# Patient Record
Sex: FEMALE | Race: WHITE | NOT HISPANIC OR LATINO | Employment: FULL TIME | URBAN - NONMETROPOLITAN AREA
[De-identification: names, ages, dates, MRNs, and addresses within clinical notes are randomized per-mention and may not be internally consistent; named-entity substitution may affect disease eponyms.]

---

## 2017-03-17 ENCOUNTER — OFFICE VISIT (OUTPATIENT)
Dept: CARDIOLOGY | Facility: CLINIC | Age: 57
End: 2017-03-17

## 2017-03-17 VITALS
HEIGHT: 62 IN | BODY MASS INDEX: 29.08 KG/M2 | SYSTOLIC BLOOD PRESSURE: 128 MMHG | WEIGHT: 158 LBS | OXYGEN SATURATION: 97 % | HEART RATE: 72 BPM | DIASTOLIC BLOOD PRESSURE: 79 MMHG

## 2017-03-17 DIAGNOSIS — R07.9 CHEST PAIN, UNSPECIFIED TYPE: Primary | ICD-10-CM

## 2017-03-17 DIAGNOSIS — E78.5 DYSLIPIDEMIA: ICD-10-CM

## 2017-03-17 DIAGNOSIS — R09.89 DIMINISHED PULSES IN LOWER EXTREMITY: ICD-10-CM

## 2017-03-17 DIAGNOSIS — R06.02 SHORTNESS OF BREATH: ICD-10-CM

## 2017-03-17 DIAGNOSIS — R00.2 PALPITATIONS: ICD-10-CM

## 2017-03-17 DIAGNOSIS — R60.9 PERIPHERAL EDEMA: ICD-10-CM

## 2017-03-17 DIAGNOSIS — J06.9 UPPER RESPIRATORY TRACT INFECTION, UNSPECIFIED TYPE: ICD-10-CM

## 2017-03-17 PROCEDURE — 99213 OFFICE O/P EST LOW 20 MIN: CPT | Performed by: NURSE PRACTITIONER

## 2017-03-17 PROCEDURE — 93000 ELECTROCARDIOGRAM COMPLETE: CPT | Performed by: NURSE PRACTITIONER

## 2017-03-17 RX ORDER — AZITHROMYCIN 250 MG/1
TABLET, FILM COATED ORAL
Qty: 6 TABLET | Refills: 0 | Status: SHIPPED | OUTPATIENT
Start: 2017-03-17 | End: 2017-10-09 | Stop reason: ALTCHOICE

## 2017-03-17 RX ORDER — ESTRADIOL 0.05 MG/D
FILM, EXTENDED RELEASE TRANSDERMAL
COMMUNITY
Start: 2015-05-13

## 2017-03-17 RX ORDER — DIMENHYDRINATE 50 MG
TABLET ORAL
COMMUNITY
Start: 2015-09-14

## 2017-03-17 RX ORDER — CALCIUM CARBONATE 500(1250)
TABLET ORAL DAILY
COMMUNITY
Start: 2015-05-13

## 2017-03-17 RX ORDER — MELOXICAM 15 MG/1
15 TABLET ORAL DAILY
COMMUNITY
Start: 2017-01-10 | End: 2021-01-19

## 2017-03-17 RX ORDER — LORATADINE 10 MG/1
TABLET ORAL DAILY
COMMUNITY
Start: 2015-09-14 | End: 2019-06-10 | Stop reason: SDUPTHER

## 2017-03-17 RX ORDER — NARATRIPTAN 2.5 MG/1
2.5 TABLET ORAL EVERY 4 HOURS PRN
COMMUNITY
Start: 2015-05-13

## 2017-03-17 RX ORDER — NICOTINE POLACRILEX 4 MG/1
GUM, CHEWING ORAL DAILY
COMMUNITY
Start: 2015-05-13 | End: 2018-07-09 | Stop reason: SDUPTHER

## 2017-03-17 RX ORDER — NAPROXEN 500 MG/1
500 TABLET ORAL DAILY PRN
COMMUNITY
Start: 2015-09-14

## 2017-03-17 NOTE — PROGRESS NOTES
Subjective   Linda Schafer is a 56 y.o. female     Chief Complaint   Patient presents with   • Follow-up     presents as a follow up       HPI    Problem list    1. History of heart murmur  1.1 Echo 10/27/15 - EF 60-65%; mild MR and TR; PA 25-30  2. Dyslipidemia  3. Palpitations      4. BLE Arterial Ultrasound 9/14/15 - no obstructive disease on either side; mildly abnormal doppler profile left distally.          5. Chest Pain         5.1 Stress Test 10/27/15 - low risk study    Patient is a 56-year-old female who presents today for a follow-up.  She says she has been having sharp pain under her left breast.  She has had 3 in 2 weeks.  They come and go really quickly.  She will have palpitations, but they are random.  They occur when she is anxious, but without anxiety as well.  She had dizziness when she had the flu in Jan, but otherwise no dizziness, presyncope or syncope.  She denies any orthopnea or PND.  She will get edema if she misses her water pill.  She says since she had the flu she has had shortness of breath with her normal activity.  PCP monitors her cholesterol.     Current Outpatient Prescriptions   Medication Sig Dispense Refill   • aspirin 81 MG tablet Take  by mouth Daily.     • atorvastatin (LIPITOR) 20 MG tablet TAKE ONE TABLET BY MOUTH EVERY NIGHT AT BEDTIME 30 tablet 2   • Calcium 500 MG tablet Take  by mouth Daily.     • Coenzyme Q10 (CO Q-10) 100 MG capsule Take  by mouth.     • estradiol (MINIVELLE) 0.05 MG/24HR patch Place  on the skin.     • furosemide (LASIX) 40 MG tablet Take 1 tablet by mouth Daily. 30 tablet 1   • loratadine (ALLERGY RELIEF) 10 MG tablet Take  by mouth Daily.     • MULTIPLE VITAMIN PO Take  by mouth Daily.     • naproxen (NAPROSYN) 500 MG tablet Take 500 mg by mouth Daily As Needed.     • naratriptan (AMERGE) 2.5 MG tablet Take 2.5 mg by mouth Every 4 (Four) Hours As Needed. migraines     • Omeprazole 20 MG tablet delayed-release Take  by mouth Daily.     • potassium  chloride (K-DUR) 10 MEQ CR tablet Take 1 tablet by mouth Daily. 30 tablet 1   • azithromycin (ZITHROMAX) 250 MG tablet Take 2 tablets the first day, then 1 tablet daily for 4 days. 6 tablet 0   • meloxicam (MOBIC) 15 MG tablet Take 15 mg by mouth Daily.       No current facility-administered medications for this visit.        ALLERGIES    Codeine; Penicillins; and Sulfa antibiotics    Past Medical History   Diagnosis Date   • Atypical chest pain      Chest pain.Atypical chest pain   • Chest pain    • Diminished pulses in lower extremity    • Diverticulosis    • Dizziness      Occasional   • Dyslipidemia    • Dyspnea    • Edema    • Fatigue    • GERD (gastroesophageal reflux disease)    • Hiatal hernia    • Migraine headache    • Palpitations    • Personal history of cardiac murmur    • SOB (shortness of breath)        Social History     Social History   • Marital status:      Spouse name: N/A   • Number of children: N/A   • Years of education: N/A     Occupational History   • Not on file.     Social History Main Topics   • Smoking status: Never Smoker   • Smokeless tobacco: Not on file   • Alcohol use No   • Drug use: Not on file   • Sexual activity: Not on file     Other Topics Concern   • Not on file     Social History Narrative       Family History   Problem Relation Age of Onset   • Hyperlipidemia Other      Dyslipidemia   • Stroke Other      CVA   • Coronary artery disease Other    • Diabetes Other    • Hypertension Other    • Sudden death Other      Sudden Cardiac Death (SCD)       Review of Systems   Constitutional: Positive for fatigue (patient recently has had the flu and has not gotten her strength back). Negative for diaphoresis.   HENT: Positive for congestion, postnasal drip, rhinorrhea and sneezing.    Eyes: Positive for visual disturbance (reading glasses ).   Respiratory: Positive for shortness of breath (some shortness of breath with daily activity s/p flu). Negative for chest tightness.   "  Cardiovascular: Positive for chest pain (discomfort under left breast sharp pain have had 3 in 2 weeks; comes and goes quickly   ), palpitations (just random, if get nervous, but not always ) and leg swelling (if misses her water pill ).   Gastrointestinal: Negative for nausea and vomiting.   Endocrine: Negative.    Genitourinary: Negative for difficulty urinating.   Musculoskeletal: Positive for arthralgias and neck pain (neck and shoulders; MRI DDD). Negative for back pain.   Skin: Negative.    Allergic/Immunologic: Positive for environmental allergies.   Neurological: Positive for dizziness (when she had the flu in Jan ) and headaches (h/o of migraines ). Negative for syncope and light-headedness.   Hematological: Bruises/bleeds easily (has blood blisters come up on her hands, mostly around fingertips, after they bust her fingers turn white).   Psychiatric/Behavioral: Negative.        Objective   Visit Vitals   • /79 (BP Location: Left arm, Patient Position: Sitting)   • Pulse 72   • Ht 62\" (157.5 cm)   • Wt 158 lb (71.7 kg)   • SpO2 97%   • BMI 28.9 kg/m2     Lab Results (most recent)     None        Physical Exam   Constitutional: She is oriented to person, place, and time. Vital signs are normal. She appears well-developed and well-nourished. She is active and cooperative.   HENT:   Head: Normocephalic.   Cardiovascular: Normal rate, regular rhythm and normal heart sounds.    Pulses:       Radial pulses are 2+ on the right side, and 2+ on the left side.        Dorsalis pedis pulses are 2+ on the right side, and 2+ on the left side.        Posterior tibial pulses are 2+ on the right side, and 2+ on the left side.   Trace edema BLE.    Pulmonary/Chest: Effort normal and breath sounds normal.   Abdominal: Normal appearance.   Neurological: She is alert and oriented to person, place, and time.   Skin: Skin is warm, dry and intact.   Psychiatric: She has a normal mood and affect. Her speech is normal and " behavior is normal. Judgment and thought content normal. Cognition and memory are normal.       Procedure     ECG 12 Lead  Date/Time: 3/17/2017 8:23 AM  Performed by: JUAN SHABAZZ  Authorized by: JUAN SHABAZZ   Rhythm: sinus rhythm  Rate: normal  BPM: 70  QRS axis: normal  Clinical impression: normal ECG and low voltage  Comments: Chest pain  Palpitations  Shortness of breath                   Assessment/Plan      Diagnosis Plan   1. Chest pain, unspecified type  ECG 12 Lead    Adult Transthoracic Echo Complete    Adult Transthoracic Echo Complete   2. Palpitations  ECG 12 Lead    Adult Transthoracic Echo Complete    Adult Transthoracic Echo Complete   3. Shortness of breath  ECG 12 Lead    Adult Transthoracic Echo Complete    Adult Transthoracic Echo Complete   4. Dyslipidemia  Adult Transthoracic Echo Complete    Adult Transthoracic Echo Complete   5. Diminished pulses in lower extremity  Adult Transthoracic Echo Complete    Adult Transthoracic Echo Complete   6. Peripheral edema  Adult Transthoracic Echo Complete    Adult Transthoracic Echo Complete   7. Upper respiratory tract infection, unspecified type  azithromycin (ZITHROMAX) 250 MG tablet    Adult Transthoracic Echo Complete    Adult Transthoracic Echo Complete       Return for After testing.         Patient does not want to have a stress test for her chest pain, but will have an echo.  She will continue her medication regimen, however, I sent a Zpack for her URI.  She will follow-up after testing or sooner if any changes.

## 2017-03-17 NOTE — PATIENT INSTRUCTIONS
Palpitations  A palpitation is the feeling that your heartbeat is irregular or is faster than normal. It may feel like your heart is fluttering or skipping a beat. Palpitations are usually not a serious problem. However, in some cases, you may need further medical evaluation.  CAUSES   Palpitations can be caused by:  · Smoking.  · Caffeine or other stimulants, such as diet pills or energy drinks.  · Alcohol.  · Stress and anxiety.  · Strenuous physical activity.  · Fatigue.  · Certain medicines.  · Heart disease, especially if you have a history of irregular heart rhythms (arrhythmias), such as atrial fibrillation, atrial flutter, or supraventricular tachycardia.  · An improperly working pacemaker or defibrillator.  DIAGNOSIS   To find the cause of your palpitations, your health care provider will take your medical history and perform a physical exam. Your health care provider may also have you take a test called an ambulatory electrocardiogram (ECG). An ECG records your heartbeat patterns over a 24-hour period. You may also have other tests, such as:  · Transthoracic echocardiogram (TTE). During echocardiography, sound waves are used to evaluate how blood flows through your heart.  · Transesophageal echocardiogram (NAI).  · Cardiac monitoring. This allows your health care provider to monitor your heart rate and rhythm in real time.  · Holter monitor. This is a portable device that records your heartbeat and can help diagnose heart arrhythmias. It allows your health care provider to track your heart activity for several days, if needed.  · Stress tests by exercise or by giving medicine that makes the heart beat faster.  TREATMENT   Treatment of palpitations depends on the cause of your symptoms and can vary greatly. Most cases of palpitations do not require any treatment other than time, relaxation, and monitoring your symptoms. Other causes, such as atrial fibrillation, atrial flutter, or supraventricular  tachycardia, usually require further treatment.  HOME CARE INSTRUCTIONS   · Avoid:    Caffeinated coffee, tea, soft drinks, diet pills, and energy drinks.    Chocolate.    Alcohol.  · Stop smoking if you smoke.  · Reduce your stress and anxiety. Things that can help you relax include:    A method of controlling things in your body, such as your heartbeats, with your mind (biofeedback).    Yoga.    Meditation.    Physical activity such as swimming, jogging, or walking.  · Get plenty of rest and sleep.  SEEK MEDICAL CARE IF:   · You continue to have a fast or irregular heartbeat beyond 24 hours.  · Your palpitations occur more often.  SEEK IMMEDIATE MEDICAL CARE IF:  · You have chest pain or shortness of breath.  · You have a severe headache.  · You feel dizzy or you faint.  MAKE SURE YOU:  · Understand these instructions.  · Will watch your condition.  · Will get help right away if you are not doing well or get worse.     This information is not intended to replace advice given to you by your health care provider. Make sure you discuss any questions you have with your health care provider.     Document Released: 12/15/2001 Document Revised: 12/23/2014 Document Reviewed: 09/01/2016  [employer] Patient Education ©2016 [employer]  Nonspecific Chest Pain   Chest pain can be caused by many different conditions. There is always a chance that your pain could be related to something serious, such as a heart attack or a blood clot in your lungs. Chest pain can also be caused by conditions that are not life-threatening. If you have chest pain, it is very important to follow up with your health care provider.  CAUSES   Chest pain can be caused by:  · Heartburn.  · Pneumonia or bronchitis.  · Anxiety or stress.  · Inflammation around your heart (pericarditis) or lung (pleuritis or pleurisy).  · A blood clot in your lung.  · A collapsed lung (pneumothorax). It can develop suddenly on its own (spontaneous  pneumothorax) or from trauma to the chest.  · Shingles infection (varicella-zoster virus).  · Heart attack.  · Damage to the bones, muscles, and cartilage that make up your chest wall. This can include:    Bruised bones due to injury.    Strained muscles or cartilage due to frequent or repeated coughing or overwork.    Fracture to one or more ribs.    Sore cartilage due to inflammation (costochondritis).  RISK FACTORS   Risk factors for chest pain may include:  · Activities that increase your risk for trauma or injury to your chest.  · Respiratory infections or conditions that cause frequent coughing.  · Medical conditions or overeating that can cause heartburn.  · Heart disease or family history of heart disease.  · Conditions or health behaviors that increase your risk of developing a blood clot.  · Having had chicken pox (varicella zoster).  SIGNS AND SYMPTOMS  Chest pain can feel like:  · Burning or tingling on the surface of your chest or deep in your chest.  · Crushing, pressure, aching, or squeezing pain.  · Dull or sharp pain that is worse when you move, cough, or take a deep breath.  · Pain that is also felt in your back, neck, shoulder, or arm, or pain that spreads to any of these areas.  Your chest pain may come and go, or it may stay constant.  DIAGNOSIS  Lab tests or other studies may be needed to find the cause of your pain. Your health care provider may have you take a test called an ambulatory ECG (electrocardiogram). An ECG records your heartbeat patterns at the time the test is performed. You may also have other tests, such as:  · Transthoracic echocardiogram (TTE). During echocardiography, sound waves are used to create a picture of all of the heart structures and to look at how blood flows through your heart.  · Transesophageal echocardiogram (NAI). This is a more advanced imaging test that obtains images from inside your body. It allows your health care provider to see your heart in finer  detail.  · Cardiac monitoring. This allows your health care provider to monitor your heart rate and rhythm in real time.  · Holter monitor. This is a portable device that records your heartbeat and can help to diagnose abnormal heartbeats. It allows your health care provider to track your heart activity for several days, if needed.  · Stress tests. These can be done through exercise or by taking medicine that makes your heart beat more quickly.  · Blood tests.  · Imaging tests.  TREATMENT   Your treatment depends on what is causing your chest pain. Treatment may include:  · Medicines. These may include:    Acid blockers for heartburn.    Anti-inflammatory medicine.    Pain medicine for inflammatory conditions.    Antibiotic medicine, if an infection is present.    Medicines to dissolve blood clots.    Medicines to treat coronary artery disease.  · Supportive care for conditions that do not require medicines. This may include:    Resting.    Applying heat or cold packs to injured areas.    Limiting activities until pain decreases.  HOME CARE INSTRUCTIONS  · If you were prescribed an antibiotic medicine, finish it all even if you start to feel better.  · Avoid any activities that bring on chest pain.  · Do not use any tobacco products, including cigarettes, chewing tobacco, or electronic cigarettes. If you need help quitting, ask your health care provider.  · Do not drink alcohol.  · Take medicines only as directed by your health care provider.  · Keep all follow-up visits as directed by your health care provider. This is important. This includes any further testing if your chest pain does not go away.  · If heartburn is the cause for your chest pain, you may be told to keep your head raised (elevated) while sleeping. This reduces the chance that acid will go from your stomach into your esophagus.  · Make lifestyle changes as directed by your health care provider. These may include:    Getting regular exercise. Ask  your health care provider to suggest some activities that are safe for you.    Eating a heart-healthy diet. A registered dietitian can help you to learn healthy eating options.    Maintaining a healthy weight.    Managing diabetes, if necessary.    Reducing stress.  SEEK MEDICAL CARE IF:  · Your chest pain does not go away after treatment.  · You have a rash with blisters on your chest.  · You have a fever.  SEEK IMMEDIATE MEDICAL CARE IF:   · Your chest pain is worse.  · You have an increasing cough, or you cough up blood.  · You have severe abdominal pain.  · You have severe weakness.  · You faint.  · You have chills.  · You have sudden, unexplained chest discomfort.  · You have sudden, unexplained discomfort in your arms, back, neck, or jaw.  · You have shortness of breath at any time.  · You suddenly start to sweat, or your skin gets clammy.  · You feel nauseous or you vomit.  · You suddenly feel light-headed or dizzy.  · Your heart begins to beat quickly, or it feels like it is skipping beats.  These symptoms may represent a serious problem that is an emergency. Do not wait to see if the symptoms will go away. Get medical help right away. Call your local emergency services (911 in the [place] Do not drive yourself to the hospital.     This information is not intended to replace advice given to you by your health care provider. Make sure you discuss any questions you have with your health care provider.     Document Released: 09/27/2006 Document Revised: 01/08/2016 Document Reviewed: 07/24/2015  [employer] Patient Education ©2016 [employer]

## 2017-03-28 ENCOUNTER — APPOINTMENT (OUTPATIENT)
Dept: CARDIOLOGY | Facility: HOSPITAL | Age: 57
End: 2017-03-28

## 2017-04-06 DIAGNOSIS — R60.9 EDEMA, UNSPECIFIED TYPE: ICD-10-CM

## 2017-04-06 RX ORDER — POTASSIUM CHLORIDE 750 MG/1
TABLET, FILM COATED, EXTENDED RELEASE ORAL
Qty: 30 TABLET | Refills: 5 | Status: SHIPPED | OUTPATIENT
Start: 2017-04-06 | End: 2017-09-29 | Stop reason: SDUPTHER

## 2017-04-06 RX ORDER — FUROSEMIDE 40 MG/1
TABLET ORAL
Qty: 30 TABLET | Refills: 5 | Status: SHIPPED | OUTPATIENT
Start: 2017-04-06 | End: 2017-09-29 | Stop reason: SDUPTHER

## 2017-04-11 ENCOUNTER — HOSPITAL ENCOUNTER (OUTPATIENT)
Dept: CARDIOLOGY | Facility: HOSPITAL | Age: 57
Discharge: HOME | End: 2017-04-11
Admitting: NURSE PRACTITIONER

## 2017-04-11 ENCOUNTER — OUTSIDE FACILITY SERVICE (OUTPATIENT)
Dept: CARDIOLOGY | Facility: CLINIC | Age: 57
End: 2017-04-11

## 2017-04-11 PROCEDURE — 93306 TTE W/DOPPLER COMPLETE: CPT

## 2017-04-11 PROCEDURE — 93306 TTE W/DOPPLER COMPLETE: CPT | Performed by: INTERNAL MEDICINE

## 2017-04-20 ENCOUNTER — DOCUMENTATION (OUTPATIENT)
Dept: CARDIOLOGY | Facility: CLINIC | Age: 57
End: 2017-04-20

## 2017-09-05 RX ORDER — ATORVASTATIN CALCIUM 20 MG/1
TABLET, FILM COATED ORAL
Qty: 30 TABLET | Refills: 1 | Status: SHIPPED | OUTPATIENT
Start: 2017-09-05 | End: 2017-11-19 | Stop reason: SDUPTHER

## 2017-09-29 DIAGNOSIS — R60.9 EDEMA, UNSPECIFIED TYPE: ICD-10-CM

## 2017-09-29 RX ORDER — POTASSIUM CHLORIDE 750 MG/1
TABLET, FILM COATED, EXTENDED RELEASE ORAL
Qty: 30 TABLET | Refills: 4 | Status: SHIPPED | OUTPATIENT
Start: 2017-09-29 | End: 2018-02-22 | Stop reason: SDUPTHER

## 2017-09-29 RX ORDER — FUROSEMIDE 40 MG/1
TABLET ORAL
Qty: 30 TABLET | Refills: 4 | Status: SHIPPED | OUTPATIENT
Start: 2017-09-29 | End: 2018-02-22 | Stop reason: SDUPTHER

## 2017-10-09 ENCOUNTER — OFFICE VISIT (OUTPATIENT)
Dept: CARDIOLOGY | Facility: CLINIC | Age: 57
End: 2017-10-09

## 2017-10-09 VITALS
HEART RATE: 71 BPM | WEIGHT: 159.2 LBS | HEIGHT: 62 IN | SYSTOLIC BLOOD PRESSURE: 117 MMHG | OXYGEN SATURATION: 95 % | DIASTOLIC BLOOD PRESSURE: 73 MMHG | BODY MASS INDEX: 29.3 KG/M2

## 2017-10-09 DIAGNOSIS — E78.5 DYSLIPIDEMIA: Primary | ICD-10-CM

## 2017-10-09 DIAGNOSIS — R60.9 PERIPHERAL EDEMA: ICD-10-CM

## 2017-10-09 DIAGNOSIS — R00.2 PALPITATIONS: ICD-10-CM

## 2017-10-09 PROCEDURE — 99213 OFFICE O/P EST LOW 20 MIN: CPT | Performed by: NURSE PRACTITIONER

## 2017-10-09 RX ORDER — UBIDECARENONE 75 MG
50 CAPSULE ORAL DAILY
COMMUNITY

## 2017-10-09 RX ORDER — PANTOPRAZOLE SODIUM 20 MG/1
20 TABLET, DELAYED RELEASE ORAL DAILY
COMMUNITY
End: 2017-10-09

## 2017-10-09 NOTE — PROGRESS NOTES
Subjective   Linda Schafer is a 56 y.o. female     Chief Complaint   Patient presents with   • Palpitations     presents as a follow up       HPI    Problem list    1. History of heart murmur  1.1 Echo 10/27/15 - EF 60-65%; mild MR and TR; PA 25-30  1.2 Echo 4/11/17-EF greater than 65%, diastolic dysfunction 1, trace MR and trace TR, PA 25-30  2. Dyslipidemia  3. Palpitations      4. BLE Arterial Ultrasound 9/14/15 - no obstructive disease on either side; mildly abnormal doppler profile left distally.          5. Chest Pain         5.1 Stress Test 10/27/15 - low risk study    Patient is a 56-year-old female who presents today for follow-up.  He denies any chest pain, pressure, palpitations, fluttering, dizziness, presyncope, syncope, orthopnea or PND.  She does get bilateral lower extremity edema when her feet hanging down all day.  She says if she takes her Lasix scheduled then she does fine.  I did encourage her on using compression socks and elevating her legs when she is at work.  She denies any shortness of breath, she really overexerts herself.  PCP monitors her cholesterol.    Current Outpatient Prescriptions   Medication Sig Dispense Refill   • aspirin 81 MG tablet Take 81 mg by mouth Daily.     • atorvastatin (LIPITOR) 20 MG tablet TAKE ONE TABLET BY MOUTH EVERY NIGHT AT BEDTIME 30 tablet 1   • Calcium 500 MG tablet Take  by mouth Daily.     • Coenzyme Q10 (CO Q-10) 100 MG capsule Take  by mouth.     • estradiol (MINIVELLE) 0.05 MG/24HR patch Place  on the skin.     • furosemide (LASIX) 40 MG tablet TAKE ONE TABLET BY MOUTH DAILY 30 tablet 4   • loratadine (ALLERGY RELIEF) 10 MG tablet Take  by mouth Daily.     • meloxicam (MOBIC) 15 MG tablet Take 15 mg by mouth Daily.     • MULTIPLE VITAMIN PO Take  by mouth Daily.     • naproxen (NAPROSYN) 500 MG tablet Take 500 mg by mouth Daily As Needed.     • naratriptan (AMERGE) 2.5 MG tablet Take 2.5 mg by mouth Every 4 (Four) Hours As Needed. migraines     •  Omeprazole 20 MG tablet delayed-release Take  by mouth Daily.     • potassium chloride (K-DUR) 10 MEQ CR tablet TAKE ONE TABLET BY MOUTH DAILY 30 tablet 4   • vitamin B-12 (CYANOCOBALAMIN) 100 MCG tablet Take 50 mcg by mouth Daily.       No current facility-administered medications for this visit.        ALLERGIES    Codeine; Penicillins; and Sulfa antibiotics    Past Medical History:   Diagnosis Date   • Atypical chest pain     Chest pain.Atypical chest pain   • Chest pain    • Diminished pulses in lower extremity    • Diverticulosis    • Dizziness     Occasional   • Dyslipidemia    • Dyspnea    • Edema    • Fatigue    • GERD (gastroesophageal reflux disease)    • Hiatal hernia    • Migraine headache    • Palpitations    • Personal history of cardiac murmur    • SOB (shortness of breath)        Social History     Social History   • Marital status:      Spouse name: N/A   • Number of children: N/A   • Years of education: N/A     Occupational History   • Not on file.     Social History Main Topics   • Smoking status: Never Smoker   • Smokeless tobacco: Never Used   • Alcohol use No   • Drug use: Not on file   • Sexual activity: Not on file     Other Topics Concern   • Not on file     Social History Narrative       Family History   Problem Relation Age of Onset   • Hyperlipidemia Other      Dyslipidemia   • Stroke Other      CVA   • Coronary artery disease Other    • Diabetes Other    • Hypertension Other    • Sudden death Other      Sudden Cardiac Death (SCD)       Review of Systems   Constitutional: Negative for diaphoresis and fatigue.   HENT: Positive for rhinorrhea and sneezing.    Eyes: Positive for visual disturbance (reading glasses).        Watery eyes    Respiratory: Positive for shortness of breath (only really over does it ). Negative for chest tightness.    Cardiovascular: Positive for leg swelling (sits with feet hanging all day ). Negative for chest pain and palpitations.   Gastrointestinal:  "Negative for nausea and vomiting.   Endocrine: Negative.    Genitourinary: Negative for difficulty urinating.   Musculoskeletal: Positive for neck pain (had rehab for 3 mos this year DDD). Negative for arthralgias and back pain.   Skin: Negative.    Allergic/Immunologic: Positive for environmental allergies.   Neurological: Positive for headaches (H/O migraines ). Negative for dizziness, syncope and light-headedness.   Hematological: Negative.    Psychiatric/Behavioral: Negative.        Objective   /73 (BP Location: Left arm, Patient Position: Sitting)  Pulse 71  Ht 62\" (157.5 cm)  Wt 159 lb 3.2 oz (72.2 kg)  SpO2 95%  BMI 29.12 kg/m2  Vitals:    10/09/17 0819   BP: 117/73   BP Location: Left arm   Patient Position: Sitting   Pulse: 71   SpO2: 95%   Weight: 159 lb 3.2 oz (72.2 kg)   Height: 62\" (157.5 cm)      Lab Results (most recent)     None        Physical Exam   Constitutional: She is oriented to person, place, and time. Vital signs are normal. She appears well-developed and well-nourished. She is active and cooperative.   HENT:   Head: Normocephalic.   Eyes: Lids are normal.   Neck: Normal carotid pulses, no hepatojugular reflux and no JVD present. Carotid bruit is not present.   Cardiovascular: Normal rate, regular rhythm and normal heart sounds.    Pulses:       Radial pulses are 2+ on the right side, and 2+ on the left side.        Dorsalis pedis pulses are 2+ on the right side, and 2+ on the left side.        Posterior tibial pulses are 2+ on the right side, and 2+ on the left side.   No edema BLE.    Pulmonary/Chest: Effort normal and breath sounds normal.   Abdominal: Normal appearance and bowel sounds are normal.   Musculoskeletal:        Right foot: There is bony tenderness.   Neurological: She is alert and oriented to person, place, and time.   Skin: Skin is warm, dry and intact.   Psychiatric: She has a normal mood and affect. Her speech is normal and behavior is normal. Judgment and " thought content normal. Cognition and memory are normal.       Procedure   Procedures         Assessment/Plan      Diagnosis Plan   1. Dyslipidemia     2. Palpitations     3. Peripheral edema         Return in about 6 months (around 4/9/2018).    Dyslipidemia-patient is on Lipitor monitor by PCP.  Palpitations-patient doing well.  Peripheral edema-patient is on Lasix.  She will continue her medication regimen.  She will use compression socks and elevate her legs.  She will follow-up in 6 months or sooner if any changes.

## 2017-10-09 NOTE — PATIENT INSTRUCTIONS
Edema  Edema is an abnormal buildup of fluids in your body tissues. Edema is somewhat dependent on gravity to pull the fluid to the lowest place in your body. That makes the condition more common in the legs and thighs (lower extremities). Painless swelling of the feet and ankles is common and becomes more likely as you get older. It is also common in looser tissues, like around your eyes.   When the affected area is squeezed, the fluid may move out of that spot and leave a dent for a few moments. This dent is called pitting.   CAUSES   There are many possible causes of edema. Eating too much salt and being on your feet or sitting for a long time can cause edema in your legs and ankles. Hot weather may make edema worse. Common medical causes of edema include:  · Heart failure.  · Liver disease.  · Kidney disease.  · Weak blood vessels in your legs.  · Cancer.  · An injury.  · Pregnancy.  · Some medications.  · Obesity.   SYMPTOMS   Edema is usually painless. Your skin may look swollen or shiny.   DIAGNOSIS   Your health care provider may be able to diagnose edema by asking about your medical history and doing a physical exam. You may need to have tests such as X-rays, an electrocardiogram, or blood tests to check for medical conditions that may cause edema.   TREATMENT   Edema treatment depends on the cause. If you have heart, liver, or kidney disease, you need the treatment appropriate for these conditions. General treatment may include:  · Elevation of the affected body part above the level of your heart.  · Compression of the affected body part. Pressure from elastic bandages or support stockings squeezes the tissues and forces fluid back into the blood vessels. This keeps fluid from entering the tissues.  · Restriction of fluid and salt intake.  · Use of a water pill (diuretic). These medications are appropriate only for some types of edema. They pull fluid out of your body and make you urinate more often. This  gets rid of fluid and reduces swelling, but diuretics can have side effects. Only use diuretics as directed by your health care provider.  HOME CARE INSTRUCTIONS   · Keep the affected body part above the level of your heart when you are lying down.    · Do not sit still or stand for prolonged periods.    · Do not put anything directly under your knees when lying down.  · Do not wear constricting clothing or garters on your upper legs.    · Exercise your legs to work the fluid back into your blood vessels. This may help the swelling go down.    · Wear elastic bandages or support stockings to reduce ankle swelling as directed by your health care provider.    · Eat a low-salt diet to reduce fluid if your health care provider recommends it.    · Only take medicines as directed by your health care provider.   SEEK MEDICAL CARE IF:   · Your edema is not responding to treatment.  · You have heart, liver, or kidney disease and notice symptoms of edema.  · You have edema in your legs that does not improve after elevating them.    · You have sudden and unexplained weight gain.  SEEK IMMEDIATE MEDICAL CARE IF:   · You develop shortness of breath or chest pain.    · You cannot breathe when you lie down.  · You develop pain, redness, or warmth in the swollen areas.    · You have heart, liver, or kidney disease and suddenly get edema.  · You have a fever and your symptoms suddenly get worse.  MAKE SURE YOU:   · Understand these instructions.  · Will watch your condition.  · Will get help right away if you are not doing well or get worse.     This information is not intended to replace advice given to you by your health care provider. Make sure you discuss any questions you have with your health care provider.     Document Released: 12/18/2006 Document Revised: 04/10/2017 Document Reviewed: 10/10/2014  [employer] Interactive Patient Education ©2017 [employer]  Pulmonary Hypertension  Pulmonary hypertension is high blood pressure  within the arteries in your lungs (pulmonary arteries). It is different than having high blood pressure elsewhere in your body, such as blood pressure that is measured with a blood pressure cuff. Pulmonary hypertension makes it harder for blood to flow through the lungs. As a result, the heart must work harder to pump blood through the lungs, and it may be harder for you to breathe. Over time, this can weaken the heart muscle. Pulmonary hypertension is a serious condition and it can be fatal.   CAUSES  Many different medical conditions can cause pulmonary hypertension. Pulmonary hypertension can be categorized by cause into five groups:  Group 1  Pulmonary hypertension that is caused by abnormal growth of small blood vessels in the lungs (pulmonary arterial hypertension). The abnormal blood vessel growth may have no known cause, or it may be:  · Passed along from a parent (hereditary).  · Caused by another disease, such as a connective tissue disease (including lupus or scleroderma) or HIV.  · Caused by certain drugs or toxins.  Group 2  Pulmonary hypertension that is caused by weakness of the main chamber of the heart (left ventricle) or heart valve disease.  Group 3  Pulmonary hypertension that is caused by lung disease or low oxygen levels. Causes in this group include:  · Emphysema or chronic obstructive pulmonary disease (COPD).  · Untreated sleep apnea.  · Pulmonary fibrosis.  Group 4  Pulmonary hypertension that is caused by blood clots in the lungs (pulmonary emboli).   Group 5  Other causes of pulmonary hypertension, such as sickle cell anemia, or a mix of multiple causes.   SYMPTOMS  Symptoms of this condition include:  · Shortness of breath. You may notice shortness of breath with:    Activity, such as walking.    No activity.  · Tiredness and fatigue.  · Dizziness or fainting.  · Rapid heartbeat or feeling your heart flutter or skip a beat (palpitations).  · Neck vein enlargement.  · Bluish color to your  lips and fingertips.  DIAGNOSIS  This condition may be diagnosed by:  · Chest X-ray.  · Arterial blood gases. This test checks the acidity of your blood as well as your blood oxygen and carbon dioxide levels.  · CT scan. This test can provide detailed images of your lungs.  · Pulmonary function test. This test measures how much air your lungs can hold. It also tests how well air moves in and out of your lungs.  · Electrocardiogram (ECG). This test traces the electrical activity of your heart.  · Echocardiogram. This test is used to look at your heart in motion and check how it is functioning.  · Heart catheterization. This test can measure the pressure in your pulmonary artery and the right side of your heart.  · Lung biopsy. This procedure involves checking a sample of lung tissue to find underlying causes.  TREATMENT  There is no cure for pulmonary hypertension, but treatment can help to relieve symptoms and slow the progress of the condition. Treatment can involve:  · Medicines, such as:    Blood pressure medicines.    Medicines to relax (dilate) the pulmonary blood vessels.    Water pills to get rid of extra fluid (diuretic medicines).    Blood-thinning medicines.  · Surgery. For severe pulmonary hypertension that does not respond to medical treatment, heart-lung or lung transplant may be needed.  HOME CARE INSTRUCTIONS  · Take medicines only as directed by your health care provider. These include over-the-counter medicines and prescription medicines. Take all medicines exactly as instructed. Do not change or stop medicines without first checking with your health care provider.  · Do not smoke. If you need help quitting, ask your health care provider.  · Eat a healthy diet.  · Limit your salt (sodium) intake to less than 2,300 mg per day.  · Stay as active as possible. Exercise as directed by your health care provider. Talk with your health care provider about what type of exercise is safe for you.  · Avoid high  altitudes.  · Avoid hot tubs and saunas.  · Avoid becoming pregnant, if this applies. Talk with your health care provider about safe methods of birth control.  · Keep all follow-up visits as directed by your health care provider. This is important.  SEEK IMMEDIATE MEDICAL CARE IF:  · You have severe shortness of breath.  · You develop chest pain or pressure in your chest.  · You cough up blood.  · You develop swelling of your feet or legs.  · You have a significant increase in weight within 1-2 days.     This information is not intended to replace advice given to you by your health care provider. Make sure you discuss any questions you have with your health care provider.     Document Released: 10/14/2008 Document Revised: 04/10/2017 Document Reviewed: 06/09/2014  [employer] Interactive Patient Education ©2017 [employer]

## 2017-10-16 DIAGNOSIS — J30.2 SEASONAL ALLERGIC RHINITIS, UNSPECIFIED CHRONICITY, UNSPECIFIED TRIGGER: Primary | ICD-10-CM

## 2017-10-16 RX ORDER — LEVOCETIRIZINE DIHYDROCHLORIDE 5 MG/1
5 TABLET, FILM COATED ORAL EVERY EVENING
Qty: 30 TABLET | Refills: 4 | Status: SHIPPED | OUTPATIENT
Start: 2017-10-16 | End: 2018-04-05 | Stop reason: SDUPTHER

## 2017-10-16 NOTE — TELEPHONE ENCOUNTER
Per CINDY Murray patient may try Xyzal 5 mg daily in conjunction with her Loratadine or she may try Flonase Nasal Spray. Patient desires Xyzal tabs.

## 2017-10-24 ENCOUNTER — APPOINTMENT (OUTPATIENT)
Dept: WOMENS IMAGING | Facility: HOSPITAL | Age: 57
End: 2017-10-24

## 2017-10-24 PROCEDURE — 77067 SCR MAMMO BI INCL CAD: CPT | Performed by: RADIOLOGY

## 2017-10-24 PROCEDURE — 77063 BREAST TOMOSYNTHESIS BI: CPT | Performed by: RADIOLOGY

## 2017-11-20 RX ORDER — ATORVASTATIN CALCIUM 20 MG/1
TABLET, FILM COATED ORAL
Qty: 30 TABLET | Refills: 0 | Status: SHIPPED | OUTPATIENT
Start: 2017-11-20 | End: 2017-12-16 | Stop reason: SDUPTHER

## 2017-12-18 RX ORDER — ATORVASTATIN CALCIUM 20 MG/1
TABLET, FILM COATED ORAL
Qty: 30 TABLET | Refills: 5 | Status: SHIPPED | OUTPATIENT
Start: 2017-12-18 | End: 2018-04-23 | Stop reason: SDUPTHER

## 2018-02-22 DIAGNOSIS — R60.9 EDEMA, UNSPECIFIED TYPE: ICD-10-CM

## 2018-02-22 RX ORDER — POTASSIUM CHLORIDE 750 MG/1
TABLET, FILM COATED, EXTENDED RELEASE ORAL
Qty: 30 TABLET | Refills: 5 | Status: SHIPPED | OUTPATIENT
Start: 2018-02-22 | End: 2018-08-18 | Stop reason: SDUPTHER

## 2018-02-22 RX ORDER — FUROSEMIDE 40 MG/1
TABLET ORAL
Qty: 30 TABLET | Refills: 5 | Status: SHIPPED | OUTPATIENT
Start: 2018-02-22 | End: 2018-08-18 | Stop reason: SDUPTHER

## 2018-04-05 DIAGNOSIS — J30.2 SEASONAL ALLERGIC RHINITIS, UNSPECIFIED CHRONICITY, UNSPECIFIED TRIGGER: ICD-10-CM

## 2018-04-05 RX ORDER — LEVOCETIRIZINE DIHYDROCHLORIDE 5 MG/1
TABLET, FILM COATED ORAL
Qty: 30 TABLET | Refills: 3 | Status: SHIPPED | OUTPATIENT
Start: 2018-04-05 | End: 2018-08-28 | Stop reason: SDUPTHER

## 2018-04-23 RX ORDER — ATORVASTATIN CALCIUM 20 MG/1
TABLET, FILM COATED ORAL
Qty: 90 TABLET | Refills: 4 | Status: SHIPPED | OUTPATIENT
Start: 2018-04-23 | End: 2019-07-14 | Stop reason: SDUPTHER

## 2018-07-09 ENCOUNTER — TELEPHONE (OUTPATIENT)
Dept: CARDIOLOGY | Facility: CLINIC | Age: 58
End: 2018-07-09

## 2018-07-09 DIAGNOSIS — K21.9 GASTROESOPHAGEAL REFLUX DISEASE WITHOUT ESOPHAGITIS: Primary | ICD-10-CM

## 2018-07-09 RX ORDER — NICOTINE POLACRILEX 4 MG/1
20 GUM, CHEWING ORAL 2 TIMES DAILY
Qty: 60 EACH | Refills: 11 | Status: SHIPPED | OUTPATIENT
Start: 2018-07-09 | End: 2020-09-25 | Stop reason: SDUPTHER

## 2018-07-09 NOTE — TELEPHONE ENCOUNTER
----- Message from CINDY Hardy sent at 7/9/2018  8:46 AM EDT -----  yes  ----- Message -----  From: Judy Lamb MA  Sent: 7/9/2018   8:29 AM  To: CINDY Hardy    Patient called stating she would like to be switched from prevacid to Prilosec 20mg bid. Is this ok to switch her?

## 2018-07-09 NOTE — TELEPHONE ENCOUNTER
I left a message for the patient that we will be switching her medication to prilosec 20mg bid [facility]

## 2018-08-18 DIAGNOSIS — R60.9 EDEMA, UNSPECIFIED TYPE: ICD-10-CM

## 2018-08-20 RX ORDER — POTASSIUM CHLORIDE 750 MG/1
TABLET, FILM COATED, EXTENDED RELEASE ORAL
Qty: 30 TABLET | Refills: 4 | Status: SHIPPED | OUTPATIENT
Start: 2018-08-20 | End: 2019-01-17 | Stop reason: SDUPTHER

## 2018-08-20 RX ORDER — FUROSEMIDE 40 MG/1
TABLET ORAL
Qty: 30 TABLET | Refills: 4 | Status: SHIPPED | OUTPATIENT
Start: 2018-08-20 | End: 2019-01-22 | Stop reason: SDUPTHER

## 2018-08-28 DIAGNOSIS — J30.2 SEASONAL ALLERGIC RHINITIS, UNSPECIFIED CHRONICITY, UNSPECIFIED TRIGGER: ICD-10-CM

## 2018-08-28 RX ORDER — LEVOCETIRIZINE DIHYDROCHLORIDE 5 MG/1
5 TABLET, FILM COATED ORAL EVERY EVENING
Qty: 30 TABLET | Refills: 3 | Status: SHIPPED | OUTPATIENT
Start: 2018-08-28 | End: 2018-12-23 | Stop reason: SDUPTHER

## 2018-11-30 ENCOUNTER — APPOINTMENT (OUTPATIENT)
Dept: WOMENS IMAGING | Facility: HOSPITAL | Age: 58
End: 2018-11-30

## 2018-11-30 PROCEDURE — 77063 BREAST TOMOSYNTHESIS BI: CPT | Performed by: RADIOLOGY

## 2018-11-30 PROCEDURE — 77067 SCR MAMMO BI INCL CAD: CPT | Performed by: RADIOLOGY

## 2018-12-23 DIAGNOSIS — J30.2 SEASONAL ALLERGIC RHINITIS: ICD-10-CM

## 2018-12-26 RX ORDER — LEVOCETIRIZINE DIHYDROCHLORIDE 5 MG/1
TABLET, FILM COATED ORAL
Qty: 30 TABLET | Refills: 2 | Status: SHIPPED | OUTPATIENT
Start: 2018-12-26 | End: 2022-08-31

## 2019-01-17 DIAGNOSIS — R60.9 EDEMA, UNSPECIFIED TYPE: ICD-10-CM

## 2019-01-17 RX ORDER — POTASSIUM CHLORIDE 750 MG/1
TABLET, FILM COATED, EXTENDED RELEASE ORAL
Qty: 30 TABLET | Refills: 3 | Status: SHIPPED | OUTPATIENT
Start: 2019-01-17 | End: 2020-09-25 | Stop reason: SDUPTHER

## 2019-01-22 DIAGNOSIS — R60.9 EDEMA, UNSPECIFIED TYPE: ICD-10-CM

## 2019-01-22 RX ORDER — FUROSEMIDE 40 MG/1
TABLET ORAL
Qty: 30 TABLET | Refills: 3 | Status: SHIPPED | OUTPATIENT
Start: 2019-01-22 | End: 2020-09-25 | Stop reason: SDUPTHER

## 2019-04-18 DIAGNOSIS — J30.2 SEASONAL ALLERGIC RHINITIS: ICD-10-CM

## 2019-04-19 RX ORDER — LEVOCETIRIZINE DIHYDROCHLORIDE 5 MG/1
TABLET, FILM COATED ORAL
Qty: 30 TABLET | Refills: 1 | OUTPATIENT
Start: 2019-04-19

## 2019-04-19 NOTE — TELEPHONE ENCOUNTER
Rec'd a request for a refill on Xyzal. Patient not seen since 2017 and this needs to be filled by PCP anyway so it was sent back denied.  HAILEY HOWARD

## 2019-06-10 ENCOUNTER — OFFICE VISIT (OUTPATIENT)
Dept: CARDIOLOGY | Facility: CLINIC | Age: 59
End: 2019-06-10

## 2019-06-10 ENCOUNTER — TELEPHONE (OUTPATIENT)
Dept: CARDIOLOGY | Facility: CLINIC | Age: 59
End: 2019-06-10

## 2019-06-10 VITALS
OXYGEN SATURATION: 97 % | WEIGHT: 171.4 LBS | HEART RATE: 80 BPM | SYSTOLIC BLOOD PRESSURE: 116 MMHG | DIASTOLIC BLOOD PRESSURE: 74 MMHG | BODY MASS INDEX: 31.54 KG/M2 | HEIGHT: 62 IN

## 2019-06-10 DIAGNOSIS — R00.2 PALPITATIONS: Primary | ICD-10-CM

## 2019-06-10 DIAGNOSIS — E78.5 DYSLIPIDEMIA: ICD-10-CM

## 2019-06-10 DIAGNOSIS — J30.2 SEASONAL ALLERGIC RHINITIS: ICD-10-CM

## 2019-06-10 DIAGNOSIS — R60.9 PERIPHERAL EDEMA: ICD-10-CM

## 2019-06-10 PROBLEM — R60.0 PERIPHERAL EDEMA: Status: ACTIVE | Noted: 2019-06-10

## 2019-06-10 PROCEDURE — 93000 ELECTROCARDIOGRAM COMPLETE: CPT | Performed by: NURSE PRACTITIONER

## 2019-06-10 PROCEDURE — 99213 OFFICE O/P EST LOW 20 MIN: CPT | Performed by: NURSE PRACTITIONER

## 2019-06-10 RX ORDER — DICYCLOMINE HYDROCHLORIDE 10 MG/1
CAPSULE ORAL
COMMUNITY
Start: 2019-06-02

## 2019-06-10 RX ORDER — METRONIDAZOLE 500 MG/1
TABLET ORAL
COMMUNITY
Start: 2019-06-02 | End: 2021-01-19

## 2019-06-10 RX ORDER — LORATADINE 10 MG/1
10 TABLET ORAL DAILY
Qty: 30 TABLET | Refills: 2 | Status: SHIPPED | OUTPATIENT
Start: 2019-06-10 | End: 2020-01-07 | Stop reason: SDUPTHER

## 2019-06-10 RX ORDER — DOXYCYCLINE 100 MG/1
CAPSULE ORAL
COMMUNITY
Start: 2019-06-02 | End: 2021-01-19

## 2019-06-10 NOTE — PATIENT INSTRUCTIONS
"Fat and Cholesterol Restricted Eating Plan  Getting too much fat and cholesterol in your diet may cause health problems. Choosing the right foods helps keep your fat and cholesterol at normal levels. This can keep you from getting certain diseases.  Your doctor may recommend an eating plan that includes:  · Total fat: ______% or less of total calories a day.  · Saturated fat: ______% or less of total calories a day.  · Cholesterol: less than _________mg a day.  · Fiber: ______g a day.    What are tips for following this plan?  General tips  · Work with your doctor to lose weight if you need to.  · Avoid:  ? Foods with added sugar.  ? Fried foods.  ? Foods with partially hydrogenated oils.  · Limit alcohol intake to no more than 1 drink a day for nonpregnant women and 2 drinks a day for men. One drink equals 12 oz of beer, 5 oz of wine, or 1½ oz of hard liquor.  Reading food labels  · Check food labels for:  ? Trans fats.  ? Partially hydrogenated oils.  ? Saturated fat (g) in each serving.  ? Cholesterol (mg) in each serving.  ? Fiber (g) in each serving.  · Choose foods with healthy fats, such as:  ? Monounsaturated fats.  ? Polyunsaturated fats.  ? Omega-3 fats.  · Choose grain products that have whole grains. Look for the word \"whole\" as the first word in the ingredient list.  Cooking  · Cook foods using low-fat methods. These include baking, boiling, grilling, and broiling.  · Eat more home-cooked foods. Eat at restaurants and buffets less often.  · Avoid cooking using saturated fats, such as butter, cream, palm oil, palm kernel oil, and coconut oil.  Meal planning  · At meals, divide your plate into four equal parts:  ? Fill one-half of your plate with vegetables and green salads.  ? Fill one-fourth of your plate with whole grains.  ? Fill one-fourth of your plate with low-fat (lean) protein foods.  · Eat fish that is high in omega-3 fats at least two times a week. This includes mackerel, tuna, sardines, and " salmon.  · Eat foods that are high in fiber, such as whole grains, beans, apples, broccoli, carrots, peas, and barley.  Recommended foods  Grains  · Whole grains, such as whole wheat or whole grain breads, crackers, cereals, and pasta. Unsweetened oatmeal, bulgur, barley, quinoa, or brown rice. Corn or whole wheat flour tortillas.  Vegetables  · Fresh or frozen vegetables (raw, steamed, roasted, or grilled). Green salads.  Fruits  · All fresh, canned (in natural juice), or frozen fruits.  Meats and other protein foods  · Ground beef (85% or leaner), grass-fed beef, or beef trimmed of fat. Skinless chicken or turkey. Ground chicken or turkey. Pork trimmed of fat. All fish and seafood. Egg whites. Dried beans, peas, or lentils. Unsalted nuts or seeds. Unsalted canned beans. Nut butters without added sugar or oil.  Dairy  · Low-fat or nonfat dairy products, such as skim or 1% milk, 2% or reduced-fat cheeses, low-fat and fat-free ricotta or cottage cheese, or plain low-fat and nonfat yogurt.  Fats and oils  · Tub margarine without trans fats. Light or reduced-fat mayonnaise and salad dressings. Avocado. Olive, canola, sesame, or safflower oils.  The items listed above may not be a complete list of recommended foods or beverages. Contact your dietitian for more options.  Foods to avoid  Grains  · White bread. White pasta. White rice. Cornbread. Bagels, pastries, and croissants. Crackers and snack foods that contain trans fat and hydrogenated oils.  Vegetables  · Vegetables cooked in cheese, cream, or butter sauce. Fried vegetables.  Fruits  · Canned fruit in heavy syrup. Fruit in cream or butter sauce. Fried fruit.  Meats and other protein foods  · Fatty cuts of meat. Ribs, chicken wings, ford, sausage, bologna, salami, chitterlings, fatback, hot dogs, bratwurst, and packaged lunch meats. Liver and organ meats. Whole eggs and egg yolks. Chicken and turkey with skin. Fried meat.  Dairy  · Whole or 2% milk, cream,  half-and-half, and cream cheese. Whole milk cheeses. Whole-fat or sweetened yogurt. Full-fat cheeses. Nondairy creamers and whipped toppings. Processed cheese, cheese spreads, and cheese curds.  Beverages  · Alcohol. Sugar-sweetened drinks such as sodas, lemonade, and fruit drinks.  Fats and oils  · Butter, stick margarine, lard, shortening, ghee, or ford fat. Coconut, palm kernel, and palm oils.  Sweets and desserts  · Corn syrup, sugars, honey, and molasses. Candy. Jam and jelly. Syrup. Sweetened cereals. Cookies, pies, cakes, donuts, muffins, and ice cream.  The items listed above may not be a complete list of foods and beverages to avoid. Contact your dietitian for more information.  Summary  · Choosing the right foods helps keep your fat and cholesterol at normal levels. This can keep you from getting certain diseases.  · At meals, fill one-half of your plate with vegetables and green salads.  · Eat high-fiber foods, like whole grains, beans, apples, carrots, peas, and barley.  · Limit added sugar, saturated fats, alcohol, and fried foods.  This information is not intended to replace advice given to you by your health care provider. Make sure you discuss any questions you have with your health care provider.  Document Released: 06/18/2013 Document Revised: 09/04/2018 Document Reviewed: 09/04/2018  [employer] Interactive Patient Education © 2019 [employer]  BMI for Adults  Body mass index (BMI) is a number that is calculated from a person's weight and height. In most adults, the number is used to find how much of an adult's weight is made up of fat. BMI is not as accurate as a direct measure of body fat.  How is BMI calculated?  BMI is calculated by dividing weight in kilograms by height in meters squared. It can also be calculated by dividing weight in pounds by height in inches squared, then multiplying the resulting number by 703. Charts are available to help you find your BMI quickly and easily without  doing this calculation.  How is BMI interpreted?  Health care professionals use BMI charts to identify whether an adult is underweight, at a normal weight, or overweight based on the following guidelines:  · Underweight: BMI less than 18.5.  · Normal weight: BMI between 18.5 and 24.9.  · Overweight: BMI between 25 and 29.9.  · Obese: BMI of 30 and above.    BMI is usually interpreted the same for males and females.  Weight includes both fat and muscle, so someone with a muscular build, such as an athlete, may have a BMI that is higher than 24.9. In cases like these, BMI may not accurately depict body fat. To determine if excess body fat is the cause of a BMI of 25 or higher, further assessments may need to be done by a health care provider.  Why is BMI a useful tool?  BMI is used to identify a possible weight problem that may be related to a medical problem or may increase the risk for medical problems. BMI can also be used to promote changes to reach a healthy weight.  This information is not intended to replace advice given to you by your health care provider. Make sure you discuss any questions you have with your health care provider.  Document Released: 08/29/2005 Document Revised: 04/27/2017 Document Reviewed: 05/15/2015  [employer] Interactive Patient Education © 2018 [employer]    Edema  Edema is an abnormal buildup of fluids in the body tissues and under the skin. Swelling of the legs, feet, and ankles is a common symptom that becomes more likely as you get older. Swelling is also common in looser tissues, like around the eyes. When the affected area is squeezed, the fluid may move out of that spot and leave a dent for a few moments. This dent is called pitting edema.  There are many possible causes of edema. Eating too much salt (sodium) and being on your feet or sitting for a long time can cause edema in your legs, feet, and ankles. Hot weather may make edema worse. Common causes of edema  include:  · Heart failure.  · Liver or kidney disease.  · Weak leg blood vessels.  · Cancer.  · An injury.  · Pregnancy.  · Medicines.  · Being obese.  · Low protein levels in the blood.    Edema is usually painless. Your skin may look swollen or shiny.  Follow these instructions at home:  · Keep the affected body part raised (elevated) above the level of your heart when you are sitting or lying down.  · Do not sit still or stand for long periods of time.  · Do not wear tight clothing. Do not wear garters on your upper legs.  · Exercise your legs to get your circulation going. This helps to move the fluid back into your blood vessels, and it may help the swelling go down.  · Wear elastic bandages or support stockings to reduce swelling as told by your health care provider.  · Eat a low-salt (low-sodium) diet to reduce fluid as told by your health care provider.  · Depending on the cause of your swelling, you may need to limit how much fluid you drink (fluid restriction).  · Take over-the-counter and prescription medicines only as told by your health care provider.  Contact a health care provider if:  · Your edema does not get better with treatment.  · You have heart, liver, or kidney disease and have symptoms of edema.  · You have sudden and unexplained weight gain.  Get help right away if:  · You develop shortness of breath or chest pain.  · You cannot breathe when you lie down.  · You develop pain, redness, or warmth in the swollen areas.  · You have heart, liver, or kidney disease and suddenly get edema.  · You have a fever and your symptoms suddenly get worse.  Summary  · Edema is an abnormal buildup of fluids in the body tissues and under the skin.  · Eating too much salt (sodium) and being on your feet or sitting for a long time can cause edema in your legs, feet, and ankles.  · Keep the affected body part raised (elevated) above the level of your heart when you are sitting or lying down.  This information is  not intended to replace advice given to you by your health care provider. Make sure you discuss any questions you have with your health care provider.  Document Released: 12/18/2006 Document Revised: 01/20/2018 Document Reviewed: 01/20/2018  [employer] Interactive Patient Education © 2019 [employer]

## 2019-06-10 NOTE — PROGRESS NOTES
Subjective   Linda Schafer is a 58 y.o. female     Chief Complaint   Patient presents with   • Follow-up     Pt in office for 1-2yr follow up appt    • Chest Pain       HPI    Problem list    1. History of heart murmur  1.1 Echo 10/27/15 - EF 60-65%; mild MR and TR; PA 25-30  1.2 Echo 4/11/17-EF greater than 65%, diastolic dysfunction 1, trace MR and trace TR, PA 25-30  2. Dyslipidemia  3. Palpitations      4. BLE Arterial Ultrasound 9/14/15 - no obstructive disease on either side; mildly abnormal doppler profile left distally.          5. Chest Pain         5.1 Stress Test 10/27/15 - low risk study    Patient is a 58-year-old female who presents today for a follow-up.  She denies any chest pain, pressure, dizziness, presyncope, syncope, orthopnea or PND.  She says she generally will get some swelling in her ankles and feet and it is worse whenever it is hot or she is on her feet a lot.  She says she has palpitations but they are very random.  She denies any shortness of breath with activity.  She also has cholesterol checked through her health fair at work.    Current Outpatient Medications   Medication Sig Dispense Refill   • aspirin 81 MG tablet Take 81 mg by mouth Daily.     • atorvastatin (LIPITOR) 20 MG tablet TAKE ONE TABLET BY MOUTH EVERY NIGHT AT BEDTIME 90 tablet 4   • Calcium 500 MG tablet Take  by mouth Daily.     • Coenzyme Q10 (CO Q-10) 100 MG capsule Take  by mouth.     • dicyclomine (BENTYL) 10 MG capsule      • doxycycline (MONODOX) 100 MG capsule      • estradiol (MINIVELLE) 0.05 MG/24HR patch Place  on the skin.     • furosemide (LASIX) 40 MG tablet TAKE ONE TABLET BY MOUTH DAILY 30 tablet 3   • levocetirizine (XYZAL) 5 MG tablet TAKE ONE TABLET BY MOUTH EVERY EVENING 30 tablet 2   • loratadine (ALLERGY RELIEF) 10 MG tablet Take  by mouth Daily.     • meloxicam (MOBIC) 15 MG tablet Take 15 mg by mouth Daily.     • metroNIDAZOLE (FLAGYL) 500 MG tablet      • MULTIPLE VITAMIN PO Take  by mouth  Daily.     • naproxen (NAPROSYN) 500 MG tablet Take 500 mg by mouth Daily As Needed.     • naratriptan (AMERGE) 2.5 MG tablet Take 2.5 mg by mouth Every 4 (Four) Hours As Needed. migraines     • Omeprazole 20 MG tablet delayed-release Take 20 mg by mouth 2 (Two) Times a Day. 60 each 11   • potassium chloride (K-DUR) 10 MEQ CR tablet TAKE ONE TABLET BY MOUTH DAILY 30 tablet 3   • vitamin B-12 (CYANOCOBALAMIN) 100 MCG tablet Take 50 mcg by mouth Daily.       No current facility-administered medications for this visit.        ALLERGIES    Codeine; Penicillins; and Sulfa antibiotics    Past Medical History:   Diagnosis Date   • Atypical chest pain     Chest pain.Atypical chest pain   • Chest pain    • Diminished pulses in lower extremity    • Diverticulosis    • Dizziness     Occasional   • Dyslipidemia    • Dyspnea    • Edema    • Fatigue    • GERD (gastroesophageal reflux disease)    • Hiatal hernia    • Migraine headache    • Palpitations    • Personal history of cardiac murmur    • SOB (shortness of breath)        Social History     Socioeconomic History   • Marital status:      Spouse name: Not on file   • Number of children: Not on file   • Years of education: Not on file   • Highest education level: Not on file   Tobacco Use   • Smoking status: Never Smoker   • Smokeless tobacco: Never Used   Substance and Sexual Activity   • Alcohol use: No       Family History   Problem Relation Age of Onset   • Hyperlipidemia Other         Dyslipidemia   • Stroke Other         CVA   • Coronary artery disease Other    • Diabetes Other    • Hypertension Other    • Sudden death Other         Sudden Cardiac Death (SCD)       Review of Systems   Constitutional: Negative for diaphoresis and fatigue.   HENT: Negative for congestion, rhinorrhea and sore throat.    Eyes: Positive for visual disturbance (reading glasses).   Respiratory: Negative for chest tightness and shortness of breath.    Cardiovascular: Positive for  "palpitations (states it happens randomly ) and leg swelling (BLE edema, mainly feet and ankles. Edema goes down overnight. States she tends to swell more when she's hot or on her feet a lot . ). Negative for chest pain (Denies CP ).   Gastrointestinal: Positive for abdominal pain, constipation (back and forth ) and diarrhea (back and forth ). Negative for blood in stool, nausea and vomiting.        States she has a flare up of diverticulitis    Endocrine: Negative for cold intolerance and heat intolerance.   Genitourinary: Negative for difficulty urinating, dysuria, frequency, hematuria and urgency.   Musculoskeletal: Positive for neck pain (occasional nack pain from DDD). Negative for arthralgias and back pain.   Skin: Negative for rash and wound.   Allergic/Immunologic: Positive for environmental allergies (seasonal ). Negative for food allergies.   Neurological: Positive for headaches (recurrent HA, has one currently). Negative for dizziness, syncope, weakness, light-headedness and numbness.   Hematological: Does not bruise/bleed easily.   Psychiatric/Behavioral: Negative for sleep disturbance.       Objective   /74   Pulse 80   Ht 157.5 cm (62\")   Wt 77.7 kg (171 lb 6.4 oz)   SpO2 97%   BMI 31.35 kg/m²   Vitals:    06/10/19 0834   BP: 116/74   Pulse: 80   SpO2: 97%   Weight: 77.7 kg (171 lb 6.4 oz)   Height: 157.5 cm (62\")      Lab Results (most recent)     None        Physical Exam   Constitutional: She is oriented to person, place, and time. Vital signs are normal. She appears well-developed and well-nourished. She is active and cooperative.   HENT:   Head: Normocephalic.   Eyes: Lids are normal.   Neck: Normal carotid pulses, no hepatojugular reflux and no JVD present. Carotid bruit is not present.   Cardiovascular: Normal rate, regular rhythm and normal heart sounds.   Pulses:       Radial pulses are 2+ on the right side, and 2+ on the left side.        Dorsalis pedis pulses are 2+ on the right " side, and 2+ on the left side.        Posterior tibial pulses are 2+ on the right side, and 2+ on the left side.   No edema BLE.   Pulmonary/Chest: Effort normal and breath sounds normal.   Abdominal: Normal appearance and bowel sounds are normal.   Neurological: She is alert and oriented to person, place, and time.   Skin: Skin is warm, dry and intact.   Psychiatric: She has a normal mood and affect. Her speech is normal and behavior is normal. Judgment and thought content normal. Cognition and memory are normal.       Procedure     ECG 12 Lead  Date/Time: 6/10/2019 9:18 AM  Performed by: Natalie Tarango APRN  Authorized by: Natalie Tarango APRN   Comparison: compared with previous ECG from 5/13/2015  Rhythm: sinus rhythm  Rate: normal  BPM: 65  Conduction: non-specific intraventricular conduction delay  QRS axis: normal  Other findings: low voltage    Clinical impression: non-specific ECG                 Assessment/Plan      Diagnosis Plan   1. Palpitations     2. Dyslipidemia     3. Peripheral edema       Palpitations-stable.  Dyslipidemia-patient is on Lipitor.  Peripheral edema-patient uses Lasix.  She will continue her medication regimen.  She will follow-up in 1 year sooner if any changes.    Return in about 1 year (around 6/10/2020).               Patient's Body mass index is 31.35 kg/m². BMI is above normal parameters. Recommendations include: educational material.      Electronically signed by:

## 2019-07-15 RX ORDER — ATORVASTATIN CALCIUM 20 MG/1
TABLET, FILM COATED ORAL
Qty: 90 TABLET | Refills: 3 | Status: SHIPPED | OUTPATIENT
Start: 2019-07-15 | End: 2020-07-07

## 2019-12-13 ENCOUNTER — APPOINTMENT (OUTPATIENT)
Dept: WOMENS IMAGING | Facility: HOSPITAL | Age: 59
End: 2019-12-13

## 2019-12-13 PROCEDURE — 77063 BREAST TOMOSYNTHESIS BI: CPT | Performed by: RADIOLOGY

## 2019-12-13 PROCEDURE — 77067 SCR MAMMO BI INCL CAD: CPT | Performed by: RADIOLOGY

## 2020-01-07 RX ORDER — LORATADINE 10 MG/1
10 TABLET ORAL DAILY
Qty: 30 TABLET | Refills: 2 | Status: SHIPPED | OUTPATIENT
Start: 2020-01-07

## 2020-01-07 NOTE — TELEPHONE ENCOUNTER
Pt LVM requesting Rf of allergy rx that Natalie prescribed, wanted sent to [facility].       Sent RF to [facility].

## 2020-07-07 RX ORDER — ATORVASTATIN CALCIUM 20 MG/1
TABLET, FILM COATED ORAL
Qty: 90 TABLET | Refills: 2 | Status: SHIPPED | OUTPATIENT
Start: 2020-07-07 | End: 2020-09-25 | Stop reason: SDUPTHER

## 2020-09-25 ENCOUNTER — TELEPHONE (OUTPATIENT)
Dept: CARDIOLOGY | Facility: CLINIC | Age: 60
End: 2020-09-25

## 2020-09-25 DIAGNOSIS — R60.9 EDEMA, UNSPECIFIED TYPE: ICD-10-CM

## 2020-09-25 DIAGNOSIS — E78.5 DYSLIPIDEMIA: Primary | ICD-10-CM

## 2020-09-25 DIAGNOSIS — K21.9 GASTROESOPHAGEAL REFLUX DISEASE WITHOUT ESOPHAGITIS: ICD-10-CM

## 2020-09-25 RX ORDER — NICOTINE POLACRILEX 4 MG/1
20 GUM, CHEWING ORAL 2 TIMES DAILY
Qty: 60 EACH | Refills: 3 | Status: SHIPPED | OUTPATIENT
Start: 2020-09-25 | End: 2020-10-16 | Stop reason: CLARIF

## 2020-09-25 RX ORDER — ATORVASTATIN CALCIUM 20 MG/1
20 TABLET, FILM COATED ORAL
Qty: 30 TABLET | Refills: 3 | Status: SHIPPED | OUTPATIENT
Start: 2020-09-25 | End: 2021-01-19 | Stop reason: SDUPTHER

## 2020-09-25 RX ORDER — FUROSEMIDE 40 MG/1
40 TABLET ORAL DAILY
Qty: 30 TABLET | Refills: 3 | Status: SHIPPED | OUTPATIENT
Start: 2020-09-25 | End: 2021-01-19 | Stop reason: SDUPTHER

## 2020-09-25 RX ORDER — POTASSIUM CHLORIDE 750 MG/1
10 TABLET, FILM COATED, EXTENDED RELEASE ORAL DAILY
Qty: 30 TABLET | Refills: 3 | Status: SHIPPED | OUTPATIENT
Start: 2020-09-25 | End: 2021-01-19 | Stop reason: SDUPTHER

## 2020-09-25 NOTE — TELEPHONE ENCOUNTER
Medications refilled. MS,CMA.      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  potassium chloride (K-DUR) 10 MEQ CR tablet    She uses [facility]

## 2020-10-05 ENCOUNTER — TELEPHONE (OUTPATIENT)
Dept: CARDIOLOGY | Facility: CLINIC | Age: 60
End: 2020-10-05

## 2020-10-05 NOTE — TELEPHONE ENCOUNTER
Received PA request for pt's Omeprazole, Initiated PA via [employer].     Dx code: Gastroesophageal reflux disease without esophagitis (K21.9)    Per chart review, no other GERD med shave been tried/failed.     RADHA MENDOZA (Key: [id number])     [facility] is processing your PA request and will respond shortly with next steps. You are currently using the fastest method to process this prior authorization. Please do not fax or call [employer] ([employer]) to resubmit this request. To check for an update later, open this request again from your dashboard.      Awaiting response...

## 2020-10-16 DIAGNOSIS — K21.9 GASTROESOPHAGEAL REFLUX DISEASE WITHOUT ESOPHAGITIS: Primary | ICD-10-CM

## 2020-10-19 RX ORDER — PANTOPRAZOLE SODIUM 40 MG/1
40 TABLET, DELAYED RELEASE ORAL DAILY
Qty: 90 TABLET | Refills: 3 | Status: SHIPPED | OUTPATIENT
Start: 2020-10-19 | End: 2021-01-19 | Stop reason: SDUPTHER

## 2021-01-19 ENCOUNTER — TELEPHONE (OUTPATIENT)
Dept: CARDIOLOGY | Facility: CLINIC | Age: 61
End: 2021-01-19

## 2021-01-19 ENCOUNTER — OFFICE VISIT (OUTPATIENT)
Dept: CARDIOLOGY | Facility: CLINIC | Age: 61
End: 2021-01-19

## 2021-01-19 ENCOUNTER — LAB (OUTPATIENT)
Dept: CARDIOLOGY | Facility: CLINIC | Age: 61
End: 2021-01-19

## 2021-01-19 VITALS
HEIGHT: 62 IN | TEMPERATURE: 97 F | DIASTOLIC BLOOD PRESSURE: 80 MMHG | WEIGHT: 158 LBS | SYSTOLIC BLOOD PRESSURE: 126 MMHG | HEART RATE: 68 BPM | BODY MASS INDEX: 29.08 KG/M2 | OXYGEN SATURATION: 98 %

## 2021-01-19 DIAGNOSIS — E78.5 DYSLIPIDEMIA: Primary | ICD-10-CM

## 2021-01-19 DIAGNOSIS — E78.5 DYSLIPIDEMIA: ICD-10-CM

## 2021-01-19 DIAGNOSIS — I51.89 GRADE I DIASTOLIC DYSFUNCTION: ICD-10-CM

## 2021-01-19 DIAGNOSIS — Z00.00 HEALTHCARE MAINTENANCE: ICD-10-CM

## 2021-01-19 DIAGNOSIS — R60.9 PERIPHERAL EDEMA: ICD-10-CM

## 2021-01-19 DIAGNOSIS — R00.2 PALPITATIONS: ICD-10-CM

## 2021-01-19 DIAGNOSIS — R60.9 EDEMA, UNSPECIFIED TYPE: ICD-10-CM

## 2021-01-19 DIAGNOSIS — M62.838 NECK MUSCLE SPASM: ICD-10-CM

## 2021-01-19 DIAGNOSIS — K21.9 GASTROESOPHAGEAL REFLUX DISEASE WITHOUT ESOPHAGITIS: ICD-10-CM

## 2021-01-19 LAB
ALBUMIN SERPL-MCNC: 4.42 G/DL (ref 3.5–5.2)
ALBUMIN/GLOB SERPL: 1.7 G/DL
ALP SERPL-CCNC: 63 U/L (ref 39–117)
ALT SERPL W P-5'-P-CCNC: 18 U/L (ref 1–33)
ANION GAP SERPL CALCULATED.3IONS-SCNC: 10.6 MMOL/L (ref 5–15)
AST SERPL-CCNC: 22 U/L (ref 1–32)
BASOPHILS # BLD AUTO: 0.1 10*3/MM3 (ref 0–0.2)
BASOPHILS NFR BLD AUTO: 1.5 % (ref 0–1.5)
BILIRUB SERPL-MCNC: 0.5 MG/DL (ref 0–1.2)
BUN SERPL-MCNC: 17 MG/DL (ref 6–20)
BUN/CREAT SERPL: 19.3 (ref 7–25)
CALCIUM SPEC-SCNC: 9.8 MG/DL (ref 8.6–10.5)
CHLORIDE SERPL-SCNC: 99 MMOL/L (ref 98–107)
CHOLEST SERPL-MCNC: 174 MG/DL (ref 0–200)
CO2 SERPL-SCNC: 27.4 MMOL/L (ref 22–29)
CREAT SERPL-MCNC: 0.88 MG/DL (ref 0.57–1)
DEPRECATED RDW RBC AUTO: 41.1 FL (ref 37–54)
EOSINOPHIL # BLD AUTO: 0.25 10*3/MM3 (ref 0–0.4)
EOSINOPHIL NFR BLD AUTO: 3.7 % (ref 0.3–6.2)
ERYTHROCYTE [DISTWIDTH] IN BLOOD BY AUTOMATED COUNT: 12.4 % (ref 12.3–15.4)
GFR SERPL CREATININE-BSD FRML MDRD: 66 ML/MIN/1.73
GLOBULIN UR ELPH-MCNC: 2.6 GM/DL
GLUCOSE SERPL-MCNC: 107 MG/DL (ref 65–99)
HCT VFR BLD AUTO: 43.1 % (ref 34–46.6)
HDLC SERPL-MCNC: 59 MG/DL (ref 40–60)
HGB BLD-MCNC: 13.5 G/DL (ref 12–15.9)
IMM GRANULOCYTES # BLD AUTO: 0.02 10*3/MM3 (ref 0–0.05)
IMM GRANULOCYTES NFR BLD AUTO: 0.3 % (ref 0–0.5)
LDLC SERPL CALC-MCNC: 89 MG/DL (ref 0–100)
LDLC/HDLC SERPL: 1.44 {RATIO}
LYMPHOCYTES # BLD AUTO: 2.08 10*3/MM3 (ref 0.7–3.1)
LYMPHOCYTES NFR BLD AUTO: 30.7 % (ref 19.6–45.3)
MAGNESIUM SERPL-MCNC: 1.9 MG/DL (ref 1.6–2.6)
MCH RBC QN AUTO: 28.1 PG (ref 26.6–33)
MCHC RBC AUTO-ENTMCNC: 31.3 G/DL (ref 31.5–35.7)
MCV RBC AUTO: 89.6 FL (ref 79–97)
MONOCYTES # BLD AUTO: 0.68 10*3/MM3 (ref 0.1–0.9)
MONOCYTES NFR BLD AUTO: 10 % (ref 5–12)
NEUTROPHILS NFR BLD AUTO: 3.65 10*3/MM3 (ref 1.7–7)
NEUTROPHILS NFR BLD AUTO: 53.8 % (ref 42.7–76)
NRBC BLD AUTO-RTO: 0 /100 WBC (ref 0–0.2)
PLATELET # BLD AUTO: 274 10*3/MM3 (ref 140–450)
PMV BLD AUTO: 12.5 FL (ref 6–12)
POTASSIUM SERPL-SCNC: 4.2 MMOL/L (ref 3.5–5.2)
PROT SERPL-MCNC: 7 G/DL (ref 6–8.5)
RBC # BLD AUTO: 4.81 10*6/MM3 (ref 3.77–5.28)
SODIUM SERPL-SCNC: 137 MMOL/L (ref 136–145)
T4 FREE SERPL-MCNC: 1.15 NG/DL (ref 0.93–1.7)
TRIGL SERPL-MCNC: 149 MG/DL (ref 0–150)
TSH SERPL DL<=0.05 MIU/L-ACNC: 4.67 UIU/ML (ref 0.27–4.2)
VLDLC SERPL-MCNC: 26 MG/DL (ref 5–40)
WBC # BLD AUTO: 6.78 10*3/MM3 (ref 3.4–10.8)

## 2021-01-19 PROCEDURE — 80061 LIPID PANEL: CPT | Performed by: NURSE PRACTITIONER

## 2021-01-19 PROCEDURE — 84481 FREE ASSAY (FT-3): CPT | Performed by: NURSE PRACTITIONER

## 2021-01-19 PROCEDURE — 84439 ASSAY OF FREE THYROXINE: CPT | Performed by: NURSE PRACTITIONER

## 2021-01-19 PROCEDURE — 84443 ASSAY THYROID STIM HORMONE: CPT | Performed by: NURSE PRACTITIONER

## 2021-01-19 PROCEDURE — 99213 OFFICE O/P EST LOW 20 MIN: CPT | Performed by: NURSE PRACTITIONER

## 2021-01-19 PROCEDURE — 83735 ASSAY OF MAGNESIUM: CPT | Performed by: NURSE PRACTITIONER

## 2021-01-19 PROCEDURE — 80053 COMPREHEN METABOLIC PANEL: CPT | Performed by: NURSE PRACTITIONER

## 2021-01-19 PROCEDURE — 36415 COLL VENOUS BLD VENIPUNCTURE: CPT

## 2021-01-19 PROCEDURE — 93000 ELECTROCARDIOGRAM COMPLETE: CPT | Performed by: NURSE PRACTITIONER

## 2021-01-19 PROCEDURE — 85025 COMPLETE CBC W/AUTO DIFF WBC: CPT | Performed by: NURSE PRACTITIONER

## 2021-01-19 RX ORDER — POTASSIUM CHLORIDE 750 MG/1
10 TABLET, FILM COATED, EXTENDED RELEASE ORAL DAILY
Qty: 90 TABLET | Refills: 3 | Status: SHIPPED | OUTPATIENT
Start: 2021-01-19 | End: 2022-08-31 | Stop reason: SDUPTHER

## 2021-01-19 RX ORDER — ATORVASTATIN CALCIUM 20 MG/1
20 TABLET, FILM COATED ORAL
Qty: 90 TABLET | Refills: 3 | Status: SHIPPED | OUTPATIENT
Start: 2021-01-19 | End: 2022-12-19 | Stop reason: SDUPTHER

## 2021-01-19 RX ORDER — FUROSEMIDE 40 MG/1
40 TABLET ORAL DAILY
Qty: 90 TABLET | Refills: 3 | Status: SHIPPED | OUTPATIENT
Start: 2021-01-19 | End: 2022-08-31 | Stop reason: SDUPTHER

## 2021-01-19 RX ORDER — TIZANIDINE HYDROCHLORIDE 4 MG/1
4 CAPSULE, GELATIN COATED ORAL 3 TIMES DAILY PRN
Qty: 45 CAPSULE | Refills: 3 | Status: SHIPPED | OUTPATIENT
Start: 2021-01-19

## 2021-01-19 RX ORDER — TIZANIDINE HYDROCHLORIDE 4 MG/1
4 CAPSULE, GELATIN COATED ORAL 3 TIMES DAILY PRN
COMMUNITY
End: 2021-01-19 | Stop reason: SDUPTHER

## 2021-01-19 RX ORDER — MULTIVIT WITH MINERALS/LUTEIN
250 TABLET ORAL DAILY
COMMUNITY

## 2021-01-19 RX ORDER — PANTOPRAZOLE SODIUM 40 MG/1
40 TABLET, DELAYED RELEASE ORAL DAILY
Qty: 90 TABLET | Refills: 3 | Status: SHIPPED | OUTPATIENT
Start: 2021-01-19 | End: 2022-08-31 | Stop reason: SDUPTHER

## 2021-01-19 NOTE — TELEPHONE ENCOUNTER
Pt was advised of lab results and told to f/up with her PCP  Ana Luisa Saab / [place]      ----- Message from CINDY Hardy sent at 1/19/2021  3:47 PM EST -----  Please advise patient.  TSH still a little underactive.  Forwarded to PCP.  Also sugar was slightly elevated any coffee with sugar or any drinks this AM?  Cholesterol was perfect.

## 2021-01-19 NOTE — PROGRESS NOTES
Subjective   Linda Schafer is a 60 y.o. female     Chief Complaint   Patient presents with   • Follow-up   • Palpitations       HPI    Problem list    1. History of heart murmur  1.1 Echo 10/27/15 - EF 60-65%; mild MR and TR; PA 25-30  1.2 Echo 4/11/17-EF greater than 65%, diastolic dysfunction 1, trace MR and trace TR, PA 25-30  2. Dyslipidemia  3. Palpitations      4. BLE Arterial Ultrasound 9/14/15 - no obstructive disease on either side; mildly abnormal doppler profile left distally.          5. Chest Pain         5.1 Stress Test 10/27/15 - low risk study    Patient is a 60-year-old female who presents today for follow-up.  She denies any chest pain or pressure.  She says she does still have palpitations but they are only on very rare occasion.  She says she might have a few and then not have any for a month or 2.  She says there is no rhyme or reason to when they come.  She denies any dizziness, presyncope, syncope, orthopnea, PND or edema.  She denies any shortness of breath with activity.  She has been under a lot of stress as her brother was the one who was in the standoff in [place].  He currently is in half-way.  She says her mom is still alive and she is 92 and she had a.  3 of her 5 kids already and it was just a lot of stress for her mom as well in regards to what happened with her brother.  She has not had any recent blood work.    Current Outpatient Medications on File Prior to Visit   Medication Sig Dispense Refill   • aspirin 81 MG tablet Take 81 mg by mouth Daily.     • Calcium 500 MG tablet Take  by mouth Daily.     • Coenzyme Q10 (CO Q-10) 100 MG capsule Take  by mouth.     • dicyclomine (BENTYL) 10 MG capsule      • estradiol (MINIVELLE) 0.05 MG/24HR patch Place  on the skin.     • levocetirizine (XYZAL) 5 MG tablet TAKE ONE TABLET BY MOUTH EVERY EVENING 30 tablet 2   • loratadine (ALLERGY RELIEF) 10 MG tablet Take 1 tablet by mouth Daily. 30 tablet 2   • MULTIPLE VITAMIN PO Take  by mouth Daily.      • naproxen (NAPROSYN) 500 MG tablet Take 500 mg by mouth Daily As Needed.     • naratriptan (AMERGE) 2.5 MG tablet Take 2.5 mg by mouth Every 4 (Four) Hours As Needed. migraines     • vitamin B-12 (CYANOCOBALAMIN) 100 MCG tablet Take 50 mcg by mouth Daily.     • vitamin C (ASCORBIC ACID) 250 MG tablet Take 250 mg by mouth Daily.     • [DISCONTINUED] atorvastatin (LIPITOR) 20 MG tablet Take 1 tablet by mouth every night at bedtime. 30 tablet 3   • [DISCONTINUED] furosemide (LASIX) 40 MG tablet Take 1 tablet by mouth Daily. 30 tablet 3   • [DISCONTINUED] pantoprazole (Protonix) 40 MG EC tablet Take 1 tablet by mouth Daily. 90 tablet 3   • [DISCONTINUED] potassium chloride 10 MEQ CR tablet Take 1 tablet by mouth Daily. 30 tablet 3   • [DISCONTINUED] doxycycline (MONODOX) 100 MG capsule      • [DISCONTINUED] meloxicam (MOBIC) 15 MG tablet Take 15 mg by mouth Daily.     • [DISCONTINUED] metroNIDAZOLE (FLAGYL) 500 MG tablet        No current facility-administered medications on file prior to visit.        ALLERGIES    Codeine, Penicillins, and Sulfa antibiotics    Past Medical History:   Diagnosis Date   • Atypical chest pain     Chest pain.Atypical chest pain   • Chest pain    • Diminished pulses in lower extremity    • Diverticulosis    • Dizziness     Occasional   • Dyslipidemia    • Dyspnea    • Edema    • Fatigue    • GERD (gastroesophageal reflux disease)    • Hiatal hernia    • Migraine headache    • Palpitations    • Personal history of cardiac murmur    • SOB (shortness of breath)        Social History     Socioeconomic History   • Marital status:      Spouse name: Not on file   • Number of children: Not on file   • Years of education: Not on file   • Highest education level: Not on file   Tobacco Use   • Smoking status: Never Smoker   • Smokeless tobacco: Never Used   Substance and Sexual Activity   • Alcohol use: No       Family History   Problem Relation Age of Onset   • Hyperlipidemia Other          "Dyslipidemia   • Stroke Other         CVA   • Coronary artery disease Other    • Diabetes Other    • Hypertension Other    • Sudden death Other         Sudden Cardiac Death (SCD)       Review of Systems   Constitutional: Negative for appetite change, chills, diaphoresis, fatigue and fever.   HENT: Negative for congestion, rhinorrhea and sore throat.    Eyes: Positive for visual disturbance (reading glasses).   Respiratory: Negative for cough, chest tightness, shortness of breath and wheezing.    Cardiovascular: Positive for palpitations (Flutters; just occasionally, one a few days ago, may not have another for a month ). Negative for chest pain and leg swelling.   Gastrointestinal: Negative for abdominal pain, blood in stool, constipation, diarrhea, nausea and vomiting.   Endocrine: Positive for heat intolerance (Hot flashes ). Negative for cold intolerance.   Genitourinary: Negative for difficulty urinating, frequency, hematuria and urgency.   Musculoskeletal: Positive for neck pain (Pt states she has luis. Disc ). Negative for back pain, joint swelling and neck stiffness.   Skin: Negative for rash and wound.   Allergic/Immunologic: Positive for environmental allergies (seasonal ). Negative for food allergies.   Neurological: Negative for dizziness, weakness, light-headedness, numbness and headaches.   Hematological: Does not bruise/bleed easily.   Psychiatric/Behavioral: Negative for sleep disturbance.       Objective   /80   Pulse 68   Temp 97 °F (36.1 °C)   Ht 157.5 cm (62\")   Wt 71.7 kg (158 lb)   SpO2 98%   BMI 28.90 kg/m²   Vitals:    01/19/21 0759   BP: 126/80   Pulse: 68   Temp: 97 °F (36.1 °C)   SpO2: 98%   Weight: 71.7 kg (158 lb)   Height: 157.5 cm (62\")      Lab Results (most recent)     None        Physical Exam  Vitals signs reviewed.   Constitutional:       General: She is awake.      Appearance: Normal appearance. She is well-developed, well-groomed and overweight.   HENT:      Head: " Normocephalic.   Eyes:      General: Lids are normal.   Neck:      Vascular: No carotid bruit, hepatojugular reflux or JVD.   Cardiovascular:      Rate and Rhythm: Normal rate and regular rhythm.      Pulses:           Radial pulses are 2+ on the right side and 2+ on the left side.        Dorsalis pedis pulses are 2+ on the right side and 2+ on the left side.        Posterior tibial pulses are 2+ on the right side and 2+ on the left side.      Heart sounds: Normal heart sounds.   Pulmonary:      Effort: Pulmonary effort is normal.      Breath sounds: Normal breath sounds and air entry.   Abdominal:      General: Bowel sounds are normal.      Palpations: Abdomen is soft.   Musculoskeletal:      Right lower leg: No edema.      Left lower leg: No edema.   Skin:     General: Skin is warm and dry.   Neurological:      Mental Status: She is alert and oriented to person, place, and time.   Psychiatric:         Attention and Perception: Attention and perception normal.         Mood and Affect: Mood and affect normal.         Speech: Speech normal.         Behavior: Behavior normal. Behavior is cooperative.         Thought Content: Thought content normal.         Cognition and Memory: Cognition and memory normal.         Judgment: Judgment normal.         Procedure     ECG 12 Lead    Date/Time: 1/19/2021 8:06 AM  Performed by: Natalie Tarango APRN  Authorized by: Natalie Tarango APRN   Comparison: compared with previous ECG from 1/10/2019  Similar to previous ECG  Rhythm: sinus rhythm  Rate: normal  BPM: 62  QRS axis: normal  Other findings: low voltage    Clinical impression: non-specific ECG                 Assessment/Plan      Diagnosis Plan   1. Dyslipidemia  atorvastatin (LIPITOR) 20 MG tablet    Lipid Panel    Comprehensive Metabolic Panel   2. Palpitations  ECG 12 Lead    CBC & Differential    TSH    T3, Free    T4, Free    Magnesium   3. Peripheral edema     4. Grade I diastolic dysfunction     5. Edema,  unspecified type  furosemide (LASIX) 40 MG tablet    potassium chloride 10 MEQ CR tablet   6. Gastroesophageal reflux disease without esophagitis  pantoprazole (Protonix) 40 MG EC tablet   7. Healthcare maintenance  Lipid Panel    Comprehensive Metabolic Panel    CBC & Differential    TSH    T3, Free    T4, Free    Magnesium       Return in about 1 year (around 1/19/2022).    Dyslipidemia-patient's on Lipitor.  Palpitations-stable per patient report.  Peripheral edema/diastolic dysfunction 1-patient takes Lasix.  GERD-refilled her Protonix.  Patient is going to get lipid, CBC, CMP, TSH, free T3, free T4 and magnesium today.  She will continue her medication regimen for now.  She will follow-up in a year sooner if any changes.       Linda PETER Schafer  reports that she has never smoked. She has never used smokeless tobacco..            Patient's Body mass index is 28.9 kg/m². BMI is above normal parameters. Recommendations include: educational material.      Electronically signed by:

## 2021-01-19 NOTE — PATIENT INSTRUCTIONS
"BMI for Adults  What is BMI?  Body mass index (BMI) is a number that is calculated from a person's weight and height. BMI can help estimate how much of a person's weight is composed of fat. BMI does not measure body fat directly. Rather, it is an alternative to procedures that directly measure body fat, which can be difficult and expensive.  BMI can help identify people who may be at higher risk for certain medical problems.  What are BMI measurements used for?  BMI is used as a screening tool to identify possible weight problems. It helps determine whether a person is obese, overweight, a healthy weight, or underweight.  BMI is useful for:  · Identifying a weight problem that may be related to a medical condition or may increase the risk for medical problems.  · Promoting changes, such as changes in diet and exercise, to help reach a healthy weight. BMI screening can be repeated to see if these changes are working.  How is BMI calculated?  BMI involves measuring your weight in relation to your height. Both height and weight are measured, and the BMI is calculated from those numbers. This can be done either in English ([place] or metric measurements. Note that charts and online BMI calculators are available to help you find your BMI quickly and easily without having to do these calculations yourself.  To calculate your BMI in English ([place] measurements:    1. Measure your weight in pounds (lb).  2. Multiply the number of pounds by 703.  ? For example, for a person who weighs 180 lb, multiply that number by 703, which equals 126,540.  3. Measure your height in inches. Then multiply that number by itself to get a measurement called \"inches squared.\"  ? For example, for a person who is 70 inches tall, the \"inches squared\" measurement is 70 inches x 70 inches, which equals 4,900 inches squared.  4. Divide the total from step 2 (number of lb x 703) by the total from step 3 (inches squared): 126,540 ÷ 4,900 = 25.8. This is " "your BMI.  To calculate your BMI in metric measurements:  1. Measure your weight in kilograms (kg).  2. Measure your height in meters (m). Then multiply that number by itself to get a measurement called \"meters squared.\"  ? For example, for a person who is 1.75 m tall, the \"meters squared\" measurement is 1.75 m x 1.75 m, which is equal to 3.1 meters squared.  3. Divide the number of kilograms (your weight) by the meters squared number. In this example: 70 ÷ 3.1 = 22.6. This is your BMI.  What do the results mean?  BMI charts are used to identify whether you are underweight, normal weight, overweight, or obese. The following guidelines will be used:  · Underweight: BMI less than 18.5.  · Normal weight: BMI between 18.5 and 24.9.  · Overweight: BMI between 25 and 29.9.  · Obese: BMI of 30 or above.  Keep these notes in mind:  · Weight includes both fat and muscle, so someone with a muscular build, such as an athlete, may have a BMI that is higher than 24.9. In cases like these, BMI is not an accurate measure of body fat.  · To determine if excess body fat is the cause of a BMI of 25 or higher, further assessments may need to be done by a health care provider.  · BMI is usually interpreted in the same way for men and women.  Where to find more information  For more information about BMI, including tools to quickly calculate your BMI, go to these websites:  · Centers for Disease Control and Prevention: www.cdc.gov  · American Heart Association: www.heart.org  · National Heart, Lung, and [employer]: www.nhlbi.nih.gov  Summary  · Body mass index (BMI) is a number that is calculated from a person's weight and height.  · BMI may help estimate how much of a person's weight is composed of fat. BMI can help identify those who may be at higher risk for certain medical problems.  · BMI can be measured using English measurements or metric measurements.  · BMI charts are used to identify whether you are underweight, normal " weight, overweight, or obese.  This information is not intended to replace advice given to you by your health care provider. Make sure you discuss any questions you have with your health care provider.  Document Revised: 09/09/2020 Document Reviewed: 07/17/2020  Elsevier Patient Education © 2020 [employer]    Palpitations  Palpitations are feelings that your heartbeat is irregular or is faster than normal. It may feel like your heart is fluttering or skipping a beat. Palpitations are usually not a serious problem. They may be caused by many things, including smoking, caffeine, alcohol, stress, and certain medicines or drugs. Most causes of palpitations are not serious. However, some palpitations can be a sign of a serious problem. You may need further tests to rule out serious medical problems.  Follow these instructions at home:         Pay attention to any changes in your condition. Take these actions to help manage your symptoms:  Eating and drinking  · Avoid foods and drinks that may cause palpitations. These may include:  ? Caffeinated coffee, tea, soft drinks, diet pills, and energy drinks.  ? Chocolate.  ? Alcohol.  Lifestyle  · Take steps to reduce your stress and anxiety. Things that can help you relax include:  ? Yoga.  ? Mind-body activities, such as deep breathing, meditation, or using words and images to create positive thoughts (guided imagery).  ? Physical activity, such as swimming, jogging, or walking. Tell your health care provider if your palpitations increase with activity. If you have chest pain or shortness of breath with activity, do not continue the activity until you are seen by your health care provider.  ? Biofeedback. This is a method that helps you learn to use your mind to control things in your body, such as your heartbeat.  · Do not use drugs, including cocaine or ecstasy. Do not use marijuana.  · Get plenty of rest and sleep. Keep a regular bed time.  General instructions  · Take  over-the-counter and prescription medicines only as told by your health care provider.  · Do not use any products that contain nicotine or tobacco, such as cigarettes and e-cigarettes. If you need help quitting, ask your health care provider.  · Keep all follow-up visits as told by your health care provider. This is important. These may include visits for further testing if palpitations do not go away or get worse.  Contact a health care provider if you:  · Continue to have a fast or irregular heartbeat after 24 hours.  · Notice that your palpitations occur more often.  Get help right away if you:  · Have chest pain or shortness of breath.  · Have a severe headache.  · Feel dizzy or you faint.  Summary  · Palpitations are feelings that your heartbeat is irregular or is faster than normal. It may feel like your heart is fluttering or skipping a beat.  · Palpitations may be caused by many things, including smoking, caffeine, alcohol, stress, certain medicines, and drugs.  · Although most causes of palpitations are not serious, some causes can be a sign of a serious medical problem.  · Get help right away if you faint or have chest pain, shortness of breath, a severe headache, or dizziness.  This information is not intended to replace advice given to you by your health care provider. Make sure you discuss any questions you have with your health care provider.  Document Revised: 01/30/2019 Document Reviewed: 01/30/2019  Elsevier Patient Education © 2020 [employer]

## 2021-01-19 NOTE — TELEPHONE ENCOUNTER
----- Message from CINDY Hardy sent at 1/19/2021  3:47 PM EST -----  Please advise patient.  TSH still a little underactive.  Forwarded to PCP.  Also sugar was slightly elevated any coffee with sugar or any drinks this AM?  Cholesterol was perfect.

## 2021-01-20 LAB — T3FREE SERPL-MCNC: 3.71 PG/ML (ref 2–4.4)

## 2021-01-26 ENCOUNTER — APPOINTMENT (OUTPATIENT)
Dept: WOMENS IMAGING | Facility: HOSPITAL | Age: 61
End: 2021-01-26

## 2021-01-26 PROCEDURE — 77063 BREAST TOMOSYNTHESIS BI: CPT | Performed by: RADIOLOGY

## 2021-01-26 PROCEDURE — 77067 SCR MAMMO BI INCL CAD: CPT | Performed by: RADIOLOGY

## 2022-01-28 ENCOUNTER — APPOINTMENT (OUTPATIENT)
Dept: WOMENS IMAGING | Facility: HOSPITAL | Age: 62
End: 2022-01-28

## 2022-01-28 PROCEDURE — 77063 BREAST TOMOSYNTHESIS BI: CPT | Performed by: RADIOLOGY

## 2022-01-28 PROCEDURE — 77067 SCR MAMMO BI INCL CAD: CPT | Performed by: RADIOLOGY

## 2022-08-31 ENCOUNTER — OFFICE VISIT (OUTPATIENT)
Dept: CARDIOLOGY | Facility: CLINIC | Age: 62
End: 2022-08-31

## 2022-08-31 VITALS
SYSTOLIC BLOOD PRESSURE: 131 MMHG | DIASTOLIC BLOOD PRESSURE: 72 MMHG | OXYGEN SATURATION: 98 % | TEMPERATURE: 97.2 F | HEIGHT: 62 IN | BODY MASS INDEX: 27.23 KG/M2 | HEART RATE: 62 BPM | WEIGHT: 148 LBS

## 2022-08-31 DIAGNOSIS — I51.89 GRADE I DIASTOLIC DYSFUNCTION: ICD-10-CM

## 2022-08-31 DIAGNOSIS — R00.2 PALPITATIONS: Primary | ICD-10-CM

## 2022-08-31 DIAGNOSIS — K21.9 GASTROESOPHAGEAL REFLUX DISEASE WITHOUT ESOPHAGITIS: ICD-10-CM

## 2022-08-31 DIAGNOSIS — E78.5 DYSLIPIDEMIA: ICD-10-CM

## 2022-08-31 DIAGNOSIS — R60.9 PERIPHERAL EDEMA: ICD-10-CM

## 2022-08-31 DIAGNOSIS — Z86.16 HISTORY OF COVID-19: ICD-10-CM

## 2022-08-31 PROCEDURE — 93000 ELECTROCARDIOGRAM COMPLETE: CPT | Performed by: NURSE PRACTITIONER

## 2022-08-31 PROCEDURE — 99214 OFFICE O/P EST MOD 30 MIN: CPT | Performed by: NURSE PRACTITIONER

## 2022-08-31 RX ORDER — FUROSEMIDE 40 MG/1
40 TABLET ORAL DAILY
Qty: 90 TABLET | Refills: 3 | Status: SHIPPED | OUTPATIENT
Start: 2022-08-31

## 2022-08-31 RX ORDER — POTASSIUM CHLORIDE 750 MG/1
10 TABLET, FILM COATED, EXTENDED RELEASE ORAL DAILY
Qty: 90 TABLET | Refills: 3 | Status: SHIPPED | OUTPATIENT
Start: 2022-08-31

## 2022-08-31 RX ORDER — PANTOPRAZOLE SODIUM 20 MG/1
20 TABLET, DELAYED RELEASE ORAL 2 TIMES DAILY
Qty: 180 TABLET | Refills: 3 | Status: SHIPPED | OUTPATIENT
Start: 2022-08-31

## 2022-08-31 NOTE — PROGRESS NOTES
Subjective   Linda Schafer is a 61 y.o. female     Chief Complaint   Patient presents with   • Follow-up       HPI    Problem list    1. History of heart murmur  1.1 Echo 10/27/15 - EF 60-65%; mild MR and TR; PA 25-30  1.2 Echo 4/11/17-EF greater than 65%, diastolic dysfunction 1, trace MR and trace TR, PA 25-30  2. Dyslipidemia  3. Palpitations      4. BLE Arterial Ultrasound 9/14/15 - no obstructive disease on either side; mildly abnormal doppler profile left distally.        5. Chest Pain       5.1 Stress Test 10/27/15 - low risk study      6. History of COVID-19 6/2021    Patient is a 61-year-old female who presents today for a follow-up.  She denies any chest pain or pressure.  She says she has palpitations typically when she is laying down but they are very random.  She says she does not have them often at all and she has had them for years.  She does have some dizziness but only occurs on occasion.  She denies any presyncope, syncope, orthopnea or PND.  She does get some swelling in her legs and her water pill does help.  She denies any shortness of breath with activity.  Her mother who is 93 had a stroke recently and she is just not sleeping as well as she is trying to help take care of her.      We went over her labs.  Her last LDL was 105, triglycerides 122, creatinine 0.7, K3.7 and TSH was within normal limits.  I discussed things to watch for since she has had COVID.  However at this time she is back to baseline.    Current Outpatient Medications on File Prior to Visit   Medication Sig Dispense Refill   • aspirin 81 MG tablet Take 81 mg by mouth Daily.     • atorvastatin (LIPITOR) 20 MG tablet Take 1 tablet by mouth every night at bedtime. 90 tablet 3   • Calcium 500 MG tablet Take  by mouth Daily.     • Coenzyme Q10 (CO Q-10) 100 MG capsule Take  by mouth.     • dicyclomine (BENTYL) 10 MG capsule      • estradiol (MINIVELLE, VIVELLE-DOT) 0.05 MG/24HR patch Place  on the skin.     • furosemide (LASIX)  40 MG tablet Take 1 tablet by mouth Daily. 90 tablet 3   • levocetirizine (XYZAL) 5 MG tablet TAKE ONE TABLET BY MOUTH EVERY EVENING 30 tablet 2   • loratadine (ALLERGY RELIEF) 10 MG tablet Take 1 tablet by mouth Daily. 30 tablet 2   • MULTIPLE VITAMIN PO Take  by mouth Daily.     • naproxen (NAPROSYN) 500 MG tablet Take 500 mg by mouth Daily As Needed.     • naratriptan (AMERGE) 2.5 MG tablet Take 2.5 mg by mouth Every 4 (Four) Hours As Needed. migraines     • NON FORMULARY Zinc - 1 tablet daily ( pt is unsure of Dose )     • pantoprazole (Protonix) 40 MG EC tablet Take 1 tablet by mouth Daily. (Patient taking differently: Take 20 mg by mouth 2 (Two) Times a Day.) 90 tablet 3   • potassium chloride 10 MEQ CR tablet Take 1 tablet by mouth Daily. 90 tablet 3   • TiZANidine (ZANAFLEX) 4 MG capsule Take 1 capsule by mouth 3 (Three) Times a Day As Needed for Muscle Spasms. 45 capsule 3   • vitamin B-12 (CYANOCOBALAMIN) 100 MCG tablet Take 50 mcg by mouth Daily.     • vitamin C (ASCORBIC ACID) 250 MG tablet Take 250 mg by mouth Daily.       No current facility-administered medications on file prior to visit.           ALLERGIES    Codeine and Penicillins    Past Medical History:   Diagnosis Date   • Atypical chest pain     Chest pain.Atypical chest pain   • Chest pain    • COVID-19     06/30/2022   • COVID-19 vaccine administered     04/02/2021 ,  [facility] ) 10/25/2021 - Moderna   • Diminished pulses in lower extremity    • Diverticulosis    • Dizziness     Occasional   • Dyslipidemia    • Dyspnea    • Edema    • Fatigue    • GERD (gastroesophageal reflux disease)    • Hiatal hernia    • Migraine headache    • Palpitations    • Personal history of cardiac murmur    • SOB (shortness of breath)        Social History     Socioeconomic History   • Marital status:    Tobacco Use   • Smoking status: Never Smoker   • Smokeless tobacco: Never Used   Vaping Use   • Vaping Use: Never used   Substance and Sexual Activity   •  Alcohol use: No   • Drug use: Never   • Sexual activity: Defer       Family History   Problem Relation Age of Onset   • Hyperlipidemia Mother    • Hypertension Mother    • Stroke Mother    • Anxiety disorder Mother    • Stroke Father    • Hypertension Father    • Hyperlipidemia Father    • Heart attack Father    • Hyperlipidemia Other         Dyslipidemia   • Stroke Other         CVA   • Coronary artery disease Other    • Diabetes Other    • Hypertension Other    • Sudden death Other         Sudden Cardiac Death (SCD)       Review of Systems   Constitutional: Positive for fatigue (fatigued ( she states that she is not getting enough rest due to her mother has had a stroke ). Negative for appetite change, chills, diaphoresis and fever.   HENT: Negative for congestion, rhinorrhea and sore throat.    Eyes: Positive for visual disturbance (readin glasses ).   Respiratory: Negative for cough, chest tightness, shortness of breath and wheezing.    Cardiovascular: Positive for palpitations (fluttering , pounds ( it will just happen when she is laying down ); very random, not often, delt with for years ) and leg swelling (legs,feet and ankles swelling will go down at night ( when she had the swelling ); water pill helps ). Negative for chest pain.   Gastrointestinal: Negative for abdominal pain, blood in stool, constipation, diarrhea, nausea and vomiting.   Endocrine: Negative for cold intolerance and heat intolerance.   Genitourinary: Negative for difficulty urinating, dysuria, frequency, hematuria and urgency.   Musculoskeletal: Positive for back pain (lower back and hips ). Negative for arthralgias, joint swelling, neck pain and neck stiffness.   Skin: Negative for color change, pallor, rash and wound.   Allergic/Immunologic: Positive for environmental allergies (seasonal ). Negative for food allergies.   Neurological: Positive for dizziness (pt has Veridgo at times ). Negative for syncope, weakness, light-headedness,  "numbness and headaches.   Hematological: Bruises/bleeds easily (Brusies easy ).   Psychiatric/Behavioral: Positive for sleep disturbance (hard to go and stay asleep at times due to the stress of her mother having a stroke ).       Objective   /72 (BP Location: Left arm, Patient Position: Sitting)   Pulse 62   Temp 97.2 °F (36.2 °C)   Ht 157.5 cm (62\")   Wt 67.1 kg (148 lb)   SpO2 98%   BMI 27.07 kg/m²   Vitals:    08/31/22 1033   BP: 131/72   BP Location: Left arm   Patient Position: Sitting   Pulse: 62   Temp: 97.2 °F (36.2 °C)   SpO2: 98%   Weight: 67.1 kg (148 lb)   Height: 157.5 cm (62\")      Lab Results (most recent)     None        Physical Exam  Vitals reviewed.   Constitutional:       General: She is awake.      Appearance: Normal appearance. She is well-developed, well-groomed and overweight.   HENT:      Head: Normocephalic.   Eyes:      General: Lids are normal.   Neck:      Vascular: No carotid bruit, hepatojugular reflux or JVD.   Cardiovascular:      Rate and Rhythm: Normal rate and regular rhythm.      Pulses:           Radial pulses are 2+ on the right side and 2+ on the left side.        Dorsalis pedis pulses are 2+ on the right side and 2+ on the left side.        Posterior tibial pulses are 2+ on the right side and 2+ on the left side.      Heart sounds: Normal heart sounds.   Pulmonary:      Effort: Pulmonary effort is normal.      Breath sounds: Normal breath sounds and air entry.   Abdominal:      General: Bowel sounds are normal.      Palpations: Abdomen is soft.   Musculoskeletal:      Right lower leg: No edema.      Left lower leg: No edema.   Skin:     General: Skin is warm and dry.   Neurological:      Mental Status: She is alert and oriented to person, place, and time.   Psychiatric:         Attention and Perception: Attention and perception normal.         Mood and Affect: Mood and affect normal.         Speech: Speech normal.         Behavior: Behavior normal. Behavior is " cooperative.         Thought Content: Thought content normal.         Cognition and Memory: Cognition and memory normal.         Judgment: Judgment normal.         Procedure     ECG 12 Lead    Date/Time: 8/31/2022 10:53 AM  Performed by: Natalie Tarango APRN  Authorized by: Natalie Tarango APRN   Comparison: compared with previous ECG from 1/19/2021  Similar to previous ECG  Rhythm: sinus rhythm  Rate: normal  BPM: 63  QRS axis: normal  Other findings: low voltage    Clinical impression: non-specific ECG                 Assessment & Plan      Diagnosis Plan   1. Palpitations  ECG 12 Lead   2. Gastroesophageal reflux disease without esophagitis  pantoprazole (PROTONIX) 20 MG EC tablet   3. History of COVID-19     4. Grade I diastolic dysfunction     5. Dyslipidemia     6. Peripheral edema  furosemide (LASIX) 40 MG tablet    potassium chloride 10 MEQ CR tablet       Return in about 1 year (around 8/31/2023).    Palpitations-stable.  GERD-refill patient's Protonix.  History of COVID-19-patient is back to baseline.  Diastolic dysfunction/peripheral edema-patient's on Lasix.  Dyslipidemia-patient is on Lipitor and her LDL is slightly elevated at 105.  She will continue her medication regimen.  She will follow-up in 1 year or sooner if any changes.       Linda GREEN Gilmar  reports that she has never smoked. She has never used smokeless tobacco.. Patient brought in medicine list to appointment, it's been reviewed with patient and med list was updated in the chart.     Electronically signed by:

## 2022-12-19 DIAGNOSIS — E78.5 DYSLIPIDEMIA: ICD-10-CM

## 2022-12-19 RX ORDER — ATORVASTATIN CALCIUM 20 MG/1
20 TABLET, FILM COATED ORAL
Qty: 90 TABLET | Refills: 3 | Status: SHIPPED | OUTPATIENT
Start: 2022-12-19

## 2023-08-28 DIAGNOSIS — R60.9 PERIPHERAL EDEMA: ICD-10-CM

## 2023-08-28 RX ORDER — FUROSEMIDE 40 MG/1
TABLET ORAL
Qty: 90 TABLET | Refills: 3 | Status: SHIPPED | OUTPATIENT
Start: 2023-08-28 | End: 2023-08-30 | Stop reason: SDUPTHER

## 2023-08-28 RX ORDER — POTASSIUM CHLORIDE 750 MG/1
TABLET, FILM COATED, EXTENDED RELEASE ORAL
Qty: 90 TABLET | Refills: 3 | Status: SHIPPED | OUTPATIENT
Start: 2023-08-28 | End: 2023-08-30 | Stop reason: SDUPTHER

## 2023-08-30 ENCOUNTER — OFFICE VISIT (OUTPATIENT)
Dept: CARDIOLOGY | Facility: CLINIC | Age: 63
End: 2023-08-30
Payer: COMMERCIAL

## 2023-08-30 VITALS
HEART RATE: 75 BPM | DIASTOLIC BLOOD PRESSURE: 75 MMHG | HEIGHT: 62 IN | WEIGHT: 143.4 LBS | SYSTOLIC BLOOD PRESSURE: 140 MMHG | BODY MASS INDEX: 26.39 KG/M2 | OXYGEN SATURATION: 98 %

## 2023-08-30 DIAGNOSIS — R60.9 PERIPHERAL EDEMA: ICD-10-CM

## 2023-08-30 DIAGNOSIS — R00.2 PALPITATIONS: ICD-10-CM

## 2023-08-30 DIAGNOSIS — I51.89 GRADE I DIASTOLIC DYSFUNCTION: ICD-10-CM

## 2023-08-30 DIAGNOSIS — E78.5 DYSLIPIDEMIA: Primary | ICD-10-CM

## 2023-08-30 DIAGNOSIS — Z86.79 PERSONAL HISTORY OF CARDIAC MURMUR: ICD-10-CM

## 2023-08-30 PROCEDURE — 93000 ELECTROCARDIOGRAM COMPLETE: CPT | Performed by: INTERNAL MEDICINE

## 2023-08-30 PROCEDURE — 99214 OFFICE O/P EST MOD 30 MIN: CPT | Performed by: INTERNAL MEDICINE

## 2023-08-30 RX ORDER — ATORVASTATIN CALCIUM 20 MG/1
20 TABLET, FILM COATED ORAL
Qty: 90 TABLET | Refills: 3 | Status: SHIPPED | OUTPATIENT
Start: 2023-08-30

## 2023-08-30 RX ORDER — POTASSIUM CHLORIDE 750 MG/1
10 TABLET, FILM COATED, EXTENDED RELEASE ORAL DAILY
Qty: 90 TABLET | Refills: 3 | Status: SHIPPED | OUTPATIENT
Start: 2023-08-30

## 2023-08-30 RX ORDER — FUROSEMIDE 40 MG/1
40 TABLET ORAL DAILY
Qty: 90 TABLET | Refills: 3 | Status: SHIPPED | OUTPATIENT
Start: 2023-08-30

## 2023-08-30 NOTE — PROGRESS NOTES
Subjective   Linda Schafer is a 62 y.o. female     Chief Complaint   Patient presents with    Follow-up     Here for yearly f/u    Palpitations    Hyperlipidemia       PROBLEM LIST:     1. History of heart murmur  1.1 Echo 10/27/15 - EF 60-65%; mild MR and TR; PA 25-30  1.2 Echo 4/11/17-EF greater than 65%, diastolic dysfunction 1, trace MR and trace TR, PA 25-30  2. Dyslipidemia  3. Palpitations      4. BLE Arterial Ultrasound 9/14/15 - no obstructive disease on either side; mildly abnormal doppler profile left distally.        5. Chest Pain       5.1 Stress Test 10/27/15 - low risk study      6. History of COVID-19 6/2021    Specialty Problems          Cardiology Problems    Diminished pulses in lower extremity        Migraine headache        Palpitations        Grade I diastolic dysfunction             HPI:  Ms. Schafer returns for follow-up on the above.    She has done well over the last year.  She denies any type of chest discomfort.  She has no orthopnea, PND, or lower extremity edema.  She has occasional palpitations which have not changed over period of years.  She relates no dizziness, presyncope, or syncope.  Functional capacity has remained stable.  The patient denies any symptoms of peripheral arterial disease or arterial embolic events to include no symptoms of TIA or stroke.    Blood pressures have been well controlled.  The patient describes systolic blood pressures generally in the 130s or less.  We have no recent fasting lipid profile data.  The patient has had no intercurrent illnesses, injuries, or hospitalizations.                      PRIOR MEDICATIONS    Current Outpatient Medications on File Prior to Visit   Medication Sig Dispense Refill    aspirin 81 MG tablet Take 1 tablet by mouth Daily.      atorvastatin (LIPITOR) 20 MG tablet Take 1 tablet by mouth every night at bedtime. 90 tablet 3    Calcium 500 MG tablet Take  by mouth Daily.      Coenzyme Q10 (CO Q-10) 100 MG capsule Take 100 mg by  mouth Every Night.      dicyclomine (BENTYL) 10 MG capsule prn      estradiol (MINIVELLE, VIVELLE-DOT) 0.05 MG/24HR patch Place 1 patch on the skin as directed by provider 2 (Two) Times a Week.      furosemide (LASIX) 40 MG tablet TAKE ONE TABLET BY MOUTH DAILY 90 tablet 3    loratadine (ALLERGY RELIEF) 10 MG tablet Take 1 tablet by mouth Daily. 30 tablet 2    MULTIPLE VITAMIN PO Take  by mouth Daily.      naproxen (NAPROSYN) 500 MG tablet Take 1 tablet by mouth Daily As Needed.      naratriptan (AMERGE) 2.5 MG tablet Take 1 tablet by mouth Every 4 (Four) Hours As Needed. migraines      pantoprazole (PROTONIX) 20 MG EC tablet Take 1 tablet by mouth 2 (Two) Times a Day. 180 tablet 3    potassium chloride 10 MEQ CR tablet TAKE ONE TABLET BY MOUTH DAILY 90 tablet 3    TiZANidine (ZANAFLEX) 4 MG capsule Take 1 capsule by mouth 3 (Three) Times a Day As Needed for Muscle Spasms. 45 capsule 3    vitamin B-12 (CYANOCOBALAMIN) 100 MCG tablet Take 0.5 tablets by mouth Daily.      vitamin C (ASCORBIC ACID) 250 MG tablet Take 1 tablet by mouth Daily.      NON FORMULARY Zinc - 1 tablet daily ( pt is unsure of Dose )       No current facility-administered medications on file prior to visit.       ALLERGIES:    Codeine and Penicillins    PAST MEDICAL HISTORY:    Past Medical History:   Diagnosis Date    Atypical chest pain     Chest pain.Atypical chest pain    Chest pain     COVID-19     06/30/2022    COVID-19 vaccine administered     04/02/2021 ,  J and J ) 10/25/2021 - Moderna    Diminished pulses in lower extremity     Diverticulosis     Dizziness     Occasional    Dyslipidemia     Dyspnea     Edema     Fatigue     GERD (gastroesophageal reflux disease)     Hiatal hernia     Migraine headache     Palpitations     Personal history of cardiac murmur     SOB (shortness of breath)        SURGICAL HISTORY:    Past Surgical History:   Procedure Laterality Date    CHOLECYSTECTOMY      HYSTERECTOMY      OOPHORECTOMY      TUBAL  "ABDOMINAL LIGATION         SOCIAL HISTORY:    Social History     Socioeconomic History    Marital status:    Tobacco Use    Smoking status: Never    Smokeless tobacco: Never   Vaping Use    Vaping Use: Never used   Substance and Sexual Activity    Alcohol use: No    Drug use: Never    Sexual activity: Defer       FAMILY HISTORY:    Family History   Problem Relation Age of Onset    Hyperlipidemia Mother     Hypertension Mother     Stroke Mother     Anxiety disorder Mother     Stroke Father     Hypertension Father     Hyperlipidemia Father     Heart attack Father     Hyperlipidemia Other         Dyslipidemia    Stroke Other         CVA    Coronary artery disease Other     Diabetes Other     Hypertension Other     Sudden death Other         Sudden Cardiac Death (SCD)       Review of Systems   Constitutional:  Positive for fatigue (r/t job and caring for mother).   HENT: Negative.     Eyes:  Positive for visual disturbance (reading glasses).   Respiratory: Negative.          Denies orthopnea/PND   Cardiovascular:  Positive for palpitations (\"for years\". usual.) and leg swelling (if doesn't take Lasix). Negative for chest pain.   Gastrointestinal: Negative.    Endocrine: Negative.    Genitourinary: Negative.    Musculoskeletal:  Positive for arthralgias and myalgias.   Skin: Negative.    Allergic/Immunologic: Positive for environmental allergies.   Neurological: Negative.         Denies stroke like sx's   Hematological:  Bruises/bleeds easily.   Psychiatric/Behavioral: Negative.       VISIT VITALS:  Vitals:    08/30/23 1003   BP: 140/75   BP Location: Left arm   Patient Position: Sitting   Pulse: 75   SpO2: 98%   Weight: 65 kg (143 lb 6.4 oz)   Height: 157.5 cm (62.01\")      /75 (BP Location: Left arm, Patient Position: Sitting)   Pulse 75   Ht 157.5 cm (62.01\")   Wt 65 kg (143 lb 6.4 oz)   SpO2 98%   BMI 26.22 kg/m²     RECENT LABS:    Objective       Physical Exam  Vitals and nursing note reviewed. "   Constitutional:       General: She is not in acute distress.     Appearance: She is well-developed.   HENT:      Head: Normocephalic and atraumatic.   Eyes:      Conjunctiva/sclera: Conjunctivae normal.      Pupils: Pupils are equal, round, and reactive to light.   Neck:      Vascular: No carotid bruit, hepatojugular reflux or JVD.      Trachea: No tracheal deviation.      Comments: Nl. Carotid upstrokes  Cardiovascular:      Rate and Rhythm: Normal rate and regular rhythm.      Pulses:           Radial pulses are 2+ on the right side and 2+ on the left side.      Heart sounds: S1 normal and S2 normal. Murmur heard.     No friction rub. S4 sounds present.      Comments: Barely auscultable 1/6 TR  No MR  No AI  Pulmonary:      Effort: Pulmonary effort is normal.      Breath sounds: Normal breath sounds. No wheezing, rhonchi or rales.      Comments: Nl. Expir. Phase  Nl. Breath sound intensity    Abdominal:      General: Bowel sounds are normal. There is no distension or abdominal bruit.      Palpations: Abdomen is soft. There is no mass.      Tenderness: There is no abdominal tenderness. There is no guarding or rebound.      Comments: No organomegaly   Musculoskeletal:         General: No tenderness or deformity. Normal range of motion.      Cervical back: Normal range of motion and neck supple.      Right lower leg: No edema.      Left lower leg: No edema.      Comments: LLE, no edema, palpable pedal pulses  RLE, no edema, palpable pedal pulses   Skin:     General: Skin is warm and dry.      Coloration: Skin is not pale.      Findings: No erythema or rash.   Neurological:      Mental Status: She is alert and oriented to person, place, and time.   Psychiatric:         Behavior: Behavior normal.         Thought Content: Thought content normal.         Judgment: Judgment normal.         ECG 12 Lead    Date/Time: 8/30/2023 10:09 AM  Performed by: Joe García MD  Authorized by: Joe García MD    Comparison: compared with previous ECG from 8/31/2022  Comments: Normal sinus rhythm.  Within normal limits.  No change.          Assessment & Plan   #1.  Valvular heart disease.  The patient has a barely is culpable TR murmur today.  I cannot appreciate MR.  Echo findings as above.  We will continue to monitor.    2.  Palpitations.  The patient has had minimal symptoms which have been stable over period of years.  She does not describe symptoms suggestive of paroxysmal atrial fibrillation.  However, given a family history of A-fib not monitoring might be reasonable if this has not been done in the past.  We will discuss further with the patient.    3.  Lower extremity edema.  The patient has no elevation of pulmonary pressures and no symptoms of right heart failure.  She manages edema with very low-dose Lasix.  We will continue as present.    4.  We discussed today symptoms of heart failure, ischemia, malignant dysrhythmia, peripheral ischemia.  The patient understands any symptoms of TIA or stroke should prompt activation of emergency medical services.  She will report other symptoms immediately.  Ms. Schafer will follow with Dr. Blecher as instructed and we will get fasting lipid profile with labs drawn through her office.  We will plan on seeing her in follow-up in 1 year or on a as needed basis.   Diagnosis Plan   1. Dyslipidemia        2. Grade I diastolic dysfunction        3. Palpitations        4. Peripheral edema        5. Personal history of cardiac murmur            No follow-ups on file.         Linda Schafer  reports that she has never smoked. She has never used smokeless tobacco.. I have educated her on the risk of diseases from using tobacco products such as cancer, COPD, and heart disease.               BMI is >= 25 and <30. (Overweight) The following options were offered after discussion;: pcp addressing             Electronically signed by:    Elliottibed for Joe García MD by Valerie Faria  LPN on August 30, 2023  at 10:09 EDT    I, Joe García MD personally performed the services described in this documentation as scribed by the above named individual in my presence, and it is both accurate and complete. August 30, 2023 10:09 EDT      Dictated Utilizing Dragon Dictation: Part of this note may be an electronic transcription/translation of spoken language to printed text using the Dragon Dictation System.

## 2023-08-31 DIAGNOSIS — E78.5 DYSLIPIDEMIA: ICD-10-CM

## 2023-09-08 ENCOUNTER — TELEPHONE (OUTPATIENT)
Dept: CARDIOLOGY | Facility: CLINIC | Age: 63
End: 2023-09-08
Payer: COMMERCIAL

## 2023-09-08 NOTE — TELEPHONE ENCOUNTER
RECENT CHOL. LEVELS DISCUSSED AND AWARE NO CHANGES NEEDED. PH,LPN            ----- Message from Joe García MD sent at 9/7/2023  1:30 PM EDT -----  No changes needed.   ----- Message -----  From: Kisha Hassan RegSched Rep  Sent: 8/31/2023  10:57 AM EDT  To: Joe García MD

## 2023-11-02 ENCOUNTER — TELEPHONE (OUTPATIENT)
Dept: CARDIOLOGY | Facility: CLINIC | Age: 63
End: 2023-11-02
Payer: COMMERCIAL

## 2023-11-02 DIAGNOSIS — R01.1 HEART MURMUR: ICD-10-CM

## 2023-11-02 DIAGNOSIS — I47.19 ATRIAL TACHYCARDIA: ICD-10-CM

## 2023-11-02 DIAGNOSIS — I51.89 GRADE I DIASTOLIC DYSFUNCTION: ICD-10-CM

## 2023-11-02 DIAGNOSIS — R00.2 PALPITATIONS: ICD-10-CM

## 2023-11-02 DIAGNOSIS — I47.29 NSVT (NONSUSTAINED VENTRICULAR TACHYCARDIA): Primary | ICD-10-CM

## 2023-11-02 NOTE — TELEPHONE ENCOUNTER
DISCUSSED MONITOR RESULTS IN DETAIL WITH MRS. MENDOZA AND DR. GARCÍA'S RECOMMENDATIONS TO HAVE A NUCLEAR STRESS TEST DONE AND ECHO DUE TO EPISODES OF NSVT AND ATRIAL RUNS NOTED. SHE DOES NOT THINK SHE CAN WALK THE TREADMILL EFFIECIENTLY SINCE SHE JUST HAD CAOVID. AWARE [facility] SCHEDULING WILL BE CALLING HER TO SCHEDULE. STATES SHE NEEDS THEM DONE BEFORE EBD OF YEAR DUE TO DEDUCTIBLE. SHE WAS INSTRUCTED NATALEE ALL THE OFFICE BACK ONCE HAS TESTING DATE AND NEEDS A 2 MO. F/U AFTER DUE TO EVENT MONITOR. VERBALIZED SHE UNDERSTOOD. JAKY SHANNON          ----- Message from Joe García MD sent at 11/2/2023  9:14 AM EDT -----  The patient had nonsustained ventricular tachycardia as well as atrial runs on event monitoring.  Scheduled for stress and echo for restratification in the setting of nonsustained V. tach.  Follow-up after those studies are completed.  ----- Message -----  From: Joe García MD  Sent: 10/28/2023   2:42 PM EDT  To: Joe García MD       no withdrawal

## 2023-11-03 ENCOUNTER — TELEPHONE (OUTPATIENT)
Dept: CARDIOLOGY | Facility: CLINIC | Age: 63
End: 2023-11-03
Payer: COMMERCIAL

## 2023-11-03 NOTE — TELEPHONE ENCOUNTER
Caller: Linda Schafer    Relationship: Self    Best call back number: [phone]    Caller requesting test results:     What test was performed: HEART MONITOR    When was the test performed: AUG-SEPT    Where was the test performed:     Additional notes: PATIENT DID GET THE RESULTS FROM [facility] BUT WOULD LIKE A COPY FAXED TO HER .[phone]

## 2023-11-03 NOTE — TELEPHONE ENCOUNTER
Notified patient she will have to sign for records in office. She can print from [employer] or I can fax to another doctor office.

## 2023-11-22 ENCOUNTER — HOSPITAL ENCOUNTER (OUTPATIENT)
Dept: CARDIOLOGY | Facility: HOSPITAL | Age: 63
Discharge: HOME | End: 2023-11-22
Payer: COMMERCIAL

## 2023-11-22 DIAGNOSIS — R01.1 HEART MURMUR: ICD-10-CM

## 2023-11-22 DIAGNOSIS — I51.89 GRADE I DIASTOLIC DYSFUNCTION: ICD-10-CM

## 2023-11-22 DIAGNOSIS — I47.19 ATRIAL TACHYCARDIA: ICD-10-CM

## 2023-11-22 DIAGNOSIS — R00.2 PALPITATIONS: ICD-10-CM

## 2023-11-22 DIAGNOSIS — I47.29 NSVT (NONSUSTAINED VENTRICULAR TACHYCARDIA): ICD-10-CM

## 2023-11-22 LAB
AORTIC DIMENSIONLESS INDEX: 0.8 (DI)
BH CV ECHO MEAS - ACS: 1.95 CM
BH CV ECHO MEAS - AO MAX PG: 6.2 MMHG
BH CV ECHO MEAS - AO MEAN PG: 3 MMHG
BH CV ECHO MEAS - AO ROOT DIAM: 2.8 CM
BH CV ECHO MEAS - AO V2 MAX: 124 CM/SEC
BH CV ECHO MEAS - AO V2 VTI: 29.5 CM
BH CV ECHO MEAS - AVA(I,D): 2.8 CM2
BH CV ECHO MEAS - EDV(CUBED): 74.1 ML
BH CV ECHO MEAS - EDV(MOD-SP4): 63.8 ML
BH CV ECHO MEAS - EF(MOD-SP4): 63.5 %
BH CV ECHO MEAS - ESV(CUBED): 15.4 ML
BH CV ECHO MEAS - ESV(MOD-SP4): 23.3 ML
BH CV ECHO MEAS - FS: 40.7 %
BH CV ECHO MEAS - IVS/LVPW: 0.88 CM
BH CV ECHO MEAS - IVSD: 0.94 CM
BH CV ECHO MEAS - LA A2CS (ATRIAL LENGTH): 4.8 CM
BH CV ECHO MEAS - LA A4C LENGTH: 4.3 CM
BH CV ECHO MEAS - LA DIMENSION: 2.7 CM
BH CV ECHO MEAS - LAT PEAK E' VEL: 12.1 CM/SEC
BH CV ECHO MEAS - LV DIASTOLIC VOL/BSA (35-75): 38.5 CM2
BH CV ECHO MEAS - LV MASS(C)D: 138 GRAMS
BH CV ECHO MEAS - LV MAX PG: 4.2 MMHG
BH CV ECHO MEAS - LV MEAN PG: 2 MMHG
BH CV ECHO MEAS - LV SYSTOLIC VOL/BSA (12-30): 14.1 CM2
BH CV ECHO MEAS - LV V1 MAX: 102 CM/SEC
BH CV ECHO MEAS - LV V1 VTI: 23.6 CM
BH CV ECHO MEAS - LVIDD: 4.2 CM
BH CV ECHO MEAS - LVIDS: 2.49 CM
BH CV ECHO MEAS - LVOT AREA: 3.5 CM2
BH CV ECHO MEAS - LVOT DIAM: 2.1 CM
BH CV ECHO MEAS - LVPWD: 1.07 CM
BH CV ECHO MEAS - MED PEAK E' VEL: 11.6 CM/SEC
BH CV ECHO MEAS - MR MAX PG: 118.8 MMHG
BH CV ECHO MEAS - MR MAX VEL: 545 CM/SEC
BH CV ECHO MEAS - MV A MAX VEL: 76.6 CM/SEC
BH CV ECHO MEAS - MV DEC SLOPE: 230 CM/SEC2
BH CV ECHO MEAS - MV DEC TIME: 0.23 SEC
BH CV ECHO MEAS - MV E MAX VEL: 73.8 CM/SEC
BH CV ECHO MEAS - MV E/A: 0.96
BH CV ECHO MEAS - MV MAX PG: 3 MMHG
BH CV ECHO MEAS - MV MEAN PG: 1 MMHG
BH CV ECHO MEAS - MV P1/2T: 109 MSEC
BH CV ECHO MEAS - MV V2 VTI: 33.2 CM
BH CV ECHO MEAS - MVA(P1/2T): 2.02 CM2
BH CV ECHO MEAS - MVA(VTI): 2.46 CM2
BH CV ECHO MEAS - PA V2 MAX: 102 CM/SEC
BH CV ECHO MEAS - RAP SYSTOLE: 10 MMHG
BH CV ECHO MEAS - RV MAX PG: 4.5 MMHG
BH CV ECHO MEAS - RV V1 MAX: 106 CM/SEC
BH CV ECHO MEAS - RV V1 VTI: 27 CM
BH CV ECHO MEAS - RVDD: 2.6 CM
BH CV ECHO MEAS - RVSP: 26.3 MMHG
BH CV ECHO MEAS - SI(MOD-SP4): 24.4 ML/M2
BH CV ECHO MEAS - SV(LVOT): 81.7 ML
BH CV ECHO MEAS - SV(MOD-SP4): 40.5 ML
BH CV ECHO MEAS - TR MAX PG: 16.3 MMHG
BH CV ECHO MEAS - TR MAX VEL: 202 CM/SEC
BH CV ECHO MEASUREMENTS AVERAGE E/E' RATIO: 6.23
LEFT ATRIUM VOLUME INDEX: 14.8 ML/M2
LEFT ATRIUM VOLUME: 25 ML

## 2023-11-22 PROCEDURE — 93017 CV STRESS TEST TRACING ONLY: CPT

## 2023-11-22 PROCEDURE — 0 TECHNETIUM SESTAMIBI: Performed by: INTERNAL MEDICINE

## 2023-11-22 PROCEDURE — A9500 TC99M SESTAMIBI: HCPCS | Performed by: INTERNAL MEDICINE

## 2023-11-22 PROCEDURE — 25010000002 REGADENOSON 0.4 MG/5ML SOLUTION: Performed by: INTERNAL MEDICINE

## 2023-11-22 PROCEDURE — 78452 HT MUSCLE IMAGE SPECT MULT: CPT

## 2023-11-22 PROCEDURE — 93306 TTE W/DOPPLER COMPLETE: CPT

## 2023-11-22 RX ORDER — REGADENOSON 0.08 MG/ML
0.4 INJECTION, SOLUTION INTRAVENOUS
Status: COMPLETED | OUTPATIENT
Start: 2023-11-22 | End: 2023-11-22

## 2023-11-22 RX ADMIN — TECHNETIUM TC 99M SESTAMIBI 1 DOSE: 1 INJECTION INTRAVENOUS at 10:29

## 2023-11-22 RX ADMIN — REGADENOSON 0.4 MG: 0.08 INJECTION, SOLUTION INTRAVENOUS at 10:29

## 2023-11-22 RX ADMIN — TECHNETIUM TC 99M SESTAMIBI 1 DOSE: 1 INJECTION INTRAVENOUS at 08:08

## 2023-11-27 LAB
BH CV REST NUCLEAR ISOTOPE DOSE: 10 MCI
BH CV STRESS COMMENTS STAGE 1: NORMAL
BH CV STRESS DOSE REGADENOSON STAGE 1: 0.4
BH CV STRESS DURATION MIN STAGE 1: 0
BH CV STRESS DURATION SEC STAGE 1: 10
BH CV STRESS NUCLEAR ISOTOPE DOSE: 30 MCI
BH CV STRESS PROTOCOL 1: NORMAL
BH CV STRESS RECOVERY BP: NORMAL MMHG
BH CV STRESS RECOVERY HR: 68 BPM
BH CV STRESS STAGE 1: 1
MAXIMAL PREDICTED HEART RATE: 159 BPM
PERCENT MAX PREDICTED HR: 54.09 %
STRESS BASELINE BP: NORMAL MMHG
STRESS BASELINE HR: 68 BPM
STRESS PERCENT HR: 64 %
STRESS POST PEAK BP: NORMAL MMHG
STRESS POST PEAK HR: 86 BPM
STRESS TARGET HR: 135 BPM

## 2023-11-29 ENCOUNTER — TELEPHONE (OUTPATIENT)
Dept: CARDIOLOGY | Facility: CLINIC | Age: 63
End: 2023-11-29
Payer: COMMERCIAL

## 2023-11-29 NOTE — TELEPHONE ENCOUNTER
PATIENT NOTIFIED OF STRESS TEST RESULTS AND TO KEEP F/U APPT. VERBALIZED OK. JAKY SHANNON          ----- Message from Joe García MD sent at 11/29/2023  9:01 AM EST -----  Keep f/u appt.   ----- Message -----  From: Joe García MD  Sent: 11/27/2023  10:24 PM EST  To: Joe García MD

## 2023-12-12 ENCOUNTER — TELEPHONE (OUTPATIENT)
Dept: CARDIOLOGY | Facility: CLINIC | Age: 63
End: 2023-12-12
Payer: COMMERCIAL

## 2023-12-12 NOTE — TELEPHONE ENCOUNTER
ECHO  Pt notified of no acute findings. Provider will discuss results at f/u. Pt reminded of appt date and time.  ----- Message from Kelsie Brice MA sent at 12/11/2023  9:38 AM EST -----    ----- Message -----  From: Jesus Andersen PA  Sent: 12/8/2023   4:47 PM EST  To: Kelsie Brice MA    Routine follow-up.  ----- Message -----  From: Mayte Morel RegSched Rep  Sent: 12/7/2023   9:28 AM EST  To: EVA Dawson      ----- Message -----  From: Joe García MD  Sent: 12/3/2023   2:59 PM EST  To: Joe García MD

## 2024-08-16 ENCOUNTER — TELEPHONE (OUTPATIENT)
Dept: CARDIOLOGY | Facility: CLINIC | Age: 64
End: 2024-08-16
Payer: COMMERCIAL

## 2024-10-27 DIAGNOSIS — E78.5 DYSLIPIDEMIA: ICD-10-CM

## 2024-10-28 RX ORDER — ATORVASTATIN CALCIUM 20 MG/1
20 TABLET, FILM COATED ORAL
Qty: 90 TABLET | Refills: 3 | Status: SHIPPED | OUTPATIENT
Start: 2024-10-28

## 2025-05-24 ENCOUNTER — APPOINTMENT (OUTPATIENT)
Dept: CT IMAGING | Facility: HOSPITAL | Age: 65
End: 2025-05-24
Payer: COMMERCIAL

## 2025-05-24 ENCOUNTER — HOSPITAL ENCOUNTER (INPATIENT)
Facility: HOSPITAL | Age: 65
LOS: 6 days | Discharge: NURSING, REHAB OR LONG-TERM CARE | End: 2025-05-30
Attending: EMERGENCY MEDICINE | Admitting: INTERNAL MEDICINE
Payer: COMMERCIAL

## 2025-05-24 ENCOUNTER — APPOINTMENT (OUTPATIENT)
Dept: GENERAL RADIOLOGY | Facility: HOSPITAL | Age: 65
End: 2025-05-24
Payer: COMMERCIAL

## 2025-05-24 ENCOUNTER — APPOINTMENT (OUTPATIENT)
Dept: CARDIOLOGY | Facility: HOSPITAL | Age: 65
End: 2025-05-24
Payer: COMMERCIAL

## 2025-05-24 ENCOUNTER — APPOINTMENT (OUTPATIENT)
Dept: MRI IMAGING | Facility: HOSPITAL | Age: 65
End: 2025-05-24
Payer: COMMERCIAL

## 2025-05-24 DIAGNOSIS — Z98.890 STATUS POST CRANIOTOMY: ICD-10-CM

## 2025-05-24 DIAGNOSIS — I63.9 ACUTE CVA (CEREBROVASCULAR ACCIDENT): Primary | ICD-10-CM

## 2025-05-24 DIAGNOSIS — I63.9 ACUTE ISCHEMIC STROKE: ICD-10-CM

## 2025-05-24 DIAGNOSIS — R00.2 PALPITATIONS: ICD-10-CM

## 2025-05-24 DIAGNOSIS — R13.12 OROPHARYNGEAL DYSPHAGIA: ICD-10-CM

## 2025-05-24 DIAGNOSIS — R47.1 DYSARTHRIA: ICD-10-CM

## 2025-05-24 DIAGNOSIS — I63.9 STROKE: ICD-10-CM

## 2025-05-24 LAB
ALT SERPL W P-5'-P-CCNC: 19 U/L (ref 1–33)
AORTIC DIMENSIONLESS INDEX: 0.74 (DI)
APTT PPP: 27.2 SECONDS (ref 22–39)
ASCENDING AORTA: 2.7 CM
AST SERPL-CCNC: 27 U/L (ref 1–32)
AV MEAN PRESS GRAD SYS DOP V1V2: 2.5 MMHG
AV VMAX SYS DOP: 114.5 CM/SEC
BASOPHILS # BLD AUTO: 0.07 10*3/MM3 (ref 0–0.2)
BASOPHILS NFR BLD AUTO: 0.9 % (ref 0–1.5)
BH CV ECHO MEAS - AO MAX PG: 5.3 MMHG
BH CV ECHO MEAS - AO ROOT DIAM: 2.7 CM
BH CV ECHO MEAS - AO V2 VTI: 29.4 CM
BH CV ECHO MEAS - AVA(I,D): 2.1 CM2
BH CV ECHO MEAS - EDV(CUBED): 79.5 ML
BH CV ECHO MEAS - EDV(MOD-SP2): 70.1 ML
BH CV ECHO MEAS - EDV(MOD-SP4): 60.8 ML
BH CV ECHO MEAS - EF(MOD-SP2): 60.3 %
BH CV ECHO MEAS - EF(MOD-SP4): 63.5 %
BH CV ECHO MEAS - ESV(CUBED): 17.6 ML
BH CV ECHO MEAS - ESV(MOD-SP2): 27.8 ML
BH CV ECHO MEAS - ESV(MOD-SP4): 22.2 ML
BH CV ECHO MEAS - FS: 39.5 %
BH CV ECHO MEAS - IVS/LVPW: 1 CM
BH CV ECHO MEAS - IVSD: 0.9 CM
BH CV ECHO MEAS - LA DIMENSION: 2.9 CM
BH CV ECHO MEAS - LAT PEAK E' VEL: 14.1 CM/SEC
BH CV ECHO MEAS - LV DIASTOLIC VOL/BSA (35-75): 34.5 CM2
BH CV ECHO MEAS - LV MASS(C)D: 123.3 GRAMS
BH CV ECHO MEAS - LV MAX PG: 3.1 MMHG
BH CV ECHO MEAS - LV MEAN PG: 1.5 MMHG
BH CV ECHO MEAS - LV SYSTOLIC VOL/BSA (12-30): 12.6 CM2
BH CV ECHO MEAS - LV V1 MAX: 87.5 CM/SEC
BH CV ECHO MEAS - LV V1 VTI: 21.8 CM
BH CV ECHO MEAS - LVIDD: 4.3 CM
BH CV ECHO MEAS - LVIDS: 2.6 CM
BH CV ECHO MEAS - LVOT AREA: 2.8 CM2
BH CV ECHO MEAS - LVOT DIAM: 1.9 CM
BH CV ECHO MEAS - LVPWD: 0.9 CM
BH CV ECHO MEAS - MED PEAK E' VEL: 9.3 CM/SEC
BH CV ECHO MEAS - MV A MAX VEL: 74.9 CM/SEC
BH CV ECHO MEAS - MV DEC SLOPE: 147 CM/SEC2
BH CV ECHO MEAS - MV DEC TIME: 0.28 SEC
BH CV ECHO MEAS - MV E MAX VEL: 71.8 CM/SEC
BH CV ECHO MEAS - MV E/A: 0.96
BH CV ECHO MEAS - MV MAX PG: 3 MMHG
BH CV ECHO MEAS - MV MEAN PG: 1 MMHG
BH CV ECHO MEAS - MV P1/2T: 160.6 MSEC
BH CV ECHO MEAS - MV V2 VTI: 35.5 CM
BH CV ECHO MEAS - MVA(P1/2T): 1.37 CM2
BH CV ECHO MEAS - MVA(VTI): 1.74 CM2
BH CV ECHO MEAS - PA ACC TIME: 0.17 SEC
BH CV ECHO MEAS - PA V2 MAX: 101 CM/SEC
BH CV ECHO MEAS - SV(LVOT): 61.7 ML
BH CV ECHO MEAS - SV(MOD-SP2): 42.3 ML
BH CV ECHO MEAS - SV(MOD-SP4): 38.6 ML
BH CV ECHO MEAS - SVI(LVOT): 35 ML/M2
BH CV ECHO MEAS - SVI(MOD-SP2): 24 ML/M2
BH CV ECHO MEAS - SVI(MOD-SP4): 21.9 ML/M2
BH CV ECHO MEASUREMENTS AVERAGE E/E' RATIO: 6.14
BH CV ECHO SHUNT ASSESSMENT PERFORMED (HIDDEN SCRIPTING): 1
BH CV VAS BP LEFT ARM: NORMAL MMHG
BH CV XLRA - TDI S': 12.9 CM/SEC
BUN BLDA-MCNC: 11 MG/DL (ref 8–26)
CA-I BLDA-SCNC: 1.25 MMOL/L (ref 1.2–1.32)
CHLORIDE BLDA-SCNC: 103 MMOL/L (ref 98–109)
CO2 BLDA-SCNC: 25 MMOL/L (ref 24–29)
CREAT BLDA-MCNC: 0.8 MG/DL (ref 0.6–1.3)
DEPRECATED RDW RBC AUTO: 38.9 FL (ref 37–54)
EGFRCR SERPLBLD CKD-EPI 2021: 83.4 ML/MIN/1.73
EOSINOPHIL # BLD AUTO: 0.02 10*3/MM3 (ref 0–0.4)
EOSINOPHIL NFR BLD AUTO: 0.3 % (ref 0.3–6.2)
ERYTHROCYTE [DISTWIDTH] IN BLOOD BY AUTOMATED COUNT: 12.2 % (ref 12.3–15.4)
GLUCOSE BLDC GLUCOMTR-MCNC: 113 MG/DL (ref 70–130)
GLUCOSE BLDC GLUCOMTR-MCNC: 116 MG/DL (ref 70–130)
GLUCOSE BLDC GLUCOMTR-MCNC: 90 MG/DL (ref 70–130)
GLUCOSE BLDC GLUCOMTR-MCNC: 96 MG/DL (ref 70–130)
HCT VFR BLD AUTO: 42.7 % (ref 34–46.6)
HCT VFR BLDA CALC: 43 % (ref 38–51)
HGB BLD-MCNC: 13.9 G/DL (ref 12–15.9)
HGB BLDA-MCNC: 14.6 G/DL (ref 12–17)
HOLD SPECIMEN: NORMAL
IMM GRANULOCYTES # BLD AUTO: 0.08 10*3/MM3 (ref 0–0.05)
IMM GRANULOCYTES NFR BLD AUTO: 1.1 % (ref 0–0.5)
INR PPP: 0.91 (ref 0.89–1.12)
IVRT: 58 MS
LEFT ATRIUM VOLUME INDEX: 14.3 ML/M2
LV EF BIPLANE MOD: 62 %
LYMPHOCYTES # BLD AUTO: 1.07 10*3/MM3 (ref 0.7–3.1)
LYMPHOCYTES NFR BLD AUTO: 14.5 % (ref 19.6–45.3)
MAGNESIUM SERPL-MCNC: 2.1 MG/DL (ref 1.6–2.4)
MCH RBC QN AUTO: 28.2 PG (ref 26.6–33)
MCHC RBC AUTO-ENTMCNC: 32.6 G/DL (ref 31.5–35.7)
MCV RBC AUTO: 86.6 FL (ref 79–97)
MONOCYTES # BLD AUTO: 0.34 10*3/MM3 (ref 0.1–0.9)
MONOCYTES NFR BLD AUTO: 4.6 % (ref 5–12)
NEUTROPHILS NFR BLD AUTO: 5.8 10*3/MM3 (ref 1.7–7)
NEUTROPHILS NFR BLD AUTO: 78.6 % (ref 42.7–76)
NRBC BLD AUTO-RTO: 0 /100 WBC (ref 0–0.2)
PLATELET # BLD AUTO: 259 10*3/MM3 (ref 140–450)
PMV BLD AUTO: 11.8 FL (ref 6–12)
POTASSIUM BLDA-SCNC: 4 MMOL/L (ref 3.5–4.9)
POTASSIUM SERPL-SCNC: 3.7 MMOL/L (ref 3.5–5.2)
PROTHROMBIN TIME: 12.8 SECONDS (ref 12.2–15.3)
QT INTERVAL: 488 MS
QTC INTERVAL: 488 MS
RBC # BLD AUTO: 4.93 10*6/MM3 (ref 3.77–5.28)
SODIUM BLD-SCNC: 142 MMOL/L (ref 138–146)
SODIUM SERPL-SCNC: 143 MMOL/L (ref 136–145)
WBC NRBC COR # BLD AUTO: 7.38 10*3/MM3 (ref 3.4–10.8)
WHOLE BLOOD HOLD COAG: NORMAL
WHOLE BLOOD HOLD SPECIMEN: NORMAL

## 2025-05-24 PROCEDURE — 99285 EMERGENCY DEPT VISIT HI MDM: CPT

## 2025-05-24 PROCEDURE — 70496 CT ANGIOGRAPHY HEAD: CPT

## 2025-05-24 PROCEDURE — 83735 ASSAY OF MAGNESIUM: CPT

## 2025-05-24 PROCEDURE — 0042T HC CT CEREBRAL PERFUSION W/WO CONTRAST: CPT

## 2025-05-24 PROCEDURE — 84132 ASSAY OF SERUM POTASSIUM: CPT | Performed by: NURSE PRACTITIONER

## 2025-05-24 PROCEDURE — 80047 BASIC METABLC PNL IONIZED CA: CPT | Performed by: EMERGENCY MEDICINE

## 2025-05-24 PROCEDURE — 93306 TTE W/DOPPLER COMPLETE: CPT | Performed by: INTERNAL MEDICINE

## 2025-05-24 PROCEDURE — 25510000001 IOPAMIDOL PER 1 ML: Performed by: EMERGENCY MEDICINE

## 2025-05-24 PROCEDURE — 25810000003 SODIUM CHLORIDE 0.9 % SOLUTION: Performed by: NURSE PRACTITIONER

## 2025-05-24 PROCEDURE — 85610 PROTHROMBIN TIME: CPT | Performed by: EMERGENCY MEDICINE

## 2025-05-24 PROCEDURE — 99291 CRITICAL CARE FIRST HOUR: CPT | Performed by: INTERNAL MEDICINE

## 2025-05-24 PROCEDURE — 84460 ALANINE AMINO (ALT) (SGPT): CPT | Performed by: EMERGENCY MEDICINE

## 2025-05-24 PROCEDURE — 70498 CT ANGIOGRAPHY NECK: CPT

## 2025-05-24 PROCEDURE — 25810000003 SODIUM CHLORIDE 3 % SOLUTION: Performed by: NURSE PRACTITIONER

## 2025-05-24 PROCEDURE — 93010 ELECTROCARDIOGRAM REPORT: CPT | Performed by: INTERNAL MEDICINE

## 2025-05-24 PROCEDURE — 71045 X-RAY EXAM CHEST 1 VIEW: CPT

## 2025-05-24 PROCEDURE — C1894 INTRO/SHEATH, NON-LASER: HCPCS

## 2025-05-24 PROCEDURE — 70450 CT HEAD/BRAIN W/O DYE: CPT

## 2025-05-24 PROCEDURE — C1751 CATH, INF, PER/CENT/MIDLINE: HCPCS

## 2025-05-24 PROCEDURE — 82948 REAGENT STRIP/BLOOD GLUCOSE: CPT

## 2025-05-24 PROCEDURE — 02HV33Z INSERTION OF INFUSION DEVICE INTO SUPERIOR VENA CAVA, PERCUTANEOUS APPROACH: ICD-10-PCS | Performed by: NURSE PRACTITIONER

## 2025-05-24 PROCEDURE — 84295 ASSAY OF SERUM SODIUM: CPT | Performed by: NURSE PRACTITIONER

## 2025-05-24 PROCEDURE — 93005 ELECTROCARDIOGRAM TRACING: CPT

## 2025-05-24 PROCEDURE — 74018 RADEX ABDOMEN 1 VIEW: CPT

## 2025-05-24 PROCEDURE — 99223 1ST HOSP IP/OBS HIGH 75: CPT | Performed by: NURSE PRACTITIONER

## 2025-05-24 PROCEDURE — 85730 THROMBOPLASTIN TIME PARTIAL: CPT | Performed by: EMERGENCY MEDICINE

## 2025-05-24 PROCEDURE — 70551 MRI BRAIN STEM W/O DYE: CPT

## 2025-05-24 PROCEDURE — 4A03X5D MEASUREMENT OF ARTERIAL FLOW, INTRACRANIAL, EXTERNAL APPROACH: ICD-10-PCS | Performed by: RADIOLOGY

## 2025-05-24 PROCEDURE — 93005 ELECTROCARDIOGRAM TRACING: CPT | Performed by: EMERGENCY MEDICINE

## 2025-05-24 PROCEDURE — 36415 COLL VENOUS BLD VENIPUNCTURE: CPT

## 2025-05-24 PROCEDURE — 84450 TRANSFERASE (AST) (SGOT): CPT | Performed by: EMERGENCY MEDICINE

## 2025-05-24 PROCEDURE — 93306 TTE W/DOPPLER COMPLETE: CPT

## 2025-05-24 PROCEDURE — 99222 1ST HOSP IP/OBS MODERATE 55: CPT | Performed by: STUDENT IN AN ORGANIZED HEALTH CARE EDUCATION/TRAINING PROGRAM

## 2025-05-24 PROCEDURE — 85014 HEMATOCRIT: CPT | Performed by: EMERGENCY MEDICINE

## 2025-05-24 PROCEDURE — 85025 COMPLETE CBC W/AUTO DIFF WBC: CPT | Performed by: EMERGENCY MEDICINE

## 2025-05-24 RX ORDER — ACETAMINOPHEN 160 MG/5ML
650 SOLUTION ORAL EVERY 6 HOURS PRN
Status: DISCONTINUED | OUTPATIENT
Start: 2025-05-24 | End: 2025-05-25

## 2025-05-24 RX ORDER — SODIUM CHLORIDE 0.9 % (FLUSH) 0.9 %
10 SYRINGE (ML) INJECTION AS NEEDED
Status: DISCONTINUED | OUTPATIENT
Start: 2025-05-24 | End: 2025-05-26

## 2025-05-24 RX ORDER — SODIUM CHLORIDE 0.9 % (FLUSH) 0.9 %
10 SYRINGE (ML) INJECTION EVERY 12 HOURS SCHEDULED
Status: DISCONTINUED | OUTPATIENT
Start: 2025-05-24 | End: 2025-05-30 | Stop reason: HOSPADM

## 2025-05-24 RX ORDER — ATORVASTATIN CALCIUM 40 MG/1
80 TABLET, FILM COATED ORAL NIGHTLY
Status: DISCONTINUED | OUTPATIENT
Start: 2025-05-24 | End: 2025-05-26

## 2025-05-24 RX ORDER — ASPIRIN 300 MG/1
300 SUPPOSITORY RECTAL DAILY
Status: DISCONTINUED | OUTPATIENT
Start: 2025-05-24 | End: 2025-05-24

## 2025-05-24 RX ORDER — SODIUM CHLORIDE 0.9 % (FLUSH) 0.9 %
10 SYRINGE (ML) INJECTION EVERY 12 HOURS SCHEDULED
Status: DISCONTINUED | OUTPATIENT
Start: 2025-05-24 | End: 2025-05-29

## 2025-05-24 RX ORDER — IOPAMIDOL 755 MG/ML
115 INJECTION, SOLUTION INTRAVASCULAR
Status: COMPLETED | OUTPATIENT
Start: 2025-05-24 | End: 2025-05-24

## 2025-05-24 RX ORDER — ESTRADIOL 0.05 MG/D
1 PATCH, EXTENDED RELEASE TRANSDERMAL 2 TIMES WEEKLY
Status: ON HOLD | COMMUNITY
End: 2025-05-24

## 2025-05-24 RX ORDER — ASPIRIN 325 MG
325 TABLET ORAL DAILY
Status: DISCONTINUED | OUTPATIENT
Start: 2025-05-24 | End: 2025-05-24

## 2025-05-24 RX ORDER — SODIUM CHLORIDE 0.9 % (FLUSH) 0.9 %
20 SYRINGE (ML) INJECTION AS NEEDED
Status: DISCONTINUED | OUTPATIENT
Start: 2025-05-24 | End: 2025-05-30 | Stop reason: HOSPADM

## 2025-05-24 RX ORDER — SODIUM CHLORIDE 9 MG/ML
40 INJECTION, SOLUTION INTRAVENOUS AS NEEDED
Status: DISCONTINUED | OUTPATIENT
Start: 2025-05-24 | End: 2025-05-30 | Stop reason: HOSPADM

## 2025-05-24 RX ORDER — 3% SODIUM CHLORIDE 3 G/100ML
40 INJECTION, SOLUTION INTRAVENOUS CONTINUOUS
Status: DISPENSED | OUTPATIENT
Start: 2025-05-24 | End: 2025-05-25

## 2025-05-24 RX ORDER — SODIUM CHLORIDE 0.9 % (FLUSH) 0.9 %
10 SYRINGE (ML) INJECTION AS NEEDED
Status: DISCONTINUED | OUTPATIENT
Start: 2025-05-24 | End: 2025-05-30 | Stop reason: HOSPADM

## 2025-05-24 RX ORDER — DEXTROSE MONOHYDRATE 50 MG/ML
6 INJECTION, SOLUTION INTRAVENOUS CONTINUOUS PRN
Status: ACTIVE | OUTPATIENT
Start: 2025-05-24 | End: 2025-05-27

## 2025-05-24 RX ADMIN — MUPIROCIN 1 APPLICATION: 20 OINTMENT TOPICAL at 21:07

## 2025-05-24 RX ADMIN — SODIUM CHLORIDE 40 ML/HR: 3 INJECTION, SOLUTION INTRAVENOUS at 16:08

## 2025-05-24 RX ADMIN — SODIUM CHLORIDE 1000 ML: 9 INJECTION, SOLUTION INTRAVENOUS at 11:08

## 2025-05-24 RX ADMIN — IOPAMIDOL 115 ML: 755 INJECTION, SOLUTION INTRAVENOUS at 10:50

## 2025-05-24 RX ADMIN — Medication 10 ML: at 21:08

## 2025-05-24 RX ADMIN — ACETAMINOPHEN 650 MG: 650 SOLUTION ORAL at 21:06

## 2025-05-24 RX ADMIN — Medication 10 ML: at 21:07

## 2025-05-24 RX ADMIN — ATORVASTATIN CALCIUM 80 MG: 40 TABLET, FILM COATED ORAL at 21:06

## 2025-05-24 RX ADMIN — ASPIRIN 300 MG: 300 SUPPOSITORY RECTAL at 11:58

## 2025-05-24 NOTE — SIGNIFICANT NOTE
05/24/25 1509   SLP Deferred Reason   SLP Deferred Reason Patient unavailable for evaluation  (Swallow and Speech Language evaluation order received and appreciated. Pt currently not appropriate as she will be going to surgery per RN. SLP will continue to follow and complete eval pending pt's appropriateness.)

## 2025-05-24 NOTE — CONSULTS
Placed by CHEYENNE Wick triple lumen PICC confirmed with 3cg.  Prior to picc patient had pause on monitor per staff.  During procedure, patient HR dropped while attempting to thread catheter,  became flush, was arousable, and began vomiting.  Staff present and assisted.  Was able to complete procedure and confirm placement in a sterile manner.

## 2025-05-24 NOTE — H&P
Patient Care Team:  Micheline Heath DO as PCP - General (Family Medicine)    Chief complaint:  Stroke    History of present illness:  Subjective     This is a 64-year-old female patient, lifelong non-smoker with no prior history of stroke.  She is right-handed.    She presented to the hospital today for left-sided weakness and slurred speech.  She was last normal at 11 PM when she went to bed with her .  She woke up around 2:30 AM after she fell out of the bed which is not usual for her and her  walked with her to the bathroom and she returned back.  She appeared to be slightly wobbly when she did so but she was able to walk by herself mostly.  She did not have trouble speaking at that time.  She went back to bed and when her  woke up at 7 AM, he found her weak, unable to get out of the bed and had slurred speech..  She arrived to the ED around 10 AM.  NIH was 18..  Vitals are normal in the ED.  CT brain showed complete infarction in the right MCA territory and effacement of the anterior horn of the lateral ventricle..  Perfusion images showed a core of 70 mL.  CTA showed right ICA and right M1 MCA occlusion.  No a tPA candidate due to time.    I was asked to admit to the intensive care unit.    I evaluated the patient in the ICU.  She was following commands but clearly plegic in the left arm.  She had left neglect, right gaze deviation and expressive aphasia.  Had an episode of sinus pause on telemetry strips around 8 seconds.    Patient's  indicated that she snores really loud but no prior history of sleep apnea.      Review of Systems:  Cannot obtain.  Patient is aphasic.    History  Past Medical History:   Diagnosis Date    Atypical chest pain     Chest pain.Atypical chest pain    Chest pain     COVID-19     06/30/2022    COVID-19 vaccine administered     04/02/2021 ,  J and J ) 10/25/2021 - Moderna    Diminished pulses in lower extremity     Diverticulosis      Dizziness     Occasional    Dyslipidemia     Dyspnea     Edema     Fatigue     GERD (gastroesophageal reflux disease)     Hiatal hernia     Migraine headache     Palpitations     Personal history of cardiac murmur     SOB (shortness of breath)      Past Surgical History:   Procedure Laterality Date    CHOLECYSTECTOMY      HYSTERECTOMY      OOPHORECTOMY      TUBAL ABDOMINAL LIGATION       Family History   Problem Relation Age of Onset    Hyperlipidemia Mother     Hypertension Mother     Stroke Mother     Anxiety disorder Mother     Stroke Father     Hypertension Father     Hyperlipidemia Father     Heart attack Father     Hyperlipidemia Other         Dyslipidemia    Stroke Other         CVA    Coronary artery disease Other     Diabetes Other     Hypertension Other     Sudden death Other         Sudden Cardiac Death (SCD)     Social History     Tobacco Use    Smoking status: Never    Smokeless tobacco: Never   Vaping Use    Vaping status: Never Used   Substance Use Topics    Alcohol use: No    Drug use: Never     E-cigarette/Vaping    E-cigarette/Vaping Use Never User      E-cigarette/Vaping Substances     Medications Prior to Admission   Medication Sig Dispense Refill Last Dose/Taking    aspirin 81 MG tablet Take 1 tablet by mouth Daily. OTC   5/23/2025 Morning    atorvastatin (LIPITOR) 20 MG tablet TAKE ONE TABLET BY MOUTH EVERY NIGHT AT BEDTIME 90 tablet 3 5/23/2025 Bedtime    Calcium 500 MG tablet Take 500 mg by mouth Daily. OTC   5/23/2025    Coenzyme Q10 (CO Q-10) 100 MG capsule Take 100 mg by mouth Every Night. OTC   5/23/2025 Bedtime    dicyclomine (BENTYL) 10 MG capsule Take 1 capsule by mouth 2 (Two) Times a Day As Needed for Abdominal Cramping.   Past Month    estradiol (MINIVELLE, VIVELLE-DOT) 0.05 MG/24HR patch Place 1 patch on the skin as directed by provider 2 (Two) Times a Week.   Past Week    linaclotide (LINZESS) 145 MCG capsule capsule Take 1 capsule by mouth Every Morning Before Breakfast.    5/23/2025    multivitamin (THERAGRAN) tablet tablet Take 1 tablet by mouth Daily. OTC   5/23/2025    naratriptan (AMERGE) 2.5 MG tablet Take 1 tablet by mouth Every 4 (Four) Hours As Needed. migraines   Past Month    pantoprazole (PROTONIX) 20 MG EC tablet Take 1 tablet by mouth 2 (Two) Times a Day. (Patient taking differently: Take 2 tablets by mouth 2 (Two) Times a Day.) 180 tablet 3 5/23/2025    TiZANidine (ZANAFLEX) 4 MG capsule Take 1 capsule by mouth 3 (Three) Times a Day As Needed for Muscle Spasms. (Patient taking differently: Take 1 capsule by mouth 2 (Two) Times a Day As Needed for Muscle Spasms.) 45 capsule 3 5/23/2025    vitamin B-12 (CYANOCOBALAMIN) 100 MCG tablet Take 1 tablet by mouth Daily. OTC   5/23/2025    vitamin C (ASCORBIC ACID) 250 MG tablet Take 1 tablet by mouth Daily. OTC   5/23/2025     Allergies:  Codeine and Penicillins    Objective   Vital Signs  Temp:  [96.8 °F (36 °C)] 96.8 °F (36 °C)  Heart Rate:  [58-78] 71  Resp:  [14] 14  BP: (122-133)/(58-72) 123/58    PPE used per hospital policy    Physical Exam:  Constitutional: Not in acute distress.  Eyes: Injected conjunctiva.  Right gaze deviation.  Pupils equal and reactive to light.  ENMT: Rico 4.  Moist tongue.  External ears normal.  Neck: No thyromegaly.  Trachea midline  Heart: RRR, no murmur.  No pitting edema  Lungs: Good and equal air entry bilaterally.  Nonlabored breathing  Abdomen: Soft. No tenderness or dullness.  Positive bowel sounds  Extremities: No cyanosis, clubbing. Warm extremities and well-perfused  Neuro: Conscious but eyes closed.  Moves the right arm with strength 5/5.  Legs with strength 3-4/5.  Left arm 1/5 and withdraw to pain.  Diminished sensation in the left arm.  Psych: Cannot assess.  Integumentary: No rash or ecchymosis  Lymphatic: No palpable cervical or supraclavicular lymph nodes.      Diagnostic imaging:  I personally and independently reviewed the following images:   Brain imaging as  above.      Laboratory workup:  Results from last 7 days   Lab Units 05/24/25  1038   CREATININE mg/dL 0.80         Results from last 7 days   Lab Units 05/24/25  1038 05/24/25  1037   WBC 10*3/mm3  --  7.38   HEMOGLOBIN g/dL  --  13.9   HEMOGLOBIN, POC g/dL 14.6  --    HEMATOCRIT %  --  42.7   HEMATOCRIT POC % 43  --    PLATELETS 10*3/mm3  --  259     Results from last 7 days   Lab Units 05/24/25  1037   INR  0.91   APTT seconds 27.2           Assessment     Acute ischemic R MCA stroke  Localized brain edema, secondary to #1  Left hemiparesis and expressive aphasia, secondary #1  Sinus bradycardia/sinus pause: Secondary to sleep and sleep apnea.  Possible contribution from brain edema.  Lowest HR was noted at 44 but again she had sinus pauses as above.  Loud snoring, probable CATHRYN    GERD  Hiatal hernia          Plan:    Admit to ICU.  Neurocheck per protocol. Allow permissive hypertension  AC with aspirin.  Atorvastatin 80 mg daily  PT OT/speech  Maintenance IV fluid while n.p.o.  NG tube.  Connected to suction.  Patient vomited x 1 in ICU.  Head of bed elevation.  Aspiration precaution  Oxygen by NC  Consult neurology and neurosurgery  Initiate 3% saline to keep sodium 145-155 unless otherwise recommended by neurology  Echocardiogram and MRI brain.  CT brain in a.m.    CODE STATUS: Full code.  Discussed with family.    Va Espinosa MD  05/24/25  13:00 EDT    Time: Critical care 43 min      This note was dictated utilizing [employer] dictation

## 2025-05-24 NOTE — CONSULTS
Patient: Linda Schafer  : 1960    Primary Care Provider: Micheline Heath DO    Requesting Provider: As above      Chief Complaint: Stroke      History of Present Illness: This is a 64 y.o. female who presented this morning with acute onset of left-sided weakness and altered mental status.  Her imaging revealed a completed right hemispheric infarct secondary to an ICA terminus occlusion.  Fortunately, she was not a endovascular candidate due to completion of the stroke.  She has been transferred to the ICU.  While in the ICU, she has remained neurologically stable.  She is awake and following commands on the right side.  She does endorse headache.  She was given 1 rectal dose of aspirin, but otherwise is not on any blood thinners.    PMHX  Allergies:  Allergies   Allergen Reactions    Codeine      Codeine Derivatives    Penicillins      Medications    Current Facility-Administered Medications:     aspirin tablet 325 mg, 325 mg, Oral, Daily **OR** aspirin suppository 300 mg, 300 mg, Rectal, Daily, Izzy Silvestre, APRN, 300 mg at 25 1158    atorvastatin (LIPITOR) tablet 80 mg, 80 mg, Oral, Nightly, Izzy Silvestre, APRN    sodium chloride 0.9 % flush 10 mL, 10 mL, Intravenous, PRN, Man Hannon MD  Past Medical History:  Past Medical History:   Diagnosis Date    Atypical chest pain     Chest pain.Atypical chest pain    Chest pain     COVID-19     2022    COVID-19 vaccine administered     2021 ,  J [employer] J ) 10/25/2021 - Moderna    Diminished pulses in lower extremity     Diverticulosis     Dizziness     Occasional    Dyslipidemia     Dyspnea     Edema     Fatigue     GERD (gastroesophageal reflux disease)     Hiatal hernia     Migraine headache     Palpitations     Personal history of cardiac murmur     SOB (shortness of breath)      Past Surgical History:  Past Surgical History:   Procedure Laterality Date    CHOLECYSTECTOMY      HYSTERECTOMY      OOPHORECTOMY      TUBAL  "ABDOMINAL LIGATION       Social Hx:  Social History     Tobacco Use    Smoking status: Never    Smokeless tobacco: Never   Vaping Use    Vaping status: Never Used   Substance Use Topics    Alcohol use: No    Drug use: Never     Family Hx:  Family History   Problem Relation Age of Onset    Hyperlipidemia Mother     Hypertension Mother     Stroke Mother     Anxiety disorder Mother     Stroke Father     Hypertension Father     Hyperlipidemia Father     Heart attack Father     Hyperlipidemia Other         Dyslipidemia    Stroke Other         CVA    Coronary artery disease Other     Diabetes Other     Hypertension Other     Sudden death Other         Sudden Cardiac Death (SCD)     Review of Systems:        Positive for left-sided weakness and headache.  All other review of systems negative    Physical Exam:   /73 (BP Location: Left arm, Patient Position: Lying)   Pulse 77   Temp 98.4 °F (36.9 °C) (Oral)   Resp 16   Ht 157.5 cm (62\")   Wt 74.8 kg (165 lb)   SpO2 99%   BMI 30.18 kg/m²   Patient appears comfortable, resting  Awake, alert and oriented x 3  Speech fluent/clear  Opens eyes spont  Pupils 3 mm reactive bilaterally  Left facial droop  Neglects the left side  Follows commands briskly in the right upper and right lower extremity.  No movement noted to stimulation in the left upper extremity.  There is some withdrawal in the left lower extremity to stimulation.    Intake/Output: No intake or output data in the 24 hours ending 05/24/25 1418    Current Medications:   Current Facility-Administered Medications:     aspirin tablet 325 mg, 325 mg, Oral, Daily **OR** aspirin suppository 300 mg, 300 mg, Rectal, Daily, Izzy Silvestre APRN, 300 mg at 05/24/25 1158    atorvastatin (LIPITOR) tablet 80 mg, 80 mg, Oral, Nightly, Izzy Silvestre APRN    sodium chloride 0.9 % flush 10 mL, 10 mL, Intravenous, PRN, Man Hannon MD     Laboratory Results:      Lab 05/24/25  1038 05/24/25  1037   WBC  --  7.38 "   HEMOGLOBIN  --  13.9   HEMOGLOBIN, POC 14.6  --    HEMATOCRIT  --  42.7   HEMATOCRIT POC 43  --    PLATELETS  --  259   NEUTROS ABS  --  5.80   IMMATURE GRANS (ABS)  --  0.08*   LYMPHS ABS  --  1.07   MONOS ABS  --  0.34   EOS ABS  --  0.02   MCV  --  86.6   PROTIME  --  12.8   APTT  --  27.2         Lab 05/24/25  1038   CREATININE 0.80   EGFR 83.4         Lab 05/24/25  1037   ALT (SGPT) 19   AST (SGOT) 27         Lab 05/24/25  1037   PROTIME 12.8   INR 0.91                 Brief Urine Lab Results       None          Microbiology Results (last 10 days)       ** No results found for the last 240 hours. **             Diagnostic Imaging: The patient's diagnostic imaging was independently reviewed and interpreted by myself.    Assessment/Plan:    1. Acute CVA (cerebrovascular accident)         This is a 64-year-old female who presented with a completed right MCA infarct secondary to an ICA terminus occlusion this morning.  Clinically, the patient is neurologically stable and awake.  I had a long discussion with the patient's family regarding the concern for malignant cerebral edema.  Given her young age, overall functional status as well as good support system at home, the family would like for us to proceed with a decompressive craniectomy in the event that she is developing worsening edema.  At this point, she is stable and does not require any urgent surgery.  We are going to start her on 3% with a sodium goal of 145-150.  We will plan for a repeat head CT tomorrow morning.  Depending on what this shows and how she is doing, we may discuss timing of surgery if needed.  At this point, please hold all anticoagulation and antiplatelet medications in the event she does need surgery.  Appreciate stroke team and ICU assistance.      Matt Olivarez MD  05/24/25  14:18 EDT

## 2025-05-24 NOTE — CONSULTS
Stroke Consult Note    Patient Name: Linda Schafer   MRN: [id number]  Age: 64 y.o.  Sex: female  : 1960    Primary Care Physician: Micheline Heath DO  Referring Physician: Dr. Man Hannon    TIME STROKE TEAM CALLED: 0955 EST     TIME PATIENT SEEN: 1025 EST on arrival to [facility]    Handedness: Right  Race:     Chief Complaint/Reason for Consultation: Right MCA syndrome    HPI:   Linda Schafer is a 64-year-old  female with a past medical history significant for hyperlipidemia, GERD, migraine headaches, palpitations and arrhythmia.  She is noted to be a lifelong non-smoker.  She presented to [facility] emergency department via air [facility] for stroke workup.  Per flight crew they report that approximately 2:30 AM the patient was up to go to the bathroom and fell.  They report her  helped her get back to bed with minimal assist and when they woke around 8 AM this morning she had dense left-sided hemiparesis and right gaze preference which prompted a call to 911.  On arrival to [facility] code stroke was initiated and she was taken urgently to CT scanner for further workup and evaluation.  CT head was completed showing right MCA territory infarct with hyperdense right MCA.  CTA head and neck shows complete occlusion of the right internal carotid artery through the right M1 segment.  CT perfusion shows area of core infarct with similarly matching penumbra.  NIH on my initial examination 17.  Blood pressure on arrival 128/65.  Secondary to CT head changes as well as extended last known well she was not deemed to be an appropriate IV thrombolytic therapy candidate.  Secondary to lack of ischemic penumbra and advanced CT head changes she was not deemed to be an emergent endovascular therapy candidate. After discussion with Dr. Olivarez as well as Dr. Torres the patient will be taken to the neuro ICU for further workup and evaluation.     Last Known Normal Date/Time: 0230am EST      Review of Systems   Unable to perform ROS: Mental status change   Constitutional:  Positive for fatigue.   HENT:  Positive for trouble swallowing.    Eyes:  Positive for visual disturbance.   Musculoskeletal:  Positive for gait problem.   Neurological:  Positive for facial asymmetry, speech difficulty, weakness, numbness and headaches.      Past Medical History:   Diagnosis Date    Atypical chest pain     Chest pain.Atypical chest pain    Chest pain     COVID-19     06/30/2022    COVID-19 vaccine administered     04/02/2021 ,  J and J ) 10/25/2021 - Moderna    Diminished pulses in lower extremity     Diverticulosis     Dizziness     Occasional    Dyslipidemia     Dyspnea     Edema     Fatigue     GERD (gastroesophageal reflux disease)     Hiatal hernia     Migraine headache     Palpitations     Personal history of cardiac murmur     SOB (shortness of breath)      Past Surgical History:   Procedure Laterality Date    CHOLECYSTECTOMY      HYSTERECTOMY      OOPHORECTOMY      TUBAL ABDOMINAL LIGATION       Family History   Problem Relation Age of Onset    Hyperlipidemia Mother     Hypertension Mother     Stroke Mother     Anxiety disorder Mother     Stroke Father     Hypertension Father     Hyperlipidemia Father     Heart attack Father     Hyperlipidemia Other         Dyslipidemia    Stroke Other         CVA    Coronary artery disease Other     Diabetes Other     Hypertension Other     Sudden death Other         Sudden Cardiac Death (SCD)     Social History     Socioeconomic History    Marital status:    Tobacco Use    Smoking status: Never    Smokeless tobacco: Never   Vaping Use    Vaping status: Never Used   Substance and Sexual Activity    Alcohol use: No    Drug use: Never    Sexual activity: Defer     Allergies   Allergen Reactions    Codeine      Codeine Derivatives    Penicillins      Prior to Admission medications    Medication Sig Start Date End Date Taking? Authorizing Provider   aspirin 81 MG  tablet Take 1 tablet by mouth Daily. 9/14/15   Mary Jose MD   atorvastatin (LIPITOR) 20 MG tablet TAKE ONE TABLET BY MOUTH EVERY NIGHT AT BEDTIME 10/28/24   Neptali King APRN   Calcium 500 MG tablet Take  by mouth Daily. 5/13/15   Mary Jsoe MD   Coenzyme Q10 (CO Q-10) 100 MG capsule Take 100 mg by mouth Every Night. 9/14/15   Mary Jose MD   dicyclomine (BENTYL) 10 MG capsule prn 6/2/19   Mary Jose MD   estradiol (MINIVELLE, VIVELLE-DOT) 0.05 MG/24HR patch Place 1 patch on the skin as directed by provider 2 (Two) Times a Week. 5/13/15   Mary Jose MD   furosemide (LASIX) 40 MG tablet Take 1 tablet by mouth Daily. 8/30/23   Joe García MD   loratadine (ALLERGY RELIEF) 10 MG tablet Take 1 tablet by mouth Daily. 1/7/20   Natalie Barbosa APRN   MULTIPLE VITAMIN PO Take  by mouth Daily. 5/13/15   Mary Jose MD   naproxen (NAPROSYN) 500 MG tablet Take 1 tablet by mouth Daily As Needed. 9/14/15   Mary Jose MD   naratriptan (AMERGE) 2.5 MG tablet Take 1 tablet by mouth Every 4 (Four) Hours As Needed. migraines 5/13/15   Mary Jose MD   pantoprazole (PROTONIX) 20 MG EC tablet Take 1 tablet by mouth 2 (Two) Times a Day. 8/31/22   Natalie Barbosa APRN   potassium chloride 10 MEQ CR tablet Take 1 tablet by mouth Daily. 8/30/23   Joe García MD   TiZANidine (ZANAFLEX) 4 MG capsule Take 1 capsule by mouth 3 (Three) Times a Day As Needed for Muscle Spasms. 1/19/21   Natalie Barbosa APRN   vitamin B-12 (CYANOCOBALAMIN) 100 MCG tablet Take 0.5 tablets by mouth Daily.    Mary Jose MD   vitamin C (ASCORBIC ACID) 250 MG tablet Take 1 tablet by mouth Daily.    Mary Jose MD         Temp:  [96.8 °F (36 °C)] 96.8 °F (36 °C)  Heart Rate:  [58] 58  Resp:  [14] 14  BP: (133)/(72) 133/72  Neurological Exam  Mental Status  Awake, alert and drowsy. Oriented only to person, place and situation. Speech is  normal. Language is fluent with no aphasia.    Cranial Nerves  CN III, IV, VI: Pupils equal round and reactive to light bilaterally. Right gaze preference.  CN V:  Left: Diminished sensation of the entire left side of the face.  CN VII:  Left: There is central facial weakness.  CN VIII: Hearing appears to be intact bilaterally.  CN IX, X:  Right: Palate is normal.  Left: Palate is normal.    Motor  Normal muscle bulk throughout. No fasciculations present. Normal muscle tone. Left hemiparesis.    Sensory  Left hemisensory loss.     Coordination    No overt dysmetria or ataxia out of proportion to weakness.    Gait    Unable to assess secondary to the acuity of the patient's condition.      Physical Exam  Constitutional:       General: She is awake. She is not in acute distress.  HENT:      Head: Normocephalic.      Mouth/Throat:      Pharynx: Oropharynx is clear.   Eyes:      Pupils: Pupils are equal, round, and reactive to light.   Cardiovascular:      Rate and Rhythm: Normal rate and regular rhythm.   Pulmonary:      Effort: No respiratory distress.   Abdominal:      General: Abdomen is flat.   Skin:     General: Skin is warm and dry.   Neurological:      Mental Status: She is alert.      Cranial Nerves: Cranial nerve deficit present.      Sensory: Sensory deficit present.      Motor: Weakness present.   Psychiatric:         Mood and Affect: Mood normal.         Speech: Speech normal.         Behavior: Behavior normal.       Acute Stroke Data    Thrombolytic Inclusion / Exclusion Criteria    Time: 10:37 EDT  Person Administering Scale: CINDY Thomas      YES NO INCLUSION CRITERIA CLASS I   x      Suspected diagnosis of acute ischemic stroke with measureable neurological deficit.  Low NIHSS with disabling stroke symptoms.    x Onset of stroke symptoms < 3 hours before beginning treatment >/ 18 years old  Stroke symptom onset = time patient was last seen well or without symptoms (LKW)    x Onset of symptoms  between 3-4.5 hours: >/= 80 years old (safe Class IIa) with history of   both diabetes and prior CVA  (reasonable Class IIb) AND NIHSS </= 25  *If not eligible for IV Thrombolytic consider neuro intervention for LKW within 24 hours     YES NO EXCLUSION CRITERIA (CONTRAINDICATIONS) CLASS III EVIDENCE HARM     Blood pressure >185/110 medically refractory to IV medications     Active bleeding at a non-compressible site     Active intracranial hemorrhage (ICH)     Symptoms suggestive of subarachnoid hemorrhage (SAH)     GI bleed within 21 days     Ischemic stroke within 3 months     Severe head trauma within 3 months      Intracranial or intraspinal surgery within 3 months   []  Current GI malignancy     Intracranial neoplasm     Infective endocarditis     Aortic arch dissection     Active coagulopathy with  INR >1.7, platelets <100,000, PTT > 40 sec, PT > 15 sec   *For warfarin, administration can begin before blood tests resulted. Discontinue for above values.      Treatment dose* of LMWH (Lovenox) in last 24 hours  *prophylactic dosages are not a contraindication     Concurrent use of antiplatelet agents' glycoprotein inhibitors IIb/IIIa (Integrilin, etc.)     Thrombin or factor Xa inhibitors (Eliquis, Xarelto, Arixtra) taken in last 48 hours     YES NO CLASS II: AIS WITH THE FOLLOWING CONDITIONS -   TREATMENT RISKS SHOULD BE WEIGHED AGAINST POSSIBLE BENEFITS.      Major trauma in last 14 days, recent major surgery in last 14 days, intracranial arterial dissection, giant unruptured and unsecured intracranial aneurysm, pericarditis       The risks and benefits have been discussed with the patient or family related to the administration of IV thrombolytic therapy for stroke symptoms.     I have discussed and reviewed the patient's case and imaging with the attending prior to IV thrombolytic therapy.   TIME NA Time IV thrombolytic administered       Hospital Meds:  Scheduled-    Infusions-     PRNs-   sodium  chloride    Functional Status Prior to Current Stroke/[place] Score: 0    NIH Stroke Scale  Time: 10:37 EDT  Person Administering Scale: CINDY Thomas    1a  Level of consciousness: 0=alert; keenly responsive   1b. LOC questions:  0=Answers both questions correctly   1c. LOC commands: 0=Performs both tasks correctly   2.  Best Gaze: 2=forced deviation, or total gaze paresis not overcome by oculocephalic maneuver   3.  Visual: 2=Complete hemianopia   4. Facial Palsy: 2=Partial paralysis (total or near total paralysis of the lower face)   5a.  Motor left arm: 4=No movement   5b.  Motor right arm: 0=No drift, limb holds 90 (or 45) degrees for full 10 seconds   6a. motor left le=No movement   6b  Motor right le=No drift, limb holds 90 (or 45) degrees for full 10 seconds   7. Limb Ataxia: 0=Absent   8.  Sensory: 1=Mild to moderate sensory loss; patient feels pinprick is less sharp or is dull on the affected side; there is a loss of superficial pain with pinprick but patient is aware She is being touched   9. Best Language:  0=No aphasia, normal   10. Dysarthria: 0=Normal   11. Extinction and Inattention: 2=Profound jorge-inattention or jorge-inattention to more than one modality. Does not recognize own hand or orients only to one side of space    Total:   17     Results Reviewed:  I have personally reviewed current lab, radiology, and data and agree with results.    Results for orders placed during the hospital encounter of 23    Adult Transthoracic Echo Complete W/ Cont if Necessary Per Protocol    Interpretation Summary    Left ventricular ejection fraction appears to be 61 - 65%.    Left ventricular diastolic function is consistent with (grade I) impaired relaxation.    Estimated right ventricular systolic pressure from tricuspid regurgitation is normal (<35 mmHg).    Good technical quality.    1.  LV size, function, wall motion, and wall thickness are normal.  Visually estimated ejection  fraction is 55 to 60%.  Grade 1 diastolic dysfunction.  The atria and right ventricle are normal.  No septal defect or intracavitary mass or thrombus.    2.  Valves are morphologically and physiologically normal with no stenotic lesions, valve associated masses or thrombi, or hemodynamically significant regurgitation.  There is trivial to mild MR and trivial TR.    3.  No pericardial or great vessel pathology.    4.  Pulmonary artery systolic pressures are estimated in the mid 20s.     XR Chest 1 View  Result Date: 5/24/2025  Impression: No acute cardiopulmonary abnormality. Electronically Signed: Jr Ambrosio MD  5/24/2025 11:20 AM EDT  Workstation ID: [id number]    CT Angiogram Head w AI Analysis of LVO  Result Date: 5/24/2025  Impression: 1.There is occlusion of the right internal carotid artery that may be subacute. 2.There is occlusion of the right M1 segment. 3.There are evolving changes of right MCA territory infarct. Electronically Signed: Ed Jiménez MD  5/24/2025 11:04 AM EDT  Workstation ID: [id number]    CT Angiogram Neck  Result Date: 5/24/2025  Impression: 1.There is occlusion of the right internal carotid artery that may be subacute. 2.There is occlusion of the right M1 segment. 3.There are evolving changes of right MCA territory infarct. Electronically Signed: Ed Jiménez MD  5/24/2025 11:04 AM EDT  Workstation ID: [id number]    CT CEREBRAL PERFUSION WITH & WITHOUT CONTRAST  Result Date: 5/24/2025  Impression: 1.There is occlusion of the right internal carotid artery that may be subacute. 2.There is occlusion of the right M1 segment. 3.There are evolving changes of right MCA territory infarct. Electronically Signed: Ed Jiménez MD  5/24/2025 11:04 AM EDT  Workstation ID: [id number]    CT Head Without Contrast Stroke Protocol  Result Date: 5/24/2025  Impression: Hyperdense right MCA sign suggesting thrombus, with hypodensity in the adjacent right frontal and temporal lobes in the right MCA distribution  suggesting acute infarction. Mild mass effect, but no midline shift. Electronically Signed: Luis Li MD  5/24/2025 10:37 AM EDT  Workstation ID: [id number]    ECG 12 Lead Bradycardia   Preliminary Result   Test Reason : Bradycardia   Blood Pressure :   */*   mmHG   Vent. Rate :  60 BPM     Atrial Rate :  60 BPM      P-R Int : 158 ms          QRS Dur :  76 ms       QT Int : 488 ms       P-R-T Axes :  45  28  27 degrees     QTcB Int : 488 ms      Normal sinus rhythm   Low voltage QRS   QTcB >= 480 msec   Abnormal ECG   When compared with ECG of 24-May-2025 10:58, (Unconfirmed)   No significant change was found      Referred By:            Confirmed By:       ECG 12 Lead ED Triage Standing Order; Acute Stroke (Onset <24 hrs)   Preliminary Result   Test Reason : ED Triage Standing Order~   Blood Pressure :   */*   mmHG   Vent. Rate :  60 BPM     Atrial Rate :  60 BPM      P-R Int : 158 ms          QRS Dur :  74 ms       QT Int : 488 ms       P-R-T Axes :  49  34  49 degrees     QTcB Int : 488 ms      Normal sinus rhythm   Normal ECG   No previous ECGs available      Referred By: ed md           Confirmed By:         Lab Results   Component Value Date    GLUCOSE 107 (H) 01/19/2021    BUN 17 01/19/2021    CREATININE 0.80 05/24/2025     05/24/2025    K 3.7 05/24/2025    CL 99 01/19/2021    CALCIUM 9.8 01/19/2021    PROTEINTOT 7.0 01/19/2021    ALBUMIN 4.42 01/19/2021    ALT 19 05/24/2025    AST 27 05/24/2025    ALKPHOS 63 01/19/2021    BILITOT 0.5 01/19/2021    GLOB 2.6 01/19/2021    AGRATIO 1.7 01/19/2021    BCR 19.3 01/19/2021    ANIONGAP 10.6 01/19/2021    EGFR 83.4 05/24/2025     WBC   Date Value Ref Range Status   05/24/2025 7.38 3.40 - 10.80 10*3/mm3 Final     RBC   Date Value Ref Range Status   05/24/2025 4.93 3.77 - 5.28 10*6/mm3 Final     Hemoglobin   Date Value Ref Range Status   05/24/2025 14.6 12.0 - 17.0 g/dL Final     Comment:     Serial Number: [id number]:  [id number]   05/24/2025 13.9 12.0 -  15.9 g/dL Final     Hematocrit   Date Value Ref Range Status   05/24/2025 43 38 - 51 % Final   05/24/2025 42.7 34.0 - 46.6 % Final     MCV   Date Value Ref Range Status   05/24/2025 86.6 79.0 - 97.0 fL Final     MCH   Date Value Ref Range Status   05/24/2025 28.2 26.6 - 33.0 pg Final     MCHC   Date Value Ref Range Status   05/24/2025 32.6 31.5 - 35.7 g/dL Final     RDW   Date Value Ref Range Status   05/24/2025 12.2 (L) 12.3 - 15.4 % Final     RDW-SD   Date Value Ref Range Status   05/24/2025 38.9 37.0 - 54.0 fl Final     MPV   Date Value Ref Range Status   05/24/2025 11.8 6.0 - 12.0 fL Final     Platelets   Date Value Ref Range Status   05/24/2025 259 140 - 450 10*3/mm3 Final     Neutrophil %   Date Value Ref Range Status   05/24/2025 78.6 (H) 42.7 - 76.0 % Final     Lymphocyte %   Date Value Ref Range Status   05/24/2025 14.5 (L) 19.6 - 45.3 % Final     Monocyte %   Date Value Ref Range Status   05/24/2025 4.6 (L) 5.0 - 12.0 % Final     Eosinophil %   Date Value Ref Range Status   05/24/2025 0.3 0.3 - 6.2 % Final     Basophil %   Date Value Ref Range Status   05/24/2025 0.9 0.0 - 1.5 % Final     Immature Grans %   Date Value Ref Range Status   05/24/2025 1.1 (H) 0.0 - 0.5 % Final     Neutrophils, Absolute   Date Value Ref Range Status   05/24/2025 5.80 1.70 - 7.00 10*3/mm3 Final     Lymphocytes, Absolute   Date Value Ref Range Status   05/24/2025 1.07 0.70 - 3.10 10*3/mm3 Final     Monocytes, Absolute   Date Value Ref Range Status   05/24/2025 0.34 0.10 - 0.90 10*3/mm3 Final     Eosinophils, Absolute   Date Value Ref Range Status   05/24/2025 0.02 0.00 - 0.40 10*3/mm3 Final     Basophils, Absolute   Date Value Ref Range Status   05/24/2025 0.07 0.00 - 0.20 10*3/mm3 Final     Immature Grans, Absolute   Date Value Ref Range Status   05/24/2025 0.08 (H) 0.00 - 0.05 10*3/mm3 Final     nRBC   Date Value Ref Range Status   05/24/2025 0.0 0.0 - 0.2 /100 WBC Final       Assessment/Plan:  64-year-old  female with  minimal vascular risk factors who presented [facility] emergency department with right MCA syndrome.  Secondary to CT angiogram as well as extended last known well she was not deemed to be an appropriate IV thrombolytic therapy candidate.  Secondary to completed stroke on CT head without contrast as well as CT perfusion imaging she was not deemed to be an emergent endovascular therapy candidate after discussing with Dr. Olivarez and Dr. Torres.  She is admitted to the neuro ICU for further workup and evaluation.    Antiplatelet PTA: None  Anticoagulant PTA: None      Left hemiparesis  Right gaze preference  Right MCA CVA  - Current etiology is unknown.  - CVA order set  - Bedside dysphagia screening failed  - PICC Line placement today  - Initiate hypertonic saline at 40 mL, goal sodium 145-150  - Stability CT head 1600  - MRI brain without contrast, please complete today  - PT/OT/SLP to see and assess when appropriate  - Dr. Olivarez following, discussed potential need for decompressive craniectomy  - Loaded with 300 mg rectal aspirin in ED, will defer any additional doses until surgery course is planned  - Repeat CT head in a.m.  - Hemoglobin A1c and FLP in a.m.  - Every 6 hours sodium  - Neurochecks and NIHSS per protocol  - Allow for permissive hypertension with goal SBP less than 200, overall management per ICU medicine team  - Stat CT head for any neurologic decline    Stroke neurology will continue to follow.  Plan of care discussed with the patient as well as her family, Dr. Torres, Dr. Olivarez, and Dr. Hannon.  Thanks for the consult the care of this patient.  Please call with any further questions or concerns.    Izzy Silvestre, APRN  May 24, 2025  10:37 EDT

## 2025-05-25 ENCOUNTER — ANESTHESIA (OUTPATIENT)
Dept: PERIOP | Facility: HOSPITAL | Age: 65
End: 2025-05-25
Payer: COMMERCIAL

## 2025-05-25 ENCOUNTER — APPOINTMENT (OUTPATIENT)
Dept: CT IMAGING | Facility: HOSPITAL | Age: 65
End: 2025-05-25
Payer: COMMERCIAL

## 2025-05-25 ENCOUNTER — ANESTHESIA EVENT CONVERTED (OUTPATIENT)
Dept: ANESTHESIOLOGY | Facility: HOSPITAL | Age: 65
End: 2025-05-25
Payer: COMMERCIAL

## 2025-05-25 ENCOUNTER — ANESTHESIA EVENT (OUTPATIENT)
Dept: PERIOP | Facility: HOSPITAL | Age: 65
End: 2025-05-25
Payer: COMMERCIAL

## 2025-05-25 LAB
ANION GAP SERPL CALCULATED.3IONS-SCNC: 10 MMOL/L (ref 5–15)
BUN SERPL-MCNC: 8 MG/DL (ref 8–23)
BUN/CREAT SERPL: 12.3 (ref 7–25)
CALCIUM SPEC-SCNC: 9.1 MG/DL (ref 8.6–10.5)
CHLORIDE SERPL-SCNC: 115 MMOL/L (ref 98–107)
CHOLEST SERPL-MCNC: 150 MG/DL (ref 0–200)
CO2 SERPL-SCNC: 22 MMOL/L (ref 22–29)
CREAT SERPL-MCNC: 0.65 MG/DL (ref 0.57–1)
DEPRECATED RDW RBC AUTO: 41.9 FL (ref 37–54)
EGFRCR SERPLBLD CKD-EPI 2021: 99.7 ML/MIN/1.73
ERYTHROCYTE [DISTWIDTH] IN BLOOD BY AUTOMATED COUNT: 12.7 % (ref 12.3–15.4)
GLUCOSE BLDC GLUCOMTR-MCNC: 114 MG/DL (ref 70–130)
GLUCOSE SERPL-MCNC: 140 MG/DL (ref 65–99)
HBA1C MFR BLD: 5.3 % (ref 4.8–5.6)
HCT VFR BLD AUTO: 38.7 % (ref 34–46.6)
HDLC SERPL-MCNC: 60 MG/DL (ref 40–60)
HGB BLD-MCNC: 12.2 G/DL (ref 12–15.9)
LDLC SERPL CALC-MCNC: 75 MG/DL (ref 0–100)
LDLC/HDLC SERPL: 1.25 {RATIO}
MCH RBC QN AUTO: 28.2 PG (ref 26.6–33)
MCHC RBC AUTO-ENTMCNC: 31.5 G/DL (ref 31.5–35.7)
MCV RBC AUTO: 89.4 FL (ref 79–97)
PLATELET # BLD AUTO: 222 10*3/MM3 (ref 140–450)
PMV BLD AUTO: 12.1 FL (ref 6–12)
POTASSIUM SERPL-SCNC: 3.3 MMOL/L (ref 3.5–5.2)
POTASSIUM SERPL-SCNC: 3.4 MMOL/L (ref 3.5–5.2)
POTASSIUM SERPL-SCNC: 3.5 MMOL/L (ref 3.5–5.2)
POTASSIUM SERPL-SCNC: 3.6 MMOL/L (ref 3.5–5.2)
QT INTERVAL: 488 MS
QTC INTERVAL: 488 MS
RBC # BLD AUTO: 4.33 10*6/MM3 (ref 3.77–5.28)
SODIUM SERPL-SCNC: 146 MMOL/L (ref 136–145)
SODIUM SERPL-SCNC: 147 MMOL/L (ref 136–145)
SODIUM SERPL-SCNC: 150 MMOL/L (ref 136–145)
SODIUM SERPL-SCNC: 152 MMOL/L (ref 136–145)
TRIGL SERPL-MCNC: 75 MG/DL (ref 0–150)
VLDLC SERPL-MCNC: 15 MG/DL (ref 5–40)
WBC NRBC COR # BLD AUTO: 10.75 10*3/MM3 (ref 3.4–10.8)

## 2025-05-25 PROCEDURE — 25010000002 LIDOCAINE PF 1% 1 % SOLUTION: Performed by: NURSE ANESTHETIST, CERTIFIED REGISTERED

## 2025-05-25 PROCEDURE — 00N00ZZ RELEASE BRAIN, OPEN APPROACH: ICD-10-PCS | Performed by: STUDENT IN AN ORGANIZED HEALTH CARE EDUCATION/TRAINING PROGRAM

## 2025-05-25 PROCEDURE — 25810000003 SODIUM CHLORIDE 0.9 % SOLUTION 250 ML FLEX CONT: Performed by: NURSE ANESTHETIST, CERTIFIED REGISTERED

## 2025-05-25 PROCEDURE — 80061 LIPID PANEL: CPT | Performed by: NURSE PRACTITIONER

## 2025-05-25 PROCEDURE — 61322 CRNEC/CRNOT DCMPRV W/O LOBEC: CPT | Performed by: STUDENT IN AN ORGANIZED HEALTH CARE EDUCATION/TRAINING PROGRAM

## 2025-05-25 PROCEDURE — 25010000002 ONDANSETRON PER 1 MG: Performed by: STUDENT IN AN ORGANIZED HEALTH CARE EDUCATION/TRAINING PROGRAM

## 2025-05-25 PROCEDURE — 84132 ASSAY OF SERUM POTASSIUM: CPT | Performed by: NURSE PRACTITIONER

## 2025-05-25 PROCEDURE — 85027 COMPLETE CBC AUTOMATED: CPT | Performed by: NURSE PRACTITIONER

## 2025-05-25 PROCEDURE — 70450 CT HEAD/BRAIN W/O DYE: CPT

## 2025-05-25 PROCEDURE — 25010000002 POTASSIUM CHLORIDE PER 2 MEQ: Performed by: INTERNAL MEDICINE

## 2025-05-25 PROCEDURE — C1889 IMPLANT/INSERT DEVICE, NOC: HCPCS | Performed by: STUDENT IN AN ORGANIZED HEALTH CARE EDUCATION/TRAINING PROGRAM

## 2025-05-25 PROCEDURE — 25010000002 PROPOFOL 10 MG/ML EMULSION: Performed by: NURSE ANESTHETIST, CERTIFIED REGISTERED

## 2025-05-25 PROCEDURE — 84132 ASSAY OF SERUM POTASSIUM: CPT | Performed by: STUDENT IN AN ORGANIZED HEALTH CARE EDUCATION/TRAINING PROGRAM

## 2025-05-25 PROCEDURE — 25010000002 CEFAZOLIN PER 500 MG: Performed by: NURSE ANESTHETIST, CERTIFIED REGISTERED

## 2025-05-25 PROCEDURE — 25010000002 LEVETRIRACETAM PER 10 MG: Performed by: STUDENT IN AN ORGANIZED HEALTH CARE EDUCATION/TRAINING PROGRAM

## 2025-05-25 PROCEDURE — 25810000003 SODIUM CHLORIDE 0.9 % SOLUTION 250 ML FLEX CONT: Performed by: STUDENT IN AN ORGANIZED HEALTH CARE EDUCATION/TRAINING PROGRAM

## 2025-05-25 PROCEDURE — 82948 REAGENT STRIP/BLOOD GLUCOSE: CPT

## 2025-05-25 PROCEDURE — 25010000002 NICARDIPINE 2.5 MG/ML SOLUTION 10 ML VIAL: Performed by: STUDENT IN AN ORGANIZED HEALTH CARE EDUCATION/TRAINING PROGRAM

## 2025-05-25 PROCEDURE — 83036 HEMOGLOBIN GLYCOSYLATED A1C: CPT | Performed by: NURSE PRACTITIONER

## 2025-05-25 PROCEDURE — 84295 ASSAY OF SERUM SODIUM: CPT | Performed by: NURSE PRACTITIONER

## 2025-05-25 PROCEDURE — 87176 TISSUE HOMOGENIZATION CULTR: CPT | Performed by: STUDENT IN AN ORGANIZED HEALTH CARE EDUCATION/TRAINING PROGRAM

## 2025-05-25 PROCEDURE — 87070 CULTURE OTHR SPECIMN AEROBIC: CPT | Performed by: STUDENT IN AN ORGANIZED HEALTH CARE EDUCATION/TRAINING PROGRAM

## 2025-05-25 PROCEDURE — 25810000003 SODIUM CHLORIDE 0.9 % SOLUTION

## 2025-05-25 PROCEDURE — 84295 ASSAY OF SERUM SODIUM: CPT | Performed by: STUDENT IN AN ORGANIZED HEALTH CARE EDUCATION/TRAINING PROGRAM

## 2025-05-25 PROCEDURE — 25010000002 PHENYLEPHRINE 10 MG/ML SOLUTION 1 ML VIAL: Performed by: NURSE ANESTHETIST, CERTIFIED REGISTERED

## 2025-05-25 PROCEDURE — 80048 BASIC METABOLIC PNL TOTAL CA: CPT | Performed by: NURSE PRACTITIONER

## 2025-05-25 PROCEDURE — 25010000002 FENTANYL CITRATE (PF) 100 MCG/2ML SOLUTION: Performed by: NURSE ANESTHETIST, CERTIFIED REGISTERED

## 2025-05-25 PROCEDURE — 25010000002 ONDANSETRON PER 1 MG: Performed by: NURSE ANESTHETIST, CERTIFIED REGISTERED

## 2025-05-25 PROCEDURE — 25010000002 CEFAZOLIN PER 500 MG: Performed by: STUDENT IN AN ORGANIZED HEALTH CARE EDUCATION/TRAINING PROGRAM

## 2025-05-25 PROCEDURE — 87205 SMEAR GRAM STAIN: CPT | Performed by: STUDENT IN AN ORGANIZED HEALTH CARE EDUCATION/TRAINING PROGRAM

## 2025-05-25 PROCEDURE — 61322 CRNEC/CRNOT DCMPRV W/O LOBEC: CPT

## 2025-05-25 PROCEDURE — 25810000003 SODIUM CHLORIDE 0.9 % SOLUTION: Performed by: NURSE ANESTHETIST, CERTIFIED REGISTERED

## 2025-05-25 PROCEDURE — 87075 CULTR BACTERIA EXCEPT BLOOD: CPT | Performed by: STUDENT IN AN ORGANIZED HEALTH CARE EDUCATION/TRAINING PROGRAM

## 2025-05-25 PROCEDURE — 25010000002 SUGAMMADEX 200 MG/2ML SOLUTION: Performed by: NURSE ANESTHETIST, CERTIFIED REGISTERED

## 2025-05-25 PROCEDURE — 25810000003 SODIUM CHLORIDE 3 % SOLUTION: Performed by: NURSE PRACTITIONER

## 2025-05-25 PROCEDURE — 99291 CRITICAL CARE FIRST HOUR: CPT | Performed by: INTERNAL MEDICINE

## 2025-05-25 DEVICE — FLOSEAL HEMOSTATIC MATRIX, 10ML
Type: IMPLANTABLE DEVICE | Site: BRAIN | Status: FUNCTIONAL
Brand: FLOSEAL HEMOSTATIC MATRIX

## 2025-05-25 DEVICE — DURAGEN® PLUS DURAL REGENERATION MATRIX, 3 IN X 3 IN (7.5 CM X 7.5 CM)
Type: IMPLANTABLE DEVICE | Site: BRAIN | Status: FUNCTIONAL
Brand: DURAGEN® PLUS

## 2025-05-25 DEVICE — HEMOST ABS SURGIFOAM SZ100 8X12 10MM: Type: IMPLANTABLE DEVICE | Site: BRAIN | Status: FUNCTIONAL

## 2025-05-25 RX ORDER — PANTOPRAZOLE SODIUM 40 MG/10ML
40 INJECTION, POWDER, LYOPHILIZED, FOR SOLUTION INTRAVENOUS
Status: DISCONTINUED | OUTPATIENT
Start: 2025-05-26 | End: 2025-05-28

## 2025-05-25 RX ORDER — LIDOCAINE HYDROCHLORIDE 10 MG/ML
0.5 INJECTION, SOLUTION EPIDURAL; INFILTRATION; INTRACAUDAL; PERINEURAL ONCE AS NEEDED
Status: DISCONTINUED | OUTPATIENT
Start: 2025-05-25 | End: 2025-05-25 | Stop reason: HOSPADM

## 2025-05-25 RX ORDER — ONDANSETRON 2 MG/ML
INJECTION INTRAMUSCULAR; INTRAVENOUS AS NEEDED
Status: DISCONTINUED | OUTPATIENT
Start: 2025-05-25 | End: 2025-05-25 | Stop reason: SURG

## 2025-05-25 RX ORDER — POTASSIUM CHLORIDE 29.8 MG/ML
20 INJECTION INTRAVENOUS
Status: COMPLETED | OUTPATIENT
Start: 2025-05-25 | End: 2025-05-25

## 2025-05-25 RX ORDER — SUCCINYLCHOLINE/SOD CL,ISO/PF 200MG/10ML
SYRINGE (ML) INTRAVENOUS AS NEEDED
Status: DISCONTINUED | OUTPATIENT
Start: 2025-05-25 | End: 2025-05-25 | Stop reason: SURG

## 2025-05-25 RX ORDER — BUPIVACAINE HCL/0.9 % NACL/PF 0.125 %
PLASTIC BAG, INJECTION (ML) EPIDURAL AS NEEDED
Status: DISCONTINUED | OUTPATIENT
Start: 2025-05-25 | End: 2025-05-25 | Stop reason: SURG

## 2025-05-25 RX ORDER — MIDAZOLAM HYDROCHLORIDE 1 MG/ML
1 INJECTION, SOLUTION INTRAMUSCULAR; INTRAVENOUS
Status: DISCONTINUED | OUTPATIENT
Start: 2025-05-25 | End: 2025-05-25 | Stop reason: HOSPADM

## 2025-05-25 RX ORDER — LEVETIRACETAM 500 MG/5ML
1000 INJECTION, SOLUTION, CONCENTRATE INTRAVENOUS ONCE
Status: COMPLETED | OUTPATIENT
Start: 2025-05-25 | End: 2025-05-25

## 2025-05-25 RX ORDER — ACETAMINOPHEN 325 MG/1
650 TABLET ORAL EVERY 4 HOURS PRN
Status: DISCONTINUED | OUTPATIENT
Start: 2025-05-25 | End: 2025-05-26

## 2025-05-25 RX ORDER — METOPROLOL TARTRATE 1 MG/ML
INJECTION, SOLUTION INTRAVENOUS AS NEEDED
Status: DISCONTINUED | OUTPATIENT
Start: 2025-05-25 | End: 2025-05-25 | Stop reason: SURG

## 2025-05-25 RX ORDER — LIDOCAINE HYDROCHLORIDE 10 MG/ML
INJECTION, SOLUTION EPIDURAL; INFILTRATION; INTRACAUDAL; PERINEURAL AS NEEDED
Status: DISCONTINUED | OUTPATIENT
Start: 2025-05-25 | End: 2025-05-25 | Stop reason: SURG

## 2025-05-25 RX ORDER — FENTANYL CITRATE 50 UG/ML
INJECTION, SOLUTION INTRAMUSCULAR; INTRAVENOUS AS NEEDED
Status: DISCONTINUED | OUTPATIENT
Start: 2025-05-25 | End: 2025-05-25 | Stop reason: SURG

## 2025-05-25 RX ORDER — ACETAMINOPHEN 160 MG/5ML
650 SOLUTION ORAL EVERY 4 HOURS PRN
Status: DISCONTINUED | OUTPATIENT
Start: 2025-05-25 | End: 2025-05-25

## 2025-05-25 RX ORDER — LEVETIRACETAM 500 MG/5ML
500 INJECTION, SOLUTION, CONCENTRATE INTRAVENOUS EVERY 12 HOURS SCHEDULED
Status: DISCONTINUED | OUTPATIENT
Start: 2025-05-25 | End: 2025-05-27

## 2025-05-25 RX ORDER — 3% SODIUM CHLORIDE 3 G/100ML
30 INJECTION, SOLUTION INTRAVENOUS CONTINUOUS
Status: DISCONTINUED | OUTPATIENT
Start: 2025-05-25 | End: 2025-05-25

## 2025-05-25 RX ORDER — POTASSIUM CHLORIDE 1.5 G/1.58G
40 POWDER, FOR SOLUTION ORAL EVERY 4 HOURS
Status: COMPLETED | OUTPATIENT
Start: 2025-05-25 | End: 2025-05-26

## 2025-05-25 RX ORDER — CEFAZOLIN SODIUM 1 G/3ML
INJECTION, POWDER, FOR SOLUTION INTRAMUSCULAR; INTRAVENOUS AS NEEDED
Status: DISCONTINUED | OUTPATIENT
Start: 2025-05-25 | End: 2025-05-25 | Stop reason: SURG

## 2025-05-25 RX ORDER — ACETAMINOPHEN 160 MG/5ML
650 SOLUTION ORAL EVERY 4 HOURS PRN
Status: DISCONTINUED | OUTPATIENT
Start: 2025-05-25 | End: 2025-05-26

## 2025-05-25 RX ORDER — TRAMADOL HYDROCHLORIDE 50 MG/1
50 TABLET ORAL EVERY 4 HOURS PRN
Status: DISCONTINUED | OUTPATIENT
Start: 2025-05-25 | End: 2025-05-26

## 2025-05-25 RX ORDER — FAMOTIDINE 10 MG/ML
20 INJECTION, SOLUTION INTRAVENOUS ONCE
Status: DISCONTINUED | OUTPATIENT
Start: 2025-05-25 | End: 2025-05-25 | Stop reason: HOSPADM

## 2025-05-25 RX ORDER — SODIUM CHLORIDE 9 MG/ML
50 INJECTION, SOLUTION INTRAVENOUS CONTINUOUS
Status: DISCONTINUED | OUTPATIENT
Start: 2025-05-25 | End: 2025-05-25

## 2025-05-25 RX ORDER — SODIUM CHLORIDE 9 MG/ML
INJECTION, SOLUTION INTRAVENOUS CONTINUOUS PRN
Status: DISCONTINUED | OUTPATIENT
Start: 2025-05-25 | End: 2025-05-25 | Stop reason: SURG

## 2025-05-25 RX ORDER — SODIUM CHLORIDE 9 MG/ML
50 INJECTION, SOLUTION INTRAVENOUS CONTINUOUS
Status: DISCONTINUED | OUTPATIENT
Start: 2025-05-25 | End: 2025-05-28

## 2025-05-25 RX ORDER — ONDANSETRON 4 MG/1
4 TABLET, ORALLY DISINTEGRATING ORAL EVERY 6 HOURS PRN
Status: DISCONTINUED | OUTPATIENT
Start: 2025-05-25 | End: 2025-05-26

## 2025-05-25 RX ORDER — SODIUM CHLORIDE 0.9 % (FLUSH) 0.9 %
10 SYRINGE (ML) INJECTION AS NEEDED
Status: DISCONTINUED | OUTPATIENT
Start: 2025-05-25 | End: 2025-05-25 | Stop reason: HOSPADM

## 2025-05-25 RX ORDER — ROCURONIUM BROMIDE 10 MG/ML
INJECTION, SOLUTION INTRAVENOUS AS NEEDED
Status: DISCONTINUED | OUTPATIENT
Start: 2025-05-25 | End: 2025-05-25 | Stop reason: SURG

## 2025-05-25 RX ORDER — PROPOFOL 10 MG/ML
VIAL (ML) INTRAVENOUS AS NEEDED
Status: DISCONTINUED | OUTPATIENT
Start: 2025-05-25 | End: 2025-05-25 | Stop reason: SURG

## 2025-05-25 RX ORDER — SODIUM CHLORIDE 0.9 % (FLUSH) 0.9 %
10 SYRINGE (ML) INJECTION EVERY 12 HOURS SCHEDULED
Status: DISCONTINUED | OUTPATIENT
Start: 2025-05-25 | End: 2025-05-25 | Stop reason: HOSPADM

## 2025-05-25 RX ORDER — SODIUM CHLORIDE, SODIUM LACTATE, POTASSIUM CHLORIDE, CALCIUM CHLORIDE 600; 310; 30; 20 MG/100ML; MG/100ML; MG/100ML; MG/100ML
9 INJECTION, SOLUTION INTRAVENOUS CONTINUOUS
Status: DISCONTINUED | OUTPATIENT
Start: 2025-05-26 | End: 2025-05-26

## 2025-05-25 RX ORDER — FENTANYL CITRATE 50 UG/ML
50 INJECTION, SOLUTION INTRAMUSCULAR; INTRAVENOUS
Status: DISCONTINUED | OUTPATIENT
Start: 2025-05-25 | End: 2025-05-25 | Stop reason: HOSPADM

## 2025-05-25 RX ORDER — ONDANSETRON 2 MG/ML
4 INJECTION INTRAMUSCULAR; INTRAVENOUS EVERY 6 HOURS PRN
Status: DISCONTINUED | OUTPATIENT
Start: 2025-05-25 | End: 2025-05-30 | Stop reason: HOSPADM

## 2025-05-25 RX ORDER — ONDANSETRON 2 MG/ML
4 INJECTION INTRAMUSCULAR; INTRAVENOUS ONCE AS NEEDED
Status: DISCONTINUED | OUTPATIENT
Start: 2025-05-25 | End: 2025-05-25 | Stop reason: HOSPADM

## 2025-05-25 RX ORDER — MAGNESIUM HYDROXIDE 1200 MG/15ML
LIQUID ORAL AS NEEDED
Status: DISCONTINUED | OUTPATIENT
Start: 2025-05-25 | End: 2025-05-25 | Stop reason: HOSPADM

## 2025-05-25 RX ORDER — ACETAMINOPHEN 650 MG/1
650 SUPPOSITORY RECTAL EVERY 4 HOURS PRN
Status: DISCONTINUED | OUTPATIENT
Start: 2025-05-25 | End: 2025-05-26

## 2025-05-25 RX ORDER — FAMOTIDINE 20 MG/1
20 TABLET, FILM COATED ORAL ONCE
Status: DISCONTINUED | OUTPATIENT
Start: 2025-05-25 | End: 2025-05-25 | Stop reason: HOSPADM

## 2025-05-25 RX ORDER — MANNITOL 20 G/100ML
INJECTION, SOLUTION INTRAVENOUS CONTINUOUS PRN
Status: DISCONTINUED | OUTPATIENT
Start: 2025-05-25 | End: 2025-05-25 | Stop reason: SURG

## 2025-05-25 RX ADMIN — Medication 180 MG: at 09:29

## 2025-05-25 RX ADMIN — SODIUM CHLORIDE 40 ML/HR: 3 INJECTION, SOLUTION INTRAVENOUS at 05:21

## 2025-05-25 RX ADMIN — ROCURONIUM BROMIDE 50 MG: 10 INJECTION INTRAVENOUS at 10:00

## 2025-05-25 RX ADMIN — Medication 100 MCG: at 09:32

## 2025-05-25 RX ADMIN — SODIUM CHLORIDE: 9 INJECTION, SOLUTION INTRAVENOUS at 09:21

## 2025-05-25 RX ADMIN — MANNITOL: 20 INJECTION, SOLUTION INTRAVENOUS at 09:31

## 2025-05-25 RX ADMIN — ACETAMINOPHEN 650 MG: 650 SOLUTION ORAL at 00:59

## 2025-05-25 RX ADMIN — ROCURONIUM BROMIDE 5 MG: 10 INJECTION INTRAVENOUS at 09:29

## 2025-05-25 RX ADMIN — Medication 10 ML: at 20:03

## 2025-05-25 RX ADMIN — ACETAMINOPHEN 650 MG: 650 SOLUTION ORAL at 05:21

## 2025-05-25 RX ADMIN — POTASSIUM CHLORIDE 20 MEQ: 400 INJECTION, SOLUTION INTRAVENOUS at 13:37

## 2025-05-25 RX ADMIN — PROPOFOL 200 MG: 10 INJECTION, EMULSION INTRAVENOUS at 09:29

## 2025-05-25 RX ADMIN — ACETAMINOPHEN 650 MG: 650 SOLUTION ORAL at 17:49

## 2025-05-25 RX ADMIN — SUGAMMADEX 200 MG: 100 INJECTION, SOLUTION INTRAVENOUS at 11:08

## 2025-05-25 RX ADMIN — PHENYLEPHRINE HYDROCHLORIDE 1 MCG/KG/MIN: 10 INJECTION INTRAVENOUS at 09:48

## 2025-05-25 RX ADMIN — MUPIROCIN 1 APPLICATION: 20 OINTMENT TOPICAL at 20:03

## 2025-05-25 RX ADMIN — POTASSIUM CHLORIDE 20 MEQ: 400 INJECTION, SOLUTION INTRAVENOUS at 16:45

## 2025-05-25 RX ADMIN — ACETAMINOPHEN 650 MG: 650 SOLUTION ORAL at 21:52

## 2025-05-25 RX ADMIN — ONDANSETRON 4 MG: 2 INJECTION INTRAMUSCULAR; INTRAVENOUS at 17:49

## 2025-05-25 RX ADMIN — Medication 100 MCG: at 09:39

## 2025-05-25 RX ADMIN — POTASSIUM CHLORIDE 40 MEQ: 1.5 FOR SOLUTION ORAL at 22:18

## 2025-05-25 RX ADMIN — ONDANSETRON 4 MG: 2 INJECTION INTRAMUSCULAR; INTRAVENOUS at 11:12

## 2025-05-25 RX ADMIN — ATORVASTATIN CALCIUM 80 MG: 40 TABLET, FILM COATED ORAL at 20:02

## 2025-05-25 RX ADMIN — LEVETIRACETAM 1000 MG: 100 INJECTION, SOLUTION INTRAVENOUS at 10:01

## 2025-05-25 RX ADMIN — Medication 100 MCG: at 09:41

## 2025-05-25 RX ADMIN — SODIUM CHLORIDE 50 ML/HR: 9 INJECTION, SOLUTION INTRAVENOUS at 21:01

## 2025-05-25 RX ADMIN — LEVETIRACETAM 500 MG: 100 INJECTION, SOLUTION INTRAVENOUS at 20:03

## 2025-05-25 RX ADMIN — Medication 100 MCG: at 09:34

## 2025-05-25 RX ADMIN — SODIUM CHLORIDE 5 MG/HR: 900 INJECTION, SOLUTION INTRAVENOUS at 13:32

## 2025-05-25 RX ADMIN — METOPROLOL TARTRATE 1 MG: 5 INJECTION INTRAVENOUS at 11:11

## 2025-05-25 RX ADMIN — ACETAMINOPHEN 650 MG: 650 SOLUTION ORAL at 13:24

## 2025-05-25 RX ADMIN — Medication 100 MCG: at 10:15

## 2025-05-25 RX ADMIN — CEFAZOLIN 2 G: 1 INJECTION, POWDER, FOR SOLUTION INTRAMUSCULAR; INTRAVENOUS at 09:31

## 2025-05-25 RX ADMIN — SODIUM CHLORIDE 2000 MG: 900 INJECTION INTRAVENOUS at 20:02

## 2025-05-25 RX ADMIN — FENTANYL CITRATE 100 MCG: 50 INJECTION, SOLUTION INTRAMUSCULAR; INTRAVENOUS at 09:29

## 2025-05-25 RX ADMIN — MUPIROCIN 1 APPLICATION: 20 OINTMENT TOPICAL at 08:40

## 2025-05-25 RX ADMIN — LIDOCAINE HYDROCHLORIDE 100 MG: 10 INJECTION, SOLUTION EPIDURAL; INFILTRATION; INTRACAUDAL; PERINEURAL at 09:29

## 2025-05-25 NOTE — SIGNIFICANT NOTE
05/25/25 1036   SLP Deferred Reason   SLP Deferred Reason Unable to evaluate, medical status change  (crani this am, will follow up 5/26)

## 2025-05-25 NOTE — ANESTHESIA POSTPROCEDURE EVALUATION
Patient: Linda Schafer    Procedure Summary       Date: 05/25/25 Room / Location: [facility] OR [address] [place] [address] OR; [facility] ANESTHESIA    Anesthesia Start: 0921 Anesthesia Stop:     Procedures:       RIGHT DECOMPRESSIVE CRANIECTOMY (Right: Head)      ANESTHESIA ARTERIAL LINE Diagnosis:     Scheduled Providers: Matt Olivarez MD Provider: Florecita Sr DO    Anesthesia Type: general, Stacy ASA Status: 4 - Emergent            Anesthesia Type: general, [place]    Vitals  No vitals data found for the desired time range.    HR 60  /65  SpO2 97%  Temp 97.3      Post Anesthesia Care and Evaluation    Patient location during evaluation: PACU  Patient participation: waiting for patient participation  Pain management: adequate    Airway patency: patent  Anesthetic complications: No anesthetic complications  PONV Status: none  Cardiovascular status: hemodynamically stable and acceptable  Respiratory status: nonlabored ventilation, acceptable, nasal cannula and oral airway  Hydration status: acceptable

## 2025-05-25 NOTE — ANESTHESIA PREPROCEDURE EVALUATION
Anesthesia Evaluation     Patient summary reviewed and Nursing notes reviewed   no history of anesthetic complications:   NPO Solid Status: Waived due to emergency  NPO Liquid Status: Waived due to emergency           Airway   Mallampati: II  TM distance: >3 FB  Neck ROM: full  No difficulty expected  Dental - normal exam     Pulmonary - normal exam   (+) ,shortness of breath  (-) not a smoker  Cardiovascular - normal exam  Exercise tolerance: good (4-7 METS)    ECG reviewed      ROS comment: 5/24/25-  lvef EF = 62%. Mild mr, trace ai. Negative bubble.   11/23-1.  No scintigraphic evidence of ischemia.  2.  Preserved post stress ejection fraction of 71% with no focal wall motion abnormalities.3.  Transient ischemic dilation ratio of 1.06 and lung heart radiopharmaceutical ratio 0.39 do not support multivessel coronary disease or increased LV filling pressures at the time of this study.      Neuro/Psych  (+) CVA, headaches  GI/Hepatic/Renal/Endo    (+) hiatal hernia    Musculoskeletal     Abdominal    Substance History - negative use     OB/GYN          Other        ROS/Med Hx Other: Hgb 12.2 k 3.3   R hemispheric infarct 2/2 ICA terminus occlusion                  Anesthesia Plan    ASA 4 - emergent     general and [place]   Rapid sequence  (Emergent crani)  intravenous induction     Anesthetic plan, risks, benefits, and alternatives have been provided, discussed and informed consent has been obtained with: patient.    Plan discussed with CRNA.    CODE STATUS:

## 2025-05-25 NOTE — OP NOTE
Date of operation: May 25, 2025    Attending surgeon: Dr. 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  Decompressive craniectomy on the right for malignant cerebral edema from a MCA stroke    Findings: Successful large decompressive craniectomy achieved.  The brain was under moderate pressure when the dura was opened.    Specimens: None    Indications: Malignant cerebral edema from a right MCA stroke    Procedure in detail: The patient was brought back to the operating room and placed under general anesthesia.  She was then positioned supine with her head rotated to the left to expose the right frontal parietal region.  At this time, the hair on the patient's right side of her head was shaved.  We then sushma out a traditional trauma craniotomy question rachael incision which was just off the midline to the right.  It started just behind the hairline and went down to the root of the zygoma.  We then prepped and draped this area in a sterile fashion.  A timeout was performed, antibiotics were given and local anesthetic was injected into the incision marking.  At this time, we incised the incision with a knife.  Using Bovie cautery, we reflected our myocutaneous flaps anteriorly and have placed fishhook retractors.  We carefully exposed the root of the zygoma as well as the keyhole.  At this time, we achieved complete hemostasis.  We then placed multiple bur holes in a circumferential manner around our exposure.  We then used the craniotome to turn a large bone flap in the area that been exposed.  Once the bone flap was off, we achieved complete hemostasis.  We then continued to dissect the dura off of the middle fossa floor and then further removed the bone of the middle fossa with rongeurs until we were flush with the floor.  At this time, we then opened up the dura  in a stellate fashion.  The brain was under moderate pressure when this was performed.  At this point, we had achieved a good decompression.  We then achieved complete hemostasis with bipolar cautery and Gelfoam powder.  At this time, we placed DuraGen on top of our dural opening.  We then tunneled a Hemovac drain posteriorly.  We then copiously irrigated the field and turned our attention to the closure with the temporalis fascia was closed with interrupted 2-0 Vicryl stitches.  The galea was closed with interrupted inverted 2-0 Vicryl stitches and the skin with staples.  The drain was secured with a 2-0 silk stitch.  The bone flap was stored in our freezer using the standard protocol.    Stephanie Javed was present for all portions of the surgery and assisted with patient positioning, opening, retraction, suturing, and closing.  At the end of the case all counts were correct x2 and there were no complications noted.

## 2025-05-25 NOTE — PROGRESS NOTES
"                                              LOS: 1 day   Patient Care Team:  Micheline Heath DO as PCP - General (Family Medicine)    Chief Complaint:  F/up stroke and critical care management.    Subjective   Interval History    Seen postop.  She is sleepy but not much changed on her exam compared to previously.  Still weaker on the left side and had right gaze preference.  On nicardipine drip and hypertonic saline.  No seizure.    REVIEW OF SYSTEMS:   Could not obtain.  Patient is aphasic.    Ventilator/Non-Invasive Ventilation Settings (From admission, onward)      None                  Physical Exam:     Vital Signs  Temp:  [96.8 °F (36 °C)-100.7 °F (38.2 °C)] 97.6 °F (36.4 °C)  Heart Rate:  [58-86] 66  Resp:  [14-18] 18  BP: (113-140)/(56-91) 133/73    Intake/Output Summary (Last 24 hours) at 5/25/2025 0836  Last data filed at 5/25/2025 0648  Gross per 24 hour   Intake 826.67 ml   Output 950 ml   Net -123.33 ml     Flowsheet Rows      Flowsheet Row First Filed Value   Admission Height 157.5 cm (62\") Documented at 05/24/2025 1032   Admission Weight 74.8 kg (165 lb) Documented at 05/24/2025 1032            PPE used per hospital policy    General Appearance:  Somnolent but easily arousable.  Not in distress.   ENMT: Mallampati 3.  Dry tongue.   Eyes: Right gaze preference.  Pupils equal and reactive to light.   Neck:    Trachea midline. No thyromegaly.   Lungs:    Clear to auscultation,respirations regular, even and nonlabored    Heart:   Regular rhythm and normal rate, normal S1 and S2, no         murmur   Skin:   No rash or ecchymosis   Abdomen:    Soft. No tenderness. No HSM.   Neuro/psych: Somnolent but easily arousable.  Moves extremities on the right.  Withdraws to painful stimulus on the left.   Extremities:  No cyanosis, clubbing or edema.  Warm extremities and well-perfused          Results Review:        Results from last 7 days   Lab Units 05/25/25  0607 05/25/25  0039 05/24/25  1726 05/24/25  1038 "   SODIUM mmol/L 147* 146* 143  --    POTASSIUM mmol/L 3.3* 3.5 3.7  --    CHLORIDE mmol/L 115*  --   --   --    CO2 mmol/L 22.0  --   --   --    BUN mg/dL 8  --   --   --    CREATININE mg/dL 0.65  --   --  0.80   GLUCOSE mg/dL 140*  --   --   --    CALCIUM mg/dL 9.1  --   --   --          Results from last 7 days   Lab Units 05/25/25  0607 05/24/25  1038 05/24/25  1037   WBC 10*3/mm3 10.75  --  7.38   HEMOGLOBIN g/dL 12.2  --  13.9   HEMOGLOBIN, POC g/dL  --  14.6  --    HEMATOCRIT % 38.7  --  42.7   HEMATOCRIT POC %  --  43  --    PLATELETS 10*3/mm3 222  --  259     Results from last 7 days   Lab Units 05/24/25  1037   INR  0.91   APTT seconds 27.2                               I reviewed the patient's new clinical results.        Medication Review:   atorvastatin, 80 mg, Oral, Nightly  mupirocin, 1 Application, Each Nare, BID  sodium chloride, 10 mL, Intravenous, Q12H  sodium chloride, 10 mL, Intravenous, Q12H  sodium chloride, 10 mL, Intravenous, Q12H        dextrose, 6 mL/kg  sodium chloride, 40 mL/hr, Last Rate: 40 mL/hr (05/25/25 0521)        Diagnostic imaging:  I personally and independently reviewed the following images:  CT brain 5/25/2025: Worsening localized brain edema with effacement of the anterior horn of the right lateral ventricle but no midline shift    Assessment     Acute ischemic R MCA stroke  Brain edema, secondary to #1, worse, s/p Decompressive craniectomy 5/25  Left hemiparesis and expressive aphasia, secondary #1  Sinus bradycardia/sinus pause: Secondary to sleep and sleep apnea.  Possible contribution from brain edema.  Lowest HR was noted at 44 but again she had sinus pauses as above.  Loud snoring, probable CATHRYN  Electrolyte disturbance: Iatrogenic hypernatremia.  Hypokalemia.     GERD  Hiatal hernia        Plan     3% saline infusion to target sodium level 145-150 per neurosurgery  CT brain monitoring  Neurocheck per protocol  Continue holding anticoagulation.  Resume when okay with  neurosurgery.  NG tube in place.  Speech eval in a.m. and initiate TF.  Nicardipine IV drip  PT OT/speech  Atorvastatin 80 mg daily.  Replace potassium  Initiate PPI prophylaxis  DVT prophylaxis with SCD        Va Espinosa MD  05/25/25  08:36 EDT        Time: Critical care 35 min      This note was dictated utilizing [employer] dictation

## 2025-05-25 NOTE — ANESTHESIA PROCEDURE NOTES
Airway  Reason: elective    Date/Time: 5/25/2025 9:30 AM  Airway not difficult    General Information and Staff    Patient location during procedure: OR  CRNA/CAA: Corinne Garrison CRNA    Indications and Patient Condition  Indications for airway management: airway protection    Preoxygenated: yes  MILS not maintained throughout    Mask difficulty assessment: 0 - not attempted    Final Airway Details    Final airway type: endotracheal airway      Successful airway: ETT  Cuffed: yes   Successful intubation technique: video laryngoscopy and RSI  Adjuncts used in placement: intubating stylet  Endotracheal tube insertion site: oral  Blade: Randhawa  Blade size: 3  ETT size (mm): 7.5  Cormack-Lehane Classification: grade I - full view of glottis  Placement verified by: chest auscultation and capnometry   Measured from: lips  ETT/EBT  to lips (cm): 20  Number of attempts at approach: 1  Assessment: lips, teeth, and gum same as pre-op and atraumatic intubation    Additional Comments  Negative epigastric sounds, Breath sound equal bilaterally with symmetric chest rise and fall

## 2025-05-25 NOTE — ANESTHESIA PROCEDURE NOTES
Arterial Line       Performed By   Anesthesiologist: Florecita Sr DO   Preanesthetic Checklist  Completed: patient identified, IV checked, site marked, risks and benefits discussed, surgical consent, monitors and equipment checked, pre-op evaluation and timeout performed  Arterial Line Prep    Sterile Tech: cap, gloves and sterile barriers  Prep: ChloraPrep  Patient monitoring: EKG, continuous pulse oximetry and blood pressure monitoring  Arterial Line Procedure   Laterality:right  Location:  radial artery  Catheter size: 20 G   Guidance: landmark technique  Number of attempts: 1  Successful placement: yes   Post Assessment   Dressing Type: wrist guard applied, secured with tape, occlusive dressing applied and line sutured.   Complications no  Circ/Move/Sens Assessment: normal and unchanged.   Patient Tolerance: patient tolerated the procedure well with no apparent complications

## 2025-05-25 NOTE — BRIEF OP NOTE
CRANIOTOMY FOR SUBDURAL HEMATOMA  Progress Note    Linda Schafer  5/25/2025    Pre-op Diagnosis:   Right MCA stroke with malignant edema       Post-Op Diagnosis Codes:  Same    Procedure(s):      Procedure(s):  CRANIOTOMY              Surgeon(s):  Matt Olivarez MD    Anesthesia: General    Staff:   * No surgical staff found *       Estimated Blood Loss: 75 ml    Urine Voided: * No values recorded between 5/25/2025  9:20 AM and 5/25/2025 10:46 AM *    Specimens:                None      Drains:   Closed/Suction Drain 1 Lateral;Right Scalp Accordion 15 Fr. (Active)       NG/OG Tube Nasogastric 16 Fr Left nostril (Active)   Placement Verification X-ray 05/25/25 0600   Site Assessment Clean;Dry;Intact 05/25/25 0800   Securement nasal septal tie 05/25/25 0800   Secured at (cm) 57 05/25/25 0800   NG/OG Site Interventions Site assessed 05/25/25 0800   Status Suction-low intermittent 05/25/25 0800   Drainage Appearance Thin;Yellow;Clear 05/24/25 2030   Surrounding Skin Dry;Intact;Non reddened 05/25/25 0800   Flush/ Irrigation Intake (mL) 60 05/25/25 0800   Intake (mL) 35 mL 05/25/25 0600       External Urinary Catheter (Active)   Daily Indications Daily output 05/25/25 0800   Site Assessment Clean;Skin intact 05/25/25 0800   Application/Removal external catheter changed 05/25/25 0015   Collection Container Wall suction 05/25/25 0800   Wall suction (mmHG) 120 mmHG 05/25/25 0600   Securement Method Securing device 05/25/25 0800   Catheter care complete Yes 05/25/25 0015   Output (mL) 350 mL 05/25/25 0015       Findings: Successful decompressive craniectomy.  Brain was under moderate pressure when the dura was opened.      Complications: None apparent          Matt Olivarez MD     Date: 5/25/2025  Time: 11:00 EDT

## 2025-05-25 NOTE — PROGRESS NOTES
"NEUROSURGERY PROGRESS NOTE    Chief Complaint: Right MCA stroke    Subjective: Patient complaining of more headache this morning.  Additionally, she feels more lethargic and having trouble opening her eyes.  Last sodium was 147.    Objective    Vital Signs: Blood pressure 113/69, pulse 52, temperature 98.7 °F (37.1 °C), temperature source Axillary, resp. rate 16, height 157.5 cm (62.01\"), weight 66.1 kg (145 lb 11.6 oz), SpO2 98%.    Physical Exam  Says name  Does not open eyes to voice  Pupils 3 mm reactive bilaterally  Left facial droop  No movement to stimulation of the left upper extremity.  She does withdraw the left lower extremity to stimulation  Follows commands with good strength on the right side    Intake/Output:   Intake/Output Summary (Last 24 hours) at 5/25/2025 0850  Last data filed at 5/25/2025 0648  Gross per 24 hour   Intake 826.67 ml   Output 950 ml   Net -123.33 ml       Current Medications:   Current Facility-Administered Medications:     acetaminophen (TYLENOL) 160 MG/5ML oral solution 650 mg, 650 mg, Oral, Q4H PRN, Franc Joel APRN, 650 mg at 05/25/25 0521    atorvastatin (LIPITOR) tablet 80 mg, 80 mg, Oral, Nightly, Izzy Silvestre APRN, 80 mg at 05/24/25 2106    Calcium Replacement - Follow Nurse / BPA Driven Protocol, , Not Applicable, PRFrancisca MENDOZA Rami, MD    dextrose (D5W) 5 % infusion 448.8 mL, 6 mL/kg, Intravenous, Continuous PRN, Izzy Silvestre APRN    Magnesium Standard Dose Replacement - Follow Nurse / BPA Driven Protocol, , Not Applicable, PRFrancisca MENDOZA Rami, MD    mupirocin (BACTROBAN) 2 % nasal ointment 1 Application, 1 Application, Each Nare, BID, Va Espinosa MD, 1 Application at 05/25/25 0840    Phosphorus Replacement - Follow Nurse / BPA Driven Protocol, , Not Applicable, PRNFrancisca Rami, MD    Potassium Replacement - Follow Nurse / BPA Driven Protocol, , Not Applicable, PRFrancisca MENDOZA Rami, MD    sodium chloride (HYPERTONIC) 3 % infusion, 40 mL/hr, " Intravenous, Continuous, Izzy Silvestre, CINDY, Last Rate: 40 mL/hr at 05/25/25 0521, 40 mL/hr at 05/25/25 0521    sodium chloride 0.9 % flush 10 mL, 10 mL, Intravenous, PRN, Man Hannon MD    sodium chloride 0.9 % flush 10 mL, 10 mL, Intravenous, Q12H, Va Espinosa MD, 10 mL at 05/24/25 2108    sodium chloride 0.9 % flush 10 mL, 10 mL, Intravenous, Q12H, Va Espinosa MD, 10 mL at 05/24/25 2108    sodium chloride 0.9 % flush 10 mL, 10 mL, Intravenous, Q12H, Va Espinosa MD, 10 mL at 05/24/25 2107    sodium chloride 0.9 % flush 10 mL, 10 mL, Intravenous, PRNFrancisca Rami, MD    sodium chloride 0.9 % flush 20 mL, 20 mL, Intravenous, Francisca VOGEL Rami, MD    sodium chloride 0.9 % infusion 40 mL, 40 mL, Intravenous, LELA, Va Espinosa MD     Laboratory Results:       Lab 05/25/25  0607 05/24/25  1038 05/24/25  1037   WBC 10.75  --  7.38   HEMOGLOBIN 12.2  --  13.9   HEMOGLOBIN, POC  --  14.6  --    HEMATOCRIT 38.7  --  42.7   HEMATOCRIT POC  --  43  --    PLATELETS 222  --  259   NEUTROS ABS  --   --  5.80   IMMATURE GRANS (ABS)  --   --  0.08*   LYMPHS ABS  --   --  1.07   MONOS ABS  --   --  0.34   EOS ABS  --   --  0.02   MCV 89.4  --  86.6   PROTIME  --   --  12.8   APTT  --   --  27.2         Lab 05/25/25  0607 05/25/25  0039 05/24/25  1726 05/24/25  1038 05/24/25  1037   SODIUM 147* 146* 143  --   --    POTASSIUM 3.3* 3.5 3.7  --   --    CHLORIDE 115*  --   --   --   --    CO2 22.0  --   --   --   --    ANION GAP 10.0  --   --   --   --    BUN 8  --   --   --   --    CREATININE 0.65  --   --  0.80  --    EGFR 99.7  --   --  83.4  --    GLUCOSE 140*  --   --   --   --    CALCIUM 9.1  --   --   --   --    MAGNESIUM  --   --   --   --  2.1   HEMOGLOBIN A1C 5.30  --   --   --   --          Lab 05/24/25  1037   ALT (SGPT) 19   AST (SGOT) 27         Lab 05/24/25  1037   PROTIME 12.8   INR 0.91         Lab 05/25/25  0607   CHOLESTEROL 150   LDL CHOL 75   HDL CHOL 60   TRIGLYCERIDES 75             Brief  Urine Lab Results       None          Microbiology Results (last 10 days)       ** No results found for the last 240 hours. **             Diagnostic Imaging: I reviewed and independently interpreted the new imaging.     Assessment/Plan:    1. Acute CVA (cerebrovascular accident)         This is a 64-year-old female who presented yesterday with a completed right MCA infarct.  Repeat imaging this morning shows increased edema and mass effect.  Clinically, the patient is having worsening headache and is slightly more lethargic.  Due to both the radiographic and clinical worsening, I think it is appropriate to proceed with a right sided decompressive craniectomy.  I discussed this case at length yesterday with the patient's family and they were in agreement to proceed.  This morning, I reviewed the risks, benefits and alternatives with her  and daughter which include but are not limited to bleeding, infection, CSF leak, seizure, neurological injury, coma and death.  They understood and are in agreement.  We will look to do that this morning.      Any copied data from previous notes included in the (1) History of Present Illness, (2) Physical Examination and (3) Medical Decision Making and/or Assessment and Plan has been reviewed and is accurate as of 05/25/25      Matt Olivarez MD  05/25/25  08:50 EDT

## 2025-05-26 ENCOUNTER — APPOINTMENT (OUTPATIENT)
Dept: CT IMAGING | Facility: HOSPITAL | Age: 65
End: 2025-05-26
Payer: COMMERCIAL

## 2025-05-26 LAB
ANION GAP SERPL CALCULATED.3IONS-SCNC: 9 MMOL/L (ref 5–15)
BASOPHILS # BLD AUTO: 0.05 10*3/MM3 (ref 0–0.2)
BASOPHILS NFR BLD AUTO: 0.3 % (ref 0–1.5)
BUN SERPL-MCNC: 8 MG/DL (ref 8–23)
BUN/CREAT SERPL: 11.8 (ref 7–25)
CALCIUM SPEC-SCNC: 8.9 MG/DL (ref 8.6–10.5)
CHLORIDE SERPL-SCNC: 120 MMOL/L (ref 98–107)
CO2 SERPL-SCNC: 23 MMOL/L (ref 22–29)
CREAT SERPL-MCNC: 0.68 MG/DL (ref 0.57–1)
DEPRECATED RDW RBC AUTO: 43.1 FL (ref 37–54)
EGFRCR SERPLBLD CKD-EPI 2021: 98.6 ML/MIN/1.73
EOSINOPHIL # BLD AUTO: 0 10*3/MM3 (ref 0–0.4)
EOSINOPHIL NFR BLD AUTO: 0 % (ref 0.3–6.2)
ERYTHROCYTE [DISTWIDTH] IN BLOOD BY AUTOMATED COUNT: 13 % (ref 12.3–15.4)
GLUCOSE BLDC GLUCOMTR-MCNC: 102 MG/DL (ref 70–130)
GLUCOSE BLDC GLUCOMTR-MCNC: 106 MG/DL (ref 70–130)
GLUCOSE BLDC GLUCOMTR-MCNC: 129 MG/DL (ref 70–130)
GLUCOSE SERPL-MCNC: 125 MG/DL (ref 65–99)
HCT VFR BLD AUTO: 35.4 % (ref 34–46.6)
HGB BLD-MCNC: 10.9 G/DL (ref 12–15.9)
IMM GRANULOCYTES # BLD AUTO: 0.08 10*3/MM3 (ref 0–0.05)
IMM GRANULOCYTES NFR BLD AUTO: 0.4 % (ref 0–0.5)
LYMPHOCYTES # BLD AUTO: 1.95 10*3/MM3 (ref 0.7–3.1)
LYMPHOCYTES NFR BLD AUTO: 10.2 % (ref 19.6–45.3)
MCH RBC QN AUTO: 28.2 PG (ref 26.6–33)
MCHC RBC AUTO-ENTMCNC: 30.8 G/DL (ref 31.5–35.7)
MCV RBC AUTO: 91.5 FL (ref 79–97)
MONOCYTES # BLD AUTO: 1.59 10*3/MM3 (ref 0.1–0.9)
MONOCYTES NFR BLD AUTO: 8.3 % (ref 5–12)
NEUTROPHILS NFR BLD AUTO: 15.53 10*3/MM3 (ref 1.7–7)
NEUTROPHILS NFR BLD AUTO: 80.8 % (ref 42.7–76)
NRBC BLD AUTO-RTO: 0 /100 WBC (ref 0–0.2)
PLATELET # BLD AUTO: 208 10*3/MM3 (ref 140–450)
PMV BLD AUTO: 12.3 FL (ref 6–12)
POTASSIUM SERPL-SCNC: 3.8 MMOL/L (ref 3.5–5.2)
POTASSIUM SERPL-SCNC: 3.9 MMOL/L (ref 3.5–5.2)
POTASSIUM SERPL-SCNC: 4.1 MMOL/L (ref 3.5–5.2)
POTASSIUM SERPL-SCNC: 4.1 MMOL/L (ref 3.5–5.2)
RBC # BLD AUTO: 3.87 10*6/MM3 (ref 3.77–5.28)
SODIUM SERPL-SCNC: 151 MMOL/L (ref 136–145)
SODIUM SERPL-SCNC: 152 MMOL/L (ref 136–145)
SODIUM SERPL-SCNC: 152 MMOL/L (ref 136–145)
SODIUM SERPL-SCNC: 153 MMOL/L (ref 136–145)
WBC NRBC COR # BLD AUTO: 19.2 10*3/MM3 (ref 3.4–10.8)

## 2025-05-26 PROCEDURE — 84295 ASSAY OF SERUM SODIUM: CPT | Performed by: STUDENT IN AN ORGANIZED HEALTH CARE EDUCATION/TRAINING PROGRAM

## 2025-05-26 PROCEDURE — 70450 CT HEAD/BRAIN W/O DYE: CPT

## 2025-05-26 PROCEDURE — 84132 ASSAY OF SERUM POTASSIUM: CPT | Performed by: STUDENT IN AN ORGANIZED HEALTH CARE EDUCATION/TRAINING PROGRAM

## 2025-05-26 PROCEDURE — 82948 REAGENT STRIP/BLOOD GLUCOSE: CPT

## 2025-05-26 PROCEDURE — 92610 EVALUATE SWALLOWING FUNCTION: CPT

## 2025-05-26 PROCEDURE — 25010000002 CEFAZOLIN PER 500 MG: Performed by: STUDENT IN AN ORGANIZED HEALTH CARE EDUCATION/TRAINING PROGRAM

## 2025-05-26 PROCEDURE — 25010000002 LEVETRIRACETAM PER 10 MG: Performed by: STUDENT IN AN ORGANIZED HEALTH CARE EDUCATION/TRAINING PROGRAM

## 2025-05-26 PROCEDURE — 99291 CRITICAL CARE FIRST HOUR: CPT | Performed by: INTERNAL MEDICINE

## 2025-05-26 PROCEDURE — 25810000003 SODIUM CHLORIDE 0.9 % SOLUTION

## 2025-05-26 PROCEDURE — 85025 COMPLETE CBC W/AUTO DIFF WBC: CPT | Performed by: STUDENT IN AN ORGANIZED HEALTH CARE EDUCATION/TRAINING PROGRAM

## 2025-05-26 PROCEDURE — 80048 BASIC METABOLIC PNL TOTAL CA: CPT | Performed by: STUDENT IN AN ORGANIZED HEALTH CARE EDUCATION/TRAINING PROGRAM

## 2025-05-26 PROCEDURE — 92523 SPEECH SOUND LANG COMPREHEN: CPT

## 2025-05-26 RX ORDER — ACETAMINOPHEN 160 MG/5ML
650 SOLUTION ORAL EVERY 4 HOURS PRN
Status: DISCONTINUED | OUTPATIENT
Start: 2025-05-26 | End: 2025-05-27

## 2025-05-26 RX ORDER — LIDOCAINE 4 G/G
1 PATCH TOPICAL
Status: DISCONTINUED | OUTPATIENT
Start: 2025-05-27 | End: 2025-05-26

## 2025-05-26 RX ORDER — ATORVASTATIN CALCIUM 40 MG/1
80 TABLET, FILM COATED ORAL NIGHTLY
Status: DISCONTINUED | OUTPATIENT
Start: 2025-05-26 | End: 2025-05-27

## 2025-05-26 RX ORDER — TRAMADOL HYDROCHLORIDE 50 MG/1
50 TABLET ORAL EVERY 4 HOURS PRN
Status: DISCONTINUED | OUTPATIENT
Start: 2025-05-26 | End: 2025-05-27

## 2025-05-26 RX ORDER — LIDOCAINE 4 G/G
1 PATCH TOPICAL
Status: DISCONTINUED | OUTPATIENT
Start: 2025-05-26 | End: 2025-05-30 | Stop reason: HOSPADM

## 2025-05-26 RX ADMIN — ACETAMINOPHEN 650 MG: 650 SOLUTION ORAL at 14:47

## 2025-05-26 RX ADMIN — ACETAMINOPHEN 650 MG: 650 SOLUTION ORAL at 10:28

## 2025-05-26 RX ADMIN — Medication 10 ML: at 08:06

## 2025-05-26 RX ADMIN — ATORVASTATIN CALCIUM 80 MG: 40 TABLET, FILM COATED ORAL at 20:09

## 2025-05-26 RX ADMIN — PANTOPRAZOLE SODIUM 40 MG: 40 INJECTION, POWDER, FOR SOLUTION INTRAVENOUS at 05:54

## 2025-05-26 RX ADMIN — ACETAMINOPHEN 650 MG: 650 SOLUTION ORAL at 20:09

## 2025-05-26 RX ADMIN — SODIUM CHLORIDE 50 ML/HR: 900 INJECTION, SOLUTION INTRAVENOUS at 18:00

## 2025-05-26 RX ADMIN — LEVETIRACETAM 500 MG: 100 INJECTION, SOLUTION INTRAVENOUS at 20:09

## 2025-05-26 RX ADMIN — MUPIROCIN 1 APPLICATION: 20 OINTMENT TOPICAL at 20:10

## 2025-05-26 RX ADMIN — SODIUM CHLORIDE 2000 MG: 900 INJECTION INTRAVENOUS at 02:00

## 2025-05-26 RX ADMIN — ACETAMINOPHEN 650 MG: 650 SOLUTION ORAL at 05:54

## 2025-05-26 RX ADMIN — Medication 10 ML: at 20:09

## 2025-05-26 RX ADMIN — LIDOCAINE 1 PATCH: 4 PATCH TOPICAL at 21:16

## 2025-05-26 RX ADMIN — LEVETIRACETAM 500 MG: 100 INJECTION, SOLUTION INTRAVENOUS at 08:06

## 2025-05-26 RX ADMIN — POTASSIUM CHLORIDE 40 MEQ: 1.5 FOR SOLUTION ORAL at 02:00

## 2025-05-26 RX ADMIN — MUPIROCIN 1 APPLICATION: 20 OINTMENT TOPICAL at 08:06

## 2025-05-26 NOTE — THERAPY EVALUATION
Acute Care - Speech Language Pathology   Swallow Initial Evaluation [facility]  Clinical Swallow Evaluation  Cognitive-Communication Evaluation       Patient Name: Linda Schafer  : 1960  MRN: [id number]  Today's Date: 2025               Admit Date: 2025    Visit Dx:     ICD-10-CM ICD-9-CM   1. Acute CVA (cerebrovascular accident)  I63.9 434.91   2. Stroke  I63.9 434.91   3. Dysarthria  R47.1 784.51   4. Dysphagia, unspecified type  R13.10 787.20     Patient Active Problem List   Diagnosis    Palpitations    Personal history of cardiac murmur    Migraine headache    Hiatal hernia    GERD (gastroesophageal reflux disease)    Diverticulosis    Diminished pulses in lower extremity    Dyslipidemia    Peripheral edema    Grade I diastolic dysfunction    Acute ischemic stroke     Past Medical History:   Diagnosis Date    Atypical chest pain     Chest pain.Atypical chest pain    Chest pain     COVID-19     2022    COVID-19 vaccine administered     2021 ,  J and J ) 10/25/2021 - Moderna    Diminished pulses in lower extremity     Diverticulosis     Dizziness     Occasional    Dyslipidemia     Dyspnea     Edema     Fatigue     GERD (gastroesophageal reflux disease)     Hiatal hernia     Migraine headache     Palpitations     Personal history of cardiac murmur     SOB (shortness of breath)      Past Surgical History:   Procedure Laterality Date    CHOLECYSTECTOMY      HYSTERECTOMY      OOPHORECTOMY      TUBAL ABDOMINAL LIGATION         SLP Recommendation and Plan  SLP Swallowing Diagnosis: moderate, oral dysphagia, suspected pharyngeal dysphagia (25)  SLP Diet Recommendation: NPO, other (see comments) (except ice chips with nursing staff, 3-5 per hour after oral care as tolerated) (25)  Recommended Precautions and Strategies: general aspiration precautions (25)  SLP Rec. for Method of Medication Administration: meds via alternate route (25)      Monitor for Signs of Aspiration: yes, notify SLP if any concerns (05/26/25 0852)  Recommended Diagnostics: reassess via clinical swallow evaluation (05/26/25 0852)     Anticipated Discharge Disposition (SLP): inpatient rehabilitation facility (05/26/25 0852)  Rehab Potential/Prognosis, Swallowing: re-evaluate goals as necessary (05/26/25 0852)     Predicted Duration Therapy Intervention (Days): 2 weeks (05/26/25 0852)  Oral Care Recommendations: Oral Care BID/PRN, Suction toothbrush (05/26/25 0852)                                        Progress:  (eval; see note for more information)      SWALLOW EVALUATION (Last 72 Hours)       SLP Adult Swallow Evaluation       Row Name 05/26/25 0852                   Rehab Evaluation    Document Type evaluation  -MM        Subjective Information no complaints  -MM        Patient Observations alert;cooperative  -MM        Patient/Family/Caregiver Comments/Observations Son present  -MM        Patient Effort good  -MM        Symptoms Noted During/After Treatment dizziness  -MM        Oral Care teeth brushed - suction toothbrush  -MM           General Information    Patient Profile Reviewed yes  -MM        Pertinent History Of Current Problem Pt is a 64 year old female who presented to the facility with R MCA stroke, s/p crani.  -MM        Current Method of Nutrition NPO  -MM        Precautions/Limitations, Vision difficult to assess;other (see comments)  pt was unable to open eyes t/o evaluation  -MM        Precautions/Limitations, Hearing WFL;for purposes of eval  -MM        Prior Level of Function-Communication WFL  -MM        Prior Level of Function-Swallowing no diet consistency restrictions;safe, efficient swallowing in all situations  -MM        Plans/Goals Discussed with patient and family;agreed upon  -MM        Barriers to Rehab medically complex  -MM        Patient's Goals for Discharge patient did not state  -MM        Family Goals for Discharge family did not state   -MM           Pain    Pretreatment Pain Rating 0/10 - no pain  -MM        Posttreatment Pain Rating 0/10 - no pain  -MM           Oral Motor Structure and Function    Dentition Assessment natural, present and adequate  -MM        Secretion Management WNL/WFL  -MM        Mucosal Quality dry  -MM        Volitional Swallow unable to elicit  -MM           Oral Musculature and Cranial Nerve Assessment    Oral Motor General Assessment oral labial or buccal impairment  -MM        Oral Labial or Buccal Impairment, Detail, Cranial Nerve VII (Facial): left labial droop  -MM        Lingual Impairment, Detail. Cranial Nerves IX, XII (Glossopharyngeal and Hypoglossal) reduced strength left;reduced lingual ROM  -MM           General Eating/Swallowing Observations    Eating/Swallowing Skills fed by SLP  -MM        Positioning During Eating upright 90 degree;upright in bed  -MM        Utensils Used spoon  -MM        Consistencies Trialed ice chips;thin liquids;nectar/syrup-thick liquids  -MM           Clinical Swallow Eval    Oral Prep Phase impaired  -MM        Oral Transit impaired  -MM        Oral Residue WFL  -MM        Pharyngeal Phase suspected pharyngeal impairment  -MM        Esophageal Phase unremarkable  -MM        Clinical Swallow Evaluation Summary Patient presents with moderate oral dysphagia and suspected pharyngeal dysphagia c/b poor bolus manipulation, ?delayed swallow initiation, and cough following trials of thin via spoon. SLP recs NPO except ice chips and will continue to follow.  -MM           Oral Prep Concerns    Oral Prep Concerns incomplete or weak lip closure around spoon;anterior loss;increased prep time  -MM        Incomplete or Weak Lip Closure Around Spoon thin;nectar  -MM        Anterior Loss thin  -MM        Increased Prep Time thin;nectar  -MM           Oral Transit Concerns    Oral Transit Concerns delayed initiation of bolus transit;increased oral transit time  -MM        Delayed Intiation of  Bolus Transit thin;nectar  -MM        Increased Oral Transit Time thin;nectar  -MM           Pharyngeal Phase Concerns    Pharyngeal Phase Concerns cough  -MM        Cough thin  -MM           SLP Evaluation Clinical Impression    SLP Swallowing Diagnosis moderate;oral dysphagia;suspected pharyngeal dysphagia  -MM        Functional Impact risk of aspiration/pneumonia;risk of malnutrition;risk of dehydration  -MM        Rehab Potential/Prognosis, Swallowing re-evaluate goals as necessary  -MM           Recommendations    Predicted Duration Therapy Intervention (Days) 2 weeks  -MM        SLP Diet Recommendation NPO;other (see comments)  except ice chips with nursing staff, 3-5 per hour after oral care as tolerated  -MM        Recommended Diagnostics reassess via clinical swallow evaluation  -MM        Recommended Precautions and Strategies general aspiration precautions  -MM        Oral Care Recommendations Oral Care BID/PRN;Suction toothbrush  -MM        SLP Rec. for Method of Medication Administration meds via alternate route  -MM        Monitor for Signs of Aspiration yes;notify SLP if any concerns  -MM        Anticipated Discharge Disposition (SLP) inpatient rehabilitation facility  -MM                  User Key  (r) = Recorded By, (t) = Taken By, (c) = Cosigned By      Initials Name Effective Dates    MM Yolanda Strong MS CCC-SLP 08/30/24 -                     EDUCATION  The patient has been educated in the following areas:   Dysphagia (Swallowing Impairment).        SLP GOALS       Row Name 05/26/25 0852             Patient will demonstrate functional speech skills for return to discharge environment    [place] with minimal cues  -MM      Time frame by discharge  -MM      Barriers medically complex  -MM      Progress/Outcomes new goal  -MM         SLP Diagnostic Treatment     Patient will participate in further assessment in the following areas reading comprehension;graphic expression;cognitive-linguistic   -MM      Time Frame (Diagnostic) 1 week  -MM      Barriers (Diagnostic) Medically complex  -MM      Progress/Outcomes (Additional Goal 1, SLP) new goal  -MM         Respiratory Support Goal 1 (SLP)    Improve Respiratory Support Goal 1 (SLP) increasing length of exhalation;sustaining a vowel sound on exhalation;repeating a sentence on exhalation;80%;with minimal cues (75-90%)  -MM      Time Frame (Respiratory Support Goal 1, SLP) 1 week  -MM      Progress/Outcomes (Respiratory Support Goal 1, SLP) new goal  -MM         Articulation Goal 1 (SLP)    Improve Articulation Goal 1 (SLP) by over-articulating at word level;by over-articulating at phrase level;by over-articulating in connected speech;80%;with minimal cues (75-90%)  -MM      Time Frame (Articulation Goal 1, SLP) 1 week  -MM      Progress/Outcomes (Articulation Goal 1, SLP) new goal  -MM                User Key  (r) = Recorded By, (t) = Taken By, (c) = Cosigned By      Initials Name Provider Type    Yolanda Almaraz MS CCC-SLP Speech and Language Pathologist                         Time Calculation:    Time Calculation- SLP       Row Name 05/26/25 1024             Time Calculation- SLP    SLP Start Time 0852  -MM      SLP Received On 05/26/25  -MM         Untimed Charges    23652-EG Eval Speech and Production w/ Language Minutes 25  -MM      55722-PM Eval Oral Pharyng Swallow Minutes 26  -MM         Total Minutes    Untimed Charges Total Minutes 51  -MM       Total Minutes 51  -MM                User Key  (r) = Recorded By, (t) = Taken By, (c) = Cosigned By      Initials Name Provider Type    Yolanda Almaraz MS CCC-SLP Speech and Language Pathologist                    Therapy Charges for Today       Code Description Service Date Service Provider Modifiers Qty    29106874042 HC ST EVAL ORAL PHARYNG SWALLOW 2 5/26/2025 Yolanda Strong MS CCC-SLP GN 1    75658580907 HC ST EVAL SPEECH AND PROD W LANG  2 5/26/2025 Yolanda Strong MS CCC-SLP GN 1                  Yolanda Strong MS CCC-SLP  2025   and Acute Care - Speech Language Pathology Initial Evaluation  [facility]     Patient Name: Linda Schafer  : 1960  MRN: [id number]  Today's Date: 2025               Admit Date: 2025     Visit Dx:    ICD-10-CM ICD-9-CM   1. Acute CVA (cerebrovascular accident)  I63.9 434.91   2. Stroke  I63.9 434.91   3. Dysarthria  R47.1 784.51   4. Dysphagia, unspecified type  R13.10 787.20     Patient Active Problem List   Diagnosis    Palpitations    Personal history of cardiac murmur    Migraine headache    Hiatal hernia    GERD (gastroesophageal reflux disease)    Diverticulosis    Diminished pulses in lower extremity    Dyslipidemia    Peripheral edema    Grade I diastolic dysfunction    Acute ischemic stroke     Past Medical History:   Diagnosis Date    Atypical chest pain     Chest pain.Atypical chest pain    Chest pain     COVID-19     2022    COVID-19 vaccine administered     2021 ,  J and J ) 10/25/2021 - Moderna    Diminished pulses in lower extremity     Diverticulosis     Dizziness     Occasional    Dyslipidemia     Dyspnea     Edema     Fatigue     GERD (gastroesophageal reflux disease)     Hiatal hernia     Migraine headache     Palpitations     Personal history of cardiac murmur     SOB (shortness of breath)      Past Surgical History:   Procedure Laterality Date    CHOLECYSTECTOMY      HYSTERECTOMY      OOPHORECTOMY      TUBAL ABDOMINAL LIGATION         SLP Recommendation and Plan  SLP Diagnosis: moderate, dysarthria (25)  SLP Diagnosis Comments: Pt presents with grossly functional auditory comprehension and verbal expression with moderate dysarthria. Reading comprehension and graphic expression were unable to be evaluated at this time as the pt could not open her eyes despite cues. SLP will continue to follow. (25)     Monitor for Signs of Aspiration: yes, notify SLP if any concerns (25)     SLC Criteria  for Skilled Therapy Interventions Met: yes (05/26/25 0852)  Anticipated Discharge Disposition (SLP): inpatient rehabilitation facility (05/26/25 0852)        Therapy Frequency (SLP SLC): 5 days per week (05/26/25 0852)  Predicted Duration Therapy Intervention (Days): 2 weeks (05/26/25 0852)  Oral Care Recommendations: Oral Care BID/PRN, Suction toothbrush (05/26/25 0852)                          Progress:  (eval; see note for more information) (05/26/25 1023)      SLP EVALUATION (Last 72 Hours)       SLP SLC Evaluation       Row Name 05/26/25 0852                   General Information    Prior Level of Function-Communication WFL  -MM        Patient's Goals for Discharge patient did not state  -MM        Family Goals for Discharge family did not state  -MM           Comprehension Assessment/Intervention    Comprehension Assessment/Intervention Auditory Comprehension  -MM           Auditory Comprehension Assessment/Intervention    Auditory Comprehension (Communication) WFL  -MM        Narrative Discourse WFL  -MM           Expression Assessment/Intervention    Expression Assessment/Intervention verbal expression  -MM           Verbal Expression Assessment/Intervention    Verbal Expression WFL  -MM        Sentence Formulation WFL  -MM        Verbal Expression, Comment Confrontational naming unable to be assessed as the pt could not open her eyes  -MM           Motor Speech Assessment/Intervention    Motor Speech Function moderate impairment  -MM        Characteristics Consistent with Dysarthria slow rate;decreased intensity;decreased articulation;slurred speech;decreased breath support  -MM        Speech intelligibility 70%;in connected speech;with unfamiliar listener  -MM           SLP Evaluation Clinical Impressions    SLP Diagnosis moderate;dysarthria  -MM        SLP Diagnosis Comments Pt presents with grossly functional auditory comprehension and verbal expression with moderate dysarthria. Reading comprehension and  graphic expression were unable to be evaluated at this time as the pt could not open her eyes despite cues. SLP will continue to follow.  -MM        Rehab Potential/Prognosis good  -MM        SLC Criteria for Skilled Therapy Interventions Met yes  -MM        Functional Impact difficulty communicating in an emergency;difficulty in expressing complex messages  -MM           Recommendations    Therapy Frequency (SLP SLC) 5 days per week  -MM                  User Key  (r) = Recorded By, (t) = Taken By, (c) = Cosigned By      Initials Name Effective Dates    MM Yolanda Strong MS CCC-SLP 08/30/24 -                        EDUCATION  The patient has been educated in the following areas:     Communication Impairment.           SLP GOALS       Row Name 05/26/25 0852             Patient will demonstrate functional speech skills for return to discharge environment    [place] with minimal cues  -MM      Time frame by discharge  -MM      Barriers medically complex  -MM      Progress/Outcomes new goal  -MM         SLP Diagnostic Treatment     Patient will participate in further assessment in the following areas reading comprehension;graphic expression;cognitive-linguistic  -MM      Time Frame (Diagnostic) 1 week  -MM      Barriers (Diagnostic) Medically complex  -MM      Progress/Outcomes (Additional Goal 1, SLP) new goal  -MM         Respiratory Support Goal 1 (SLP)    Improve Respiratory Support Goal 1 (SLP) increasing length of exhalation;sustaining a vowel sound on exhalation;repeating a sentence on exhalation;80%;with minimal cues (75-90%)  -MM      Time Frame (Respiratory Support Goal 1, SLP) 1 week  -MM      Progress/Outcomes (Respiratory Support Goal 1, SLP) new goal  -MM         Articulation Goal 1 (SLP)    Improve Articulation Goal 1 (SLP) by over-articulating at word level;by over-articulating at phrase level;by over-articulating in connected speech;80%;with minimal cues (75-90%)  -MM      Time Frame (Articulation  Goal 1, SLP) 1 week  -MM      Progress/Outcomes (Articulation Goal 1, SLP) new goal  -MM                User Key  (r) = Recorded By, (t) = Taken By, (c) = Cosigned By      Initials Name Provider Type    Yolanda Almaraz MS CCC-SLP Speech and Language Pathologist                              Time Calculation:      Time Calculation- SLP       Row Name 05/26/25 1024             Time Calculation- SLP    SLP Start Time 0852  -MM      SLP Received On 05/26/25  -MM         Untimed Charges    54553-JA Eval Speech and Production w/ Language Minutes 25  -MM      70547-BO Eval Oral Pharyng Swallow Minutes 26  -MM         Total Minutes    Untimed Charges Total Minutes 51  -MM       Total Minutes 51  -MM                User Key  (r) = Recorded By, (t) = Taken By, (c) = Cosigned By      Initials Name Provider Type    Yolanda Almaraz MS CCC-SLP Speech and Language Pathologist                    Therapy Charges for Today       Code Description Service Date Service Provider Modifiers Qty    16780283937 HC ST EVAL ORAL PHARYNG SWALLOW 2 5/26/2025 Yolanda Strong MS CCC-SLP GN 1    42266378765 HC ST EVAL SPEECH AND PROD W LANG  2 5/26/2025 Yolanda Strong MS CCC-SLP GN 1                       Yolanda Strong MS CCC-PALOMO  5/26/2025

## 2025-05-26 NOTE — PROGRESS NOTES
"HOD# : 2    No events last night  Patient answering questions appropriately.  Patient with a lot of swelling in her right eye as expected for a craniectomy.    Patient will stick out her tongue and tell me the year place and date.    Patient pretty much flaccid maybe with extensor tone in the left upper extremity but some discoordinated movement of the left lower extremity with 4 out of 5 strength but proximal hip on the left is not antigravity so 3 out of 5.    Right side follows all commands with 5 out of 5 strength.    Patient son had no unanswered questions    Acute ischemic stroke      Temp:  [97.3 °F (36.3 °C)-99.4 °F (37.4 °C)] 98.7 °F (37.1 °C)  Heart Rate:  [48-90] 74  Resp:  [12-18] 16  BP: (104-142)/(55-97) 124/81  Arterial Line BP: ()/(48-83) 89/57  I/O last 3 completed shifts:  In: 2935.2 [I.V.:2485.2; Other:195; NG/GT:105; IV Piggyback:150]  Out: 2805 [Urine:2750; Drains:55]  I/O this shift:  In: 115.1 [I.V.:115.1]  Out: 75 [Urine:75]  Vital signs were reviewed and documented in the chart      EXAM   Body mass index is 26.65 kg/m².    Patient's GCS is 14 and will follow commands on the right side.    Left upper extremity is flaccid perhaps some extensor tone.    Left lower extremity is proximally weak with 3 out of 5 proximal hip flexion but is able to \"kick like a soccer ball\" with 4 out of 5 strength    Dorsiflexion plantarflexion 4 out of 5 on the left foot 5 out of 5 on the right      DIAGNOSIS  Completed right MCA stroke   Hemicraniectomy secondary to brain swelling      PLAN    From a neurosurgical perspective she is looking pretty good.  Answering questions with left upper extremity weakness as described preoperatively.    Patient will continue medical support from here.  Patient's current sodium is 152.  We will keep the NaCl where it is.    Patient will stay on Keppra and needs Cardene to keep systolic blood pressures less than 140              "

## 2025-05-26 NOTE — PROGRESS NOTES
"                                              LOS: 2 days   Patient Care Team:  Micheline Heath DO as PCP - General (Family Medicine)    Chief Complaint:  F/up stroke and critical care management.    Subjective   Interval History    No fever.  WBC is noted to be increased.  Clinically she looks better.  She is able to talk.  She moves her right side and still very weak on the left.  She underwent swallow evaluation    REVIEW OF SYSTEMS:   Could not obtain.  Patient is aphasic.    Ventilator/Non-Invasive Ventilation Settings (From admission, onward)      None                  Physical Exam:     Vital Signs  Temp:  [97.3 °F (36.3 °C)-99.4 °F (37.4 °C)] 98.7 °F (37.1 °C)  Heart Rate:  [48-90] 79  Resp:  [12-18] 16  BP: (104-142)/(55-97) 112/76  Arterial Line BP: ()/(48-83) 87/58    Intake/Output Summary (Last 24 hours) at 5/26/2025 0823  Last data filed at 5/26/2025 0800  Gross per 24 hour   Intake 2163.6 ml   Output 2455 ml   Net -291.4 ml     Flowsheet Rows      Flowsheet Row First Filed Value   Admission Height 157.5 cm (62\") Documented at 05/24/2025 1032   Admission Weight 74.8 kg (165 lb) Documented at 05/24/2025 1032            PPE used per hospital policy    General Appearance:  Conscious.  Alert.  Not in distress.   ENMT: Mallampati 3.  Dry tongue.   Eyes: injected conjunctiva.  Pupils equal and reactive to light.   Neck:    Trachea midline. No thyromegaly.   Lungs:    Clear to auscultation,respirations regular, even and nonlabored    Heart:   Regular rhythm and normal rate, normal S1 and S2, no         murmur   Skin:   No rash or ecchymosis   Abdomen:    Soft. No tenderness. No HSM.   Neuro/psych: Conscious.  Alert..  Moves extremities on the right.  Very minimally the left arm.  Speech is comprehensible slightly slurred   Extremities:  No cyanosis, clubbing or edema.  Warm extremities and well-perfused          Results Review:        Results from last 7 days   Lab Units 05/26/25  0601 05/26/25  0016 " 05/25/25  1927 05/25/25  1350 05/25/25  0607 05/24/25  1726 05/24/25  1038   SODIUM mmol/L 152* 152* 152*   < > 147*   < >  --    POTASSIUM mmol/L 4.1 4.1 3.6   < > 3.3*   < >  --    CHLORIDE mmol/L 120*  --   --   --  115*  --   --    CO2 mmol/L 23.0  --   --   --  22.0  --   --    BUN mg/dL 8  --   --   --  8  --   --    CREATININE mg/dL 0.68  --   --   --  0.65  --  0.80   GLUCOSE mg/dL 125*  --   --   --  140*   < >  --    CALCIUM mg/dL 8.9  --   --   --  9.1  --   --     < > = values in this interval not displayed.         Results from last 7 days   Lab Units 05/26/25  0601 05/25/25  0607 05/24/25  1038 05/24/25  1037   WBC 10*3/mm3 19.20* 10.75  --  7.38   HEMOGLOBIN g/dL 10.9* 12.2  --  13.9   HEMOGLOBIN, POC g/dL  --   --  14.6  --    HEMATOCRIT % 35.4 38.7  --  42.7   HEMATOCRIT POC %  --   --  43  --    PLATELETS 10*3/mm3 208 222  --  259     Results from last 7 days   Lab Units 05/24/25  1037   INR  0.91   APTT seconds 27.2                               I reviewed the patient's new clinical results.        Medication Review:   atorvastatin, 80 mg, Oral, Nightly  levETIRAcetam, 500 mg, Intravenous, Q12H  mupirocin, 1 Application, Each Nare, BID  pantoprazole, 40 mg, Intravenous, Q AM  sodium chloride, 10 mL, Intravenous, Q12H  sodium chloride, 10 mL, Intravenous, Q12H  sodium chloride, 10 mL, Intravenous, Q12H        dextrose, 6 mL/kg  lactated ringers, 9 mL/hr  niCARdipine, 5-15 mg/hr, Last Rate: Stopped (05/25/25 1900)  sodium chloride, 50 mL/hr, Last Rate: 50 mL/hr (05/26/25 0646)        Diagnostic imaging:  I personally and independently reviewed the following images:  CT brain 5/25/2025: Worsening localized brain edema with effacement of the anterior horn of the right lateral ventricle but no midline shift    CT brain 5/26/2025: Motion artifact limiting interpretation.  Evolution of the R MCA ischemic stroke/infarct with edema and 6.7 midline shift (my interpretation)    Assessment     Acute ischemic  R MCA stroke  Brain edema, secondary to #1,  s/p Decompressive craniectomy 5/25  Left hemiparesis and expressive aphasia, secondary #1  Sinus bradycardia/sinus pauses: Secondary to sleep and sleep apnea.  Possible contribution from brain edema.  POA.  This has resolved since then  Loud snoring, probable CATHRYN  Electrolyte disturbance: Iatrogenic hypernatremia.  Hypokalemia, replaced.  Leukocytosis, no fever.  Dysphagia     GERD  Hiatal hernia        Plan     NS 30 ml/h  Start tube feeding.  Continue speech eval  CT brain per neurosurgery  Neurocheck per protocol.  Continue critical care monitoring.  Continue holding anticoagulation.  Resume when okay with neurosurgery.  Nicardipine IV drip, titrate to keep SBP <140  Keppra 500 mg twice daily prophylaxis  F/up WBC. Consider empiric antibiotic if fever.  Atorvastatin 80 mg daily.  PPI prophylaxis  DVT prophylaxis with SCD        Va Espinosa MD  05/26/25  08:23 EDT        Time: Critical care 32 min      This note was dictated utilizing [place] [employer] dictation

## 2025-05-26 NOTE — PLAN OF CARE
Goal Outcome Evaluation:  Plan of Care Reviewed With: patient        Progress:  (eval; see note for more information)       Anticipated Discharge Disposition (SLP): inpatient rehabilitation facility    SLP Diagnosis: moderate, dysarthria (05/26/25 0852)  SLP Diagnosis Comments: Pt presents with grossly functional auditory comprehension and verbal expression with moderate dysarthria. Reading comprehension and graphic expression were unable to be evaluated at this time as the pt could not open her eyes despite cues. SLP will continue to follow. (05/26/25 0852)  SLP Swallowing Diagnosis: moderate, oral dysphagia, suspected pharyngeal dysphagia (05/26/25 0852)

## 2025-05-27 ENCOUNTER — ANCILLARY PROCEDURE (OUTPATIENT)
Dept: SPEECH THERAPY | Facility: HOSPITAL | Age: 65
End: 2025-05-27
Payer: COMMERCIAL

## 2025-05-27 PROBLEM — Z98.890 STATUS POST CRANIOTOMY: Status: ACTIVE | Noted: 2025-05-27

## 2025-05-27 PROBLEM — G47.33 OBSTRUCTIVE SLEEP APNEA: Status: ACTIVE | Noted: 2025-05-27

## 2025-05-27 LAB
GLUCOSE BLDC GLUCOMTR-MCNC: 74 MG/DL (ref 70–130)
GLUCOSE BLDC GLUCOMTR-MCNC: 84 MG/DL (ref 70–130)
POTASSIUM SERPL-SCNC: 3.8 MMOL/L (ref 3.5–5.2)
SODIUM SERPL-SCNC: 147 MMOL/L (ref 136–145)

## 2025-05-27 PROCEDURE — 92612 ENDOSCOPY SWALLOW (FEES) VID: CPT

## 2025-05-27 PROCEDURE — 82948 REAGENT STRIP/BLOOD GLUCOSE: CPT

## 2025-05-27 PROCEDURE — 84295 ASSAY OF SERUM SODIUM: CPT | Performed by: STUDENT IN AN ORGANIZED HEALTH CARE EDUCATION/TRAINING PROGRAM

## 2025-05-27 PROCEDURE — 99233 SBSQ HOSP IP/OBS HIGH 50: CPT | Performed by: STUDENT IN AN ORGANIZED HEALTH CARE EDUCATION/TRAINING PROGRAM

## 2025-05-27 PROCEDURE — 25010000002 LEVETRIRACETAM PER 10 MG: Performed by: STUDENT IN AN ORGANIZED HEALTH CARE EDUCATION/TRAINING PROGRAM

## 2025-05-27 PROCEDURE — 84132 ASSAY OF SERUM POTASSIUM: CPT | Performed by: STUDENT IN AN ORGANIZED HEALTH CARE EDUCATION/TRAINING PROGRAM

## 2025-05-27 PROCEDURE — 99232 SBSQ HOSP IP/OBS MODERATE 35: CPT | Performed by: INTERNAL MEDICINE

## 2025-05-27 PROCEDURE — 92610 EVALUATE SWALLOWING FUNCTION: CPT

## 2025-05-27 RX ORDER — ACETAMINOPHEN 325 MG/1
650 TABLET ORAL EVERY 6 HOURS PRN
Status: DISCONTINUED | OUTPATIENT
Start: 2025-05-27 | End: 2025-05-30 | Stop reason: HOSPADM

## 2025-05-27 RX ORDER — LEVETIRACETAM 100 MG/ML
500 SOLUTION ORAL EVERY 12 HOURS SCHEDULED
Status: DISCONTINUED | OUTPATIENT
Start: 2025-05-27 | End: 2025-05-27

## 2025-05-27 RX ORDER — CETIRIZINE HYDROCHLORIDE 10 MG/1
10 TABLET ORAL NIGHTLY
Status: DISCONTINUED | OUTPATIENT
Start: 2025-05-27 | End: 2025-05-30 | Stop reason: HOSPADM

## 2025-05-27 RX ORDER — LEVETIRACETAM 100 MG/ML
500 SOLUTION ORAL EVERY 12 HOURS SCHEDULED
Status: DISCONTINUED | OUTPATIENT
Start: 2025-05-27 | End: 2025-05-28

## 2025-05-27 RX ORDER — ATORVASTATIN CALCIUM 40 MG/1
80 TABLET, FILM COATED ORAL NIGHTLY
Status: DISCONTINUED | OUTPATIENT
Start: 2025-05-27 | End: 2025-05-30 | Stop reason: HOSPADM

## 2025-05-27 RX ORDER — TRAMADOL HYDROCHLORIDE 50 MG/1
50 TABLET ORAL EVERY 4 HOURS PRN
Status: DISCONTINUED | OUTPATIENT
Start: 2025-05-27 | End: 2025-05-30 | Stop reason: HOSPADM

## 2025-05-27 RX ADMIN — LEVETIRACETAM 500 MG: 500 SOLUTION ORAL at 20:04

## 2025-05-27 RX ADMIN — MUPIROCIN 1 APPLICATION: 20 OINTMENT TOPICAL at 20:04

## 2025-05-27 RX ADMIN — Medication 10 ML: at 20:04

## 2025-05-27 RX ADMIN — LEVETIRACETAM 500 MG: 100 INJECTION, SOLUTION INTRAVENOUS at 08:36

## 2025-05-27 RX ADMIN — MUPIROCIN 1 APPLICATION: 20 OINTMENT TOPICAL at 08:36

## 2025-05-27 RX ADMIN — ACETAMINOPHEN 650 MG: 650 SOLUTION ORAL at 06:32

## 2025-05-27 RX ADMIN — ACETAMINOPHEN 650 MG: 650 SOLUTION ORAL at 00:16

## 2025-05-27 RX ADMIN — ACETAMINOPHEN 650 MG: 650 SOLUTION ORAL at 10:38

## 2025-05-27 RX ADMIN — PANTOPRAZOLE SODIUM 40 MG: 40 INJECTION, POWDER, FOR SOLUTION INTRAVENOUS at 06:23

## 2025-05-27 RX ADMIN — ACETAMINOPHEN 650 MG: 325 TABLET ORAL at 16:48

## 2025-05-27 RX ADMIN — ATORVASTATIN CALCIUM 80 MG: 40 TABLET, FILM COATED ORAL at 20:04

## 2025-05-27 RX ADMIN — CETIRIZINE HYDROCHLORIDE 10 MG: 10 TABLET, FILM COATED ORAL at 19:16

## 2025-05-27 NOTE — CASE MANAGEMENT/SOCIAL WORK
Discharge Planning Assessment  [facility]     Patient Name: Linda Schafer  MRN: [id number]  Today's Date: 5/27/2025    Admit Date: 5/24/2025    Plan: Home with spouse   Discharge Needs Assessment       Row Name 05/27/25 0837       Living Environment    People in Home spouse    Name(s) of People in Home Loc (spouse) [phone]    Current Living Arrangements home    Potentially Unsafe Housing Conditions none    In the past 12 months has the electric, gas, oil, or water company threatened to shut off services in your home? No    Primary Care Provided by self    Provides Primary Care For no one    Family Caregiver if Needed spouse    Able to Return to Prior Arrangements yes       Resource/Environmental Concerns    Resource/Environmental Concerns none    Transportation Concerns none       Transportation Needs    In the past 12 months, has lack of transportation kept you from medical appointments or from getting medications? no    In the past 12 months, has lack of transportation kept you from meetings, work, or from getting things needed for daily living? No       Food Insecurity    Within the past 12 months, you worried that your food would run out before you got the money to buy more. Never true    Within the past 12 months, the food you bought just didn't last and you didn't have money to get more. Never true       Transition Planning    Patient/Family Anticipates Transition to home with family    Patient/Family Anticipated Services at Transition     Transportation Anticipated family or friend will provide       Discharge Needs Assessment    Readmission Within the Last 30 Days no previous admission in last 30 days    Equipment Currently Used at Home none    Concerns to be Addressed denies needs/concerns at this time    Do you want help finding or keeping work or a job? I do not need or want help    Do you want help with school or training? For example, starting or completing job training or getting  a high school diploma, GED or equivalent No    Anticipated Changes Related to Illness none    Equipment Needed After Discharge none                   Discharge Plan       Row Name 05/27/25 0838       Plan    Plan Home with spouse    Patient/Family in Agreement with Plan yes    Plan Comments Spoke to spouse by phone. Lives with Daisy (spouse) [phone] in [facility]. Is independent with ADL's. No problems with [place] or medications. Uses [facility]. No HH. Uses no DME. PCP is Micheline Heath DO. Plan is home with spouse pending PT/OT recs. Spouse will transport. CM will continue to follow.    Final Discharge Disposition Code 01 - home or self-care                       Demographic Summary       Row Name 05/27/25 0836       General Information    Admission Type inpatient    Arrived From emergency department    Referral Source admission list    Reason for Consult discharge planning    Preferred Language English       Contact Information    Permission Granted to Share Info With     Contact Information Obtained for                    Functional Status       Row Name 05/27/25 0836       Functional Status    Usual Activity Tolerance good    Current Activity Tolerance good       Physical Activity    On average, how many days per week do you engage in moderate to strenuous exercise (like a brisk walk)? 5 days    On average, how many minutes do you engage in exercise at this level? 30 min    Number of minutes of exercise per week 150       Functional Status, IADL    Medications independent    Meal Preparation independent    Housekeeping independent    Laundry independent    Shopping independent       Mental Status    General Appearance WDL WDL       Mental Status Summary    Recent Changes in Mental Status/Cognitive Functioning no changes       Employment/    Employment Status employed full-time                   Psychosocial    No documentation.                   Abuse/Neglect    No documentation.                  Legal    No documentation.                  Substance Abuse    No documentation.                  Patient Forms    No documentation.                     Gaudencio Benitez RN

## 2025-05-27 NOTE — PROGRESS NOTES
"Critical Care Note     LOS: 3 days   Patient Care Team:  Micheline Heath DO as PCP - General (Family Medicine)    Chief Complaint/Reason for visit:    Chief Complaint   Patient presents with    Stroke     Acute right middle cerebral artery stroke  Cerebral edema status post decompressive craniotomy May 25  Left hemiparesis and expressive aphasia  Sinus bradycardia  Obstructive sleep apnea, suspected  Hypernatremia, iatrogenic    Subjective     Interval History:     Patient follows commands with her right side.  She is maintaining sinus rhythm.  Afebrile.  Speech assessed and are recommending mechanical ground textures with thin liquids.    Review of Systems:    All systems were reviewed and negative except as noted in subjective.    Medical history, surgical history, social history, family history reviewed    Objective     Intake/Output:    Intake/Output Summary (Last 24 hours) at 5/27/2025 1620  Last data filed at 5/27/2025 1200  Gross per 24 hour   Intake 2103.3 ml   Output 705 ml   Net 1398.3 ml       Nutrition:  Diet: Regular/House; Texture: Mechanical Ground (NDD 2); Fluid Consistency: Thin (IDDSI 0)    Infusions:  niCARdipine, 5-15 mg/hr, Last Rate: Stopped (05/25/25 1900)  sodium chloride, 50 mL/hr, Last Rate: 50 mL/hr (05/26/25 1800)        Mechanical Ventilator Settings:                                                Telemetry: Sinus rhythm             Vital Signs  Blood pressure 95/81, pulse 71, temperature 98 °F (36.7 °C), temperature source Axillary, resp. rate 18, height 157.5 cm (62.01\"), weight 66.1 kg (145 lb 11.6 oz), SpO2 96%.    Physical Exam  Constitutional:       General: She is not in acute distress.  HENT:      Head:      Comments: Craniotomy dressing intact.  Surgical drain removed     Nose: No congestion.      Mouth/Throat:      Pharynx: No oropharyngeal exudate.   Eyes:      Pupils: Pupils are equal, round, and reactive to light.   Neck:      Vascular: No carotid bruit.   Cardiovascular: " "     Rate and Rhythm: Normal rate and regular rhythm.      Heart sounds: No murmur heard.  Pulmonary:      Effort: Pulmonary effort is normal.      Breath sounds: No wheezing or rhonchi.   Abdominal:      General: Bowel sounds are normal.      Palpations: Abdomen is soft.   Musculoskeletal:      Right lower leg: No edema.      Left lower leg: No edema.   Neurological:      Mental Status: She is alert.      Comments: Awake, oriented to name, hospital, year.  Left hemiparesis.  Follows commands with the right side           Results Review:     I reviewed the patient's new clinical results.   Results from last 7 days   Lab Units 05/27/25  0623 05/26/25  1909 05/26/25  1241 05/26/25  0601 05/25/25  1350 05/25/25  0607 05/24/25  1726 05/24/25 1038 05/24/25  1037   SODIUM mmol/L 147* 153* 151* 152*   < > 147*   < >  --   --    POTASSIUM mmol/L 3.8 3.8 3.9 4.1   < > 3.3*   < >  --   --    CHLORIDE mmol/L  --   --   --  120*  --  115*  --   --   --    CO2 mmol/L  --   --   --  23.0  --  22.0  --   --   --    BUN mg/dL  --   --   --  8  --  8  --   --   --    CREATININE mg/dL  --   --   --  0.68  --  0.65  --  0.80  --    CALCIUM mg/dL  --   --   --  8.9  --  9.1  --   --   --    ALT (SGPT) U/L  --   --   --   --   --   --   --   --  19   AST (SGOT) U/L  --   --   --   --   --   --   --   --  27   GLUCOSE mg/dL  --   --   --  125*  --  140*  --   --   --     < > = values in this interval not displayed.     Results from last 7 days   Lab Units 05/26/25  0601 05/25/25  0607 05/24/25  1038 05/24/25  1037   WBC 10*3/mm3 19.20* 10.75  --  7.38   HEMOGLOBIN g/dL 10.9* 12.2  --  13.9   HEMOGLOBIN, POC g/dL  --   --  14.6  --    HEMATOCRIT % 35.4 38.7  --  42.7   HEMATOCRIT POC %  --   --  43  --    PLATELETS 10*3/mm3 208 222  --  259         No results found for: \"BLOODCX\"  No results found for: \"URINECX\"    I reviewed the patient's new imaging including images and reports.    CT HEAD WO CONTRAST    Date of Exam: 5/26/2025 5:17 AM " EDT    Indication: Status post craniectomy.    Comparison: 5/25/2025    Technique: Axial CT images were obtained of the head without contrast administration.  Automated exposure control and iterative construction methods were used.      Findings:  Stable postoperative changes from recent right craniectomy. Stable large right MCA distribution infarct. No evidence of acute hemorrhage or midline shift. No extra-axial fluid collections are identified.   Impression:     Impression:  Stable exam. Postoperative change from recent right craniectomy with evolving large right MCA distribution infarct.        Electronically Signed: Milton Blancas MD   5/26/2025 9:47 AM EDT         Interpretation Summary         Left ventricular systolic function is normal. Calculated left ventricular EF = 62%    Mild mitral valve regurgitation is present.    Trace aortic valve regurgitation is present.    Normal left atrial cavity size noted. Saline test results are negative.      All medications reviewed.   atorvastatin, 80 mg, Nasogastric, Nightly  levETIRAcetam, 500 mg, Nasogastric, Q12H  Lidocaine, 1 patch, Transdermal, Q24H  mupirocin, 1 Application, Each Nare, BID  pantoprazole, 40 mg, Intravenous, Q AM  sodium chloride, 10 mL, Intravenous, Q12H  sodium chloride, 10 mL, Intravenous, Q12H  sodium chloride, 10 mL, Intravenous, Q12H          Assessment & Plan       Acute ischemic stroke    Status post craniotomy    Obstructive sleep apnea    64-year-old woman presenting on May 24 with right middle cerebral artery stroke, left hemiparesis.  She was outside the window for thrombolytic therapy. 5/25 Imaging revealed increasing edema with mass effect and clinical decline.  Therefore, she underwent decompressive craniotomy.  She had significant improvement in her mentation and has been able to follow commands with her right side.  She remains flaccid left upper extremity, some movement of the left lower extremity.      #1 right middle cerebral  artery  CVA  -May 24 CT perfusion occlusion right internal carotid artery and right M1 with evolving right middle cerebral artery stroke  - May 25 worsening edema with mass effect, status post decompressive craniotomy  - May 26 postoperative right craniotomy for large right middle cerebral artery infarct without hemorrhage or midline shift  - May 27 speech recommended dysphagia diet, mechanical ground texture with thin liquids  - Sodium 147, goal 145-150  - NIH stroke scale 22  - Echocardiogram, EF 62%, saline test negative  - Keppra 500 mg twice daily  - Atorvastatin  - Goal systolic blood pressure less than 140    #2 possible obstructive sleep apnea  - Loud snoring noted  - Supplemental oxygen at night          VTE Prophylaxis:SCDS    Stress Ulcer Prophylaxis:protonix    Tia Blake MD  05/27/25  16:20 EDT      Time: Critical care 30 min  I personally provided care to this critically ill patient as documented above.  Critical care time does not include time spent on separately billed procedures.  None of my critical care time was concurrent with other critical care providers.

## 2025-05-27 NOTE — MBS/VFSS/FEES
Acute Care - Speech Language Pathology   Swallow Initial Evaluation [facility]  Fiberoptic Endoscopic Evaluation of Swallowing (FEES)  Clinical Swallow Evaluation       Patient Name: Linda Schafer  : 1960  MRN: [id number]  Today's Date: 2025               Admit Date: 2025    Visit Dx:     ICD-10-CM ICD-9-CM   1. Acute CVA (cerebrovascular accident)  I63.9 434.91   2. Stroke  I63.9 434.91   3. Dysarthria  R47.1 784.51   4. Oropharyngeal dysphagia  R13.12 787.22     Patient Active Problem List   Diagnosis    Palpitations    Personal history of cardiac murmur    Migraine headache    Hiatal hernia    GERD (gastroesophageal reflux disease)    Diverticulosis    Diminished pulses in lower extremity    Dyslipidemia    Peripheral edema    Grade I diastolic dysfunction    Acute ischemic stroke     Past Medical History:   Diagnosis Date    Atypical chest pain     Chest pain.Atypical chest pain    Chest pain     COVID-19     2022    COVID-19 vaccine administered     2021 ,  J and J ) 10/25/2021 - Moderna    Diminished pulses in lower extremity     Diverticulosis     Dizziness     Occasional    Dyslipidemia     Dyspnea     Edema     Fatigue     GERD (gastroesophageal reflux disease)     Hiatal hernia     Migraine headache     Palpitations     Personal history of cardiac murmur     SOB (shortness of breath)      Past Surgical History:   Procedure Laterality Date    CHOLECYSTECTOMY      CRANIOTOMY FOR SUBDURAL HEMATOMA Right 2025    Procedure: DECOMPRESSIVE CRANIECTOMY RIGHT;  Surgeon: Matt Olivarez MD;  Location: On license of [facility];  Service: Neurosurgery;  Laterality: Right;    HYSTERECTOMY      OOPHORECTOMY      TUBAL ABDOMINAL LIGATION         SLP Recommendation and Plan  SLP Swallowing Diagnosis: mild, pharyngeal dysphagia, moderate, oral dysphagia (25 1130)  SLP Diet Recommendation: mechanical ground textures, thin liquids (25 1130)  Recommended Precautions and Strategies:  upright posture during/after eating, assist with feeding (05/27/25 1130)  SLP Rec. for Method of Medication Administration: meds whole, with puree (05/27/25 1130)     Monitor for Signs of Aspiration: yes, notify SLP if any concerns (05/27/25 1130)     Swallow Criteria for Skilled Therapeutic Interventions Met: demonstrates skilled criteria (05/27/25 1130)  Anticipated Discharge Disposition (SLP): inpatient rehabilitation facility (05/27/25 1130)  Rehab Potential/Prognosis, Swallowing: good, to achieve stated therapy goals (05/27/25 1130)  Therapy Frequency (Swallow): 5 days per week (05/27/25 1130)  Predicted Duration Therapy Intervention (Days): 2 weeks (05/27/25 1130)  Oral Care Recommendations: Oral Care BID/PRN (05/27/25 1130)                                               SWALLOW EVALUATION (Last 72 Hours)       SLP Adult Swallow Evaluation       Row Name 05/27/25 1130 05/26/25 0852                Rehab Evaluation    Document Type evaluation  -DV evaluation  -MM       Subjective Information no complaints  -DV no complaints  -MM       Patient Observations lethargic;cooperative  -DV alert;cooperative  -MM       Patient/Family/Caregiver Comments/Observations no family present  -DV Son present  -MM       Patient Effort good  -DV good  -MM       Comment FEES performed at 1130, clinical swallow evaluation performed at 0930  -DV --       Symptoms Noted During/After Treatment none  -DV dizziness  -MM       Oral Care swabbed with antiseptic solution;suction provided  -DV teeth brushed - suction toothbrush  -MM          General Information    Patient Profile Reviewed yes  -DV yes  -MM       Pertinent History Of Current Problem see initial eval  -DV Pt is a 64 year old female who presented to the facility with R MCA stroke, s/p crani.  -MM       Current Method of Nutrition NPO;nasogastric feedings;large-bore  -DV NPO  -MM       Precautions/Limitations, Vision difficult to assess;other (see comments)  pt unable to open one  eye, did not open the other  -DV difficult to assess;other (see comments)  pt was unable to open eyes t/o evaluation  -MM       Precautions/Limitations, Hearing WFL;for purposes of eval  -DV WFL;for purposes of eval  -MM       Prior Level of Function-Communication WFL  -DV WFL  -MM       Prior Level of Function-Swallowing no diet consistency restrictions;safe, efficient swallowing in all situations  -DV no diet consistency restrictions;safe, efficient swallowing in all situations  -MM       Plans/Goals Discussed with patient;agreed upon  -DV patient and family;agreed upon  -MM       Barriers to Rehab medically complex  -DV medically complex  -MM       Patient's Goals for Discharge return to PO diet  -DV patient did not state  -MM       Family Goals for Discharge -- family did not state  -MM          Pain    Pretreatment Pain Rating 0/10 - no pain  -DV 0/10 - no pain  -MM       Posttreatment Pain Rating 0/10 - no pain  -DV 0/10 - no pain  -MM          Oral Motor Structure and Function    Dentition Assessment natural, present and adequate  -DV natural, present and adequate  -MM       Secretion Management WNL/WFL  -DV WNL/WFL  -MM       Mucosal Quality dry  -DV dry  -MM       Volitional Swallow unable to elicit  -DV unable to elicit  -MM       Volitional Cough weak  -DV --          Oral Musculature and Cranial Nerve Assessment    Oral Motor General Assessment oral labial or buccal impairment;lingual impairment  -DV oral labial or buccal impairment  -MM       Oral Labial or Buccal Impairment, Detail, Cranial Nerve VII (Facial): left labial droop  -DV left labial droop  -MM       Lingual Impairment, Detail. Cranial Nerves IX, XII (Glossopharyngeal and Hypoglossal) reduced strength left;reduced lingual ROM  -DV reduced strength left;reduced lingual ROM  -MM          General Eating/Swallowing Observations    Respiratory Support Currently in Use room air  -DV --       Eating/Swallowing Skills fed by SLP  -DV fed by SLP  -MM        Positioning During Eating upright 90 degree;upright in bed  -DV upright 90 degree;upright in bed  -MM       Utensils Used spoon;straw  -DV spoon  -MM       Consistencies Trialed ice chips;thin liquids;pureed  -DV ice chips;thin liquids;nectar/syrup-thick liquids  -MM          Clinical Swallow Eval    Oral Prep Phase impaired  -DV impaired  -MM       Oral Transit impaired  -DV impaired  -MM       Oral Residue WFL  -DV WFL  -MM       Pharyngeal Phase suspected pharyngeal impairment  -DV suspected pharyngeal impairment  -MM       Esophageal Phase unremarkable  -DV unremarkable  -MM       Clinical Swallow Evaluation Summary Pt continues lethargic today, but eager for PO and no overt s/sx aspiration other than occasional multiple swallows. Increased oral prep and transit time noted for oral phase. Proceeded with FEES.  -DV Patient presents with moderate oral dysphagia and suspected pharyngeal dysphagia c/b poor bolus manipulation, ?delayed swallow initiation, and cough following trials of thin via spoon. SLP recs NPO except ice chips and will continue to follow.  -MM          Oral Prep Concerns    Oral Prep Concerns increased prep time;oral holding  -DV incomplete or weak lip closure around spoon;anterior loss;increased prep time  -MM       Oral Holding pudding;other (see comments)  ice chips  -DV --       Incomplete or Weak Lip Closure Around Spoon -- thin;nectar  -MM       Anterior Loss -- thin  -MM       Increased Prep Time all consistencies  -DV thin;nectar  -MM          Oral Transit Concerns    Oral Transit Concerns delayed initiation of bolus transit;increased oral transit time  -DV delayed initiation of bolus transit;increased oral transit time  -MM       Delayed Intiation of Bolus Transit all consistencies  -DV thin;nectar  -MM       Increased Oral Transit Time -- thin;nectar  -MM          Pharyngeal Phase Concerns    Pharyngeal Phase Concerns multiple swallows  -DV cough  -MM       Multiple Swallows all  consistencies  -DV --       Cough -- thin  -MM          Fiberoptic Endoscopic Evaluation of Swallowing (FEES)    Risks/Benefits Reviewed risks/benefits explained;patient;agreed to eval  -DV --       Nasal Entry left:  -DV --       Scope serial number/identification 338  -DV --          Anatomy and Physiology    Anatomic Considerations no anatomic structural deviation  -DV --       Velopharyngeal WFL  -DV --       Base of Tongue symmetrical  -DV --       Epiglottis WFL  -DV --       Laryngeal Function Breathing symmetrical  -DV --       Laryngeal Function Phonation symmetrical  -DV --       Laryngeal Function to Breath Hold CNA  -DV --       Secretion Rating Scale (Sam et al. 1996) 0- normal, no visible secretions  -DV --       Spontaneous Swallow frequency adequate  -DV --       Sensory reduced sensation  -DV --       Utensils Used Spoon;Cup;Straw  -DV --       Consistencies Trialed ice chips;thin liquids;pudding/puree;regular textures  -DV --          FEES Interpretation    Oral Phase oral holding;prolonged manipulation;increased A-P transit time  -DV --          Initiation of Pharyngeal Swallow    Initiation of Pharyngeal Swallow WFL  -DV --       Pharyngeal Phase impaired pharyngeal phase of swallowing  -DV --       Rosenbek's Scale thin:;pudding/puree:;regular textures:;1-->Level 1  -DV --       Residue thin liquids;valleculae;pyriform sinuses;secondary to reduced base of tongue retraction;secondary to reduced hyolaryngeal excursion  -DV --       Response to Residue cleared residue;with spontaneous subsequent swallow  -DV --       Attempted Compensatory Maneuvers other (see comments)  compensations not necessary  -DV --       FEES Summary NO penetration or aspiration. There was some residue of thin liquids in the valleculae and pyriform sinuses, especially around the NG tube, but pt effectively cleared residue spontaneously with an additional swallow each time. Oral phase is prolonged. Recommend mechanical  soft diet with thin liquids. SLP will upgrade solids in tx.  -DV --          SLP Evaluation Clinical Impression    SLP Swallowing Diagnosis mild;pharyngeal dysphagia;moderate;oral dysphagia  -DV moderate;oral dysphagia;suspected pharyngeal dysphagia  -MM       Functional Impact risk of aspiration/pneumonia;risk of malnutrition;risk of dehydration  -DV risk of aspiration/pneumonia;risk of malnutrition;risk of dehydration  -MM       Rehab Potential/Prognosis, Swallowing good, to achieve stated therapy goals  -DV re-evaluate goals as necessary  -MM       Swallow Criteria for Skilled Therapeutic Interventions Met demonstrates skilled criteria  -DV --          Recommendations    Therapy Frequency (Swallow) 5 days per week  -DV --       Predicted Duration Therapy Intervention (Days) 2 weeks  -DV 2 weeks  -MM       SLP Diet Recommendation mechanical ground textures;thin liquids  -DV NPO;other (see comments)  except ice chips with nursing staff, 3-5 per hour after oral care as tolerated  -MM       Recommended Diagnostics -- reassess via clinical swallow evaluation  -MM       Recommended Precautions and Strategies upright posture during/after eating;assist with feeding  -DV general aspiration precautions  -MM       Oral Care Recommendations Oral Care BID/PRN  -DV Oral Care BID/PRN;Suction toothbrush  -MM       SLP Rec. for Method of Medication Administration meds whole;with puree  -DV meds via alternate route  -MM       Monitor for Signs of Aspiration yes;notify SLP if any concerns  -DV yes;notify SLP if any concerns  -MM       Anticipated Discharge Disposition (SLP) inpatient rehabilitation facility  -DV inpatient rehabilitation facility  -MM                 User Key  (r) = Recorded By, (t) = Taken By, (c) = Cosigned By      Initials Name Effective Dates    DV Matilda Fraser MS CCC-SLP 06/16/21 -     MM Yolanda Strong MS CCC-SLP 08/30/24 -                     EDUCATION  The patient has been educated in the following areas:    Dysphagia (Swallowing Impairment) Modified Diet Instruction.        SLP GOALS       Row Name 05/27/25 1130 05/26/25 0852          (LTG) Patient will demonstrate functional swallow for    Diet Texture (Demonstrate functional swallow) regular textures  -DV --     Liquid viscosity (Demonstrate functional swallow) thin liquids  -DV --     [place] (Demonstrate functional swallow) independently (over 90% accuracy)  -DV --     Time Frame (Demonstrate functional swallow) by discharge  -DV --     Progress/Outcomes (Demonstrate functional swallow) new goal  -DV --        (STG) Patient will tolerate trials of    Consistencies Trialed (Tolerate trials) mechanical ground textures;thin liquids  -DV --     Desired Outcome (Tolerate trials) without signs/symptoms of aspiration;with adequate oral prep/transit/clearance  -DV --     [place] (Tolerate trials) independently (over 90% accuracy)  -DV --     Time Frame (Tolerate trials) 1 week  -DV --     Progress/Outcomes (Tolerate trials) new goal  -DV --        (STG) Patient will tolerate therapeutic trials of    Consistencies Trialed (Tolerate therapeutic trials) regular textures  -DV --     Desired Outcome (Tolerate therapeutic trials) with adequate oral prep/transit/clearance  -DV --     [place] (Tolerate therapeutic trials) independently (over 90% accuracy)  -DV --     Time Frame (Tolerate therapeutic trials) 1 week  -DV --     Progress/Outcomes (Tolerate therapeutic trials) new goal  -DV --        (STG) Labial Strengthening Goal 1 (SLP)    Activity (Labial Strengthening Goal 1, SLP) increase labial tone  -DV --     Increase Labial Tone swallow trials;labial resistance exercises  -DV --     [place]/Accuracy (Labial Strengthening Goal 1, SLP) with minimal cues (75-90% accuracy)  -DV --     Time Frame (Labial Strengthening Goal 1, SLP) short term goal (STG)  -DV --     Progress/Outcomes (Labial Strengthening Goal 1, SLP) new goal  -DV --        (STG) Lingual  Strengthening Goal 1 (SLP)    Activity (Lingual Strengthening Goal 1, SLP) increase lingual tone/sensation/control/coordination/movement  -DV --     Increase Lingual Tone/Sensation/Control/Coordination/Movement swallow trials;lingual movement exercises  -DV --     [place]/Accuracy (Lingual Strengthening Goal 1, SLP) with minimal cues (75-90% accuracy)  -DV --     Time Frame (Lingual Strengthening Goal 1, SLP) short term goal (STG)  -DV --     Progress/Outcomes (Lingual Strengthening Goal 1, SLP) new goal  -DV --        (STG) Pharyngeal Strengthening Exercise Goal 1 (SLP)    Activity (Pharyngeal Strengthening Goal 1, SLP) increase anterior movement of the hyolaryngeal complex;increase tongue base retraction  -DV --     Increase Anterior Movement of the Hyolaryngeal Complex chin tuck against resistance (CTAR)  -DV --     Increase Tongue Base Retraction camryn;hard effortful swallow  -DV --     [place]/Accuracy (Pharyngeal Strengthening Goal 1, SLP) with minimal cues (75-90% accuracy)  -DV --     Time Frame (Pharyngeal Strengthening Goal 1, SLP) short term goal (STG)  -DV --     Progress/Outcomes (Pharyngeal Strengthening Goal 1, SLP) new goal  -DV --        Patient will demonstrate functional speech skills for return to discharge environment    [place] with minimal cues  -DV with minimal cues  -MM     Time frame by discharge  -DV by discharge  -MM     Barriers medically complex  -DV medically complex  -MM     Progress/Outcomes goal ongoing  -DV new goal  -MM        SLP Diagnostic Treatment     Patient will participate in further assessment in the following areas reading comprehension;graphic expression;cognitive-linguistic  -DV reading comprehension;graphic expression;cognitive-linguistic  -MM     Time Frame (Diagnostic) 1 week  -DV 1 week  -MM     Barriers (Diagnostic) Medically complex  -DV Medically complex  -MM     Progress/Outcomes (Additional Goal 1, SLP) goal ongoing  -DV new goal  -MM         Respiratory Support Goal 1 (SLP)    Improve Respiratory Support Goal 1 (SLP) increasing length of exhalation;sustaining a vowel sound on exhalation;repeating a sentence on exhalation;80%;with minimal cues (75-90%)  -DV increasing length of exhalation;sustaining a vowel sound on exhalation;repeating a sentence on exhalation;80%;with minimal cues (75-90%)  -MM     Time Frame (Respiratory Support Goal 1, SLP) 1 week  -DV 1 week  -MM     Progress/Outcomes (Respiratory Support Goal 1, SLP) goal ongoing  -DV new goal  -MM        Articulation Goal 1 (SLP)    Improve Articulation Goal 1 (SLP) by over-articulating at word level;by over-articulating at phrase level;by over-articulating in connected speech;80%;with minimal cues (75-90%)  -DV by over-articulating at word level;by over-articulating at phrase level;by over-articulating in connected speech;80%;with minimal cues (75-90%)  -MM     Time Frame (Articulation Goal 1, SLP) 1 week  -DV 1 week  -MM     Progress/Outcomes (Articulation Goal 1, SLP) goal ongoing  -DV new goal  -MM               User Key  (r) = Recorded By, (t) = Taken By, (c) = Cosigned By      Initials Name Provider Type    Matilda Perera MS CCC-SLP Speech and Language Pathologist    Yolanda Almaraz MS CCC-SLP Speech and Language Pathologist                         Time Calculation:    Time Calculation- SLP       Row Name 05/27/25 1333             Time Calculation- SLP    SLP Start Time 1130  -DV      SLP Received On 05/27/25  -DV         Untimed Charges    SLP Eval/Re-eval  ST Eval Oral Pharyng Swallow - 12240;ST Fiberoptic Endo Eval Swallow - 11880  -DV      52736-BX Eval Oral Pharyng Swallow Minutes 32  -DV      21811-OD Fiberoptic Endo Eval Swallow Minutes 78  -DV         Total Minutes    Untimed Charges Total Minutes 110  -DV       Total Minutes 110  -DV                User Key  (r) = Recorded By, (t) = Taken By, (c) = Cosigned By      Initials Name Provider Type    Matilda Perera MS CCC-SLP Speech  and Language Pathologist                    Therapy Charges for Today       Code Description Service Date Service Provider Modifiers Qty    [id number] HC ST EVAL ORAL PHARYNG SWALLOW 2 5/27/2025 Matilda Fraser, MS CCC-SLP GN 1    [id number]  ST FIBEROPTIC ENDO EVAL SWALL 5 5/27/2025 Matilda Fraser, MS CCC-SLP GN 1                 Matilda Fraser MS CCC-SLP  5/27/2025

## 2025-05-27 NOTE — DISCHARGE INSTR - DIET
FEES Reason for Referral 5/27/2025    Patient was referred for a FEES to assess the efficiency of his/her swallow function, rule out aspiration and make recommendations regarding safe dietary consistencies, effective compensatory strategies, and safe eating environment.         Recommendations/Treatment  SLP Swallowing Diagnosis: mild, pharyngeal dysphagia, moderate, oral dysphagia (05/27/25 1130)  Functional Impact: risk of aspiration/pneumonia, risk of malnutrition, risk of dehydration (05/27/25 1130)  Rehab Potential/Prognosis, Swallowing: good, to achieve stated therapy goals (05/27/25 1130)  Swallow Criteria for Skilled Therapeutic Interventions Met: demonstrates skilled criteria (05/27/25 1130)  Therapy Frequency (Swallow): 5 days per week (05/27/25 1130)  Predicted Duration Therapy Intervention (Days): 2 weeks (05/27/25 1130)  SLP Diet Recommendation: mechanical ground textures, thin liquids (05/27/25 1130)  Recommended Precautions and Strategies: upright posture during/after eating, assist with feeding (05/27/25 1130)  SLP Rec. for Method of Medication Administration: meds whole, with puree (05/27/25 1130)  Monitor for Signs of Aspiration: yes, notify SLP if any concerns (05/27/25 1130)  Anticipated Discharge Disposition (SLP): inpatient rehabilitation facility (05/27/25 1130)    Instrumental Set-up  Risks/Benefits Reviewed: risks/benefits explained, patient, agreed to eval (05/27/25 1130)  Nasal Entry: left: (05/27/25 1130)  Anatomic Considerations: no anatomic structural deviation (05/27/25 1130)  Utensils Used: Spoon, Cup, Straw (05/27/25 1130)  Consistencies Trialed: ice chips, thin liquids, pudding/puree, regular textures (05/27/25 1130)    Oral Preparation/ Oral Phase  Oral Phase: oral holding, prolonged manipulation, increased A-P transit time (05/27/25 1130)    Pharyngeal Phase  Initiation of Pharyngeal Swallow: WFL (05/27/25 1130)  Pharyngeal Phase: impaired pharyngeal phase of swallowing (05/27/25  1130)  Rosenbek's Scale: thin:, pudding/puree:, regular textures:, 1-->Level 1 (05/27/25 1130)  Residue: thin liquids, valleculae, pyriform sinuses, secondary to reduced base of tongue retraction, secondary to reduced hyolaryngeal excursion (05/27/25 1130)  Response to Residue: cleared residue, with spontaneous subsequent swallow (05/27/25 1130)  Attempted Compensatory Maneuvers: other (see comments) (compensations not necessary) (05/27/25 1130)  FEES Summary: NO penetration or aspiration. There was some residue of thin liquids in the valleculae and pyriform sinuses, especially around the NG tube, but pt effectively cleared residue spontaneously with an additional swallow each time. Oral phase is prolonged. Recommend mechanical soft diet with thin liquids. SLP will upgrade solids in tx. (05/27/25 1130)

## 2025-05-27 NOTE — PROGRESS NOTES
"NEUROSURGERY PROGRESS NOTE    Chief Complaint: Stroke    Subjective: Stable overnight.    Objective    Vital Signs: Blood pressure 101/90, pulse 69, temperature 99.2 °F (37.3 °C), temperature source Axillary, resp. rate 16, height 157.5 cm (62.01\"), weight 66.1 kg (145 lb 11.6 oz), SpO2 94%.    Physical Exam  Resting, awakes easily.  Right eye swollen  Pupils are 3 mm and reactive bilaterally  Follows commands briskly with good strength in the right upper and right lower extremity.  Spontaneous movement noted in the left lower extremity.  No significant movement noted in the left upper extremity    Intake/Output:   Intake/Output Summary (Last 24 hours) at 5/27/2025 0859  Last data filed at 5/27/2025 0600  Gross per 24 hour   Intake 1382.5 ml   Output 855 ml   Net 527.5 ml       Current Medications:   Current Facility-Administered Medications:     acetaminophen (TYLENOL) 160 MG/5ML oral solution 650 mg, 650 mg, Nasogastric, Q4H PRN, Va Espinosa MD, 650 mg at 05/27/25 0632    atorvastatin (LIPITOR) tablet 80 mg, 80 mg, Nasogastric, Nightly, Va Espinosa MD, 80 mg at 05/26/25 2009    Calcium Replacement - Follow Nurse / BPA Driven Protocol, , Not Applicable, PRN, Matt Olivarez MD    dextrose (D5W) 5 % infusion 448.8 mL, 6 mL/kg, Intravenous, Continuous PRN, Matt Olivarez MD    levETIRAcetam (KEPPRA) injection 500 mg, 500 mg, Intravenous, Q12H, Matt Olivarez MD, 500 mg at 05/27/25 0836    Lidocaine 4 % 1 patch, 1 patch, Transdermal, Q24H, Justin Unger, PharmD, 1 patch at 05/26/25 2116    Magnesium Standard Dose Replacement - Follow Nurse / BPA Driven Protocol, , Not Applicable, PRN, Matt Olivarez MD    mupirocin (BACTROBAN) 2 % nasal ointment 1 Application, 1 Application, Each Nare, BID, Matt Olivarez MD, 1 Application at 05/27/25 0836    niCARdipine (CARDENE) 25mg in 250mL NS infusion, 5-15 mg/hr, Intravenous, Titrated, Matt Olivarez MD, Stopped at 05/25/25 1900    [DISCONTINUED] " ondansetron ODT (ZOFRAN-ODT) disintegrating tablet 4 mg, 4 mg, Oral, Q6H PRN **OR** ondansetron (ZOFRAN) injection 4 mg, 4 mg, Intravenous, Q6H PRN, Matt Olivarez MD, 4 mg at 05/25/25 1749    pantoprazole (PROTONIX) injection 40 mg, 40 mg, Intravenous, Q AM, Va Espinosa MD, 40 mg at 05/27/25 0623    Phosphorus Replacement - Follow Nurse / BPA Driven Protocol, , Not Applicable, Juanis VOGEL Nicolas, MD    Potassium Replacement - Follow Nurse / BPA Driven Protocol, , Not Applicable, PRJuanis MENDOZA Nicolas, MD    sodium chloride 0.9 % flush 10 mL, 10 mL, Intravenous, Q12H, Matt Olivarez MD, 10 mL at 05/25/25 2003    sodium chloride 0.9 % flush 10 mL, 10 mL, Intravenous, Q12H, Matt Olivarez MD, 10 mL at 05/26/25 0806    sodium chloride 0.9 % flush 10 mL, 10 mL, Intravenous, Q12H, Matt Olivarez MD, 10 mL at 05/26/25 2009    sodium chloride 0.9 % flush 10 mL, 10 mL, Intravenous, PRNJuanis Nicolas, MD    sodium chloride 0.9 % flush 20 mL, 20 mL, Intravenous, PRNJuanis Nicolas, MD    sodium chloride 0.9 % infusion 40 mL, 40 mL, Intravenous, PRNJuanis Nicolas, MD    sodium chloride 0.9 % infusion, 50 mL/hr, Intravenous, Continuous, Stephanie Javed PA-C, Last Rate: 50 mL/hr at 05/26/25 1800, 50 mL/hr at 05/26/25 1800    traMADol (ULTRAM) tablet 50 mg, 50 mg, Nasogastric, Q4H PRN, Va Espinosa MD     Laboratory Results:       Lab 05/26/25  0601 05/25/25  0607 05/24/25  1038 05/24/25  1037   WBC 19.20* 10.75  --  7.38   HEMOGLOBIN 10.9* 12.2  --  13.9   HEMOGLOBIN, POC  --   --  14.6  --    HEMATOCRIT 35.4 38.7  --  42.7   HEMATOCRIT POC  --   --  43  --    PLATELETS 208 222  --  259   NEUTROS ABS 15.53*  --   --  5.80   IMMATURE GRANS (ABS) 0.08*  --   --  0.08*   LYMPHS ABS 1.95  --   --  1.07   MONOS ABS 1.59*  --   --  0.34   EOS ABS 0.00  --   --  0.02   MCV 91.5 89.4  --  86.6   PROTIME  --   --   --  12.8   APTT  --   --   --  27.2         Lab 05/27/25  0623 05/26/25  1909  05/26/25  1241 05/26/25  0601 05/26/25  0016 05/25/25  1350 05/25/25  0607 05/24/25  1726 05/24/25  1038 05/24/25  1037   SODIUM 147* 153* 151* 152* 152*   < > 147*   < >  --   --    POTASSIUM 3.8 3.8 3.9 4.1 4.1   < > 3.3*   < >  --   --    CHLORIDE  --   --   --  120*  --   --  115*  --   --   --    CO2  --   --   --  23.0  --   --  22.0  --   --   --    ANION GAP  --   --   --  9.0  --   --  10.0  --   --   --    BUN  --   --   --  8  --   --  8  --   --   --    CREATININE  --   --   --  0.68  --   --  0.65  --  0.80  --    EGFR  --   --   --  98.6  --   --  99.7  --  83.4  --    GLUCOSE  --   --   --  125*  --   --  140*  --   --   --    CALCIUM  --   --   --  8.9  --   --  9.1  --   --   --    MAGNESIUM  --   --   --   --   --   --   --   --   --  2.1   HEMOGLOBIN A1C  --   --   --   --   --   --  5.30  --   --   --     < > = values in this interval not displayed.         Lab 05/24/25  1037   ALT (SGPT) 19   AST (SGOT) 27         Lab 05/24/25  1037   PROTIME 12.8   INR 0.91         Lab 05/25/25  0607   CHOLESTEROL 150   LDL CHOL 75   HDL CHOL 60   TRIGLYCERIDES 75             Brief Urine Lab Results       None          Microbiology Results (last 10 days)       Procedure Component Value - Date/Time    Tissue / Bone Culture - Surgical Site, Brain [[id number]] Collected: 05/25/25 1831    Lab Status: Preliminary result Specimen: Surgical Site from Brain Updated: 05/25/25 2134     Gram Stain Many (4+) Red blood cells      Rare (1+) WBCs seen      No organisms seen             Diagnostic Imaging: I reviewed and independently interpreted the new imaging.     Assessment/Plan:    1. Acute CVA (cerebrovascular accident)    2. Stroke    3. Dysarthria    4. Dysphagia, unspecified type         This is a 64-year-old female status post decompressive craniectomy for a large right MCA stroke on 5/26.  Neurologically, the patient has remained stable.  Her head CT shows good decompression with mild swelling noted of the right  hemisphere.  We will plan to remove her drain today.  Continue to keep systolic blood pressure less than 140.  Continue care in the ICU.  Plan to start DVT prophylaxis tomorrow.  Appreciate ICU assistance.      Any copied data from previous notes included in the (1) History of Present Illness, (2) Physical Examination and (3) Medical Decision Making and/or Assessment and Plan has been reviewed and is accurate as of 05/27/25      Matt Olivarez MD  05/27/25  08:59 EDT

## 2025-05-27 NOTE — PROGRESS NOTES
Stroke Progress Note       Chief Complaint: Right MCA acute ischemic stroke    Subjective    Subjective     Subjective:  The patient is lying down in the bed in NAD.  No family were at the bedside. The patient stated that she is doing fine.  Denies having any new stroke or strokelike symptoms.  Nurse was at the bedside at the time of my evaluation denies having any concern.  All questions and concerns were answered.    No other acute complains at this time    Review of Systems   Constitutional: fatigue        Objective      Temp:  [98.7 °F (37.1 °C)-99.6 °F (37.6 °C)] 98.7 °F (37.1 °C)  Heart Rate:  [52-81] 56  Resp:  [16-20] 20  BP: ()/(46-90) 94/82  Arterial Line BP: (71-84)/(57-63) 71/57    Objective    GEN: lying in bed; in NAD  HENT: normocephalic, non-erythematous oropharynx  CV: no LE edema    NEURO:  Mental Status: A&O x 3 the patient would not open her eyes as her right eye was swollen from the decompressive craniectomy and concern for eyelid apraxia on the left, interactive, able to follow commands  Speech: Intact Articulation  CN 2-12:  II - PERRLA  III, IV, VI - EOMI  V - Facial sensation intact  VII -mild left facial droop  VIII - Auditory acuity intact  XII - Tongue protrudes midline    Motor:  RUE: 5/5  LUE: 0/5  RLE: 5/5  LLE: 2/5    Sensory: intact light touch throughout  Reflexes: negative Howard's sign BL  Coordination: Able to touch her left ear with her right hand  Gait/Station: deferred     Results Review:    I reviewed the patient's new clinical results.    WBC   Date Value Ref Range Status   05/26/2025 19.20 (H) 3.40 - 10.80 10*3/mm3 Final     RBC   Date Value Ref Range Status   05/26/2025 3.87 3.77 - 5.28 10*6/mm3 Final     Hemoglobin   Date Value Ref Range Status   05/26/2025 10.9 (L) 12.0 - 15.9 g/dL Final     Hematocrit   Date Value Ref Range Status   05/26/2025 35.4 34.0 - 46.6 % Final     MCV   Date Value Ref Range Status   05/26/2025 91.5 79.0 - 97.0 fL Final     MCH   Date  Value Ref Range Status   05/26/2025 28.2 26.6 - 33.0 pg Final     MCHC   Date Value Ref Range Status   05/26/2025 30.8 (L) 31.5 - 35.7 g/dL Final     RDW   Date Value Ref Range Status   05/26/2025 13.0 12.3 - 15.4 % Final     RDW-SD   Date Value Ref Range Status   05/26/2025 43.1 37.0 - 54.0 fl Final     MPV   Date Value Ref Range Status   05/26/2025 12.3 (H) 6.0 - 12.0 fL Final     Platelets   Date Value Ref Range Status   05/26/2025 208 140 - 450 10*3/mm3 Final     Neutrophil %   Date Value Ref Range Status   05/26/2025 80.8 (H) 42.7 - 76.0 % Final     Lymphocyte %   Date Value Ref Range Status   05/26/2025 10.2 (L) 19.6 - 45.3 % Final     Monocyte %   Date Value Ref Range Status   05/26/2025 8.3 5.0 - 12.0 % Final     Eosinophil %   Date Value Ref Range Status   05/26/2025 0.0 (L) 0.3 - 6.2 % Final     Basophil %   Date Value Ref Range Status   05/26/2025 0.3 0.0 - 1.5 % Final     Immature Grans %   Date Value Ref Range Status   05/26/2025 0.4 0.0 - 0.5 % Final     Neutrophils, Absolute   Date Value Ref Range Status   05/26/2025 15.53 (H) 1.70 - 7.00 10*3/mm3 Final     Lymphocytes, Absolute   Date Value Ref Range Status   05/26/2025 1.95 0.70 - 3.10 10*3/mm3 Final     Monocytes, Absolute   Date Value Ref Range Status   05/26/2025 1.59 (H) 0.10 - 0.90 10*3/mm3 Final     Eosinophils, Absolute   Date Value Ref Range Status   05/26/2025 0.00 0.00 - 0.40 10*3/mm3 Final     Basophils, Absolute   Date Value Ref Range Status   05/26/2025 0.05 0.00 - 0.20 10*3/mm3 Final     Immature Grans, Absolute   Date Value Ref Range Status   05/26/2025 0.08 (H) 0.00 - 0.05 10*3/mm3 Final     nRBC   Date Value Ref Range Status   05/26/2025 0.0 0.0 - 0.2 /100 WBC Final       Lab Results   Component Value Date    GLUCOSE 125 (H) 05/26/2025    BUN 8 05/26/2025    CREATININE 0.68 05/26/2025     (H) 05/27/2025    K 3.8 05/27/2025     (H) 05/26/2025    CALCIUM 8.9 05/26/2025    PROTEINTOT 7.0 01/19/2021    ALBUMIN 4.42  01/19/2021    ALT 19 05/24/2025    AST 27 05/24/2025    ALKPHOS 63 01/19/2021    BILITOT 0.5 01/19/2021    GLOB 2.6 01/19/2021    AGRATIO 1.7 01/19/2021    BCR 11.8 05/26/2025    ANIONGAP 9.0 05/26/2025    EGFR 98.6 05/26/2025     CT Head Without Contrast  Result Date: 5/26/2025  Impression: Stable exam. Postoperative change from recent right craniectomy with evolving large right MCA distribution infarct. Electronically Signed: Milton Blancas MD  5/26/2025 9:47 AM EDT  Workstation ID: [id number]    CT Head Without Contrast  Result Date: 5/25/2025  Impression: Postoperative changes of right-sided craniectomy. Evolving large right MCA territory infarct. No acute intracranial hemorrhage. Similar associated mass effect including narrowing of the right lateral ventricle. No significant midline shift or herniation. Electronically Signed: Carlton Christiansen MD  5/25/2025 8:20 PM EDT  Workstation ID: [id number]    Results for orders placed during the hospital encounter of 05/24/25    Adult Transthoracic Echo Complete W/ Cont if Necessary Per Protocol (With Agitated Saline)    Interpretation Summary    Left ventricular systolic function is normal. Calculated left ventricular EF = 62%    Mild mitral valve regurgitation is present.    Trace aortic valve regurgitation is present.    Normal left atrial cavity size noted. Saline test results are negative.    -CTH wo on 5/24/2025 images were personally reviewed and showed hyperdense right MCA sign together with hypodensity of the right frontal and temporal lobes suggesting an acute infarct with no midline shift  -CTA of the head and neck images from 5/24/2025 were personally reviewed and showed an occlusion of the right ICA and right M1 segment of the MCA  -MRI brain images from 5/24/2025 were personally reviewed and showed large area of restricted diffusion in the right MCA distribution suggesting an acute infarct with no evidence of acute hemorrhage  -Transthoracic echocardiogram from  5/24/2025 report was personally reviewed and showed left ventricular ejection fraction of 62%, no left atrial dilation and negative bubble study  -A1c from 5/25/2025 was 5.3%  -LDL from 5/25/2025 was 75    Assessment/Plan     64-year-old  female with minimal vascular risk factors who presented [facility] emergency department with right MCA syndrome.  Secondary to CT angiogram as well as extended last known well she was not deemed to be an appropriate IV thrombolytic therapy candidate.  Secondary to completed stroke on CT head without contrast as well as CT perfusion imaging she was not deemed to be an emergent endovascular therapy candidate after discussing with Dr. Olivarez and Dr. Torres.  She is admitted to the neuro ICU for further workup and evaluation.     Antiplatelet PTA: None  Anticoagulant PTA: None            #Left hemiparesis  #Right gaze preference  #Right MCA ischemic stroke status post decompressive craniectomy    - Current etiology is cryptogenic.  This is likely cardioembolic versus ESUS  -CTH wo on 5/24/2025 images were personally reviewed and showed hyperdense right MCA sign together with hypodensity of the right frontal and temporal lobes suggesting an acute infarct with no midline shift  -CTA of the head and neck images from 5/24/2025 were personally reviewed and showed an occlusion of the right ICA and right M1 segment of the MCA  -MRI brain images from 5/24/2025 were personally reviewed and showed large area of restricted diffusion in the right MCA distribution suggesting an acute infarct with no evidence of acute hemorrhage  -Transthoracic echocardiogram from 5/24/2025 report was personally reviewed and showed left ventricular ejection fraction of 62%, no left atrial dilation and negative bubble study  -A1c from 5/25/2025 was 5.3%  -LDL from 5/25/2025 was 75    Recommendations  - Neurosurgery is following, appreciate recommendations  - Will relax sodium check 2 per  shift.  Most recent sodium level was 147  - Agree with neurosurgery regarding starting DVT prophylaxis as soon as tomorrow  - The patient tolerated DVT prophylaxis will consider starting aspirin 81 mg for secondary stroke prevention in the next 2 days if it is okay from a neurosurgical standpoint.  - Continue atorvastatin 80 mg nightly for secondary stroke prevention.  Target LDL level of less than 70  - Target blood pressure goals of less than 140/90  - If no A-fib is detected while inpatient the patient will need 2 to 4 weeks of cardiac monitoring as an outpatient to rule out/an atrial fibrillation  - PT/OT/SLP to see and assess when appropriate  - Neurochecks and NIHSS per protocol  - Stat CT head for any neurologic decline    Stroke will continue to follow. Please call for any further questions or concerns  =================================  Kristopher Matt MD, Msc, PhD  Vascular Neurologist  [facility]

## 2025-05-27 NOTE — PLAN OF CARE
Problem: Adult Inpatient Plan of Care  Goal: Plan of Care Review  Outcome: Progressing  Flowsheets (Taken 5/27/2025 8590)  Plan of Care Reviewed With: patient   Goal Outcome Evaluation:  Plan of Care Reviewed With: patient      SLP evaluation completed. Will address dysphagia in tx. Please see note for further details and recommendations.            Anticipated Discharge Disposition (SLP): inpatient rehabilitation facility          SLP Swallowing Diagnosis: mild, pharyngeal dysphagia, moderate, oral dysphagia (05/27/25 1130)

## 2025-05-27 NOTE — CONSULTS
Clinical Nutrition     Patient Name: Linda Schafer  YOB: 1960  MRN: [id number]  Date of Encounter: 05/27/25 09:53 EDT  Admission date: 5/24/2025  Reason for Visit: Physician consult, EN, Difficulty chewing/swallowing    Assessment   Nutrition Assessment   Admission Diagnosis:  Acute ischemic stroke [I63.9]    Problem List:    Acute ischemic stroke      PMH:   She  has a past medical history of Atypical chest pain, Chest pain, COVID-19, COVID-19 vaccine administered, Diminished pulses in lower extremity, Diverticulosis, Dizziness, Dyslipidemia, Dyspnea, Edema, Fatigue, GERD (gastroesophageal reflux disease), Hiatal hernia, Migraine headache, Palpitations, Personal history of cardiac murmur, and SOB (shortness of breath).    PSH:  She  has a past surgical history that includes Hysterectomy; Cholecystectomy; Tubal ligation; Oophorectomy; and Craniotomy for Subdural Hematoma (Right, 5/25/2025).    Applicable Nutrition History:   R MCA s/p crani    Labs    Labs Reviewed: Yes    Results from last 7 days   Lab Units 05/27/25  0623 05/26/25  1909 05/26/25  1241 05/26/25  0601 05/25/25  1350 05/25/25  0607 05/24/25  1726 05/24/25  1038 05/24/25  1037   GLUCOSE mg/dL  --   --   --  125*  --  140*  --   --   --    BUN mg/dL  --   --   --  8  --  8  --   --   --    CREATININE mg/dL  --   --   --  0.68  --  0.65  --  0.80  --    SODIUM mmol/L 147* 153* 151* 152*   < > 147*   < >  --   --    CHLORIDE mmol/L  --   --   --  120*  --  115*  --   --   --    POTASSIUM mmol/L 3.8 3.8 3.9 4.1   < > 3.3*   < >  --   --    MAGNESIUM mg/dL  --   --   --   --   --   --   --   --  2.1   ALT (SGPT) U/L  --   --   --   --   --   --   --   --  19    < > = values in this interval not displayed.       Results from last 7 days   Lab Units 05/25/25  0607   CHOLESTEROL mg/dL 150   TRIGLYCERIDES mg/dL 75       Results from last 7 days   Lab Units 05/27/25  0524 05/26/25  2331 05/26/25  0503 05/25/25  1882  "05/25/25 0523 05/24/25  2321   GLUCOSE mg/dL 84 102 106 129 114 113     Lab Results   Lab Value Date/Time    HGBA1C 5.30 05/25/2025 0607               Medications    Medications Reviewed: yes    Scheduled Meds:atorvastatin, 80 mg, Nasogastric, Nightly  levETIRAcetam, 500 mg, Nasogastric, Q12H  Lidocaine, 1 patch, Transdermal, Q24H  mupirocin, 1 Application, Each Nare, BID  pantoprazole, 40 mg, Intravenous, Q AM  sodium chloride, 10 mL, Intravenous, Q12H  sodium chloride, 10 mL, Intravenous, Q12H  sodium chloride, 10 mL, Intravenous, Q12H      Continuous Infusions:dextrose, 6 mL/kg  niCARdipine, 5-15 mg/hr, Last Rate: Stopped (05/25/25 1900)  sodium chloride, 50 mL/hr, Last Rate: 50 mL/hr (05/26/25 1800)      PRN Meds:.  acetaminophen    Calcium Replacement - Follow Nurse / BPA Driven Protocol    dextrose    Magnesium Standard Dose Replacement - Follow Nurse / BPA Driven Protocol    [DISCONTINUED] ondansetron ODT **OR** ondansetron    Phosphorus Replacement - Follow Nurse / BPA Driven Protocol    Potassium Replacement - Follow Nurse / BPA Driven Protocol    sodium chloride    sodium chloride    sodium chloride    traMADol    Intake/Ouptut 24 hrs (0701 - 0700)   I&O's Reviewed: yes    Anthropometrics     Height: Height: 157.5 cm (62.01\")  Last Filed Weight: Weight: 66.1 kg (145 lb 11.6 oz) (05/25/25 0648)  Method: Weight Method: Bed scale  BMI: BMI (Calculated): 26.6    UBW:    Weight       Weight (kg) Weight (lbs) Weight Method Visit Report   8/31/2022 67.132 kg  148 lb   --    8/30/2023 65.046 kg  143 lb 6.4 oz   --    5/24/2025 74.844 kg  165 lb       74.8 kg  164 lb 14.5 oz      5/25/2025 66.1 kg  145 lb 11.6 oz  Bed scale         Weight change:  no verifiable significant changes    Nutrition Focused Physical Exam     Date: 5/27    Unable to perform due to Pt unable to participate at time of visit     Subjective   Reported/Observed/Food/Nutrition Related History:     Patient screened for dysphagia, discussed in " "MDR plan for EN if did not pass SLP as expected. Later in day pt did pass with SLP, not able to eat much yet but RN gave boost and she drank this well. Will follow PO progress. Patient was sleeping at my visit. NS @ 50 ml/hr. Na goal 145-150.     Needs Assessment   Date: 5/27    Height used:Height: 157.5 cm (62.01\")  Weights used: 66 kg (ABW)    Estimated Calorie needs: ~1450 Kcal/day initial goal on vent  Method:  22-25 Kcals/KG 3434-0688  Method:  MSJ 1177 X 1.2 = 1412  Method:   HBD  1282 X 1.1 = 1410    Estimated Protein needs: ~89 g PRO/day  Method: 1.2-1.5 g/Kg = 79-99    Estimated Fluid needs: ~ ml/day   Per clinical status    Current Nutrition Prescription     PO: NPO Diet NPO Type: Strict NPO  Oral Nutrition Supplement:   Intake: Insufficient data RN stated only a few bites of first meal but is drinking boost well    Assessment & Plan   Nutrition Diagnosis   Date: 5/27 Updated:   Problem Predicted inadequate nutrient intake    Etiology Dysphagia, altered mentation s/p stroke   Signs/Symptoms Mechanical ground diet, only a few bites first meal   Status: New    Goal:   Nutrition to support treatment and Increase intake, Advance diet as medically feasible/appropriate    Nutrition Intervention      Follow treatment progress, Care plan reviewed, Supplement provided    Encourage PO as tolerated, boost with meals    Monitoring/Evaluation:   Per protocol, I&O, PO intake, Supplement intake, Pertinent labs, Weight, Symptoms, POC/GOC, Swallow function    Judy Huang RD, CNSC  Time Spent: 25m    "

## 2025-05-27 NOTE — PAYOR COMM NOTE
"Ref# [id number]     DOMINICK Cordova, RN  Utilization Review  Phone [phone]  Fax [phone]    [place] [address]           Radha Mendoza (64 y.o. Female)       Date of Birth   1960    Social Security Number       Address   [address] [place] [id number]    Home Phone   [phone]    MRN   [id number]       Spiritism   [facility]    Marital Status                               Admission Date   5/24/2025    Admission Type   Emergency    Admitting Provider   Va Espinosa MD    Attending Provider   Tia Blake MD    Department, Room/Bed   [facility] 2B ICU, N228/1       Discharge Date       Discharge Disposition       Discharge Destination                                 Attending Provider: Tia Blake MD    Allergies: Codeine, Penicillins    Isolation: None   Infection: None   Code Status: CPR    Ht: 157.5 cm (62.01\")   Wt: 66.1 kg (145 lb 11.6 oz)    Admission Cmt: None   Principal Problem: Acute ischemic stroke [I63.9]                   Active Insurance as of 5/24/2025       Primary Coverage       Payor Plan Insurance Group Employer/Plan Group    [employer] EMPLOYEE [id number]       Payor Plan Address Payor Plan Phone Number Payor Plan Fax Number Effective Dates    [id number] [phone]  1/1/2015 - None Entered    [place] [address]         Subscriber Name Subscriber Birth Date Member ID       RADHA MENDOZA 1960 [id number]                     Emergency Contacts        (Rel.) Home Phone Work Phone Mobile Phone    GilmarLoc (Spouse) [phone] -- [phone]    RADHA MENDOZA (Son) -- -- [phone]    Esperanza Ariasanda (Daughter) -- -- [phone]              Lovelady: NPI [id number] Tax ID [id number]  Insurance Information                  [employer] Phone: [phone]    Subscriber: Gilmar" Linda GREEN Subscriber#: [id number]    Group#: [id number] Precert#: --    Authorization#: -- Effective Date: --             History & Physical        Va Espinosa MD at 05/24/25 1300                        Patient Care Team:  Micheline Heath DO as PCP - General (Family Medicine)    Chief complaint:  Stroke    History of present illness:  Subjective    This is a 64-year-old female patient, lifelong non-smoker with no prior history of stroke.  She is right-handed.    She presented to the hospital today for left-sided weakness and slurred speech.  She was last normal at 11 PM when she went to bed with her .  She woke up around 2:30 AM after she fell out of the bed which is not usual for her and her  walked with her to the bathroom and she returned back.  She appeared to be slightly wobbly when she did so but she was able to walk by herself mostly.  She did not have trouble speaking at that time.  She went back to bed and when her  woke up at 7 AM, he found her weak, unable to get out of the bed and had slurred speech..  She arrived to the ED around 10 AM.  NIH was 18..  Vitals are normal in the ED.  CT brain showed complete infarction in the right MCA territory and effacement of the anterior horn of the lateral ventricle..  Perfusion images showed a core of 70 mL.  CTA showed right ICA and right M1 MCA occlusion.  No a tPA candidate due to time.    I was asked to admit to the intensive care unit.    I evaluated the patient in the ICU.  She was following commands but clearly plegic in the left arm.  She had left neglect, right gaze deviation and expressive aphasia.  Had an episode of sinus pause on telemetry strips around 8 seconds.    Patient's  indicated that she snores really loud but no prior history of sleep apnea.      Review of Systems:  Cannot obtain.  Patient is aphasic.    History  Past Medical History:   Diagnosis Date    Atypical chest pain     Chest pain.Atypical chest pain     Chest pain     COVID-19     06/30/2022    COVID-19 vaccine administered     04/02/2021 ,  J and J ) 10/25/2021 - Moderna    Diminished pulses in lower extremity     Diverticulosis     Dizziness     Occasional    Dyslipidemia     Dyspnea     Edema     Fatigue     GERD (gastroesophageal reflux disease)     Hiatal hernia     Migraine headache     Palpitations     Personal history of cardiac murmur     SOB (shortness of breath)      Past Surgical History:   Procedure Laterality Date    CHOLECYSTECTOMY      HYSTERECTOMY      OOPHORECTOMY      TUBAL ABDOMINAL LIGATION       Family History   Problem Relation Age of Onset    Hyperlipidemia Mother     Hypertension Mother     Stroke Mother     Anxiety disorder Mother     Stroke Father     Hypertension Father     Hyperlipidemia Father     Heart attack Father     Hyperlipidemia Other         Dyslipidemia    Stroke Other         CVA    Coronary artery disease Other     Diabetes Other     Hypertension Other     Sudden death Other         Sudden Cardiac Death (SCD)     Social History     Tobacco Use    Smoking status: Never    Smokeless tobacco: Never   Vaping Use    Vaping status: Never Used   Substance Use Topics    Alcohol use: No    Drug use: Never     E-cigarette/Vaping    E-cigarette/Vaping Use Never User      E-cigarette/Vaping Substances     Medications Prior to Admission   Medication Sig Dispense Refill Last Dose/Taking    aspirin 81 MG tablet Take 1 tablet by mouth Daily. OTC   5/23/2025 Morning    atorvastatin (LIPITOR) 20 MG tablet TAKE ONE TABLET BY MOUTH EVERY NIGHT AT BEDTIME 90 tablet 3 5/23/2025 Bedtime    Calcium 500 MG tablet Take 500 mg by mouth Daily. OTC   5/23/2025    Coenzyme Q10 (CO Q-10) 100 MG capsule Take 100 mg by mouth Every Night. OTC   5/23/2025 Bedtime    dicyclomine (BENTYL) 10 MG capsule Take 1 capsule by mouth 2 (Two) Times a Day As Needed for Abdominal Cramping.   Past Month    estradiol (MINIVELLE, VIVELLE-DOT) 0.05 MG/24HR patch Place 1 patch  on the skin as directed by provider 2 (Two) Times a Week.   Past Week    linaclotide (LINZESS) 145 MCG capsule capsule Take 1 capsule by mouth Every Morning Before Breakfast.   5/23/2025    multivitamin (THERAGRAN) tablet tablet Take 1 tablet by mouth Daily. OTC   5/23/2025    naratriptan (AMERGE) 2.5 MG tablet Take 1 tablet by mouth Every 4 (Four) Hours As Needed. migraines   Past Month    pantoprazole (PROTONIX) 20 MG EC tablet Take 1 tablet by mouth 2 (Two) Times a Day. (Patient taking differently: Take 2 tablets by mouth 2 (Two) Times a Day.) 180 tablet 3 5/23/2025    TiZANidine (ZANAFLEX) 4 MG capsule Take 1 capsule by mouth 3 (Three) Times a Day As Needed for Muscle Spasms. (Patient taking differently: Take 1 capsule by mouth 2 (Two) Times a Day As Needed for Muscle Spasms.) 45 capsule 3 5/23/2025    vitamin B-12 (CYANOCOBALAMIN) 100 MCG tablet Take 1 tablet by mouth Daily. OTC   5/23/2025    vitamin C (ASCORBIC ACID) 250 MG tablet Take 1 tablet by mouth Daily. OTC   5/23/2025     Allergies:  Codeine and Penicillins    Objective  Vital Signs  Temp:  [96.8 °F (36 °C)] 96.8 °F (36 °C)  Heart Rate:  [58-78] 71  Resp:  [14] 14  BP: (122-133)/(58-72) 123/58    PPE used per hospital policy    Physical Exam:  Constitutional: Not in acute distress.  Eyes: Injected conjunctiva.  Right gaze deviation.  Pupils equal and reactive to light.  ENMT: Rico 4.  Moist tongue.  External ears normal.  Neck: No thyromegaly.  Trachea midline  Heart: RRR, no murmur.  No pitting edema  Lungs: Good and equal air entry bilaterally.  Nonlabored breathing  Abdomen: Soft. No tenderness or dullness.  Positive bowel sounds  Extremities: No cyanosis, clubbing. Warm extremities and well-perfused  Neuro: Conscious but eyes closed.  Moves the right arm with strength 5/5.  Legs with strength 3-4/5.  Left arm 1/5 and withdraw to pain.  Diminished sensation in the left arm.  Psych: Cannot assess.  Integumentary: No rash or  ecchymosis  Lymphatic: No palpable cervical or supraclavicular lymph nodes.      Diagnostic imaging:  I personally and independently reviewed the following images:   Brain imaging as above.      Laboratory workup:  Results from last 7 days   Lab Units 05/24/25  1038   CREATININE mg/dL 0.80         Results from last 7 days   Lab Units 05/24/25  1038 05/24/25  1037   WBC 10*3/mm3  --  7.38   HEMOGLOBIN g/dL  --  13.9   HEMOGLOBIN, POC g/dL 14.6  --    HEMATOCRIT %  --  42.7   HEMATOCRIT POC % 43  --    PLATELETS 10*3/mm3  --  259     Results from last 7 days   Lab Units 05/24/25  1037   INR  0.91   APTT seconds 27.2           Assessment    Acute ischemic R MCA stroke  Localized brain edema, secondary to #1  Left hemiparesis and expressive aphasia, secondary #1  Sinus bradycardia/sinus pause: Secondary to sleep and sleep apnea.  Possible contribution from brain edema.  Lowest HR was noted at 44 but again she had sinus pauses as above.  Loud snoring, probable CATHRYN    GERD  Hiatal hernia          Plan:    Admit to ICU.  Neurocheck per protocol. Allow permissive hypertension  AC with aspirin.  Atorvastatin 80 mg daily  PT OT/speech  Maintenance IV fluid while n.p.o.  NG tube.  Connected to suction.  Patient vomited x 1 in ICU.  Head of bed elevation.  Aspiration precaution  Oxygen by NC  Consult neurology and neurosurgery  Initiate 3% saline to keep sodium 145-155 unless otherwise recommended by neurology  Echocardiogram and MRI brain.  CT brain in a.m.    CODE STATUS: Full code.  Discussed with family.    Va Espinosa MD  05/24/25  13:00 EDT    Time: Critical care 43 min      This note was dictated utilizing Dragon dictation     Electronically signed by Va Espinosa MD at 05/24/25 2514          Emergency Department Notes        Man Hannon MD at 05/24/25 1030          Subjective   History of Present Illness  Mrs. Schafer is brought by helicopter from home with left-sided weakness.  Reports headache since yesterday.   EMS reports last known well at 230 this morning however at that time she had fallen.  Later in the morning it became evident that she was flaccid on the left side and having difficulty talking.  Does complain of left-sided headache.  No prior history of stroke.  Has history of migraine.      Review of Systems    Past Medical History:   Diagnosis Date    Atypical chest pain     Chest pain.Atypical chest pain    Chest pain     COVID-19     06/30/2022    COVID-19 vaccine administered     04/02/2021 ,  J and J ) 10/25/2021 - Moderna    Diminished pulses in lower extremity     Diverticulosis     Dizziness     Occasional    Dyslipidemia     Dyspnea     Edema     Fatigue     GERD (gastroesophageal reflux disease)     Hiatal hernia     Migraine headache     Palpitations     Personal history of cardiac murmur     SOB (shortness of breath)        Allergies   Allergen Reactions    Codeine      Codeine Derivatives    Penicillins        Past Surgical History:   Procedure Laterality Date    CHOLECYSTECTOMY      HYSTERECTOMY      OOPHORECTOMY      TUBAL ABDOMINAL LIGATION         Family History   Problem Relation Age of Onset    Hyperlipidemia Mother     Hypertension Mother     Stroke Mother     Anxiety disorder Mother     Stroke Father     Hypertension Father     Hyperlipidemia Father     Heart attack Father     Hyperlipidemia Other         Dyslipidemia    Stroke Other         CVA    Coronary artery disease Other     Diabetes Other     Hypertension Other     Sudden death Other         Sudden Cardiac Death (SCD)       Social History     Socioeconomic History    Marital status:    Tobacco Use    Smoking status: Never    Smokeless tobacco: Never   Vaping Use    Vaping status: Never Used   Substance and Sexual Activity    Alcohol use: No    Drug use: Never    Sexual activity: Defer           Objective   Physical Exam  Vitals and nursing note reviewed.   Constitutional:       General: She is not in acute distress.  HENT:       Head: Normocephalic and atraumatic.      Nose: Nose normal. No congestion or rhinorrhea.   Eyes:      General: No scleral icterus.     Conjunctiva/sclera: Conjunctivae normal.   Neck:      Comments: No JVD   Cardiovascular:      Rate and Rhythm: Normal rate and regular rhythm.      Heart sounds: No murmur heard.     No friction rub.   Pulmonary:      Effort: Pulmonary effort is normal.      Breath sounds: Normal breath sounds. No wheezing or rales.   Musculoskeletal:         General: No tenderness.      Cervical back: Normal range of motion and neck supple.      Right lower leg: No edema.      Left lower leg: No edema.   Skin:     General: Skin is warm and dry.      Coloration: Skin is not pale.      Findings: No erythema.   Neurological:      Mental Status: She is alert.      Motor: Weakness present.      Coordination: Coordination abnormal.      Comments: She has obvious left facial weakness.  She is flaccid in the left upper and lower extremity.  Persistent rightward gaze.  She answers questions appropriately         Procedures          ED Course  ED Course as of 05/24/25 1319   Sat May 24, 2025   1031 Saw her emergently in CT as the result of a code stroke page.  Interviewed paramedics.  I examined Mrs. Schafer.  I reviewed her CAT scan at bedside as it was being done.  There is hypodensity and a large right MCA distribution.  No bleed.  Have conferred with stroke navigator.  Not candidate for thrombolysis as symptoms visible on CT and last known well greater than 4.5 hours ago.  Will proceed with stroke workup including perfusion study and angiograms. [DT]   1040 Reviewed bedside labs. [DT]   1044 Stroke team has reviewed her perfusion study.  She has completed CVA.  Not candidate for intervention in the Cath Lab.  They recommend ICU admission for close observation of ICP. [DT]   1046 Paged intensivist [DT]   1046 D/w Dr Espinosa who will admit her to the ICU. [DT]   1112 Reviewed EKG and is normal. [DT]      ED  Course User Index  [DT] Man Hannon MD                                Total (NIH Stroke Scale): 18                      Medical Decision Making  Saw her emergently in CT.  Ordered and interpreted multiple labs, multiple CT scans.  Multiple interactions with stroke navigator.  I consulted intensivist and arranged ICU admission.    Problems Addressed:  Acute CVA (cerebrovascular accident): complicated acute illness or injury that poses a threat to life or bodily functions    Amount and/or Complexity of Data Reviewed  Independent Historian: EMS  External Data Reviewed: notes.  Labs: ordered. Decision-making details documented in ED Course.  Radiology: ordered. Decision-making details documented in ED Course.  ECG/medicine tests: ordered. Decision-making details documented in ED Course.    Risk  Prescription drug management.  Decision regarding hospitalization.        Final diagnoses:   Acute CVA (cerebrovascular accident)       ED Disposition  ED Disposition       ED Disposition   Decision to Admit    Condition   --    Comment   Level of Care: Critical Care [6]   Admitting Physician: JESSICA ESPINOSA [[id number]]                 No follow-up provider specified.       Medication List        ASK your doctor about these medications      estradiol 0.05 MG/24HR patch  Commonly known as: MINIVELLE, VIVELLE-DOT  Ask about: Which instructions should I use?                 Man Hannon MD  05/24/25 1319      Electronically signed by Man Hannon MD at 05/24/25 1319       Current Facility-Administered Medications   Medication Dose Route Frequency Provider Last Rate Last Admin    acetaminophen (TYLENOL) 160 MG/5ML oral solution 650 mg  650 mg Nasogastric Q4H PRN Jessica Espinosa MD   650 mg at 05/27/25 1038    atorvastatin (LIPITOR) tablet 80 mg  80 mg Nasogastric Nightly Jessica Espinosa MD   80 mg at 05/26/25 2009    Calcium Replacement - Follow Nurse / BPA Driven Protocol   Not Applicable Matt Batista MD         levETIRAcetam (KEPPRA) 100 MG/ML oral solution 500 mg  500 mg Nasogastric Q12H Tia Blake MD        Lidocaine 4 % 1 patch  1 patch Transdermal Q24H Justin Unger PharmD   1 patch at 05/26/25 2116    Magnesium Standard Dose Replacement - Follow Nurse / BPA Driven Protocol   Not Applicable PRN Matt Olivarez MD        mupirocin (BACTROBAN) 2 % nasal ointment 1 Application  1 Application Each Nare BID Matt Olivarez MD   1 Application at 05/27/25 0836    niCARdipine (CARDENE) 25mg in 250mL NS infusion  5-15 mg/hr Intravenous Titrated Matt Olivarez MD   Stopped at 05/25/25 1900    ondansetron (ZOFRAN) injection 4 mg  4 mg Intravenous Q6H PRN Matt Olivarez MD   4 mg at 05/25/25 1749    pantoprazole (PROTONIX) injection 40 mg  40 mg Intravenous Q AM Va Espinosa MD   40 mg at 05/27/25 0623    Phosphorus Replacement - Follow Nurse / BPA Driven Protocol   Not Applicable PRMatt Velasco MD        Potassium Replacement - Follow Nurse / BPA Driven Protocol   Not Applicable PRN Matt Olivarez MD        sodium chloride 0.9 % flush 10 mL  10 mL Intravenous Q12H Matt Olivarez MD   10 mL at 05/25/25 2003    sodium chloride 0.9 % flush 10 mL  10 mL Intravenous Q12H Matt Olivarez MD   10 mL at 05/26/25 0806    sodium chloride 0.9 % flush 10 mL  10 mL Intravenous Q12H Matt Olivarez MD   10 mL at 05/26/25 2009    sodium chloride 0.9 % flush 10 mL  10 mL Intravenous PRN Matt Olivarez MD        sodium chloride 0.9 % flush 20 mL  20 mL Intravenous PRN Matt Olivarez MD        sodium chloride 0.9 % infusion 40 mL  40 mL Intravenous PRN Matt Olivarez MD        sodium chloride 0.9 % infusion  50 mL/hr Intravenous Continuous Stephanie Javed PA-C 50 mL/hr at 05/26/25 1800 50 mL/hr at 05/26/25 1800    traMADol (ULTRAM) tablet 50 mg  50 mg Nasogastric Q4H PRN Va Espinosa MD         Lab Results (all)       Procedure Component Value Units Date/Time    POC Glucose Once  [[id number]]  (Normal) Collected: 05/27/25 1127    Specimen: Blood Updated: 05/27/25 1130     Glucose 74 mg/dL     Tissue / Bone Culture - Surgical Site, Brain [[id number]] Collected: 05/25/25 1831    Specimen: Surgical Site from Brain Updated: 05/27/25 0924     Tissue Culture No growth     Gram Stain Many (4+) Red blood cells      Rare (1+) WBCs seen      No organisms seen    Potassium [[id number]]  (Normal) Collected: 05/27/25 0623    Specimen: Blood Updated: 05/27/25 0659     Potassium 3.8 mmol/L      Comment: Slight hemolysis detected by analyzer. Result may be falsely elevated.       Sodium [[id number]]  (Abnormal) Collected: 05/27/25 0623    Specimen: Blood Updated: 05/27/25 0659     Sodium 147 mmol/L     POC Glucose Once [[id number]]  (Normal) Collected: 05/27/25 0524    Specimen: Blood Updated: 05/27/25 0527     Glucose 84 mg/dL     POC Glucose Once [[id number]]  (Normal) Collected: 05/26/25 2331    Specimen: Blood Updated: 05/26/25 2332     Glucose 102 mg/dL     Sodium [[id number]]  (Abnormal) Collected: 05/26/25 1909    Specimen: Blood Updated: 05/26/25 1927     Sodium 153 mmol/L     Potassium [[id number]]  (Normal) Collected: 05/26/25 1909    Specimen: Blood Updated: 05/26/25 1927     Potassium 3.8 mmol/L      Comment: Slight hemolysis detected by analyzer. Result may be falsely elevated.       Sodium [[id number]]  (Abnormal) Collected: 05/26/25 1241    Specimen: Blood Updated: 05/26/25 1307     Sodium 151 mmol/L     Potassium [[id number]]  (Normal) Collected: 05/26/25 1241    Specimen: Blood Updated: 05/26/25 1307     Potassium 3.9 mmol/L     CBC & Differential [[id number]]  (Abnormal) Collected: 05/26/25 0601    Specimen: Blood Updated: 05/26/25 0714    Narrative:      The following orders were created for panel order CBC & Differential.  Procedure                               Abnormality         Status                     ---------                               -----------         ------                     CBC  Auto Differential[[id number]]        Abnormal            Final result                 Please view results for these tests on the individual orders.    CBC Auto Differential [[id number]]  (Abnormal) Collected: 05/26/25 0601    Specimen: Blood Updated: 05/26/25 0714     WBC 19.20 10*3/mm3      RBC 3.87 10*6/mm3      Hemoglobin 10.9 g/dL      Hematocrit 35.4 %      MCV 91.5 fL      MCH 28.2 pg      MCHC 30.8 g/dL      RDW 13.0 %      RDW-SD 43.1 fl      MPV 12.3 fL      Platelets 208 10*3/mm3      Neutrophil % 80.8 %      Lymphocyte % 10.2 %      Monocyte % 8.3 %      Eosinophil % 0.0 %      Basophil % 0.3 %      Immature Grans % 0.4 %      Neutrophils, Absolute 15.53 10*3/mm3      Lymphocytes, Absolute 1.95 10*3/mm3      Monocytes, Absolute 1.59 10*3/mm3      Eosinophils, Absolute 0.00 10*3/mm3      Basophils, Absolute 0.05 10*3/mm3      Immature Grans, Absolute 0.08 10*3/mm3      nRBC 0.0 /100 WBC     Basic Metabolic Panel [[id number]]  (Abnormal) Collected: 05/26/25 0601    Specimen: Blood Updated: 05/26/25 0649     Glucose 125 mg/dL      BUN 8 mg/dL      Creatinine 0.68 mg/dL      Sodium 152 mmol/L      Potassium 4.1 mmol/L      Comment: Slight hemolysis detected by analyzer. Result may be falsely elevated.        Chloride 120 mmol/L      CO2 23.0 mmol/L      Calcium 8.9 mg/dL      BUN/Creatinine Ratio 11.8     Anion Gap 9.0 mmol/L      eGFR 98.6 mL/min/1.73     Narrative:      GFR Categories in Chronic Kidney Disease (CKD)              GFR Category          GFR (mL/min/1.73)    Interpretation  G1                    90 or greater        Normal or high (1)  G2                    60-89                Mild decrease (1)  G3a                   45-59                Mild to moderate decrease  G3b                   30-44                Moderate to severe decrease  G4                    15-29                Severe decrease  G5                    14 or less           Kidney failure    (1)In the absence of evidence of kidney  disease, neither GFR category G1 or G2 fulfill the criteria for CKD.    eGFR calculation 2021 CKD-EPI creatinine equation, which does not include race as a factor    POC Glucose Once [[id number]]  (Normal) Collected: 05/26/25 0503    Specimen: Blood Updated: 05/26/25 0510     Glucose 106 mg/dL     POC Glucose Once [[id number]]  (Normal) Collected: 05/25/25 2321    Specimen: Blood Updated: 05/26/25 0510     Glucose 129 mg/dL     Sodium [[id number]]  (Abnormal) Collected: 05/26/25 0016    Specimen: Blood Updated: 05/26/25 0101     Sodium 152 mmol/L     Potassium [[id number]]  (Normal) Collected: 05/26/25 0016    Specimen: Blood Updated: 05/26/25 0101     Potassium 4.1 mmol/L      Comment: Slight hemolysis detected by analyzer. Result may be falsely elevated.       Anaerobic Culture - Surgical Site, Brain [[id number]] Collected: 05/25/25 1831    Specimen: Surgical Site from Brain Updated: 05/25/25 2007    Sodium [[id number]]  (Abnormal) Collected: 05/25/25 1927    Specimen: Blood Updated: 05/25/25 1942     Sodium 152 mmol/L     Potassium [[id number]]  (Normal) Collected: 05/25/25 1927    Specimen: Blood Updated: 05/25/25 1942     Potassium 3.6 mmol/L     Sodium [[id number]]  (Abnormal) Collected: 05/25/25 1350    Specimen: Blood Updated: 05/25/25 1410     Sodium 150 mmol/L     Potassium [[id number]]  (Abnormal) Collected: 05/25/25 1350    Specimen: Blood Updated: 05/25/25 1410     Potassium 3.4 mmol/L      Comment: Slight hemolysis detected by analyzer. Result may be falsely elevated.       Lipid Panel [[id number]] Collected: 05/25/25 0607    Specimen: Blood Updated: 05/25/25 0655     Total Cholesterol 150 mg/dL      Triglycerides 75 mg/dL      HDL Cholesterol 60 mg/dL      LDL Cholesterol  75 mg/dL      VLDL Cholesterol 15 mg/dL      LDL/HDL Ratio 1.25    Narrative:      Cholesterol Reference Ranges  (U.S. Department of Health and Human Services ATP III Classifications)    Desirable          <200 mg/dL  Borderline High     200-239 mg/dL  High Risk          >240 mg/dL      Triglyceride Reference Ranges  (U.S. Department of Health and Human Services ATP III Classifications)    Normal           <150 mg/dL  Borderline High  150-199 mg/dL  High             200-499 mg/dL  Very High        >500 mg/dL    HDL Reference Ranges  (U.S. Department of Health and Human Services ATP III Classifications)    Low     <40 mg/dl (major risk factor for CHD)  High    >60 mg/dl ('negative' risk factor for CHD)        LDL Reference Ranges  (U.S. Department of Health and Human Services ATP III Classifications)    Optimal          <100 mg/dL  Near Optimal     100-129 mg/dL  Borderline High  130-159 mg/dL  High             160-189 mg/dL  Very High        >189 mg/dL    LDL is calculated using the NIH LDL-C calculation.      Basic Metabolic Panel [[id number]]  (Abnormal) Collected: 05/25/25 0607    Specimen: Blood Updated: 05/25/25 0655     Glucose 140 mg/dL      BUN 8 mg/dL      Creatinine 0.65 mg/dL      Sodium 147 mmol/L      Potassium 3.3 mmol/L      Comment: Slight hemolysis detected by analyzer. Result may be falsely elevated.        Chloride 115 mmol/L      CO2 22.0 mmol/L      Calcium 9.1 mg/dL      BUN/Creatinine Ratio 12.3     Anion Gap 10.0 mmol/L      eGFR 99.7 mL/min/1.73     Narrative:      GFR Categories in Chronic Kidney Disease (CKD)              GFR Category          GFR (mL/min/1.73)    Interpretation  G1                    90 or greater        Normal or high (1)  G2                    60-89                Mild decrease (1)  G3a                   45-59                Mild to moderate decrease  G3b                   30-44                Moderate to severe decrease  G4                    15-29                Severe decrease  G5                    14 or less           Kidney failure    (1)In the absence of evidence of kidney disease, neither GFR category G1 or G2 fulfill the criteria for CKD.    eGFR calculation 2021 CKD-EPI creatinine equation,  which does not include race as a factor    CBC (No Diff) [[id number]]  (Abnormal) Collected: 05/25/25 0607    Specimen: Blood Updated: 05/25/25 0641     WBC 10.75 10*3/mm3      RBC 4.33 10*6/mm3      Hemoglobin 12.2 g/dL      Hematocrit 38.7 %      MCV 89.4 fL      MCH 28.2 pg      MCHC 31.5 g/dL      RDW 12.7 %      RDW-SD 41.9 fl      MPV 12.1 fL      Platelets 222 10*3/mm3     Hemoglobin A1c [[id number]]  (Normal) Collected: 05/25/25 0607    Specimen: Blood Updated: 05/25/25 0638     Hemoglobin A1C 5.30 %     Narrative:      Hemoglobin A1C Ranges:    Increased Risk for Diabetes  5.7% to 6.4%  Diabetes                     >= 6.5%  Diabetic Goal                < 7.0%    POC Glucose Once [[id number]]  (Normal) Collected: 05/25/25 0523    Specimen: Blood Updated: 05/25/25 0524     Glucose 114 mg/dL     Sodium [[id number]]  (Abnormal) Collected: 05/25/25 0039    Specimen: Blood Updated: 05/25/25 0118     Sodium 146 mmol/L     Potassium [[id number]]  (Normal) Collected: 05/25/25 0039    Specimen: Blood Updated: 05/25/25 0118     Potassium 3.5 mmol/L     POC Glucose Once [[id number]]  (Normal) Collected: 05/24/25 2321    Specimen: Blood Updated: 05/24/25 2323     Glucose 113 mg/dL     Sodium [[id number]]  (Normal) Collected: 05/24/25 1726    Specimen: Blood Updated: 05/24/25 1738     Sodium 143 mmol/L     Potassium [[id number]]  (Normal) Collected: 05/24/25 1726    Specimen: Blood Updated: 05/24/25 1738     Potassium 3.7 mmol/L      Comment: Slight hemolysis detected by analyzer. Result may be falsely elevated.       POC Glucose Once [[id number]]  (Normal) Collected: 05/24/25 1729    Specimen: Blood Updated: 05/24/25 1730     Glucose 96 mg/dL     Magnesium [[id number]]  (Normal) Collected: 05/24/25 1037    Specimen: Blood Updated: 05/24/25 1540     Magnesium 2.1 mg/dL     POC Glucose Once [[id number]]  (Normal) Collected: 05/24/25 1235    Specimen: Blood Updated: 05/24/25 1236     Glucose 90 mg/dL     AST [[id number]]  (Normal)  Collected: 05/24/25 1037    Specimen: Blood Updated: 05/24/25 1100     AST (SGOT) 27 U/L     ALT [[id number]]  (Normal) Collected: 05/24/25 1037    Specimen: Blood Updated: 05/24/25 1100     ALT (SGPT) 19 U/L     Protime-INR [[id number]]  (Normal) Collected: 05/24/25 1037    Specimen: Blood Updated: 05/24/25 1057     Protime 12.8 Seconds      INR 0.91    aPTT [[id number]]  (Normal) Collected: 05/24/25 1037    Specimen: Blood Updated: 05/24/25 1057     PTT 27.2 seconds     Narrative:      PTT = The equivalent PTT values for the therapeutic range of heparin levels at 0.3 to 0.5 U/ml are 60 to 70 seconds.    [place] Draw [[id number]] Collected: 05/24/25 1037    Specimen: Blood Updated: 05/24/25 1046    Narrative:      The following orders were created for panel order [place] Draw.  Procedure                               Abnormality         Status                     ---------                               -----------         ------                     Green Top (Gel)[[id number]]                                  Final result               Lavender Top[[id number]]                                     Final result               Gold Top - SST[[id number]]                                   Final result               Gray Top[[id number]]                                         Final result               Light Blue Top[[id number]]                                   Final result                 Please view results for these tests on the individual orders.    Green Top (Gel) [[id number]] Collected: 05/24/25 1037    Specimen: Blood Updated: 05/24/25 1046     Extra Tube Hold for add-ons.     Comment: Auto resulted.       Lavender Top [[id number]] Collected: 05/24/25 1037    Specimen: Blood Updated: 05/24/25 1046     Extra Tube hold for add-on     Comment: Auto resulted       Gray Top [[id number]] Collected: 05/24/25 1037    Specimen: Blood Updated: 05/24/25 1046     Extra Tube Hold for add-ons.     Comment: Auto resulted.       Light Blue Top  [[id number]] Collected: 05/24/25 1037    Specimen: Blood Updated: 05/24/25 1046     Extra Tube Hold for add-ons.     Comment: Auto resulted       Gold Top - SST [[id number]] Collected: 05/24/25 1037    Specimen: Blood Updated: 05/24/25 1046     Extra Tube Hold for add-ons.     Comment: Auto resulted.       CBC & Differential [[id number]]  (Abnormal) Collected: 05/24/25 1037    Specimen: Blood Updated: 05/24/25 1044    Narrative:      The following orders were created for panel order CBC & Differential.  Procedure                               Abnormality         Status                     ---------                               -----------         ------                     CBC Auto Differential[[id number]]        Abnormal            Final result                 Please view results for these tests on the individual orders.    CBC Auto Differential [[id number]]  (Abnormal) Collected: 05/24/25 1037    Specimen: Blood Updated: 05/24/25 1044     WBC 7.38 10*3/mm3      RBC 4.93 10*6/mm3      Hemoglobin 13.9 g/dL      Hematocrit 42.7 %      MCV 86.6 fL      MCH 28.2 pg      MCHC 32.6 g/dL      RDW 12.2 %      RDW-SD 38.9 fl      MPV 11.8 fL      Platelets 259 10*3/mm3      Neutrophil % 78.6 %      Lymphocyte % 14.5 %      Monocyte % 4.6 %      Eosinophil % 0.3 %      Basophil % 0.9 %      Immature Grans % 1.1 %      Neutrophils, Absolute 5.80 10*3/mm3      Lymphocytes, Absolute 1.07 10*3/mm3      Monocytes, Absolute 0.34 10*3/mm3      Eosinophils, Absolute 0.02 10*3/mm3      Basophils, Absolute 0.07 10*3/mm3      Immature Grans, Absolute 0.08 10*3/mm3      nRBC 0.0 /100 WBC     POC CHEM 8 [[id number]]  (Normal) Collected: 05/24/25 1038    Specimen: Blood Updated: 05/24/25 1041     Glucose 116 mg/dL      BUN 11 mg/dL      Creatinine 0.80 mg/dL      Sodium 142 mmol/L      POC Potassium 4.0 mmol/L      Chloride 103 mmol/L      Total CO2 25 mmol/L      Hemoglobin 14.6 g/dL      Comment: Serial Number: [id number]:  [id number]         Hematocrit 43 %      Ionized Calcium 1.25 mmol/L      eGFR 83.4 mL/min/1.73           Imaging Results (All)       Procedure Component Value Units Date/Time    SLP FEES - Fiberoptic Endo Eval Swallow [[id number]] Resulted: 05/27/25 1302     Updated: 05/27/25 1302    Narrative:      This procedure was auto-finalized with no dictation required.    CT Head Without Contrast [[id number]] Collected: 05/26/25 0940     Updated: 05/26/25 0950    Narrative:      CT HEAD WO CONTRAST    Date of Exam: 5/26/2025 5:17 AM EDT    Indication: Status post craniectomy.    Comparison: 5/25/2025    Technique: Axial CT images were obtained of the head without contrast administration.  Automated exposure control and iterative construction methods were used.      Findings:  Stable postoperative changes from recent right craniectomy. Stable large right MCA distribution infarct. No evidence of acute hemorrhage or midline shift. No extra-axial fluid collections are identified.      Impression:      Impression:  Stable exam. Postoperative change from recent right craniectomy with evolving large right MCA distribution infarct.        Electronically Signed: Milton Blancas MD    5/26/2025 9:47 AM EDT    Workstation ID: [id number]    CT Head Without Contrast [[id number]] Collected: 05/25/25 2017     Updated: 05/25/25 2023    Narrative:      CT HEAD WO CONTRAST    Date of Exam: 5/25/2025 6:08 PM EDT    Indication: Status post craniectomy  Post Op Craniotomoy Evaluation.    Comparison: Head CT 5/25/2025    Technique: Axial CT images were obtained of the head without contrast administration.  Automated exposure control and iterative construction methods were used.      Findings:  Postoperative changes of presumed decompressive right-sided craniectomy with associated small volume pneumocephalus and right scalp subcutaneous emphysema and edema.    Evolving large right MCA territory infarct.No acute intracranial hemorrhage.    Similar associated mass effect  including narrowing of the right lateral ventricle. No significant midline shift or herniation.    No hydrocephalus.    Scattered areas of periventricular and subcortical white matter hypoattenuation, nonspecific, perhaps from small vessel ischemic/hypertensive changes in a patient of this age.There are intracranial atherosclerotic calcifications.    Mild scattered mucosal thickening in the paranasal sinuses.Mastoid air cells are essentially clear.Included globes and orbits appear unremarkable by CT.      Impression:      Impression:  Postoperative changes of right-sided craniectomy.    Evolving large right MCA territory infarct. No acute intracranial hemorrhage. Similar associated mass effect including narrowing of the right lateral ventricle. No significant midline shift or herniation.        Electronically Signed: Carlton Christiansen MD    5/25/2025 8:20 PM EDT    Workstation ID: [id number]    CT Head Without Contrast [[id number]] Collected: 05/25/25 0817     Updated: 05/25/25 0829    Narrative:      CT HEAD WO CONTRAST    Date of Exam: 5/25/2025 5:34 AM EDT    Indication: stroke.    Comparison: CT head and MRI brain 5/24/2025    Technique: Axial CT images were obtained of the head without contrast administration.  Automated exposure control and iterative construction methods were used.      Findings:  There has been evolution of right MCA distribution infarct. There is now a well-defined gray and white matter low-attenuation with loss of gray-white differentiation involving much of the right frontal lobe, anterior right parietal lobe, and superior and   anterior right temporal lobe, as well as the basal ganglia. There is no gross hemorrhage. There is increased mass effect on the right lateral ventricle, with increased compression of the body, anterior horn, and atrium. There is no significant   subfalcine shift. There is no effacement of the suprasellar cisterns. Hyperdense right MCA is again demonstrated.       Impression:      Impression:  Evolving right MCA distribution infarct. There is no gross hemorrhagic conversion. There is some increased mass effect on the right lateral ventricle with no significant subfalcine shift or uncal herniation        Electronically Signed: Pio Amanda    5/25/2025 8:26 AM EDT    Workstation ID: [id number]    MRI Brain Without Contrast [[id number]] Collected: 05/24/25 2050     Updated: 05/24/25 2057    Narrative:      MRI BRAIN WO CONTRAST    Date of Exam: 5/24/2025 5:17 PM EDT    Indication: Stroke, follow up  RMCA CVA.     Comparison: Head CT 5/24/2025    Technique:  Routine multiplanar/multisequence sequence images of the brain were obtained without contrast administration.    Findings:  There is a large area of restricted diffusion in the right MCA distribution corresponding to findings on the previous head CT. This involves the right frontal lobe, right temporal lobe, and the right basal ganglia. Findings are compatible with acute   infarct. There is some associated vasogenic edema on the T2 weighted sequences. No definite acute hemorrhage. The diffusion series is otherwise within normal limits. There is some effacement of the right lateral ventricle secondary to edema. However, no   midline shift is seen. The right ICA is occluded. Please see head neck CT angiogram report dictated separately. Craniocervical junction is normal. Pituitary unremarkable. No definite masses are seen.      Impression:      1.There is a large area of restricted diffusion in the right MCA distribution corresponding to findings on the previous head CT. Findings are compatible with acute infarct. There is some associated vasogenic edema. There is some effacement of the right   lateral ventricle. However no midline shift is seen.  2.No evidence of acute hemorrhage.  3.The right ICA is occluded.        Electronically Signed: Joe Grover MD    5/24/2025 8:54 PM EDT    Workstation ID: [id number]    XR Abdomen KUB  [[id number]] Collected: 05/24/25 1846     Updated: 05/24/25 1849    Narrative:      XR ABDOMEN KUB    Date of Exam: 5/24/2025 6:03 PM EDT    Indication: ngt placement    Comparison: None available.    Findings:  There is a gastric suction tube which courses below the left hemidiaphragm with tip terminating at the likely mid stomach.      Impression:      Impression:  Gastric suction tube with tip terminating at the mid stomach.        Electronically Signed: Jr Ambrosio MD    5/24/2025 6:46 PM EDT    Workstation ID: [id number]    CT Head Without Contrast [[id number]] Collected: 05/24/25 1647     Updated: 05/24/25 1658    Narrative:      CT HEAD WO CONTRAST    Date of Exam: 5/24/2025 4:44 PM EDT    Indication: stability scan.    Comparison: CT head May 24, 2025    Technique: Axial CT images were obtained of the head without contrast administration.  Automated exposure control and iterative construction methods were used.      Findings:  There are evolving changes of right MCA territory infarct. There is underlying cytotoxic edema. There is some mass effect on the frontal horn right lateral ventricle. No definite underlying hemorrhage is apparent. The paranasal sinuses and mastoids seem   clear. Dual energy was utilized for this exam. On the iodine maps no unusual staining is seen.      Impression:      Impression:  Evolving changes of right MCA territory infarct.        Electronically Signed: Ed Jiménez MD    5/24/2025 4:55 PM EDT    Workstation ID: [id number]    XR Chest 1 View [[id number]] Collected: 05/24/25 1117     Updated: 05/24/25 1123    Narrative:      XR CHEST 1 VW    Date of Exam: 5/24/2025 10:51 AM EDT    Indication: Acute Stroke Protocol (onset < 12 hrs)    Comparison: None available.    Findings:  The cardiomediastinal silhouette is within normal limits. Pulmonary vascularity appears normal. There is no acute airspace consolidation, pleural effusion, or pneumothorax. No acute osseous findings of the  chest.      Impression:      Impression:  No acute cardiopulmonary abnormality.        Electronically Signed: rJ Ambrosio MD    5/24/2025 11:20 AM EDT    Workstation ID: [id number]    CT Angiogram Neck [[id number]] Collected: 05/24/25 1056     Updated: 05/24/25 1107    Narrative:      CT CEREBRAL PERFUSION W WO CONTRAST, CT ANGIOGRAM NECK, CT ANGIOGRAM HEAD W AI ANALYSIS OF LVO    Date of Exam: 5/24/2025 10:30 AM EDT    Indication: Neuro Deficit, acute, Stroke suspected  Neuro deficit, acute stroke suspected.     Comparison: CT head May 24, 2025    Technique: Axial CT images of the brain were obtained prior to and after the administration of 115 cc of Isovue-370 . Core blood volume, core blood flow, mean transit time, and Tmax images were obtained utilizing the Rapid software protocol. A limited CT   angiogram of the head was also performed to measure the blood vessel density. Additional postcontrast images were obtained through the head and neck. Reconstructed coronal sagittal images were obtained. 3D volume rendered images were created for   interpretation    The radiation dose reduction device was turned on for each scan per the ALARA (As Low as Reasonably Achievable) protocol.      Findings:  CT perfusion:  CBF less than 30%: 70 cc.  Tmax greater than 6 seconds: 106 cc  Mismatch volume: 36 cc  Mismatch ratio 1.5.    CTA head neck: The vertebral arteries appear patent. There is no significant left carotid stenosis by NASCET criteria. There is occlusion of the right internal carotid artery that may be subacute.    On evaluation the intracranial vasculature the basilar artery does not appear unusual. The anterior, left middle and posterior cerebral arteries appear patent. There is occlusion of the right M1 segment. There are evolving changes of right MCA territory   infarct.      Impression:      Impression:  1.There is occlusion of the right internal carotid artery that may be subacute.  2.There is occlusion of  the right M1 segment.  3.There are evolving changes of right MCA territory infarct.        Electronically Signed: Ed Jiménez MD    5/24/2025 11:04 AM EDT    Workstation ID: [id number]    CT CEREBRAL PERFUSION WITH & WITHOUT CONTRAST [[id number]] Collected: 05/24/25 1056     Updated: 05/24/25 1107    Narrative:      CT CEREBRAL PERFUSION W WO CONTRAST, CT ANGIOGRAM NECK, CT ANGIOGRAM HEAD W AI ANALYSIS OF LVO    Date of Exam: 5/24/2025 10:30 AM EDT    Indication: Neuro Deficit, acute, Stroke suspected  Neuro deficit, acute stroke suspected.     Comparison: CT head May 24, 2025    Technique: Axial CT images of the brain were obtained prior to and after the administration of 115 cc of Isovue-370 . Core blood volume, core blood flow, mean transit time, and Tmax images were obtained utilizing the Rapid software protocol. A limited CT   angiogram of the head was also performed to measure the blood vessel density. Additional postcontrast images were obtained through the head and neck. Reconstructed coronal sagittal images were obtained. 3D volume rendered images were created for   interpretation    The radiation dose reduction device was turned on for each scan per the ALARA (As Low as Reasonably Achievable) protocol.      Findings:  CT perfusion:  CBF less than 30%: 70 cc.  Tmax greater than 6 seconds: 106 cc  Mismatch volume: 36 cc  Mismatch ratio 1.5.    CTA head neck: The vertebral arteries appear patent. There is no significant left carotid stenosis by NASCET criteria. There is occlusion of the right internal carotid artery that may be subacute.    On evaluation the intracranial vasculature the basilar artery does not appear unusual. The anterior, left middle and posterior cerebral arteries appear patent. There is occlusion of the right M1 segment. There are evolving changes of right MCA territory   infarct.      Impression:      Impression:  1.There is occlusion of the right internal carotid artery that may be  subacute.  2.There is occlusion of the right M1 segment.  3.There are evolving changes of right MCA territory infarct.        Electronically Signed: Ed Jiménez MD    5/24/2025 11:04 AM EDT    Workstation ID: [id number]    CT Angiogram Head w AI Analysis of LVO [[id number]] Collected: 05/24/25 1056     Updated: 05/24/25 1107    Narrative:      CT CEREBRAL PERFUSION W WO CONTRAST, CT ANGIOGRAM NECK, CT ANGIOGRAM HEAD W AI ANALYSIS OF LVO    Date of Exam: 5/24/2025 10:30 AM EDT    Indication: Neuro Deficit, acute, Stroke suspected  Neuro deficit, acute stroke suspected.     Comparison: CT head May 24, 2025    Technique: Axial CT images of the brain were obtained prior to and after the administration of 115 cc of Isovue-370 . Core blood volume, core blood flow, mean transit time, and Tmax images were obtained utilizing the Rapid software protocol. A limited CT   angiogram of the head was also performed to measure the blood vessel density. Additional postcontrast images were obtained through the head and neck. Reconstructed coronal sagittal images were obtained. 3D volume rendered images were created for   interpretation    The radiation dose reduction device was turned on for each scan per the ALARA (As Low as Reasonably Achievable) protocol.      Findings:  CT perfusion:  CBF less than 30%: 70 cc.  Tmax greater than 6 seconds: 106 cc  Mismatch volume: 36 cc  Mismatch ratio 1.5.    CTA head neck: The vertebral arteries appear patent. There is no significant left carotid stenosis by NASCET criteria. There is occlusion of the right internal carotid artery that may be subacute.    On evaluation the intracranial vasculature the basilar artery does not appear unusual. The anterior, left middle and posterior cerebral arteries appear patent. There is occlusion of the right M1 segment. There are evolving changes of right MCA territory   infarct.      Impression:      Impression:  1.There is occlusion of the right internal  carotid artery that may be subacute.  2.There is occlusion of the right M1 segment.  3.There are evolving changes of right MCA territory infarct.        Electronically Signed: Ed Jiménez MD    5/24/2025 11:04 AM EDT    Workstation ID: [id number]    CT Head Without Contrast Stroke Protocol [[id number]] Collected: 05/24/25 1033     Updated: 05/24/25 1041    Narrative:      CT HEAD WO CONTRAST STROKE PROTOCOL    Date of Exam: 5/24/2025 10:24 AM EDT    Indication: Neuro deficit, acute, stroke suspected  Neuro Deficit, acute, Stroke suspected.    Comparison: None available.    Technique: Axial CT images were obtained of the head without contrast administration.  Reconstructed coronal images were also obtained. Automated exposure control and iterative construction methods were used.    Scan Time: 10:26  Results discussed with stroke navigator at 10:36.      Findings:  There is a hyperdense right MCA sign suggesting thrombus, with hypodensity in the adjacent right frontal and temporal lobes in the right MCA distribution suggesting acute infarction. There is mild mass effect on the right lateral ventricle, but no   midline shift. The basal cisterns appear patent.    The calvarium appears intact. The paranasal sinuses and mastoid air cells are well-aerated.      Impression:      Impression:  Hyperdense right MCA sign suggesting thrombus, with hypodensity in the adjacent right frontal and temporal lobes in the right MCA distribution suggesting acute infarction. Mild mass effect, but no midline shift.        Electronically Signed: Luis Li MD    5/24/2025 10:37 AM EDT    Workstation ID: [id number]             Operative/Procedure Notes (all)        Matt Olivarez MD at 05/25/25 0955  Version 1 of 1         CRANIOTOMY FOR SUBDURAL HEMATOMA  Progress Note    Linda Schafer  5/25/2025    Pre-op Diagnosis:   Right MCA stroke with malignant edema       Post-Op Diagnosis  Codes:  Same    Procedure(s):      Procedure(s):  CRANIOTOMY              Surgeon(s):  Matt Olivarez MD    Anesthesia: General    Staff:   * No surgical staff found *       Estimated Blood Loss: 75 ml    Urine Voided: * No values recorded between 5/25/2025  9:20 AM and 5/25/2025 10:46 AM *    Specimens:                None      Drains:   Closed/Suction Drain 1 Lateral;Right Scalp Accordion 15 Fr. (Active)       NG/OG Tube Nasogastric 16 Fr Left nostril (Active)   Placement Verification X-ray 05/25/25 0600   Site Assessment Clean;Dry;Intact 05/25/25 0800   Securement nasal septal tie 05/25/25 0800   Secured at (cm) 57 05/25/25 0800   NG/OG Site Interventions Site assessed 05/25/25 0800   Status Suction-low intermittent 05/25/25 0800   Drainage Appearance Thin;Yellow;Clear 05/24/25 2030   Surrounding Skin Dry;Intact;Non reddened 05/25/25 0800   Flush/ Irrigation Intake (mL) 60 05/25/25 0800   Intake (mL) 35 mL 05/25/25 0600       External Urinary Catheter (Active)   Daily Indications Daily output 05/25/25 0800   Site Assessment Clean;Skin intact 05/25/25 0800   Application/Removal external catheter changed 05/25/25 0015   Collection Container Wall suction 05/25/25 0800   Wall suction (mmHG) 120 mmHG 05/25/25 0600   Securement Method Securing device 05/25/25 0800   Catheter care complete Yes 05/25/25 0015   Output (mL) 350 mL 05/25/25 0015       Findings: Successful decompressive craniectomy.  Brain was under moderate pressure when the dura was opened.      Complications: None apparent          Matt Olivarez MD     Date: 5/25/2025  Time: 11:00 EDT          Electronically signed by Matt Olivarez MD at 05/25/25 1100       Matt Olivarez MD at 05/25/25 0955  Version 1 of 1         Date of operation: May 25, 2025    Attending surgeon: Dr. 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  Decompressive craniectomy on the right for malignant cerebral edema from a MCA stroke    Findings: Successful large decompressive craniectomy achieved.  The brain was under moderate pressure when the dura was opened.    Specimens: None    Indications: Malignant cerebral edema from a right MCA stroke    Procedure in detail: The patient was brought back to the operating room and placed under general anesthesia.  She was then positioned supine with her head rotated to the left to expose the right frontal parietal region.  At this time, the hair on the patient's right side of her head was shaved.  We then sushma out a traditional trauma craniotomy question rachael incision which was just off the midline to the right.  It started just behind the hairline and went down to the root of the zygoma.  We then prepped and draped this area in a sterile fashion.  A timeout was performed, antibiotics were given and local anesthetic was injected into the incision marking.  At this time, we incised the incision with a knife.  Using Bovie cautery, we reflected our myocutaneous flaps anteriorly and have placed fishhook retractors.  We carefully exposed the root of the zygoma as well as the keyhole.  At this time, we achieved complete hemostasis.  We then placed multiple bur holes in a circumferential manner around our exposure.  We then used the craniotome to turn a large bone flap in the area that been exposed.  Once the bone flap was off, we achieved complete hemostasis.  We then continued to dissect the dura off of the middle fossa floor and then further removed the bone of the middle fossa with rongeurs until we were flush with the floor.  At this time, we then opened up the dura in a stellate fashion.  The brain was under moderate pressure when this was performed.  At this point, we had achieved a good decompression.  We then achieved complete hemostasis with bipolar cautery and Gelfoam powder.  At  this time, we placed DuraGen on top of our dural opening.  We then tunneled a Hemovac drain posteriorly.  We then copiously irrigated the field and turned our attention to the closure with the temporalis fascia was closed with interrupted 2-0 Vicryl stitches.  The galea was closed with interrupted inverted 2-0 Vicryl stitches and the skin with staples.  The drain was secured with a 2-0 silk stitch.  The bone flap was stored in our freezer using the standard protocol.    Stephanie Javed was present for all portions of the surgery and assisted with patient positioning, opening, retraction, suturing, and closing.  At the end of the case all counts were correct x2 and there were no complications noted.      Electronically signed by Matt Olivarez MD at 05/25/25 1107          Physician Progress Notes (all)        Kristopher Matt MD at 05/27/25 1213          Stroke Progress Note       Chief Complaint: Right MCA acute ischemic stroke    Subjective    Subjective     Subjective:  The patient is lying down in the bed in [facility].  No family were at the bedside. The patient stated that she is doing fine.  Denies having any new stroke or strokelike symptoms.  Nurse was at the bedside at the time of my evaluation denies having any concern.  All questions and concerns were answered.    No other acute complains at this time    Review of Systems   Constitutional: fatigue        Objective      Temp:  [98.7 °F (37.1 °C)-99.6 °F (37.6 °C)] 98.7 °F (37.1 °C)  Heart Rate:  [52-81] 56  Resp:  [16-20] 20  BP: ()/(46-90) 94/82  Arterial Line BP: (71-84)/(57-63) 71/57    Objective    GEN: lying in bed; in NAD  HENT: normocephalic, non-erythematous oropharynx  CV: no LE edema    NEURO:  Mental Status: A&O x 3 the patient would not open her eyes as her right eye was swollen from the decompressive craniectomy and concern for eyelid apraxia on the left, interactive, able to follow commands  Speech: Intact Articulation  CN 2-12:  II -  PERRLA  III, IV, VI - EOMI  V - Facial sensation intact  VII -mild left facial droop  VIII - Auditory acuity intact  XII - Tongue protrudes midline    Motor:  RUE: 5/5  LUE: 0/5  RLE: 5/5  LLE: 2/5    Sensory: intact light touch throughout  Reflexes: negative Howard's sign BL  Coordination: Able to touch her left ear with her right hand  Gait/Station: deferred     Results Review:    I reviewed the patient's new clinical results.    WBC   Date Value Ref Range Status   05/26/2025 19.20 (H) 3.40 - 10.80 10*3/mm3 Final     RBC   Date Value Ref Range Status   05/26/2025 3.87 3.77 - 5.28 10*6/mm3 Final     Hemoglobin   Date Value Ref Range Status   05/26/2025 10.9 (L) 12.0 - 15.9 g/dL Final     Hematocrit   Date Value Ref Range Status   05/26/2025 35.4 34.0 - 46.6 % Final     MCV   Date Value Ref Range Status   05/26/2025 91.5 79.0 - 97.0 fL Final     MCH   Date Value Ref Range Status   05/26/2025 28.2 26.6 - 33.0 pg Final     MCHC   Date Value Ref Range Status   05/26/2025 30.8 (L) 31.5 - 35.7 g/dL Final     RDW   Date Value Ref Range Status   05/26/2025 13.0 12.3 - 15.4 % Final     RDW-SD   Date Value Ref Range Status   05/26/2025 43.1 37.0 - 54.0 fl Final     MPV   Date Value Ref Range Status   05/26/2025 12.3 (H) 6.0 - 12.0 fL Final     Platelets   Date Value Ref Range Status   05/26/2025 208 140 - 450 10*3/mm3 Final     Neutrophil %   Date Value Ref Range Status   05/26/2025 80.8 (H) 42.7 - 76.0 % Final     Lymphocyte %   Date Value Ref Range Status   05/26/2025 10.2 (L) 19.6 - 45.3 % Final     Monocyte %   Date Value Ref Range Status   05/26/2025 8.3 5.0 - 12.0 % Final     Eosinophil %   Date Value Ref Range Status   05/26/2025 0.0 (L) 0.3 - 6.2 % Final     Basophil %   Date Value Ref Range Status   05/26/2025 0.3 0.0 - 1.5 % Final     Immature Grans %   Date Value Ref Range Status   05/26/2025 0.4 0.0 - 0.5 % Final     Neutrophils, Absolute   Date Value Ref Range Status   05/26/2025 15.53 (H) 1.70 - 7.00 10*3/mm3  Final     Lymphocytes, Absolute   Date Value Ref Range Status   05/26/2025 1.95 0.70 - 3.10 10*3/mm3 Final     Monocytes, Absolute   Date Value Ref Range Status   05/26/2025 1.59 (H) 0.10 - 0.90 10*3/mm3 Final     Eosinophils, Absolute   Date Value Ref Range Status   05/26/2025 0.00 0.00 - 0.40 10*3/mm3 Final     Basophils, Absolute   Date Value Ref Range Status   05/26/2025 0.05 0.00 - 0.20 10*3/mm3 Final     Immature Grans, Absolute   Date Value Ref Range Status   05/26/2025 0.08 (H) 0.00 - 0.05 10*3/mm3 Final     nRBC   Date Value Ref Range Status   05/26/2025 0.0 0.0 - 0.2 /100 WBC Final       Lab Results   Component Value Date    GLUCOSE 125 (H) 05/26/2025    BUN 8 05/26/2025    CREATININE 0.68 05/26/2025     (H) 05/27/2025    K 3.8 05/27/2025     (H) 05/26/2025    CALCIUM 8.9 05/26/2025    PROTEINTOT 7.0 01/19/2021    ALBUMIN 4.42 01/19/2021    ALT 19 05/24/2025    AST 27 05/24/2025    ALKPHOS 63 01/19/2021    BILITOT 0.5 01/19/2021    GLOB 2.6 01/19/2021    AGRATIO 1.7 01/19/2021    BCR 11.8 05/26/2025    ANIONGAP 9.0 05/26/2025    EGFR 98.6 05/26/2025     CT Head Without Contrast  Result Date: 5/26/2025  Impression: Stable exam. Postoperative change from recent right craniectomy with evolving large right MCA distribution infarct. Electronically Signed: Milton Blancas MD  5/26/2025 9:47 AM EDT  Workstation ID: [id number]    CT Head Without Contrast  Result Date: 5/25/2025  Impression: Postoperative changes of right-sided craniectomy. Evolving large right MCA territory infarct. No acute intracranial hemorrhage. Similar associated mass effect including narrowing of the right lateral ventricle. No significant midline shift or herniation. Electronically Signed: Carlton Christiansen MD  5/25/2025 8:20 PM EDT  Workstation ID: [id number]    Results for orders placed during the hospital encounter of 05/24/25    Adult Transthoracic Echo Complete W/ Cont if Necessary Per Protocol (With Agitated  Saline)    Interpretation Summary    Left ventricular systolic function is normal. Calculated left ventricular EF = 62%    Mild mitral valve regurgitation is present.    Trace aortic valve regurgitation is present.    Normal left atrial cavity size noted. Saline test results are negative.    -CTH wo on 5/24/2025 images were personally reviewed and showed hyperdense right MCA sign together with hypodensity of the right frontal and temporal lobes suggesting an acute infarct with no midline shift  -CTA of the head and neck images from 5/24/2025 were personally reviewed and showed an occlusion of the right ICA and right M1 segment of the MCA  -MRI brain images from 5/24/2025 were personally reviewed and showed large area of restricted diffusion in the right MCA distribution suggesting an acute infarct with no evidence of acute hemorrhage  -Transthoracic echocardiogram from 5/24/2025 report was personally reviewed and showed left ventricular ejection fraction of 62%, no left atrial dilation and negative bubble study  -A1c from 5/25/2025 was 5.3%  -LDL from 5/25/2025 was 75    Assessment/Plan     64-year-old  female with minimal vascular risk factors who presented [facility] emergency department with right MCA syndrome.  Secondary to CT angiogram as well as extended last known well she was not deemed to be an appropriate IV thrombolytic therapy candidate.  Secondary to completed stroke on CT head without contrast as well as CT perfusion imaging she was not deemed to be an emergent endovascular therapy candidate after discussing with Dr. Olivarez and Dr. Torres.  She is admitted to the neuro ICU for further workup and evaluation.     Antiplatelet PTA: None  Anticoagulant PTA: None            #Left hemiparesis  #Right gaze preference  #Right MCA ischemic stroke status post decompressive craniectomy    - Current etiology is cryptogenic.  This is likely cardioembolic versus ESUS  -CTH wo on 5/24/2025  images were personally reviewed and showed hyperdense right MCA sign together with hypodensity of the right frontal and temporal lobes suggesting an acute infarct with no midline shift  -CTA of the head and neck images from 5/24/2025 were personally reviewed and showed an occlusion of the right ICA and right M1 segment of the MCA  -MRI brain images from 5/24/2025 were personally reviewed and showed large area of restricted diffusion in the right MCA distribution suggesting an acute infarct with no evidence of acute hemorrhage  -Transthoracic echocardiogram from 5/24/2025 report was personally reviewed and showed left ventricular ejection fraction of 62%, no left atrial dilation and negative bubble study  -A1c from 5/25/2025 was 5.3%  -LDL from 5/25/2025 was 75    Recommendations  - Neurosurgery is following, appreciate recommendations  - Will relax sodium check 2 per shift.  Most recent sodium level was 147  - Agree with neurosurgery regarding starting DVT prophylaxis as soon as tomorrow  - The patient tolerated DVT prophylaxis will consider starting aspirin 81 mg for secondary stroke prevention in the next 2 days if it is okay from a neurosurgical standpoint.  - Continue atorvastatin 80 mg nightly for secondary stroke prevention.  Target LDL level of less than 70  - Target blood pressure goals of less than 140/90  - If no A-fib is detected while inpatient the patient will need 2 to 4 weeks of cardiac monitoring as an outpatient to rule out/an atrial fibrillation  - PT/OT/SLP to see and assess when appropriate  - Neurochecks and NIHSS per protocol  - Stat CT head for any neurologic decline    Stroke will continue to follow. Please call for any further questions or concerns  =================================  Kristopher Matt MD, Msc, PhD  Vascular Neurologist  [facility]          Electronically signed by Kristopher Matt MD at 05/27/25 123       Matt Olivarez MD at 05/27/25 0636         "    Enter Query Response Below      Query Response: Brain compression             If applicable, please update the problem list.       Electronically signed by Matt Olivarez MD at 05/27/25 1139       Matt Olivarez MD at 05/27/25 0858          NEUROSURGERY PROGRESS NOTE    Chief Complaint: Stroke    Subjective: Stable overnight.    Objective    Vital Signs: Blood pressure 101/90, pulse 69, temperature 99.2 °F (37.3 °C), temperature source Axillary, resp. rate 16, height 157.5 cm (62.01\"), weight 66.1 kg (145 lb 11.6 oz), SpO2 94%.    Physical Exam  Resting, awakes easily.  Right eye swollen  Pupils are 3 mm and reactive bilaterally  Follows commands briskly with good strength in the right upper and right lower extremity.  Spontaneous movement noted in the left lower extremity.  No significant movement noted in the left upper extremity    Intake/Output:   Intake/Output Summary (Last 24 hours) at 5/27/2025 0859  Last data filed at 5/27/2025 0600  Gross per 24 hour   Intake 1382.5 ml   Output 855 ml   Net 527.5 ml       Current Medications:   Current Facility-Administered Medications:     acetaminophen (TYLENOL) 160 MG/5ML oral solution 650 mg, 650 mg, Nasogastric, Q4H PRN, Va Espinosa MD, 650 mg at 05/27/25 0632    atorvastatin (LIPITOR) tablet 80 mg, 80 mg, Nasogastric, Nightly, Va Espinosa MD, 80 mg at 05/26/25 2009    Calcium Replacement - Follow Nurse / BPA Driven Protocol, , Not Applicable, PRN, Matt Olivarez MD    dextrose (D5W) 5 % infusion 448.8 mL, 6 mL/kg, Intravenous, Continuous PRN, Matt Olivarez MD    levETIRAcetam (KEPPRA) injection 500 mg, 500 mg, Intravenous, Q12H, Matt Olivarez MD, 500 mg at 05/27/25 0836    Lidocaine 4 % 1 patch, 1 patch, Transdermal, Q24H, Justin Unger, PharmD, 1 patch at 05/26/25 2116    Magnesium Standard Dose Replacement - Follow Nurse / BPA Driven Protocol, , Not Applicable, PRN, Matt Olivarez MD    mupirocin (BACTROBAN) 2 % nasal ointment 1 " Application, 1 Application, Each Nare, BID, Matt Olivarez MD, 1 Application at 05/27/25 0836    niCARdipine (CARDENE) 25mg in 250mL NS infusion, 5-15 mg/hr, Intravenous, Titrated, Matt Olivarez MD, Stopped at 05/25/25 1900    [DISCONTINUED] ondansetron ODT (ZOFRAN-ODT) disintegrating tablet 4 mg, 4 mg, Oral, Q6H PRN **OR** ondansetron (ZOFRAN) injection 4 mg, 4 mg, Intravenous, Q6H PRN, Matt Olivarez MD, 4 mg at 05/25/25 1749    pantoprazole (PROTONIX) injection 40 mg, 40 mg, Intravenous, Q AM, Va Espinosa MD, 40 mg at 05/27/25 0623    Phosphorus Replacement - Follow Nurse / BPA Driven Protocol, , Not Applicable, PRJuanis MENDOZA Nicolas, MD    Potassium Replacement - Follow Nurse / BPA Driven Protocol, , Not Applicable, PRNJuanis Nicolas, MD    sodium chloride 0.9 % flush 10 mL, 10 mL, Intravenous, Q12H, Matt Olivarez MD, 10 mL at 05/25/25 2003    sodium chloride 0.9 % flush 10 mL, 10 mL, Intravenous, Q12H, Matt Olivarez MD, 10 mL at 05/26/25 0806    sodium chloride 0.9 % flush 10 mL, 10 mL, Intravenous, Q12H, Matt Olivarez MD, 10 mL at 05/26/25 2009    sodium chloride 0.9 % flush 10 mL, 10 mL, Intravenous, PRNJuanis Nicolas, MD    sodium chloride 0.9 % flush 20 mL, 20 mL, Intravenous, PRN, Matt Olivarez MD    sodium chloride 0.9 % infusion 40 mL, 40 mL, Intravenous, PRNJuanis Nicolas, MD    sodium chloride 0.9 % infusion, 50 mL/hr, Intravenous, Continuous, Stephanie Javed PA-C, Last Rate: 50 mL/hr at 05/26/25 1800, 50 mL/hr at 05/26/25 1800    traMADol (ULTRAM) tablet 50 mg, 50 mg, Nasogastric, Q4H PRN, Va Espinosa MD     Laboratory Results:       Lab 05/26/25  0601 05/25/25  0607 05/24/25  1038 05/24/25  1037   WBC 19.20* 10.75  --  7.38   HEMOGLOBIN 10.9* 12.2  --  13.9   HEMOGLOBIN, POC  --   --  14.6  --    HEMATOCRIT 35.4 38.7  --  42.7   HEMATOCRIT POC  --   --  43  --    PLATELETS 208 222  --  259   NEUTROS ABS 15.53*  --   --  5.80   IMMATURE GRANS  (ABS) 0.08*  --   --  0.08*   LYMPHS ABS 1.95  --   --  1.07   MONOS ABS 1.59*  --   --  0.34   EOS ABS 0.00  --   --  0.02   MCV 91.5 89.4  --  86.6   PROTIME  --   --   --  12.8   APTT  --   --   --  27.2         Lab 05/27/25  0623 05/26/25  1909 05/26/25  1241 05/26/25  0601 05/26/25  0016 05/25/25  1350 05/25/25  0607 05/24/25  1726 05/24/25  1038 05/24/25  1037   SODIUM 147* 153* 151* 152* 152*   < > 147*   < >  --   --    POTASSIUM 3.8 3.8 3.9 4.1 4.1   < > 3.3*   < >  --   --    CHLORIDE  --   --   --  120*  --   --  115*  --   --   --    CO2  --   --   --  23.0  --   --  22.0  --   --   --    ANION GAP  --   --   --  9.0  --   --  10.0  --   --   --    BUN  --   --   --  8  --   --  8  --   --   --    CREATININE  --   --   --  0.68  --   --  0.65  --  0.80  --    EGFR  --   --   --  98.6  --   --  99.7  --  83.4  --    GLUCOSE  --   --   --  125*  --   --  140*  --   --   --    CALCIUM  --   --   --  8.9  --   --  9.1  --   --   --    MAGNESIUM  --   --   --   --   --   --   --   --   --  2.1   HEMOGLOBIN A1C  --   --   --   --   --   --  5.30  --   --   --     < > = values in this interval not displayed.         Lab 05/24/25  1037   ALT (SGPT) 19   AST (SGOT) 27         Lab 05/24/25  1037   PROTIME 12.8   INR 0.91         Lab 05/25/25  0607   CHOLESTEROL 150   LDL CHOL 75   HDL CHOL 60   TRIGLYCERIDES 75             Brief Urine Lab Results       None          Microbiology Results (last 10 days)       Procedure Component Value - Date/Time    Tissue / Bone Culture - Surgical Site, Brain [[id number]] Collected: 05/25/25 1831    Lab Status: Preliminary result Specimen: Surgical Site from Brain Updated: 05/25/25 2136     Gram Stain Many (4+) Red blood cells      Rare (1+) WBCs seen      No organisms seen             Diagnostic Imaging: I reviewed and independently interpreted the new imaging.     Assessment/Plan:    1. Acute CVA (cerebrovascular accident)    2. Stroke    3. Dysarthria    4. Dysphagia, unspecified  type         This is a 64-year-old female status post decompressive craniectomy for a large right MCA stroke on 5/26.  Neurologically, the patient has remained stable.  Her head CT shows good decompression with mild swelling noted of the right hemisphere.  We will plan to remove her drain today.  Continue to keep systolic blood pressure less than 140.  Continue care in the ICU.  Plan to start DVT prophylaxis tomorrow.  Appreciate ICU assistance.      Any copied data from previous notes included in the (1) History of Present Illness, (2) Physical Examination and (3) Medical Decision Making and/or Assessment and Plan has been reviewed and is accurate as of 05/27/25      Matt Olivarez MD  05/27/25  08:59 EDT        Electronically signed by Matt Olivarez MD at 05/27/25 0901       Arturo Jay PA-C at 05/26/25 1059          HOD# : 2    No events last night  Patient answering questions appropriately.  Patient with a lot of swelling in her right eye as expected for a craniectomy.    Patient will stick out her tongue and tell me the year place and date.    Patient pretty much flaccid maybe with extensor tone in the left upper extremity but some discoordinated movement of the left lower extremity with 4 out of 5 strength but proximal hip on the left is not antigravity so 3 out of 5.    Right side follows all commands with 5 out of 5 strength.    Patient son had no unanswered questions    Acute ischemic stroke      Temp:  [97.3 °F (36.3 °C)-99.4 °F (37.4 °C)] 98.7 °F (37.1 °C)  Heart Rate:  [48-90] 74  Resp:  [12-18] 16  BP: (104-142)/(55-97) 124/81  Arterial Line BP: ()/(48-83) 89/57  I/O last 3 completed shifts:  In: 2935.2 [I.V.:2485.2; Other:195; NG/GT:105; IV Piggyback:150]  Out: 2805 [Urine:2750; Drains:55]  I/O this shift:  In: 115.1 [I.V.:115.1]  Out: 75 [Urine:75]  Vital signs were reviewed and documented in the chart      EXAM   Body mass index is 26.65 kg/m².    Patient's GCS is 14 and will follow  "commands on the right side.    Left upper extremity is flaccid perhaps some extensor tone.    Left lower extremity is proximally weak with 3 out of 5 proximal hip flexion but is able to \"kick like a soccer ball\" with 4 out of 5 strength    Dorsiflexion plantarflexion 4 out of 5 on the left foot 5 out of 5 on the right      DIAGNOSIS  Completed right MCA stroke   Hemicraniectomy secondary to brain swelling      PLAN    From a neurosurgical perspective she is looking pretty good.  Answering questions with left upper extremity weakness as described preoperatively.    Patient will continue medical support from here.  Patient's current sodium is 152.  We will keep the NaCl where it is.    Patient will stay on Keppra and needs Cardene to keep systolic blood pressures less than 140                Electronically signed by Arturo Jay PA-C at 05/26/25 1103       Va Espinosa MD at 05/26/25 0823                                                        LOS: 2 days   Patient Care Team:  Micheline Heath DO as PCP - General (Family Medicine)    Chief Complaint:  F/up stroke and critical care management.    Subjective   Interval History    No fever.  WBC is noted to be increased.  Clinically she looks better.  She is able to talk.  She moves her right side and still very weak on the left.  She underwent swallow evaluation    REVIEW OF SYSTEMS:   Could not obtain.  Patient is aphasic.    Ventilator/Non-Invasive Ventilation Settings (From admission, onward)      None                  Physical Exam:     Vital Signs  Temp:  [97.3 °F (36.3 °C)-99.4 °F (37.4 °C)] 98.7 °F (37.1 °C)  Heart Rate:  [48-90] 79  Resp:  [12-18] 16  BP: (104-142)/(55-97) 112/76  Arterial Line BP: ()/(48-83) 87/58    Intake/Output Summary (Last 24 hours) at 5/26/2025 0823  Last data filed at 5/26/2025 0800  Gross per 24 hour   Intake 2163.6 ml   Output 2455 ml   Net -291.4 ml     Flowsheet Rows      Flowsheet Row First Filed Value   Admission Height " "157.5 cm (62\") Documented at 05/24/2025 1032   Admission Weight 74.8 kg (165 lb) Documented at 05/24/2025 1032            PPE used per hospital policy    General Appearance:  Conscious.  Alert.  Not in distress.   ENMT: Mallampati 3.  Dry tongue.   Eyes: injected conjunctiva.  Pupils equal and reactive to light.   Neck:    Trachea midline. No thyromegaly.   Lungs:    Clear to auscultation,respirations regular, even and nonlabored    Heart:   Regular rhythm and normal rate, normal S1 and S2, no         murmur   Skin:   No rash or ecchymosis   Abdomen:    Soft. No tenderness. No HSM.   Neuro/psych: Conscious.  Alert..  Moves extremities on the right.  Very minimally the left arm.  Speech is comprehensible slightly slurred   Extremities:  No cyanosis, clubbing or edema.  Warm extremities and well-perfused          Results Review:        Results from last 7 days   Lab Units 05/26/25  0601 05/26/25  0016 05/25/25  1927 05/25/25  1350 05/25/25  0607 05/24/25  1726 05/24/25  1038   SODIUM mmol/L 152* 152* 152*   < > 147*   < >  --    POTASSIUM mmol/L 4.1 4.1 3.6   < > 3.3*   < >  --    CHLORIDE mmol/L 120*  --   --   --  115*  --   --    CO2 mmol/L 23.0  --   --   --  22.0  --   --    BUN mg/dL 8  --   --   --  8  --   --    CREATININE mg/dL 0.68  --   --   --  0.65  --  0.80   GLUCOSE mg/dL 125*  --   --   --  140*   < >  --    CALCIUM mg/dL 8.9  --   --   --  9.1  --   --     < > = values in this interval not displayed.         Results from last 7 days   Lab Units 05/26/25  0601 05/25/25  0607 05/24/25  1038 05/24/25  1037   WBC 10*3/mm3 19.20* 10.75  --  7.38   HEMOGLOBIN g/dL 10.9* 12.2  --  13.9   HEMOGLOBIN, POC g/dL  --   --  14.6  --    HEMATOCRIT % 35.4 38.7  --  42.7   HEMATOCRIT POC %  --   --  43  --    PLATELETS 10*3/mm3 208 222  --  259     Results from last 7 days   Lab Units 05/24/25  1037   INR  0.91   APTT seconds 27.2                               I reviewed the patient's new clinical results.      "   Medication Review:   atorvastatin, 80 mg, Oral, Nightly  levETIRAcetam, 500 mg, Intravenous, Q12H  mupirocin, 1 Application, Each Nare, BID  pantoprazole, 40 mg, Intravenous, Q AM  sodium chloride, 10 mL, Intravenous, Q12H  sodium chloride, 10 mL, Intravenous, Q12H  sodium chloride, 10 mL, Intravenous, Q12H        dextrose, 6 mL/kg  lactated ringers, 9 mL/hr  niCARdipine, 5-15 mg/hr, Last Rate: Stopped (05/25/25 1900)  sodium chloride, 50 mL/hr, Last Rate: 50 mL/hr (05/26/25 0646)        Diagnostic imaging:  I personally and independently reviewed the following images:  CT brain 5/25/2025: Worsening localized brain edema with effacement of the anterior horn of the right lateral ventricle but no midline shift    CT brain 5/26/2025: Motion artifact limiting interpretation.  Evolution of the R MCA ischemic stroke/infarct with edema and 6.7 midline shift (my interpretation)    Assessment     Acute ischemic R MCA stroke  Brain edema, secondary to #1,  s/p Decompressive craniectomy 5/25  Left hemiparesis and expressive aphasia, secondary #1  Sinus bradycardia/sinus pauses: Secondary to sleep and sleep apnea.  Possible contribution from brain edema.  POA.  This has resolved since then  Loud snoring, probable CATHRYN  Electrolyte disturbance: Iatrogenic hypernatremia.  Hypokalemia, replaced.  Leukocytosis, no fever.  Dysphagia     GERD  Hiatal hernia        Plan     NS 30 ml/h  Start tube feeding.  Continue speech eval  CT brain per neurosurgery  Neurocheck per protocol.  Continue critical care monitoring.  Continue holding anticoagulation.  Resume when okay with neurosurgery.  Nicardipine IV drip, titrate to keep SBP <140  Keppra 500 mg twice daily prophylaxis  F/up WBC. Consider empiric antibiotic if fever.  Atorvastatin 80 mg daily.  PPI prophylaxis  DVT prophylaxis with SCD        Va Espinosa MD  05/26/25  08:23 EDT        Time: Critical care 32 min      This note was dictated utilizing Dragon dictation  "    Electronically signed by Va Espinosa MD at 05/26/25 1254       Matt Olivarez MD at 05/25/25 0850          NEUROSURGERY PROGRESS NOTE    Chief Complaint: Right MCA stroke    Subjective: Patient complaining of more headache this morning.  Additionally, she feels more lethargic and having trouble opening her eyes.  Last sodium was 147.    Objective    Vital Signs: Blood pressure 113/69, pulse 52, temperature 98.7 °F (37.1 °C), temperature source Axillary, resp. rate 16, height 157.5 cm (62.01\"), weight 66.1 kg (145 lb 11.6 oz), SpO2 98%.    Physical Exam  Says name  Does not open eyes to voice  Pupils 3 mm reactive bilaterally  Left facial droop  No movement to stimulation of the left upper extremity.  She does withdraw the left lower extremity to stimulation  Follows commands with good strength on the right side    Intake/Output:   Intake/Output Summary (Last 24 hours) at 5/25/2025 0850  Last data filed at 5/25/2025 0648  Gross per 24 hour   Intake 826.67 ml   Output 950 ml   Net -123.33 ml       Current Medications:   Current Facility-Administered Medications:     acetaminophen (TYLENOL) 160 MG/5ML oral solution 650 mg, 650 mg, Oral, Q4H PRN, Franc Joel APRN, 650 mg at 05/25/25 0521    atorvastatin (LIPITOR) tablet 80 mg, 80 mg, Oral, Nightly, Izzy Silvestre, APRN, 80 mg at 05/24/25 2106    Calcium Replacement - Follow Nurse / BPA Driven Protocol, , Not Applicable, PRN, Va Espinosa MD    dextrose (D5W) 5 % infusion 448.8 mL, 6 mL/kg, Intravenous, Continuous PRN, Izzy Silvestre, APRN    Magnesium Standard Dose Replacement - Follow Nurse / BPA Driven Protocol, , Not Applicable, PRNFrancisca Rami, MD    mupirocin (BACTROBAN) 2 % nasal ointment 1 Application, 1 Application, Each Nare, BID, Va Espinosa MD, 1 Application at 05/25/25 0840    Phosphorus Replacement - Follow Nurse / BPA Driven Protocol, , Not Applicable, PRNFrancisca Rami, MD    Potassium Replacement - Follow Nurse / BPA " Driven Protocol, , Not Applicable, Francisca VOGEL Rami, MD    sodium chloride (HYPERTONIC) 3 % infusion, 40 mL/hr, Intravenous, Continuous, Izzy Silvestre, CINDY, Last Rate: 40 mL/hr at 05/25/25 0521, 40 mL/hr at 05/25/25 0521    sodium chloride 0.9 % flush 10 mL, 10 mL, Intravenous, PRN, Man Hannon MD    sodium chloride 0.9 % flush 10 mL, 10 mL, Intravenous, Q12H, Va Espinosa MD, 10 mL at 05/24/25 2108    sodium chloride 0.9 % flush 10 mL, 10 mL, Intravenous, Q12H, Va Espinosa MD, 10 mL at 05/24/25 2108    sodium chloride 0.9 % flush 10 mL, 10 mL, Intravenous, Q12H, Va Espinosa MD, 10 mL at 05/24/25 2107    sodium chloride 0.9 % flush 10 mL, 10 mL, Intravenous, PRN, Va Espinosa MD    sodium chloride 0.9 % flush 20 mL, 20 mL, Intravenous, PRN, Va Espinosa MD    sodium chloride 0.9 % infusion 40 mL, 40 mL, Intravenous, Francisca VOGEL Rami, MD     Laboratory Results:       Lab 05/25/25  0607 05/24/25  1038 05/24/25  1037   WBC 10.75  --  7.38   HEMOGLOBIN 12.2  --  13.9   HEMOGLOBIN, POC  --  14.6  --    HEMATOCRIT 38.7  --  42.7   HEMATOCRIT POC  --  43  --    PLATELETS 222  --  259   NEUTROS ABS  --   --  5.80   IMMATURE GRANS (ABS)  --   --  0.08*   LYMPHS ABS  --   --  1.07   MONOS ABS  --   --  0.34   EOS ABS  --   --  0.02   MCV 89.4  --  86.6   PROTIME  --   --  12.8   APTT  --   --  27.2         Lab 05/25/25  0607 05/25/25  0039 05/24/25  1726 05/24/25  1038 05/24/25  1037   SODIUM 147* 146* 143  --   --    POTASSIUM 3.3* 3.5 3.7  --   --    CHLORIDE 115*  --   --   --   --    CO2 22.0  --   --   --   --    ANION GAP 10.0  --   --   --   --    BUN 8  --   --   --   --    CREATININE 0.65  --   --  0.80  --    EGFR 99.7  --   --  83.4  --    GLUCOSE 140*  --   --   --   --    CALCIUM 9.1  --   --   --   --    MAGNESIUM  --   --   --   --  2.1   HEMOGLOBIN A1C 5.30  --   --   --   --          Lab 05/24/25  1037   ALT (SGPT) 19   AST (SGOT) 27         Lab 05/24/25  1037   PROTIME 12.8   INR  0.91         Lab 05/25/25  0607   CHOLESTEROL 150   LDL CHOL 75   HDL CHOL 60   TRIGLYCERIDES 75             Brief Urine Lab Results       None          Microbiology Results (last 10 days)       ** No results found for the last 240 hours. **             Diagnostic Imaging: I reviewed and independently interpreted the new imaging.     Assessment/Plan:    1. Acute CVA (cerebrovascular accident)         This is a 64-year-old female who presented yesterday with a completed right MCA infarct.  Repeat imaging this morning shows increased edema and mass effect.  Clinically, the patient is having worsening headache and is slightly more lethargic.  Due to both the radiographic and clinical worsening, I think it is appropriate to proceed with a right sided decompressive craniectomy.  I discussed this case at length yesterday with the patient's family and they were in agreement to proceed.  This morning, I reviewed the risks, benefits and alternatives with her  and daughter which include but are not limited to bleeding, infection, CSF leak, seizure, neurological injury, coma and death.  They understood and are in agreement.  We will look to do that this morning.      Any copied data from previous notes included in the (1) History of Present Illness, (2) Physical Examination and (3) Medical Decision Making and/or Assessment and Plan has been reviewed and is accurate as of 05/25/25      Matt Olivarez MD  05/25/25  08:50 EDT        Electronically signed by Matt Olivarez MD at 05/25/25 0852       Va Espinosa MD at 05/25/25 0802                                                        LOS: 1 day   Patient Care Team:  Micheline Heath DO as PCP - General (Family Medicine)    Chief Complaint:  F/up stroke and critical care management.    Subjective   Interval History    Seen postop.  She is sleepy but not much changed on her exam compared to previously.  Still weaker on the left side and had right gaze  "preference.  On nicardipine drip and hypertonic saline.  No seizure.    REVIEW OF SYSTEMS:   Could not obtain.  Patient is aphasic.    Ventilator/Non-Invasive Ventilation Settings (From admission, onward)      None                  Physical Exam:     Vital Signs  Temp:  [96.8 °F (36 °C)-100.7 °F (38.2 °C)] 97.6 °F (36.4 °C)  Heart Rate:  [58-86] 66  Resp:  [14-18] 18  BP: (113-140)/(56-91) 133/73    Intake/Output Summary (Last 24 hours) at 5/25/2025 0836  Last data filed at 5/25/2025 0648  Gross per 24 hour   Intake 826.67 ml   Output 950 ml   Net -123.33 ml     Flowsheet Rows      Flowsheet Row First Filed Value   Admission Height 157.5 cm (62\") Documented at 05/24/2025 1032   Admission Weight 74.8 kg (165 lb) Documented at 05/24/2025 1032            PPE used per hospital policy    General Appearance:  Somnolent but easily arousable.  Not in distress.   ENMT: Mallampati 3.  Dry tongue.   Eyes: Right gaze preference.  Pupils equal and reactive to light.   Neck:    Trachea midline. No thyromegaly.   Lungs:    Clear to auscultation,respirations regular, even and nonlabored    Heart:   Regular rhythm and normal rate, normal S1 and S2, no         murmur   Skin:   No rash or ecchymosis   Abdomen:    Soft. No tenderness. No HSM.   Neuro/psych: Somnolent but easily arousable.  Moves extremities on the right.  Withdraws to painful stimulus on the left.   Extremities:  No cyanosis, clubbing or edema.  Warm extremities and well-perfused          Results Review:        Results from last 7 days   Lab Units 05/25/25  0607 05/25/25  0039 05/24/25  1726 05/24/25  1038   SODIUM mmol/L 147* 146* 143  --    POTASSIUM mmol/L 3.3* 3.5 3.7  --    CHLORIDE mmol/L 115*  --   --   --    CO2 mmol/L 22.0  --   --   --    BUN mg/dL 8  --   --   --    CREATININE mg/dL 0.65  --   --  0.80   GLUCOSE mg/dL 140*  --   --   --    CALCIUM mg/dL 9.1  --   --   --          Results from last 7 days   Lab Units 05/25/25  0607 05/24/25  1038 " 05/24/25  1037   WBC 10*3/mm3 10.75  --  7.38   HEMOGLOBIN g/dL 12.2  --  13.9   HEMOGLOBIN, POC g/dL  --  14.6  --    HEMATOCRIT % 38.7  --  42.7   HEMATOCRIT POC %  --  43  --    PLATELETS 10*3/mm3 222  --  259     Results from last 7 days   Lab Units 05/24/25  1037   INR  0.91   APTT seconds 27.2                               I reviewed the patient's new clinical results.        Medication Review:   atorvastatin, 80 mg, Oral, Nightly  mupirocin, 1 Application, Each Nare, BID  sodium chloride, 10 mL, Intravenous, Q12H  sodium chloride, 10 mL, Intravenous, Q12H  sodium chloride, 10 mL, Intravenous, Q12H        dextrose, 6 mL/kg  sodium chloride, 40 mL/hr, Last Rate: 40 mL/hr (05/25/25 0521)        Diagnostic imaging:  I personally and independently reviewed the following images:  CT brain 5/25/2025: Worsening localized brain edema with effacement of the anterior horn of the right lateral ventricle but no midline shift    Assessment     Acute ischemic R MCA stroke  Brain edema, secondary to #1, worse, s/p Decompressive craniectomy 5/25  Left hemiparesis and expressive aphasia, secondary #1  Sinus bradycardia/sinus pause: Secondary to sleep and sleep apnea.  Possible contribution from brain edema.  Lowest HR was noted at 44 but again she had sinus pauses as above.  Loud snoring, probable CATHRYN  Electrolyte disturbance: Iatrogenic hypernatremia.  Hypokalemia.     GERD  Hiatal hernia        Plan     3% saline infusion to target sodium level 145-150 per neurosurgery  CT brain monitoring  Neurocheck per protocol  Continue holding anticoagulation.  Resume when okay with neurosurgery.  NG tube in place.  Speech eval in a.m. and initiate TF.  Nicardipine IV drip  PT OT/speech  Atorvastatin 80 mg daily.  Replace potassium  Initiate PPI prophylaxis  DVT prophylaxis with SCD        Va Espinosa MD  05/25/25  08:36 EDT        Time: Critical care 35 min      This note was dictated utilizing Dragon dictation     Electronically  signed by Va Espinosa MD at 25 1650          Consult Notes (all)        Matt Olivarez MD at 25 1418          Patient: Linda Schafer  : 1960    Primary Care Provider: Micheline Heath DO    Requesting Provider: As above      Chief Complaint: Stroke      History of Present Illness: This is a 64 y.o. female who presented this morning with acute onset of left-sided weakness and altered mental status.  Her imaging revealed a completed right hemispheric infarct secondary to an ICA terminus occlusion.  Fortunately, she was not a endovascular candidate due to completion of the stroke.  She has been transferred to the ICU.  While in the ICU, she has remained neurologically stable.  She is awake and following commands on the right side.  She does endorse headache.  She was given 1 rectal dose of aspirin, but otherwise is not on any blood thinners.    PMHX  Allergies:  Allergies   Allergen Reactions    Codeine      Codeine Derivatives    Penicillins      Medications    Current Facility-Administered Medications:     aspirin tablet 325 mg, 325 mg, Oral, Daily **OR** aspirin suppository 300 mg, 300 mg, Rectal, Daily, Izzy Silvestre, APRN, 300 mg at 25 1158    atorvastatin (LIPITOR) tablet 80 mg, 80 mg, Oral, Nightly, Izzy Silvestre APRN    sodium chloride 0.9 % flush 10 mL, 10 mL, Intravenous, PRN, Man Hannon MD  Past Medical History:  Past Medical History:   Diagnosis Date    Atypical chest pain     Chest pain.Atypical chest pain    Chest pain     COVID-19     2022    COVID-19 vaccine administered     2021 ,  J and J ) 10/25/2021 - Moderna    Diminished pulses in lower extremity     Diverticulosis     Dizziness     Occasional    Dyslipidemia     Dyspnea     Edema     Fatigue     GERD (gastroesophageal reflux disease)     Hiatal hernia     Migraine headache     Palpitations     Personal history of cardiac murmur     SOB (shortness of breath)      Past Surgical  "History:  Past Surgical History:   Procedure Laterality Date    CHOLECYSTECTOMY      HYSTERECTOMY      OOPHORECTOMY      TUBAL ABDOMINAL LIGATION       Social Hx:  Social History     Tobacco Use    Smoking status: Never    Smokeless tobacco: Never   Vaping Use    Vaping status: Never Used   Substance Use Topics    Alcohol use: No    Drug use: Never     Family Hx:  Family History   Problem Relation Age of Onset    Hyperlipidemia Mother     Hypertension Mother     Stroke Mother     Anxiety disorder Mother     Stroke Father     Hypertension Father     Hyperlipidemia Father     Heart attack Father     Hyperlipidemia Other         Dyslipidemia    Stroke Other         CVA    Coronary artery disease Other     Diabetes Other     Hypertension Other     Sudden death Other         Sudden Cardiac Death (SCD)     Review of Systems:        Positive for left-sided weakness and headache.  All other review of systems negative    Physical Exam:   /73 (BP Location: Left arm, Patient Position: Lying)   Pulse 77   Temp 98.4 °F (36.9 °C) (Oral)   Resp 16   Ht 157.5 cm (62\")   Wt 74.8 kg (165 lb)   SpO2 99%   BMI 30.18 kg/m²   Patient appears comfortable, resting  Awake, alert and oriented x 3  Speech fluent/clear  Opens eyes spont  Pupils 3 mm reactive bilaterally  Left facial droop  Neglects the left side  Follows commands briskly in the right upper and right lower extremity.  No movement noted to stimulation in the left upper extremity.  There is some withdrawal in the left lower extremity to stimulation.    Intake/Output: No intake or output data in the 24 hours ending 05/24/25 1418    Current Medications:   Current Facility-Administered Medications:     aspirin tablet 325 mg, 325 mg, Oral, Daily **OR** aspirin suppository 300 mg, 300 mg, Rectal, Daily, Izzy Silvestre APRN, 300 mg at 05/24/25 1158    atorvastatin (LIPITOR) tablet 80 mg, 80 mg, Oral, Nightly, Izzy Silvestre APRN    sodium chloride 0.9 % flush 10 " mL, 10 mL, Intravenous, PRN, Man Hannon MD     Laboratory Results:      Lab 05/24/25  1038 05/24/25  1037   WBC  --  7.38   HEMOGLOBIN  --  13.9   HEMOGLOBIN, POC 14.6  --    HEMATOCRIT  --  42.7   HEMATOCRIT POC 43  --    PLATELETS  --  259   NEUTROS ABS  --  5.80   IMMATURE GRANS (ABS)  --  0.08*   LYMPHS ABS  --  1.07   MONOS ABS  --  0.34   EOS ABS  --  0.02   MCV  --  86.6   PROTIME  --  12.8   APTT  --  27.2         Lab 05/24/25  1038   CREATININE 0.80   EGFR 83.4         Lab 05/24/25  1037   ALT (SGPT) 19   AST (SGOT) 27         Lab 05/24/25  1037   PROTIME 12.8   INR 0.91                 Brief Urine Lab Results       None          Microbiology Results (last 10 days)       ** No results found for the last 240 hours. **             Diagnostic Imaging: The patient's diagnostic imaging was independently reviewed and interpreted by myself.    Assessment/Plan:    1. Acute CVA (cerebrovascular accident)         This is a 64-year-old female who presented with a completed right MCA infarct secondary to an ICA terminus occlusion this morning.  Clinically, the patient is neurologically stable and awake.  I had a long discussion with the patient's family regarding the concern for malignant cerebral edema.  Given her young age, overall functional status as well as good support system at home, the family would like for us to proceed with a decompressive craniectomy in the event that she is developing worsening edema.  At this point, she is stable and does not require any urgent surgery.  We are going to start her on 3% with a sodium goal of 145-150.  We will plan for a repeat head CT tomorrow morning.  Depending on what this shows and how she is doing, we may discuss timing of surgery if needed.  At this point, please hold all anticoagulation and antiplatelet medications in the event she does need surgery.  Appreciate stroke team and ICU assistance.      Matt Olivarez MD  05/24/25  14:18  EDT          Electronically signed by Matt Olivarez MD at 25 1423       Izzy Silvestre APRN at 25 1037        Consult Orders    1. Inpatient Neurology Consult Stroke [[id number]] ordered by Man Hannon MD at 25 1026                 Stroke Consult Note    Patient Name: Linda Schafer   MRN: [id number]  Age: 64 y.o.  Sex: female  : 1960    Primary Care Physician: Micheline Heath DO  Referring Physician: Dr. Man Hannon    TIME STROKE TEAM CALLED: 0955 EST     TIME PATIENT SEEN: 1025 EST on arrival to [facility]    Handedness: Right  Race:     Chief Complaint/Reason for Consultation: Right MCA syndrome    HPI:   Linda Schafer is a 64-year-old  female with a past medical history significant for hyperlipidemia, GERD, migraine headaches, palpitations and arrhythmia.  She is noted to be a lifelong non-smoker.  She presented to [facility] emergency department via air [facility] for stroke workup.  Per flight crew they report that approximately 2:30 AM the patient was up to go to the bathroom and fell.  They report her  helped her get back to bed with minimal assist and when they woke around 8 AM this morning she had dense left-sided hemiparesis and right gaze preference which prompted a call to 911.  On arrival to [facility] code stroke was initiated and she was taken urgently to CT scanner for further workup and evaluation.  CT head was completed showing right MCA territory infarct with hyperdense right MCA.  CTA head and neck shows complete occlusion of the right internal carotid artery through the right M1 segment.  CT perfusion shows area of core infarct with similarly matching penumbra.  NIH on my initial examination 17.  Blood pressure on arrival 128/65.  Secondary to CT head changes as well as extended last known well she was not deemed to be an appropriate IV thrombolytic therapy candidate.  Secondary to lack of ischemic penumbra and advanced CT head  changes she was not deemed to be an emergent endovascular therapy candidate. After discussion with Dr. Olivarez as well as Dr. Torres the patient will be taken to the neuro ICU for further workup and evaluation.     Last Known Normal Date/Time: 0230am EST     Review of Systems   Unable to perform ROS: Mental status change   Constitutional:  Positive for fatigue.   HENT:  Positive for trouble swallowing.    Eyes:  Positive for visual disturbance.   Musculoskeletal:  Positive for gait problem.   Neurological:  Positive for facial asymmetry, speech difficulty, weakness, numbness and headaches.      Past Medical History:   Diagnosis Date    Atypical chest pain     Chest pain.Atypical chest pain    Chest pain     COVID-19     06/30/2022    COVID-19 vaccine administered     04/02/2021 ,  J and J ) 10/25/2021 - Moderna    Diminished pulses in lower extremity     Diverticulosis     Dizziness     Occasional    Dyslipidemia     Dyspnea     Edema     Fatigue     GERD (gastroesophageal reflux disease)     Hiatal hernia     Migraine headache     Palpitations     Personal history of cardiac murmur     SOB (shortness of breath)      Past Surgical History:   Procedure Laterality Date    CHOLECYSTECTOMY      HYSTERECTOMY      OOPHORECTOMY      TUBAL ABDOMINAL LIGATION       Family History   Problem Relation Age of Onset    Hyperlipidemia Mother     Hypertension Mother     Stroke Mother     Anxiety disorder Mother     Stroke Father     Hypertension Father     Hyperlipidemia Father     Heart attack Father     Hyperlipidemia Other         Dyslipidemia    Stroke Other         CVA    Coronary artery disease Other     Diabetes Other     Hypertension Other     Sudden death Other         Sudden Cardiac Death (SCD)     Social History     Socioeconomic History    Marital status:    Tobacco Use    Smoking status: Never    Smokeless tobacco: Never   Vaping Use    Vaping status: Never Used   Substance and Sexual Activity    Alcohol use:  No    Drug use: Never    Sexual activity: Defer     Allergies   Allergen Reactions    Codeine      Codeine Derivatives    Penicillins      Prior to Admission medications    Medication Sig Start Date End Date Taking? Authorizing Provider   aspirin 81 MG tablet Take 1 tablet by mouth Daily. 9/14/15   Mary Jose MD   atorvastatin (LIPITOR) 20 MG tablet TAKE ONE TABLET BY MOUTH EVERY NIGHT AT BEDTIME 10/28/24   Neptali King APRN   Calcium 500 MG tablet Take  by mouth Daily. 5/13/15   Mary Jose MD   Coenzyme Q10 (CO Q-10) 100 MG capsule Take 100 mg by mouth Every Night. 9/14/15   Mary Jose MD   dicyclomine (BENTYL) 10 MG capsule prn 6/2/19   Mary Jose MD   estradiol (MINIVELLE, VIVELLE-DOT) 0.05 MG/24HR patch Place 1 patch on the skin as directed by provider 2 (Two) Times a Week. 5/13/15   Mary Jose MD   furosemide (LASIX) 40 MG tablet Take 1 tablet by mouth Daily. 8/30/23   Joe García MD   loratadine (ALLERGY RELIEF) 10 MG tablet Take 1 tablet by mouth Daily. 1/7/20   Natalie Barbosa APRN   MULTIPLE VITAMIN PO Take  by mouth Daily. 5/13/15   Mary Jose MD   naproxen (NAPROSYN) 500 MG tablet Take 1 tablet by mouth Daily As Needed. 9/14/15   Mary Jose MD   naratriptan (AMERGE) 2.5 MG tablet Take 1 tablet by mouth Every 4 (Four) Hours As Needed. migraines 5/13/15   Mary Jose MD   pantoprazole (PROTONIX) 20 MG EC tablet Take 1 tablet by mouth 2 (Two) Times a Day. 8/31/22   Natalie Barbosa APRN   potassium chloride 10 MEQ CR tablet Take 1 tablet by mouth Daily. 8/30/23   Joe García MD   TiZANidine (ZANAFLEX) 4 MG capsule Take 1 capsule by mouth 3 (Three) Times a Day As Needed for Muscle Spasms. 1/19/21   Natalie Barbosa APRN   vitamin B-12 (CYANOCOBALAMIN) 100 MCG tablet Take 0.5 tablets by mouth Daily.    Mary Jose MD   vitamin C (ASCORBIC ACID) 250 MG tablet Take 1 tablet by mouth Daily.     Provider, MD Mary         Temp:  [96.8 °F (36 °C)] 96.8 °F (36 °C)  Heart Rate:  [58] 58  Resp:  [14] 14  BP: (133)/(72) 133/72  Neurological Exam  Mental Status  Awake, alert and drowsy. Oriented only to person, place and situation. Speech is normal. Language is fluent with no aphasia.    Cranial Nerves  CN III, IV, VI: Pupils equal round and reactive to light bilaterally. Right gaze preference.  CN V:  Left: Diminished sensation of the entire left side of the face.  CN VII:  Left: There is central facial weakness.  CN VIII: Hearing appears to be intact bilaterally.  CN IX, X:  Right: Palate is normal.  Left: Palate is normal.    Motor  Normal muscle bulk throughout. No fasciculations present. Normal muscle tone. Left hemiparesis.    Sensory  Left hemisensory loss.     Coordination    No overt dysmetria or ataxia out of proportion to weakness.    Gait    Unable to assess secondary to the acuity of the patient's condition.      Physical Exam  Constitutional:       General: She is awake. She is not in acute distress.  HENT:      Head: Normocephalic.      Mouth/Throat:      Pharynx: Oropharynx is clear.   Eyes:      Pupils: Pupils are equal, round, and reactive to light.   Cardiovascular:      Rate and Rhythm: Normal rate and regular rhythm.   Pulmonary:      Effort: No respiratory distress.   Abdominal:      General: Abdomen is flat.   Skin:     General: Skin is warm and dry.   Neurological:      Mental Status: She is alert.      Cranial Nerves: Cranial nerve deficit present.      Sensory: Sensory deficit present.      Motor: Weakness present.   Psychiatric:         Mood and Affect: Mood normal.         Speech: Speech normal.         Behavior: Behavior normal.       Acute Stroke Data    Thrombolytic Inclusion / Exclusion Criteria    Time: 10:37 EDT  Person Administering Scale: CINDY Thomas      YES NO INCLUSION CRITERIA CLASS I   x      Suspected diagnosis of acute ischemic stroke with measureable  neurological deficit.  Low NIHSS with disabling stroke symptoms.    x Onset of stroke symptoms < 3 hours before beginning treatment >/ 18 years old  Stroke symptom onset = time patient was last seen well or without symptoms (LKW)    x Onset of symptoms between 3-4.5 hours: >/= 80 years old (safe Class IIa) with history of   both diabetes and prior CVA  (reasonable Class IIb) AND NIHSS </= 25  *If not eligible for IV Thrombolytic consider neuro intervention for LKW within 24 hours     YES NO EXCLUSION CRITERIA (CONTRAINDICATIONS) CLASS III EVIDENCE HARM     Blood pressure >185/110 medically refractory to IV medications     Active bleeding at a non-compressible site     Active intracranial hemorrhage (ICH)     Symptoms suggestive of subarachnoid hemorrhage (SAH)     GI bleed within 21 days     Ischemic stroke within 3 months     Severe head trauma within 3 months      Intracranial or intraspinal surgery within 3 months   []  Current GI malignancy     Intracranial neoplasm     Infective endocarditis     Aortic arch dissection     Active coagulopathy with  INR >1.7, platelets <100,000, PTT > 40 sec, PT > 15 sec   *For warfarin, administration can begin before blood tests resulted. Discontinue for above values.      Treatment dose* of LMWH (Lovenox) in last 24 hours  *prophylactic dosages are not a contraindication     Concurrent use of antiplatelet agents' glycoprotein inhibitors IIb/IIIa (Integrilin, etc.)     Thrombin or factor Xa inhibitors (Eliquis, Xarelto, Arixtra) taken in last 48 hours     YES NO CLASS II: AIS WITH THE FOLLOWING CONDITIONS -   TREATMENT RISKS SHOULD BE WEIGHED AGAINST POSSIBLE BENEFITS.      Major trauma in last 14 days, recent major surgery in last 14 days, intracranial arterial dissection, giant unruptured and unsecured intracranial aneurysm, pericarditis       The risks and benefits have been discussed with the patient or family related to the administration of IV thrombolytic therapy for  stroke symptoms.     I have discussed and reviewed the patient's case and imaging with the attending prior to IV thrombolytic therapy.   TIME NA Time IV thrombolytic administered       Hospital Meds:  Scheduled-    Infusions-     PRNs-   sodium chloride    Functional Status Prior to Current Stroke/[place] Score: 0    NIH Stroke Scale  Time: 10:37 EDT  Person Administering Scale: CINDY Thomas    1a  Level of consciousness: 0=alert; keenly responsive   1b. LOC questions:  0=Answers both questions correctly   1c. LOC commands: 0=Performs both tasks correctly   2.  Best Gaze: 2=forced deviation, or total gaze paresis not overcome by oculocephalic maneuver   3.  Visual: 2=Complete hemianopia   4. Facial Palsy: 2=Partial paralysis (total or near total paralysis of the lower face)   5a.  Motor left arm: 4=No movement   5b.  Motor right arm: 0=No drift, limb holds 90 (or 45) degrees for full 10 seconds   6a. motor left le=No movement   6b  Motor right le=No drift, limb holds 90 (or 45) degrees for full 10 seconds   7. Limb Ataxia: 0=Absent   8.  Sensory: 1=Mild to moderate sensory loss; patient feels pinprick is less sharp or is dull on the affected side; there is a loss of superficial pain with pinprick but patient is aware She is being touched   9. Best Language:  0=No aphasia, normal   10. Dysarthria: 0=Normal   11. Extinction and Inattention: 2=Profound jorge-inattention or jorge-inattention to more than one modality. Does not recognize own hand or orients only to one side of space    Total:   17     Results Reviewed:  I have personally reviewed current lab, radiology, and data and agree with results.    Results for orders placed during the hospital encounter of 23    Adult Transthoracic Echo Complete W/ Cont if Necessary Per Protocol    Interpretation Summary    Left ventricular ejection fraction appears to be 61 - 65%.    Left ventricular diastolic function is consistent with (grade I) impaired  relaxation.    Estimated right ventricular systolic pressure from tricuspid regurgitation is normal (<35 mmHg).    Good technical quality.    1.  LV size, function, wall motion, and wall thickness are normal.  Visually estimated ejection fraction is 55 to 60%.  Grade 1 diastolic dysfunction.  The atria and right ventricle are normal.  No septal defect or intracavitary mass or thrombus.    2.  Valves are morphologically and physiologically normal with no stenotic lesions, valve associated masses or thrombi, or hemodynamically significant regurgitation.  There is trivial to mild MR and trivial TR.    3.  No pericardial or great vessel pathology.    4.  Pulmonary artery systolic pressures are estimated in the mid 20s.     XR Chest 1 View  Result Date: 5/24/2025  Impression: No acute cardiopulmonary abnormality. 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 2.There is occlusion of the right M1 segment. 3.There are evolving changes of right MCA territory infarct. Electronically Signed: Ed Jiménez MD  5/24/2025 11:04 AM EDT  Workstation ID: [id number]    CT Angiogram Neck  Result Date: 5/24/2025  Impression: 1.There is occlusion of the right internal carotid artery that may be subacute. 2.There is occlusion of the right M1 segment. 3.There are evolving changes of right MCA territory infarct. Electronically Signed: Ed Jiménez MD  5/24/2025 11:04 AM EDT  Workstation ID: [id number]    CT CEREBRAL PERFUSION WITH & WITHOUT CONTRAST  Result Date: 5/24/2025  Impression: 1.There is occlusion of the right internal carotid artery that may be subacute. 2.There is occlusion of the right M1 segment. 3.There are evolving changes of right MCA territory infarct. Electronically Signed: Ed Jiménez MD  5/24/2025 11:04 AM EDT   Workstation ID: [id number]    CT Head Without Contrast Stroke Protocol  Result Date: 5/24/2025  Impression: Hyperdense right MCA sign suggesting thrombus, with hypodensity in the adjacent right frontal and temporal lobes in the right MCA distribution suggesting acute infarction. Mild mass effect, but no midline shift. Electronically Signed: Luis Li MD  5/24/2025 10:37 AM EDT  Workstation ID: [id number]    ECG 12 Lead Bradycardia   Preliminary Result   Test Reason : Bradycardia   Blood Pressure :   */*   mmHG   Vent. Rate :  60 BPM     Atrial Rate :  60 BPM      P-R Int : 158 ms          QRS Dur :  76 ms       QT Int : 488 ms       P-R-T Axes :  45  28  27 degrees     QTcB Int : 488 ms      Normal sinus rhythm   Low voltage QRS   QTcB >= 480 msec   Abnormal ECG   When compared with ECG of 24-May-2025 10:58, (Unconfirmed)   No significant change was found      Referred By:            Confirmed By:       ECG 12 Lead ED Triage Standing Order; Acute Stroke (Onset <24 hrs)   Preliminary Result   Test Reason : ED Triage Standing Order~   Blood Pressure :   */*   mmHG   Vent. Rate :  60 BPM     Atrial Rate :  60 BPM      P-R Int : 158 ms          QRS Dur :  74 ms       QT Int : 488 ms       P-R-T Axes :  49  34  49 degrees     QTcB Int : 488 ms      Normal sinus rhythm   Normal ECG   No previous ECGs available      Referred By: ed md           Confirmed By:         Lab Results   Component Value Date    GLUCOSE 107 (H) 01/19/2021    BUN 17 01/19/2021    CREATININE 0.80 05/24/2025     05/24/2025    K 3.7 05/24/2025    CL 99 01/19/2021    CALCIUM 9.8 01/19/2021    PROTEINTOT 7.0 01/19/2021    ALBUMIN 4.42 01/19/2021    ALT 19 05/24/2025    AST 27 05/24/2025    ALKPHOS 63 01/19/2021    BILITOT 0.5 01/19/2021    GLOB 2.6 01/19/2021    AGRATIO 1.7 01/19/2021    BCR 19.3 01/19/2021    ANIONGAP 10.6 01/19/2021    EGFR 83.4 05/24/2025     WBC   Date Value Ref Range Status   05/24/2025 7.38 3.40 - 10.80 10*3/mm3 Final      RBC   Date Value Ref Range Status   05/24/2025 4.93 3.77 - 5.28 10*6/mm3 Final     Hemoglobin   Date Value Ref Range Status   05/24/2025 14.6 12.0 - 17.0 g/dL Final     Comment:     Serial Number: [id number]:  [id number]   05/24/2025 13.9 12.0 - 15.9 g/dL Final     Hematocrit   Date Value Ref Range Status   05/24/2025 43 38 - 51 % Final   05/24/2025 42.7 34.0 - 46.6 % Final     MCV   Date Value Ref Range Status   05/24/2025 86.6 79.0 - 97.0 fL Final     MCH   Date Value Ref Range Status   05/24/2025 28.2 26.6 - 33.0 pg Final     MCHC   Date Value Ref Range Status   05/24/2025 32.6 31.5 - 35.7 g/dL Final     RDW   Date Value Ref Range Status   05/24/2025 12.2 (L) 12.3 - 15.4 % Final     RDW-SD   Date Value Ref Range Status   05/24/2025 38.9 37.0 - 54.0 fl Final     MPV   Date Value Ref Range Status   05/24/2025 11.8 6.0 - 12.0 fL Final     Platelets   Date Value Ref Range Status   05/24/2025 259 140 - 450 10*3/mm3 Final     Neutrophil %   Date Value Ref Range Status   05/24/2025 78.6 (H) 42.7 - 76.0 % Final     Lymphocyte %   Date Value Ref Range Status   05/24/2025 14.5 (L) 19.6 - 45.3 % Final     Monocyte %   Date Value Ref Range Status   05/24/2025 4.6 (L) 5.0 - 12.0 % Final     Eosinophil %   Date Value Ref Range Status   05/24/2025 0.3 0.3 - 6.2 % Final     Basophil %   Date Value Ref Range Status   05/24/2025 0.9 0.0 - 1.5 % Final     Immature Grans %   Date Value Ref Range Status   05/24/2025 1.1 (H) 0.0 - 0.5 % Final     Neutrophils, Absolute   Date Value Ref Range Status   05/24/2025 5.80 1.70 - 7.00 10*3/mm3 Final     Lymphocytes, Absolute   Date Value Ref Range Status   05/24/2025 1.07 0.70 - 3.10 10*3/mm3 Final     Monocytes, Absolute   Date Value Ref Range Status   05/24/2025 0.34 0.10 - 0.90 10*3/mm3 Final     Eosinophils, Absolute   Date Value Ref Range Status   05/24/2025 0.02 0.00 - 0.40 10*3/mm3 Final     Basophils, Absolute   Date Value Ref Range Status   05/24/2025 0.07 0.00 - 0.20 10*3/mm3  Final     Immature Grans, Absolute   Date Value Ref Range Status   05/24/2025 0.08 (H) 0.00 - 0.05 10*3/mm3 Final     nRBC   Date Value Ref Range Status   05/24/2025 0.0 0.0 - 0.2 /100 WBC Final       Assessment/Plan:  64-year-old  female with minimal vascular risk factors who presented [facility] emergency department with right MCA syndrome.  Secondary to CT angiogram as well as extended last known well she was not deemed to be an appropriate IV thrombolytic therapy candidate.  Secondary to completed stroke on CT head without contrast as well as CT perfusion imaging she was not deemed to be an emergent endovascular therapy candidate after discussing with Dr. Olivarez and Dr. Torres.  She is admitted to the neuro ICU for further workup and evaluation.    Antiplatelet PTA: None  Anticoagulant PTA: None      Left hemiparesis  Right gaze preference  Right MCA CVA  - Current etiology is unknown.  - CVA order set  - Bedside dysphagia screening failed  - PICC Line placement today  - Initiate hypertonic saline at 40 mL, goal sodium 145-150  - Stability CT head 1600  - MRI brain without contrast, please complete today  - PT/OT/SLP to see and assess when appropriate  - Dr. Olivarez following, discussed potential need for decompressive craniectomy  - Loaded with 300 mg rectal aspirin in ED, will defer any additional doses until surgery course is planned  - Repeat CT head in a.m.  - Hemoglobin A1c and FLP in a.m.  - Every 6 hours sodium  - Neurochecks and NIHSS per protocol  - Allow for permissive hypertension with goal SBP less than 200, overall management per ICU medicine team  - Stat CT head for any neurologic decline    Stroke neurology will continue to follow.  Plan of care discussed with the patient as well as her family, Dr. Torres, Dr. Olivarez, and Dr. Hannon.  Thanks for the consult the care of this patient.  Please call with any further questions or concerns.    CINDY Thomas  May  24, 2025  10:37 EDT      Electronically signed by Izzy Silvestre, APRN at 05/24/25 [id number]

## 2025-05-27 NOTE — PROGRESS NOTES
Enter Query Response Below      Query Response: Brain compression             If applicable, please update the problem list.

## 2025-05-28 LAB
ANION GAP SERPL CALCULATED.3IONS-SCNC: 8 MMOL/L (ref 5–15)
BUN SERPL-MCNC: 12.8 MG/DL (ref 8–23)
BUN/CREAT SERPL: 27.2 (ref 7–25)
CALCIUM SPEC-SCNC: 8.1 MG/DL (ref 8.6–10.5)
CHLORIDE SERPL-SCNC: 107 MMOL/L (ref 98–107)
CO2 SERPL-SCNC: 24 MMOL/L (ref 22–29)
CREAT SERPL-MCNC: 0.47 MG/DL (ref 0.57–1)
CRP SERPL-MCNC: 2.79 MG/DL (ref 0–0.5)
EGFRCR SERPLBLD CKD-EPI 2021: 107.8 ML/MIN/1.73
GLUCOSE SERPL-MCNC: 104 MG/DL (ref 65–99)
MAGNESIUM SERPL-MCNC: 1.7 MG/DL (ref 1.6–2.4)
PHOSPHATE SERPL-MCNC: 1.9 MG/DL (ref 2.5–4.5)
PHOSPHATE SERPL-MCNC: 2.2 MG/DL (ref 2.5–4.5)
POTASSIUM SERPL-SCNC: 3.3 MMOL/L (ref 3.5–5.2)
POTASSIUM SERPL-SCNC: 4.3 MMOL/L (ref 3.5–5.2)
PREALB SERPL-MCNC: 10.2 MG/DL (ref 20–40)
SODIUM SERPL-SCNC: 139 MMOL/L (ref 136–145)

## 2025-05-28 PROCEDURE — 84134 ASSAY OF PREALBUMIN: CPT

## 2025-05-28 PROCEDURE — 25010000002 ENOXAPARIN PER 10 MG: Performed by: STUDENT IN AN ORGANIZED HEALTH CARE EDUCATION/TRAINING PROGRAM

## 2025-05-28 PROCEDURE — 86140 C-REACTIVE PROTEIN: CPT

## 2025-05-28 PROCEDURE — 84100 ASSAY OF PHOSPHORUS: CPT | Performed by: INTERNAL MEDICINE

## 2025-05-28 PROCEDURE — 83735 ASSAY OF MAGNESIUM: CPT

## 2025-05-28 PROCEDURE — 99232 SBSQ HOSP IP/OBS MODERATE 35: CPT | Performed by: INTERNAL MEDICINE

## 2025-05-28 PROCEDURE — 99232 SBSQ HOSP IP/OBS MODERATE 35: CPT

## 2025-05-28 PROCEDURE — 25810000003 SODIUM CHLORIDE 0.9 % SOLUTION

## 2025-05-28 PROCEDURE — 97162 PT EVAL MOD COMPLEX 30 MIN: CPT

## 2025-05-28 PROCEDURE — 84100 ASSAY OF PHOSPHORUS: CPT

## 2025-05-28 PROCEDURE — 84132 ASSAY OF SERUM POTASSIUM: CPT | Performed by: INTERNAL MEDICINE

## 2025-05-28 PROCEDURE — 80048 BASIC METABOLIC PNL TOTAL CA: CPT

## 2025-05-28 PROCEDURE — 97166 OT EVAL MOD COMPLEX 45 MIN: CPT

## 2025-05-28 RX ORDER — PANTOPRAZOLE SODIUM 40 MG/1
40 TABLET, DELAYED RELEASE ORAL
Status: DISCONTINUED | OUTPATIENT
Start: 2025-05-29 | End: 2025-05-30 | Stop reason: HOSPADM

## 2025-05-28 RX ORDER — IBUPROFEN 600 MG/1
600 TABLET, FILM COATED ORAL EVERY 6 HOURS PRN
Status: DISCONTINUED | OUTPATIENT
Start: 2025-05-28 | End: 2025-05-30 | Stop reason: HOSPADM

## 2025-05-28 RX ORDER — BISACODYL 5 MG/1
5 TABLET, DELAYED RELEASE ORAL DAILY PRN
Status: DISCONTINUED | OUTPATIENT
Start: 2025-05-28 | End: 2025-05-29

## 2025-05-28 RX ORDER — ENOXAPARIN SODIUM 100 MG/ML
40 INJECTION SUBCUTANEOUS EVERY 24 HOURS
Status: DISCONTINUED | OUTPATIENT
Start: 2025-05-28 | End: 2025-05-30 | Stop reason: HOSPADM

## 2025-05-28 RX ORDER — LEVETIRACETAM 500 MG/1
500 TABLET ORAL EVERY 12 HOURS SCHEDULED
Status: DISCONTINUED | OUTPATIENT
Start: 2025-05-28 | End: 2025-05-30 | Stop reason: HOSPADM

## 2025-05-28 RX ORDER — POLYETHYLENE GLYCOL 3350 17 G/17G
17 POWDER, FOR SOLUTION ORAL DAILY PRN
Status: DISCONTINUED | OUTPATIENT
Start: 2025-05-28 | End: 2025-05-29

## 2025-05-28 RX ORDER — POTASSIUM CHLORIDE 1500 MG/1
40 TABLET, EXTENDED RELEASE ORAL EVERY 4 HOURS
Status: COMPLETED | OUTPATIENT
Start: 2025-05-28 | End: 2025-05-28

## 2025-05-28 RX ORDER — AMOXICILLIN 250 MG
2 CAPSULE ORAL 2 TIMES DAILY
Status: DISCONTINUED | OUTPATIENT
Start: 2025-05-28 | End: 2025-05-29

## 2025-05-28 RX ORDER — BISACODYL 10 MG
10 SUPPOSITORY, RECTAL RECTAL DAILY PRN
Status: DISCONTINUED | OUTPATIENT
Start: 2025-05-28 | End: 2025-05-29

## 2025-05-28 RX ADMIN — POTASSIUM CHLORIDE 40 MEQ: 1500 TABLET, EXTENDED RELEASE ORAL at 05:59

## 2025-05-28 RX ADMIN — DOCUSATE SODIUM 50 MG AND SENNOSIDES 8.6 MG 2 TABLET: 8.6; 5 TABLET, FILM COATED ORAL at 08:39

## 2025-05-28 RX ADMIN — PANTOPRAZOLE SODIUM 40 MG: 40 INJECTION, POWDER, FOR SOLUTION INTRAVENOUS at 05:59

## 2025-05-28 RX ADMIN — ATORVASTATIN CALCIUM 80 MG: 40 TABLET, FILM COATED ORAL at 20:00

## 2025-05-28 RX ADMIN — CETIRIZINE HYDROCHLORIDE 10 MG: 10 TABLET, FILM COATED ORAL at 20:00

## 2025-05-28 RX ADMIN — SODIUM PHOSPHATE, MONOBASIC, MONOHYDRATE AND SODIUM PHOSPHATE, DIBASIC, ANHYDROUS 15 MMOL: 276; 142 INJECTION, SOLUTION INTRAVENOUS at 16:57

## 2025-05-28 RX ADMIN — ACETAMINOPHEN 650 MG: 325 TABLET ORAL at 16:58

## 2025-05-28 RX ADMIN — BISACODYL 5 MG: 5 TABLET, COATED ORAL at 04:12

## 2025-05-28 RX ADMIN — Medication 10 ML: at 08:41

## 2025-05-28 RX ADMIN — Medication 10 ML: at 20:00

## 2025-05-28 RX ADMIN — IBUPROFEN 600 MG: 600 TABLET, FILM COATED ORAL at 20:48

## 2025-05-28 RX ADMIN — LEVETIRACETAM 500 MG: 500 TABLET, FILM COATED ORAL at 20:00

## 2025-05-28 RX ADMIN — MUPIROCIN 1 APPLICATION: 20 OINTMENT TOPICAL at 20:00

## 2025-05-28 RX ADMIN — SODIUM CHLORIDE 50 ML/HR: 900 INJECTION, SOLUTION INTRAVENOUS at 08:39

## 2025-05-28 RX ADMIN — ENOXAPARIN SODIUM 40 MG: 100 INJECTION SUBCUTANEOUS at 10:11

## 2025-05-28 RX ADMIN — LIDOCAINE 1 PATCH: 4 PATCH TOPICAL at 08:41

## 2025-05-28 RX ADMIN — ACETAMINOPHEN 650 MG: 325 TABLET ORAL at 00:23

## 2025-05-28 RX ADMIN — MUPIROCIN 1 APPLICATION: 20 OINTMENT TOPICAL at 08:39

## 2025-05-28 RX ADMIN — ACETAMINOPHEN 650 MG: 325 TABLET ORAL at 06:23

## 2025-05-28 RX ADMIN — LEVETIRACETAM 500 MG: 500 SOLUTION ORAL at 08:39

## 2025-05-28 RX ADMIN — POTASSIUM & SODIUM PHOSPHATES POWDER PACK 280-160-250 MG 2 PACKET: 280-160-250 PACK at 05:59

## 2025-05-28 RX ADMIN — POTASSIUM CHLORIDE 40 MEQ: 1500 TABLET, EXTENDED RELEASE ORAL at 10:11

## 2025-05-28 NOTE — THERAPY EVALUATION
Patient Name: Linda Schafer  : 1960    MRN: [id number]                              Today's Date: 2025       Admit Date: 2025    Visit Dx:     ICD-10-CM ICD-9-CM   1. Acute CVA (cerebrovascular accident)  I63.9 434.91   2. Stroke  I63.9 434.91   3. Dysarthria  R47.1 784.51   4. Oropharyngeal dysphagia  R13.12 787.22     Patient Active Problem List   Diagnosis    Palpitations    Personal history of cardiac murmur    Migraine headache    Hiatal hernia    GERD (gastroesophageal reflux disease)    Diverticulosis    Diminished pulses in lower extremity    Dyslipidemia    Peripheral edema    Grade I diastolic dysfunction    Acute ischemic stroke    Status post craniotomy    Obstructive sleep apnea     Past Medical History:   Diagnosis Date    Atypical chest pain     Chest pain.Atypical chest pain    Chest pain     COVID-19     2022    COVID-19 vaccine administered     2021 ,  J and J ) 10/25/2021 - Moderna    Diminished pulses in lower extremity     Diverticulosis     Dizziness     Occasional    Dyslipidemia     Dyspnea     Edema     Fatigue     GERD (gastroesophageal reflux disease)     Hiatal hernia     Migraine headache     Palpitations     Personal history of cardiac murmur     SOB (shortness of breath)      Past Surgical History:   Procedure Laterality Date    CHOLECYSTECTOMY      CRANIOTOMY FOR SUBDURAL HEMATOMA Right 2025    Procedure: DECOMPRESSIVE CRANIECTOMY RIGHT;  Surgeon: Matt Olivarez MD;  Location: [facility];  Service: Neurosurgery;  Laterality: Right;    HYSTERECTOMY      OOPHORECTOMY      TUBAL ABDOMINAL LIGATION        General Information       Row Name 25 5733          OT Time and Intention    Document Type evaluation  -KF     Mode of Treatment occupational therapy;co-treatment  -KF       Row Name 25 6622          General Information    Patient Profile Reviewed yes  -KF     Prior Level of Function independent:;all household mobility;community  mobility;bed mobility;ADL's;driving  Independent prior, no AD  -KF     Existing Precautions/Restrictions fall;other (see comments)  s/p craniectomy with helmet on OOB; L sided weakness, LUE hypertonic, L field cut  -KF     Barriers to Rehab medically complex;previous functional deficit;visual deficit  -KF       Row Name 05/28/25 1550          Occupational Profile    Environmental Supports and Barriers (Occupational Profile) tub shower  -KF       Row Name 05/28/25 1550          Living Environment    Current Living Arrangements home  -KF     People in Home spouse  -KF       Row Name 05/28/25 1550          Home Main Entrance    Number of Stairs, Main Entrance four  -KF     Stair Railings, Main Entrance railing on right side (ascending)  -KF       Row Name 05/28/25 1550          Stairs Within Home, Primary    Number of Stairs, Within Home, Primary none  -KF       Row Name 05/28/25 1550          Cognition    Orientation Status (Cognition) oriented x 3  -KF       Row Name 05/28/25 1550          Safety Issues/Impairments Affecting Functional Mobility    Safety Issues Affecting Function (Mobility) ability to follow commands;awareness of need for assistance;insight into deficits/self-awareness;judgment;problem-solving;safety precaution awareness;safety precautions follow-through/compliance;sequencing abilities  -KF     Impairments Affecting Function (Mobility) balance;cognition;endurance/activity tolerance;visual/perceptual;strength;sensation/sensory awareness;range of motion (ROM);postural/trunk control;pain;coordination;grasp;motor control;muscle tone abnormal  -KF     Cognitive Impairments, Mobility Safety/Performance attention;awareness, need for assistance;insight into deficits/self-awareness;judgment;problem-solving/reasoning;safety precaution awareness;safety precaution follow-through;sequencing abilities  -KF     Comment, Safety Issues/Impairments (Mobility) Pt lethargic with eyes closed most of session, although  verbally answering all questions with increased time.  -               User Key  (r) = Recorded By, (t) = Taken By, (c) = Cosigned By      Initials Name Provider Type    KF Marlene Clements OT Occupational Therapist                     Mobility/ADL's       Row Name 05/28/25 1552          Bed Mobility    Bed Mobility supine-sit  -KF     Supine-Sit [place] (Bed Mobility) maximum assist (25% patient effort);2 person assist;verbal cues;nonverbal cues (demo/gesture)  -     Assistive Device (Bed Mobility) bed rails;head of bed elevated;repositioning sheet  -     Comment, (Bed Mobility) Cues for sequence with assistance given at trunk and BLEs. Pt able to sit EOB with Leonides, intermittent CGA for safety.  -       Row Name 05/28/25 1552          Transfers    Transfers bed-chair transfer;sit-stand transfer;stand-sit transfer  -       Row Name 05/28/25 1552          Bed-Chair Transfer    Bed-Chair [place] (Transfers) maximum assist (25% patient effort);2 person assist;verbal cues;nonverbal cues (demo/gesture)  -     Assistive Device (Bed-Chair Transfers) other (see comments)  BUE support  -     Comment, (Bed-Chair Transfer) Pt was assisted in SPT toward the R from the EOB to the chair with maxA x2, BUE support and B knees blocked. Pt needing assistance to pivot/advance RLE and LLE.  -       Row Name 05/28/25 1552          Sit-Stand Transfer    Sit-Stand [place] (Transfers) maximum assist (25% patient effort);2 person assist;verbal cues;nonverbal cues (demo/gesture)  -     Assistive Device (Sit-Stand Transfers) other (see comments)  BUE support  -     Comment, (Sit-Stand Transfer) x1 EOB, x1 from chair; L lateral lean noted in all standing activity  -       Row Name 05/28/25 1552          Stand-Sit Transfer    Stand-Sit [place] (Transfers) maximum assist (25% patient effort);2 person assist;verbal cues;nonverbal cues (demo/gesture)  -     Assistive Device (Stand-Sit Transfers) other  (see comments)  BUE support  -       Row Name 05/28/25 1552          Activities of Daily Living    BADL Assessment/Intervention lower body dressing;upper body dressing;toileting  -[facility] Name 05/28/25 1552          Lower Body Dressing Assessment/Training    [place] Level (Lower Body Dressing) don;socks;dependent (less than 25% patient effort)  -KF     Position (Lower Body Dressing) supine  -       Row Name 05/28/25 1552          Upper Body Dressing Assessment/Training    [place] Level (Upper Body Dressing) doff;don;pajama/robe;maximum assist (25% patient effort)  -KF     Position (Upper Body Dressing) supported sitting  -       Row Name 05/28/25 1552          Toileting Assessment/Training    [place] Level (Toileting) adjust/manage clothing;perform perineal hygiene;dependent (less than 25% patient effort)  -KF     Position (Toileting) supported standing  -               User Key  (r) = Recorded By, (t) = Taken By, (c) = Cosigned By      Initials Name Provider Type    Marlene Landry OT Occupational Therapist                   Obj/Interventions       [facility] Name 05/28/25 1556          Sensory Assessment (Somatosensory)    Sensory Assessment (Somatosensory) left UE;right UE  -KF     Left UE Sensory Assessment general sensation;proprioception;impaired  -     Right UE Sensory Assessment intact  -[facility] Name 05/28/25 1556          Vision Assessment/Intervention    Vision Assessment Comment Pt with difficulty keeping eyes open and following commands for formal visual assessment, demo's L field cut.  -[facility] Name 05/28/25 1556          Range of Motion Comprehensive    General Range of Motion bilateral upper extremity ROM WFL  -KF     Comment, General Range of Motion RUE WFL, LUE PROM  -[facility] Name 05/28/25 1556          Strength Comprehensive (MMT)    General Manual Muscle Testing (MMT) Assessment upper extremity strength deficits identified  -KF     Comment, General Manual  Muscle Testing (MMT) Assessment KING melendez 3+/5, LUE 0/5  -KF       Row Name 05/28/25 2261          Motor Skills    Motor Skills muscle tone  -KF     Muscle Tone left;upper extremity(s);hypertonia;moderate impairment  -KF       Row Name 05/28/25 1556          Balance    Balance Assessment sitting static balance;sitting dynamic balance;sit to stand dynamic balance;standing static balance;standing dynamic balance  -KF     Static Sitting Balance minimal assist  -KF     Dynamic Sitting Balance moderate assist  -KF     Position, Sitting Balance supported;sitting edge of bed  -KF     Sit to Stand Dynamic Balance maximum assist;2-person assist  -KF     Static Standing Balance maximum assist;2-person assist  -KF     Dynamic Standing Balance maximum assist;2-person assist  -KF     Position/Device Used, Standing Balance supported  -KF     Balance Interventions sitting;standing;sit to stand;supported;static;dynamic;occupation based/functional task  -KF               User Key  (r) = Recorded By, (t) = Taken By, (c) = Cosigned By      Initials Name Provider Type    KF Marlene Clements OT Occupational Therapist                   Goals/Plan       [facility] Name 05/28/25 1603          Transfer Goal 1 (OT)    Activity/Assistive Device (Transfer Goal 1, OT) bed-to-chair/chair-to-bed;commode, bedside without drop arms  -KF     [place] Level/Cues Needed (Transfer Goal 1, OT) moderate assist (50-74% patient effort)  -KF     Time Frame (Transfer Goal 1, OT) long term goal (LTG);10 days  -KF     Progress/Outcome (Transfer Goal 1, OT) goal ongoing  -KF       Row Name 05/28/25 1608          Bathing Goal 1 (OT)    Activity/Device (Bathing Goal 1, OT) upper body bathing  -KF     [place] Level/Cues Needed (Bathing Goal 1, OT) minimum assist (75% or more patient effort)  -KF     Time Frame (Bathing Goal 1, OT) short term goal (STG);5 days  -KF     Strategies/Barriers (Bathing Goal 1, OT) jorge technique  -KF     Progress/Outcomes (Bathing  Goal 1, OT) goal ongoing  -KF       Row Name 05/28/25 1603          Grooming Goal 1 (OT)    Activity/Device (Grooming Goal 1, OT) hair care;oral care;wash face, hands  -KF     [place] (Grooming Goal 1, OT) minimum assist (75% or more patient effort)  -KF     Time Frame (Grooming Goal 1, OT) long term goal (LTG);10 days  -KF     Strategies/Barriers (Grooming Goal 1, OT) sitting EOB with Leonides to maintain balance  -KF     Progress/Outcome (Grooming Goal 1, OT) goal ongoing  -KF       Row Name 05/28/25 1603          Therapy Assessment/Plan (OT)    Planned Therapy Interventions (OT) activity tolerance training;adaptive equipment training;BADL retraining;functional balance retraining;occupation/activity based interventions;patient/caregiver education/training;ROM/therapeutic exercise;strengthening exercise;transfer/mobility retraining  -KF               User Key  (r) = Recorded By, (t) = Taken By, (c) = Cosigned By      Initials Name Provider Type    KF Marlene Clements, LOU Occupational Therapist                   Clinical Impression       Row Name 05/28/25 1600          Pain Assessment    Pretreatment Pain Rating 7/10  -KF     Posttreatment Pain Rating 7/10  -KF     Pain Location head  -KF     Pain Side/Orientation left  -KF     Pain Management Interventions activity modification encouraged;exercise or physical activity utilized;positioning techniques utilized;nursing notified  -KF     Response to Pain Interventions no change per patient report  -KF       Row Name 05/28/25 1600          Plan of Care Review    Plan of Care Reviewed With patient  -KF     Progress no change  -KF     Outcome Evaluation OT evaluation completed. The pt presents below baseline with L sided weakness, LUE hypertonia, L field cut, balance deficits, decreased activity tolerance, and decreased safety awareness warranting continued IP OT services. The pt was assisted in SPT toward the R from the EOB to the chair with maxGEORGETTE x2, BUE support and B  knees blocked. Recommend a d/c to IRF for best outcome.  -KF       Row Name 05/28/25 1600          Therapy Assessment/Plan (OT)    Patient/Family Therapy Goal Statement (OT) Restore PLOF  -KF     Rehab Potential (OT) good  -KF     Criteria for Skilled Therapeutic Interventions Met (OT) yes;skilled treatment is necessary  -KF     Therapy Frequency (OT) daily  -KF     Predicted Duration of Therapy Intervention (OT) 10 days  -KF       Row Name 05/28/25 1600          Therapy Plan Review/Discharge Plan (OT)    Anticipated Discharge Disposition (OT) inpatient rehabilitation facility  -KF       Row Name 05/28/25 1600          Vital Signs    Pre Systolic BP Rehab 112  -KF     Pre Treatment Diastolic BP 58  -KF     Post Systolic BP Rehab 129  -KF     Post Treatment Diastolic BP 58  -KF     Pretreatment Heart Rate (beats/min) 80  -KF     Posttreatment Heart Rate (beats/min) 67  -KF     Pre SpO2 (%) 98  -KF     O2 Delivery Pre Treatment room air  -KF     Post SpO2 (%) 99  -KF     O2 Delivery Post Treatment room air  -KF     Pre Patient Position Supine  -KF     Intra Patient Position Standing  -KF     Post Patient Position Sitting  -KF       Row Name 05/28/25 1600          Positioning and Restraints    Pre-Treatment Position in bed  -KF     Post Treatment Position chair  -KF     In Chair notified nsg;reclined;call light within reach;encouraged to call for assist;exit alarm on;with family/caregiver;waffle cushion;on mechanical lift sling;legs elevated;RUE elevated;LUE elevated;with other staff  -KF               User Key  (r) = Recorded By, (t) = Taken By, (c) = Cosigned By      Initials Name Provider Type    KF Marlene Clements, OT Occupational Therapist                   Outcome Measures       Row Name 05/28/25 1604          How much help from another is currently needed...    Putting on and taking off regular lower body clothing? 1  -KF     Bathing (including washing, rinsing, and drying) 1  -KF     Toileting (which includes  using toilet bed pan or urinal) 1  -KF     Putting on and taking off regular upper body clothing 2  -KF     Taking care of personal grooming (such as brushing teeth) 2  -KF     Eating meals 1  -KF     AM-PAC 6 Clicks Score (OT) 8  -KF       Row Name 05/28/25 1555          How much help from another person do you currently need...    Turning from your back to your side while in flat bed without using bedrails? 2  -AC     Moving from lying on back to sitting on the side of a flat bed without bedrails? 1  -AC     Moving to and from a bed to a chair (including a wheelchair)? 1  -AC     Standing up from a chair using your arms (e.g., wheelchair, bedside chair)? 1  -AC     Climbing 3-5 steps with a railing? 1  -AC     To walk in hospital room? 1  -AC     AM-PAC 6 Clicks Score (PT) 7  -AC     Highest Level of Mobility Goal Bed Activities/Dependent Transfer-2  -AC       Row Name 05/28/25 1604 05/28/25 1555       Modified [place] Scale    Pre-Stroke Modified [place] Scale 6 - Unable to determine (UTD) from the medical record documentation  -KF 6 - Unable to determine (UTD) from the medical record documentation  -AC    Modified [place] Scale 4 - Moderately severe disability.  Unable to walk without assistance, and unable to attend to own bodily needs without assistance.  -KF 4 - Moderately severe disability.  Unable to walk without assistance, and unable to attend to own bodily needs without assistance.  -AC      Row Name 05/28/25 1604 05/28/25 1555       Functional Assessment    Outcome Measure Options AM-PAC 6 Clicks Daily Activity (OT);Modified [place]  -KF AM-PAC 6 Clicks Basic Mobility (PT);Modified [place]  -AC              User Key  (r) = Recorded By, (t) = Taken By, (c) = Cosigned By      Initials Name Provider Type    Marlene Landry, OT Occupational Therapist    Mayra Ram PT Physical Therapist                    Occupational Therapy Education       Title: PT OT SLP Therapies (In Progress)       Topic:  Occupational Therapy (In Progress)       Point: ADL training (In Progress)       Learning Progress Summary            Patient Acceptance, E,TB, NR by  at 5/28/2025 1516                      Point: Precautions (In Progress)       Learning Progress Summary            Patient Acceptance, E,TB, NR by  at 5/28/2025 1516                      Point: Body mechanics (In Progress)       Learning Progress Summary            Patient Acceptance, E,TB, NR by  at 5/28/2025 1516                                      User Key       Initials Effective Dates Name Provider Type Discipline     08/09/23 -  Marlene Clements OT Occupational Therapist OT                  OT Recommendation and Plan  Planned Therapy Interventions (OT): activity tolerance training, adaptive equipment training, BADL retraining, functional balance retraining, occupation/activity based interventions, patient/caregiver education/training, ROM/therapeutic exercise, strengthening exercise, transfer/mobility retraining  Therapy Frequency (OT): daily  Plan of Care Review  Plan of Care Reviewed With: patient  Progress: no change  Outcome Evaluation: OT evaluation completed. The pt presents below baseline with L sided weakness, LUE hypertonia, L field cut, balance deficits, decreased activity tolerance, and decreased safety awareness warranting continued IP OT services. The pt was assisted in SPT toward the R from the EOB to the chair with maxA x2, BUE support and B knees blocked. Recommend a d/c to IRF for best outcome.     Time Calculation:   Evaluation Complexity (OT)  Review Occupational Profile/Medical/Therapy History Complexity: expanded/moderate complexity  Assessment, Occupational Performance/Identification of Deficit Complexity: 3-5 performance deficits  Clinical Decision Making Complexity (OT): detailed assessment/moderate complexity  Overall Complexity of Evaluation (OT): moderate complexity     Time Calculation- OT       Row Name 05/28/25 4871              Time Calculation- OT    OT Start Time 1516  -KF      OT Received On 05/28/25  -KF      OT Goal Re-Cert Due Date 06/07/25  -KF         Untimed Charges    OT Eval/Re-eval Minutes 48  -KF         Total Minutes    Untimed Charges Total Minutes 48  -KF       Total Minutes 48  -KF                User Key  (r) = Recorded By, (t) = Taken By, (c) = Cosigned By      Initials Name Provider Type    KF Marlene Clements OT Occupational Therapist                  Therapy Charges for Today       Code Description Service Date Service Provider Modifiers Qty    [id number] HC OT EVAL MOD COMPLEXITY 4 5/28/2025 Marlene Clements OT GO 1                 Marlene Clements OT  5/28/2025

## 2025-05-28 NOTE — PROGRESS NOTES
"NEUROSURGERY PROGRESS NOTE    Chief Complaint: Stroke    Subjective: Stable overnight.  No new issues.    Objective    Vital Signs: Blood pressure 134/82, pulse 72, temperature 99.5 °F (37.5 °C), temperature source Axillary, resp. rate 16, height 157.5 cm (62.01\"), weight 68.8 kg (151 lb 10.8 oz), SpO2 96%.    Physical Exam  Resting, awakes easily.  Right eye swollen  Pupils are 3 mm and reactive bilaterally  Follows commands briskly with good strength in the right upper and right lower extremity.  Spontaneous movement noted in the left lower extremity.  No significant movement noted in the left upper extremity       Intake/Output:   Intake/Output Summary (Last 24 hours) at 5/28/2025 0848  Last data filed at 5/28/2025 0600  Gross per 24 hour   Intake 2021.7 ml   Output 1385 ml   Net 636.7 ml       Current Medications:   Current Facility-Administered Medications:     acetaminophen (TYLENOL) tablet 650 mg, 650 mg, Oral, Q6H PRN, Steve Garcia, PharmD, 650 mg at 05/28/25 0623    atorvastatin (LIPITOR) tablet 80 mg, 80 mg, Oral, Nightly, Steve Garcia, PharmD, 80 mg at 05/27/25 2004    sennosides-docusate (PERICOLACE) 8.6-50 MG per tablet 2 tablet, 2 tablet, Oral, BID, 2 tablet at 05/28/25 0839 **AND** polyethylene glycol (MIRALAX) packet 17 g, 17 g, Oral, Daily PRN **AND** bisacodyl (DULCOLAX) EC tablet 5 mg, 5 mg, Oral, Daily PRN, 5 mg at 05/28/25 0412 **AND** bisacodyl (DULCOLAX) suppository 10 mg, 10 mg, Rectal, Daily PRN, Franc Joel, APRN    Calcium Replacement - Follow Nurse / BPA Driven Protocol, , Not Applicable, PRN, Matt Olivarez MD    cetirizine (zyrTEC) tablet 10 mg, 10 mg, Oral, Nightly, Florecita Valentine PA-C, 10 mg at 05/27/25 1916    levETIRAcetam (KEPPRA) 100 MG/ML oral solution 500 mg, 500 mg, Oral, Q12H, Steve Garcia, PharmD, 500 mg at 05/28/25 0839    Lidocaine 4 % 1 patch, 1 patch, Transdermal, Q24H, Justin Unger, PharmD, 1 patch at 05/28/25 0841    Magnesium Standard Dose Replacement - " Follow Nurse / BPA Driven Protocol, , Not Applicable, Juanis VOGEL Nicolas, MD    mupirocin (BACTROBAN) 2 % nasal ointment 1 Application, 1 Application, Each Nare, BID, Matt Olivarez MD, 1 Application at 05/28/25 0839    niCARdipine (CARDENE) 25mg in 250mL NS infusion, 5-15 mg/hr, Intravenous, Titrated, Matt Olivarez MD, Stopped at 05/25/25 1900    [DISCONTINUED] ondansetron ODT (ZOFRAN-ODT) disintegrating tablet 4 mg, 4 mg, Oral, Q6H PRN **OR** ondansetron (ZOFRAN) injection 4 mg, 4 mg, Intravenous, Q6H PRN, Matt Olivarez MD, 4 mg at 05/25/25 1749    pantoprazole (PROTONIX) injection 40 mg, 40 mg, Intravenous, Q AM, Va Espinosa MD, 40 mg at 05/28/25 0559    Phosphorus Replacement - Follow Nurse / BPA Driven Protocol, , Not Applicable, PRNJuanis Nicolas, MD    potassium chloride (KLOR-CON M20) CR tablet 40 mEq, 40 mEq, Oral, Q4H, Tia Blake MD, 40 mEq at 05/28/25 0559    Potassium Replacement - Follow Nurse / BPA Driven Protocol, , Not Applicable, Juanis VOGEL Nicolas, MD    sodium chloride 0.9 % flush 10 mL, 10 mL, Intravenous, Q12H, Matt Olivarez MD, 10 mL at 05/28/25 0841    sodium chloride 0.9 % flush 10 mL, 10 mL, Intravenous, Q12H, Matt Olivarez MD, 10 mL at 05/28/25 0841    sodium chloride 0.9 % flush 10 mL, 10 mL, Intravenous, Q12H, Matt Olivarez MD, 10 mL at 05/28/25 0841    sodium chloride 0.9 % flush 10 mL, 10 mL, Intravenous, Juanis VOGEL Nicolas, MD    sodium chloride 0.9 % flush 20 mL, 20 mL, Intravenous, PRNJuanis Nicolas, MD    sodium chloride 0.9 % infusion 40 mL, 40 mL, Intravenous, PRNJuanis Nicolas, MD    sodium chloride 0.9 % infusion, 50 mL/hr, Intravenous, Continuous, Stephanie Javed PA-C, Last Rate: 50 mL/hr at 05/28/25 0839, 50 mL/hr at 05/28/25 0839    traMADol (ULTRAM) tablet 50 mg, 50 mg, Oral, Q4H PRN, Steve Garcia, PharmD     Laboratory Results:       Lab 05/28/25  0415 05/26/25  0601 05/25/25  0607 05/24/25  1038  05/24/25  1037   WBC  --  19.20* 10.75  --  7.38   HEMOGLOBIN  --  10.9* 12.2  --  13.9   HEMOGLOBIN, POC  --   --   --  14.6  --    HEMATOCRIT  --  35.4 38.7  --  42.7   HEMATOCRIT POC  --   --   --  43  --    PLATELETS  --  208 222  --  259   NEUTROS ABS  --  15.53*  --   --  5.80   IMMATURE GRANS (ABS)  --  0.08*  --   --  0.08*   LYMPHS ABS  --  1.95  --   --  1.07   MONOS ABS  --  1.59*  --   --  0.34   EOS ABS  --  0.00  --   --  0.02   MCV  --  91.5 89.4  --  86.6   CRP 2.79*  --   --   --   --    PROTIME  --   --   --   --  12.8   APTT  --   --   --   --  27.2         Lab 05/28/25  0415 05/27/25  0623 05/26/25  1909 05/26/25  1241 05/26/25  0601 05/25/25  1350 05/25/25  0607 05/24/25  1726 05/24/25  1038 05/24/25  1037   SODIUM 139 147* 153* 151* 152*   < > 147*   < >  --   --    POTASSIUM 3.3* 3.8 3.8 3.9 4.1   < > 3.3*   < >  --   --    CHLORIDE 107  --   --   --  120*  --  115*  --   --   --    CO2 24.0  --   --   --  23.0  --  22.0  --   --   --    ANION GAP 8.0  --   --   --  9.0  --  10.0  --   --   --    BUN 12.8  --   --   --  8  --  8  --   --   --    CREATININE 0.47*  --   --   --  0.68  --  0.65  --  0.80  --    EGFR 107.8  --   --   --  98.6  --  99.7  --  83.4  --    GLUCOSE 104*  --   --   --  125*  --  140*  --   --   --    CALCIUM 8.1*  --   --   --  8.9  --  9.1  --   --   --    MAGNESIUM 1.7  --   --   --   --   --   --   --   --  2.1   PHOSPHORUS 1.9*  --   --   --   --   --   --   --   --   --    HEMOGLOBIN A1C  --   --   --   --   --   --  5.30  --   --   --     < > = values in this interval not displayed.         Lab 05/24/25  1037   ALT (SGPT) 19   AST (SGOT) 27         Lab 05/24/25  1037   PROTIME 12.8   INR 0.91         Lab 05/25/25  0607   CHOLESTEROL 150   LDL CHOL 75   HDL CHOL 60   TRIGLYCERIDES 75             Brief Urine Lab Results       None          Microbiology Results (last 10 days)       Procedure Component Value - Date/Time    Tissue / Bone Culture - Surgical Site, Brain  [[id number]] Collected: 05/25/25 1831    Lab Status: Preliminary result Specimen: Surgical Site from Brain Updated: 05/28/25 0739     Tissue Culture No growth at 2 days     Gram Stain Many (4+) Red blood cells      Rare (1+) WBCs seen      No organisms seen             Diagnostic Imaging: I reviewed and independently interpreted the new imaging.     Assessment/Plan:    1. Acute CVA (cerebrovascular accident)    2. Stroke    3. Dysarthria    4. Oropharyngeal dysphagia         This is a 64-year-old female status post decompressive craniectomy for a large right MCA stroke on 5/26. Neurologically, the patient has remained stable. Her head CT shows good decompression with mild swelling noted of the right hemisphere. Continue to keep systolic blood pressure less than 140. Continue care in the ICU.  We will start Lovenox for DVT prophylaxis this morning.  Appreciate ICU assistance.    Any copied data from previous notes included in the (1) History of Present Illness, (2) Physical Examination and (3) Medical Decision Making and/or Assessment and Plan has been reviewed and is accurate as of 05/28/25      Matt Olivarez MD  05/28/25  08:48 EDT

## 2025-05-28 NOTE — PROGRESS NOTES
"Critical Care Note     LOS: 4 days   Patient Care Team:  Micheline Heath DO as PCP - General (Family Medicine)    Chief Complaint/Reason for visit:    Chief Complaint   Patient presents with    Stroke     Acute right middle cerebral artery stroke  Cerebral edema status post decompressive craniotomy May 25  Left hemiparesis and expressive aphasia  Sinus bradycardia  Obstructive sleep apnea, suspected  Hypernatremia, iatrogenic    Subjective     Interval History:     Was able to speak to her daughter today and patient does not have loud snoring or excessive somnolence and has never been diagnosed with sleep apnea.  She tolerated a p.o. diet yesterday.  She is maintaining sinus rhythm.    Review of Systems:    All systems were reviewed and negative except as noted in subjective.    Medical history, surgical history, social history, family history reviewed    Objective     Intake/Output:    Intake/Output Summary (Last 24 hours) at 5/28/2025 1102  Last data filed at 5/28/2025 0600  Gross per 24 hour   Intake 1700.9 ml   Output 1385 ml   Net 315.9 ml       Nutrition:  Diet: Regular/House; Texture: Mechanical Ground (NDD 2); Fluid Consistency: Thin (IDDSI 0)    Infusions:         Mechanical Ventilator Settings:                                                Telemetry: Sinus rhythm             Vital Signs  Blood pressure 120/55, pulse 68, temperature 99.5 °F (37.5 °C), temperature source Axillary, resp. rate 16, height 157.5 cm (62.01\"), weight 68.8 kg (151 lb 10.8 oz), SpO2 96%.    Physical Exam  Constitutional:       General: She is not in acute distress.     Comments: Resting   HENT:      Head:      Comments: Craniotomy dressing intact.      Nose: No congestion.      Mouth/Throat:      Pharynx: No oropharyngeal exudate.   Eyes:      Pupils: Pupils are equal, round, and reactive to light.   Neck:      Vascular: No carotid bruit.   Cardiovascular:      Rate and Rhythm: Normal rate and regular rhythm.      Heart sounds: " No murmur heard.  Pulmonary:      Effort: Pulmonary effort is normal.      Breath sounds: No wheezing or rhonchi.   Abdominal:      General: Bowel sounds are normal.      Palpations: Abdomen is soft.   Musculoskeletal:      Right lower leg: No edema.      Left lower leg: No edema.   Skin:     General: Skin is warm and dry.   Neurological:      Mental Status: She is alert.      Comments: Follows commands with her right side.      Interval:  (post travel to MRI)  1a. Level of Consciousness: 1-->Not alert, but arousable by minor stimulation to obey, answer, or respond  1b. LOC Questions: 0-->Answers both questions correctly  1c. LOC Commands: 0-->Performs both tasks correctly  2. Best Gaze: 2-->Forced deviation, or total gaze paresis not overcome by the oculocephalic maneuver  3. Visual: 2-->Complete hemianopia  4. Facial Palsy: 2-->Partial paralysis (total or near-total paralysis of lower face)  5a. Motor Arm, Left: 3-->No effort against gravity, limb falls  5b. Motor Arm, Right: 0-->No drift, limb holds 90 (or 45) degrees for full 10 secs  6a. Motor Leg, Left: 3-->No effort against gravity, leg falls to bed immediately  6b. Motor Leg, Right: 0-->No drift, leg holds 30 degree position for full 5 secs  7. Limb Ataxia: 1-->Present in one limb  8. Sensory: 2-->Severe to total sensory loss, patient is not aware of being touched in the face, arm, and leg  9. Best Language: 1-->Mild-to-moderate aphasia, some obvious loss of fluency or facility of comprehension, without significant limitation on ideas expressed or form of expression. Reduction of speech and/or comprehension, however, makes conversation. . . (see row details)  10. Dysarthria: 1-->Mild-to-moderate dysarthria, patient slurs at least some words and, at worst, can be understood with some difficulty  11. Extinction and Inattention (formerly Neglect): 2-->Profound jorge-inattention/extinction more than 1 modality    Total (NIH Stroke Scale): 20      Results Review:   "   I reviewed the patient's new clinical results.   Results from last 7 days   Lab Units 05/28/25  0415 05/27/25  0623 05/26/25  1909 05/26/25  1241 05/26/25  0601 05/25/25  1350 05/25/25  0607 05/24/25  1038 05/24/25  1037   SODIUM mmol/L 139 147* 153*   < > 152*   < > 147*   < >  --    POTASSIUM mmol/L 3.3* 3.8 3.8   < > 4.1   < > 3.3*   < >  --    CHLORIDE mmol/L 107  --   --   --  120*  --  115*  --   --    CO2 mmol/L 24.0  --   --   --  23.0  --  22.0  --   --    BUN mg/dL 12.8  --   --   --  8  --  8  --   --    CREATININE mg/dL 0.47*  --   --   --  0.68  --  0.65   < >  --    CALCIUM mg/dL 8.1*  --   --   --  8.9  --  9.1  --   --    ALT (SGPT) U/L  --   --   --   --   --   --   --   --  19   AST (SGOT) U/L  --   --   --   --   --   --   --   --  27   GLUCOSE mg/dL 104*  --   --   --  125*  --  140*  --   --     < > = values in this interval not displayed.     Results from last 7 days   Lab Units 05/26/25  0601 05/25/25  0607 05/24/25 1038 05/24/25  1037   WBC 10*3/mm3 19.20* 10.75  --  7.38   HEMOGLOBIN g/dL 10.9* 12.2  --  13.9   HEMOGLOBIN, POC g/dL  --   --  14.6  --    HEMATOCRIT % 35.4 38.7  --  42.7   HEMATOCRIT POC %  --   --  43  --    PLATELETS 10*3/mm3 208 222  --  259         No results found for: \"BLOODCX\"  No results found for: \"URINECX\"    I reviewed the patient's new imaging including images and reports.    CT HEAD WO CONTRAST    Date of Exam: 5/26/2025 5:17 AM EDT    Indication: Status post craniectomy.    Comparison: 5/25/2025    Technique: Axial CT images were obtained of the head without contrast administration.  Automated exposure control and iterative construction methods were used.      Findings:  Stable postoperative changes from recent right craniectomy. Stable large right MCA distribution infarct. No evidence of acute hemorrhage or midline shift. No extra-axial fluid collections are identified.   Impression:     Impression:  Stable exam. Postoperative change from recent right " craniectomy with evolving large right MCA distribution infarct.        Electronically Signed: Milton Blancas MD   5/26/2025 9:47 AM EDT         Interpretation Summary         Left ventricular systolic function is normal. Calculated left ventricular EF = 62%    Mild mitral valve regurgitation is present.    Trace aortic valve regurgitation is present.    Normal left atrial cavity size noted. Saline test results are negative.      All medications reviewed.   atorvastatin, 80 mg, Oral, Nightly  cetirizine, 10 mg, Oral, Nightly  enoxaparin sodium, 40 mg, Subcutaneous, Q24H  levETIRAcetam, 500 mg, Oral, Q12H  Lidocaine, 1 patch, Transdermal, Q24H  mupirocin, 1 Application, Each Nare, BID  pantoprazole, 40 mg, Intravenous, Q AM  senna-docusate sodium, 2 tablet, Oral, BID  sodium chloride, 10 mL, Intravenous, Q12H  sodium chloride, 10 mL, Intravenous, Q12H  sodium chloride, 10 mL, Intravenous, Q12H          Assessment & Plan       Acute ischemic stroke    Status post craniotomy    Obstructive sleep apnea    64-year-old woman, non-smoker, presenting on May 24 with right middle cerebral artery stroke, left hemiparesis.  She was outside the window for thrombolytic therapy. 5/25 Imaging revealed increasing edema with mass effect and clinical decline.  Therefore, she underwent decompressive craniotomy.  She had significant improvement in her mentation and has been able to follow commands with her right side.  She remains flaccid left upper extremity, some movement of the left lower extremity.  Passed her swallow assessment and is tolerating a p.o. diet.      #1 right middle cerebral artery  CVA  -May 24 CT perfusion occlusion right internal carotid artery and right M1 with evolving right middle cerebral artery stroke  - May 25 worsening edema with mass effect, status post decompressive craniotomy  - May 26 postoperative right craniotomy for large right middle cerebral artery infarct without hemorrhage or midline shift  - May 27  speech recommended dysphagia diet, mechanical ground texture with thin liquids  - Sodium 147, goal 145-150  - NIH stroke scale 20  - Echocardiogram, EF 62%, saline test negative  - Keppra 500 mg twice daily  - Atorvastatin  - Goal systolic blood pressure less than 140  - Will need helmet before she can get up with physical therapy    #2 possible obstructive sleep apnea  - Daughter denies    #3 dyslipidemia  - Statin      Telemetry, once she gets her helmet    VTE Prophylaxis:SCDS    Stress Ulcer Prophylaxis:protonix    Tia Blake MD  05/28/25  11:02 EDT      Time: Critical care 20 min  I personally provided care to this critically ill patient as documented above.  Critical care time does not include time spent on separately billed procedures.  None of my critical care time was concurrent with other critical care providers.

## 2025-05-28 NOTE — PROGRESS NOTES
Stroke Progress Note       Chief Complaint: Right MCA acute ischemic stroke    Subjective    Subjective     Subjective:  No acute events overnight.  Patient is currently resting in bed, daughter is at bedside.  She denies headache but does report mild tenderness at her surgical site.  She continues to have left hemiparesis, visual disturbance, sensory loss, and neglect.      Review of Systems   Constitutional:  Positive for activity change.   HENT:  Positive for trouble swallowing.    Eyes:  Positive for visual disturbance.   Respiratory: Negative.     Cardiovascular: Negative.    Gastrointestinal: Negative.  Negative for nausea and vomiting.   Genitourinary: Negative.    Musculoskeletal:  Positive for gait problem.   Neurological:  Positive for speech difficulty and weakness. Negative for numbness and headaches.           Objective      Temp:  [98 °F (36.7 °C)-99.5 °F (37.5 °C)] 99.5 °F (37.5 °C)  Heart Rate:  [48-93] 72  Resp:  [14-18] 16  BP: ()/(57-97) 134/82    Objective      Neurological Exam  Mental Status  Drowsy. Level of consciousness: Easily awakens to verbal stimuli, answers questions appropriately, and follows commands. Oriented to person, place, time and situation. Oriented to person, place, and time. Memory is normal. Moderate dysarthria present. Language is fluent with no aphasia.    Cranial Nerves  CN II: Vision test: Left homonymous hemianopia. Left homonymous hemianopsia.  CN III, IV, VI: Pupils equal round and reactive to light bilaterally.  CN V:  Left: Diminished sensation of the entire left side of the face.  CN VII:  Left: There is central facial weakness.  CN VIII: Hearing appears to be intact bilaterally.  CN XII: Tongue midline without atrophy or fasciculations.    Motor  Decreased muscle bulk throughout. Left hemiparesis.  RUE/RLE with 4/5 strength.    Sensory  Left hemisensory loss.  Left-sided hemispatial neglect: Extinction to double simultaneous stimulation of the left arm and  leg.    Coordination  Right: No obvious dysmetria noted.Left: Unable to assess 2/2 weakness. Unable to assess 2/2 weakness.    Gait    Not observed.      Physical Exam  Vitals and nursing note reviewed.   Constitutional:       General: She is not in acute distress.     Appearance: She is ill-appearing.      Comments: Drowsy however awakens easily to verbal stimuli   HENT:      Head:      Comments: Right crani surgical incision clean, dry, intact; open to air     Mouth/Throat:      Mouth: Mucous membranes are dry.   Eyes:      Pupils: Pupils are equal, round, and reactive to light.      Comments: Left homonymous hemianopia   Cardiovascular:      Rate and Rhythm: Normal rate and regular rhythm.   Pulmonary:      Effort: Pulmonary effort is normal. No respiratory distress.      Comments: On room air  Skin:     General: Skin is warm and dry.   Neurological:      Mental Status: She is oriented to person, place, and time.      Cranial Nerves: Cranial nerve deficit and dysarthria present.      Sensory: Sensory deficit present.      Motor: Weakness present.   Psychiatric:         Attention and Perception: She is inattentive.         Mood and Affect: Affect is flat.         Speech: Speech is slurred.         Behavior: Behavior is slowed. Behavior is cooperative.         Cognition and Memory: Cognition and memory normal.         Results Review:    I reviewed the patient's new clinical results.    CT Head Without Contrast  Result Date: 5/26/2025  Impression: Stable exam. Postoperative change from recent right craniectomy with evolving large right MCA distribution infarct. Electronically Signed: Milton Blancas MD  5/26/2025 9:47 AM EDT  Workstation ID: [id number]    CT Head Without Contrast  Result Date: 5/25/2025  Impression: Postoperative changes of right-sided craniectomy. Evolving large right MCA territory infarct. No acute intracranial hemorrhage. Similar associated mass effect including narrowing of the right lateral  ventricle. No significant midline shift or herniation. Electronically Signed: Carlton Christiansen MD  5/25/2025 8:20 PM EDT  Workstation ID: [id number]    CT Head Without Contrast  Result Date: 5/25/2025  Impression: Evolving right MCA distribution infarct. There is no gross hemorrhagic conversion. There is some increased mass effect on the right lateral ventricle with no significant subfalcine shift or uncal herniation Electronically Signed: Pio Amanda  5/25/2025 8:26 AM EDT  Workstation ID: [id number]    MRI Brain Without Contrast  Result Date: 5/24/2025  1.There is a large area of restricted diffusion in the right MCA distribution corresponding to findings on the previous head CT. Findings are compatible with acute infarct. There is some associated vasogenic edema. There is some effacement of the right lateral ventricle. However no midline shift is seen. 2.No evidence of acute hemorrhage. 3.The right ICA is occluded. Electronically Signed: Joe Grover MD  5/24/2025 8:54 PM EDT  Workstation ID: [id number]    XR Abdomen KUB  Result Date: 5/24/2025  Impression: Gastric suction tube with tip terminating at the mid stomach. Electronically Signed: Jr Ambrosio MD  5/24/2025 6:46 PM EDT  Workstation ID: [id number]    CT Head Without Contrast  Result Date: 5/24/2025  Impression: Evolving changes of right MCA territory infarct. Electronically Signed: Ed Jiménez MD  5/24/2025 4:55 PM EDT  Workstation ID: [id number]    XR Chest 1 View  Result Date: 5/24/2025  Impression: No acute cardiopulmonary abnormality. Electronically Signed: Jr Ambrosio MD  5/24/2025 11:20 AM EDT  Workstation ID: [id number]    CT Angiogram Head w AI Analysis of LVO  Result Date: 5/24/2025  Impression: 1.There is occlusion of the right internal carotid artery that may be subacute. 2.There is occlusion of the right M1 segment. 3.There are evolving changes of right MCA territory infarct. Electronically Signed: Ed Jiménez MD  5/24/2025 11:04 AM EDT   Workstation ID: [id number]    CT Angiogram Neck  Result Date: 5/24/2025  Impression: 1.There is occlusion of the right internal carotid artery that may be subacute. 2.There is occlusion of the right M1 segment. 3.There are evolving changes of right MCA territory infarct. Electronically Signed: Ed Jiménez MD  5/24/2025 11:04 AM EDT  Workstation ID: [id number]    CT CEREBRAL PERFUSION WITH & WITHOUT CONTRAST  Result Date: 5/24/2025  Impression: 1.There is occlusion of the right internal carotid artery that may be subacute. 2.There is occlusion of the right M1 segment. 3.There are evolving changes of right MCA territory infarct. Electronically Signed: Ed Jiménez MD  5/24/2025 11:04 AM EDT  Workstation ID: [id number]    CT Head Without Contrast Stroke Protocol  Result Date: 5/24/2025  Impression: Hyperdense right MCA sign suggesting thrombus, with hypodensity in the adjacent right frontal and temporal lobes in the right MCA distribution suggesting acute infarction. Mild mass effect, but no midline shift. Electronically Signed: Luis Li MD  5/24/2025 10:37 AM EDT  Workstation ID: [id number]        WBC   Date Value Ref Range Status   05/26/2025 19.20 (H) 3.40 - 10.80 10*3/mm3 Final     Hemoglobin   Date Value Ref Range Status   05/26/2025 10.9 (L) 12.0 - 15.9 g/dL Final     Hematocrit   Date Value Ref Range Status   05/26/2025 35.4 34.0 - 46.6 % Final     Platelets   Date Value Ref Range Status   05/26/2025 208 140 - 450 10*3/mm3 Final       Lab Results   Component Value Date    GLUCOSE 104 (H) 05/28/2025    BUN 12.8 05/28/2025    CREATININE 0.47 (L) 05/28/2025     05/28/2025    K 3.3 (L) 05/28/2025     05/28/2025    CALCIUM 8.1 (L) 05/28/2025    PROTEINTOT 7.0 01/19/2021    ALBUMIN 4.42 01/19/2021    ALT 19 05/24/2025    AST 27 05/24/2025    ALKPHOS 63 01/19/2021    BILITOT 0.5 01/19/2021    GLOB 2.6 01/19/2021    AGRATIO 1.7 01/19/2021    BCR 27.2 (H) 05/28/2025    ANIONGAP 8.0 05/28/2025    EGFR 107.8  05/28/2025         Lab 05/25/25  0607   HEMOGLOBIN A1C 5.30     Lipid Panel          5/25/2025    06:07   Lipid Panel   Total Cholesterol 150    Triglycerides 75    HDL Cholesterol 60    VLDL Cholesterol 15    LDL Cholesterol  75    LDL/HDL Ratio 1.25        Results for orders placed during the hospital encounter of 05/24/25    Adult Transthoracic Echo Complete W/ Cont if Necessary Per Protocol (With Agitated Saline)    Interpretation Summary    Left ventricular systolic function is normal. Calculated left ventricular EF = 62%    Mild mitral valve regurgitation is present.    Trace aortic valve regurgitation is present.    Normal left atrial cavity size noted. Saline test results are negative.      Assessment/Plan     This is a 64-year-old female with hyperlipidemia, migraines, palpitations, and CATHRYN (daughter reports she has not been formally diagnosed) who presented [facility] emergency department on 5/24/2025 with right MCA syndrome.  CT head without contrast was performed and revealed hyperdense RMCA sign with hypodensity seen within the right frontal and temporal lobes concerning for acute stroke.  Given these findings and LKW >4.5 hours she was not an appropriate candidate for IV thrombolytic therapy.  CTA head/neck/perfusion revealed VENKAT occlusion at bifurcation into RMCA; estimated core infarct 70cc and estimated amount of ischemic penumbra 106 cc consistent with near completed infarct therefore she was not deemed to be emergent endovascular therapy candidate (discussing with Dr. Olivarez and Dr. Torres).  She was admitted to the neuro ICU for further workup and evaluation.     Antiplatelet PTA: None  Anticoagulant PTA: None      # Right MCA ischemic stroke status post decompressive craniectomy  # History of palpitations  -Current etiology is cryptogenic.  This is likely cardioembolic versus ESUS  -CTH wo on 5/25/2025 images reviewed, stable appearance of RMCA stroke and post-operative  changes  - Neurosurgery is following, appreciate recommendations  - Will relax sodium check 2 per shift.  Most recent sodium level was 139  - Agree with neurosurgery regarding starting DVT prophylaxis as soon as today  - If patient tolerated DVTs prophylaxis will consider starting aspirin 81 mg for secondary stroke prevention in the next 2 days (5/30) if it is okay from a neurosurgical standpoint; discussed with Dr. Olivarez  - Continue prophylactic Keppra 500mg BID   - Target blood pressure goals of less than 140/90  - If no A-fib is detected while inpatient the patient will need 2 to 4 weeks of cardiac monitoring as an outpatient to rule out/an atrial fibrillation  - PT/OT/SLP to see and assess when appropriate; will need protective helmet prior to getting OOB  - A1c 5.30 on admission  - Will need polysomnography as an outpatient    # Hyperlipidemia  - LDL on admission 75, goal <70 for secondary stroke prevention  - Continue atorvastatin 80 mg nightly     # Dysphagia  - SLP continues to follow  - Diet advanced to mechanical ground and thin liquid on 5/27    Plan of care was discussed with patient and daughter at bedside.  Stroke will continue to follow. Please call for any further questions or concerns    Kaleigh Estrada MSN, APRN, AGACNP-BC, ANVC-BC  Stroke Neurology

## 2025-05-28 NOTE — PROGRESS NOTES
Linda Schafer  1960    5/28/2025.    Ms. Schafer is a 64-year-old female who has undergone hemicraniectomy, 5/25/2025.  The patient requires a helmet for safety to participate with physical therapy and Occupational Therapy.    Ursula Martin PA-C

## 2025-05-28 NOTE — PLAN OF CARE
Problem: Adult Inpatient Plan of Care  Goal: Plan of Care Review  Recent Flowsheet Documentation  Taken 5/28/2025 1619 by Mayra Babcock, GRICELDA  Progress: no change  Outcome Evaluation: PT initial evaluation complete. Pt presents w/ LLE weakness, impaired balance, and decreased activity tolerance. Pt limited by significant lethargy throughout session requiring frequent VC to keep attention on task. Pt required maxAx2 for all mobility this date and was unable to demonstrate any active movement of LLE. In addition pt demonstrates significant L sided trunk lean throughout. Pt completed SPT to bedside chair w/ maxAx2, BUE support, and BLE knee blocking. IPPT services warranted to address deficits listed above. D/c rec is IRF for best outcome.  Plan of Care Reviewed With:   patient   family   Goal Outcome Evaluation:  Plan of Care Reviewed With: patient, family        Progress: no change  Outcome Evaluation: PT initial evaluation complete. Pt presents w/ LLE weakness, impaired balance, and decreased activity tolerance. Pt limited by significant lethargy throughout session requiring frequent VC to keep attention on task. Pt required maxAx2 for all mobility this date and was unable to demonstrate any active movement of LLE. In addition pt demonstrates significant L sided trunk lean throughout. Pt completed SPT to bedside chair w/ maxAx2, BUE support, and BLE knee blocking. IPPT services warranted to address deficits listed above. D/c rec is IRF for best outcome.    Anticipated Discharge Disposition (PT): inpatient rehabilitation facility

## 2025-05-28 NOTE — THERAPY EVALUATION
Patient Name: Linda Schafer  : 1960    MRN: [id number]                              Today's Date: 2025       Admit Date: 2025    Visit Dx:     ICD-10-CM ICD-9-CM   1. Acute CVA (cerebrovascular accident)  I63.9 434.91   2. Stroke  I63.9 434.91   3. Dysarthria  R47.1 784.51   4. Oropharyngeal dysphagia  R13.12 787.22     Patient Active Problem List   Diagnosis    Palpitations    Personal history of cardiac murmur    Migraine headache    Hiatal hernia    GERD (gastroesophageal reflux disease)    Diverticulosis    Diminished pulses in lower extremity    Dyslipidemia    Peripheral edema    Grade I diastolic dysfunction    Acute ischemic stroke    Status post craniotomy    Obstructive sleep apnea     Past Medical History:   Diagnosis Date    Atypical chest pain     Chest pain.Atypical chest pain    Chest pain     COVID-19     2022    COVID-19 vaccine administered     2021 ,  J and J ) 10/25/2021 - Moderna    Diminished pulses in lower extremity     Diverticulosis     Dizziness     Occasional    Dyslipidemia     Dyspnea     Edema     Fatigue     GERD (gastroesophageal reflux disease)     Hiatal hernia     Migraine headache     Palpitations     Personal history of cardiac murmur     SOB (shortness of breath)      Past Surgical History:   Procedure Laterality Date    CHOLECYSTECTOMY      CRANIOTOMY FOR SUBDURAL HEMATOMA Right 2025    Procedure: DECOMPRESSIVE CRANIECTOMY RIGHT;  Surgeon: Matt Olivarez MD;  Location: [facility];  Service: Neurosurgery;  Laterality: Right;    HYSTERECTOMY      OOPHORECTOMY      TUBAL ABDOMINAL LIGATION        General Information       Row Name 25 1541          Physical Therapy Time and Intention    Document Type evaluation  -AC     Mode of Treatment physical therapy  -AC       Row Name 25 1541          General Information    Patient Profile Reviewed yes  -AC     Prior Level of Function independent:;all household mobility;community  mobility;gait;transfer;bed mobility;ADL's  -     Existing Precautions/Restrictions fall;other (see comments)  helmet when OOB, hemicraniectomy, R MCA CVA  -     Barriers to Rehab medically complex  -       Row Name 05/28/25 1541          Living Environment    Current Living Arrangements home  -     People in Home spouse  -       Row Name 05/28/25 1541          Home Main Entrance    Number of Stairs, Main Entrance four  -AC     Stair Railings, Main Entrance railing on right side (ascending)  -AC       Row Name 05/28/25 1541          Stairs Within Home, Primary    Number of Stairs, Within Home, Primary none  -AC       Row Name 05/28/25 1541          Cognition    Orientation Status (Cognition) oriented x 4  -AC       Row Name 05/28/25 1541          Safety Issues/Impairments Affecting Functional Mobility    Safety Issues Affecting Function (Mobility) ability to follow commands;awareness of need for assistance;insight into deficits/self-awareness;judgment;problem-solving;safety precaution awareness;safety precautions follow-through/compliance;sequencing abilities  -     Impairments Affecting Function (Mobility) balance;cognition;endurance/activity tolerance;visual/perceptual;strength;sensation/sensory awareness;range of motion (ROM);postural/trunk control;pain  -AC     Cognitive Impairments, Mobility Safety/Performance attention;awareness, need for assistance;insight into deficits/self-awareness;problem-solving/reasoning;judgment;safety precaution awareness;safety precaution follow-through;sequencing abilities  -     Comment, Safety Issues/Impairments (Mobility) pt very lethargic throughout session  -               User Key  (r) = Recorded By, (t) = Taken By, (c) = Cosigned By      Initials Name Provider Type    AC Mayra Babcock, PT Physical Therapist                   Mobility       Row Name 05/28/25 1545          Bed Mobility    Bed Mobility supine-sit  -AC     Supine-Sit [place] (Bed Mobility)  maximum assist (25% patient effort);2 person assist;verbal cues;nonverbal cues (demo/gesture)  -AC     Assistive Device (Bed Mobility) head of bed elevated;bed rails;repositioning sheet  -AC     Comment, (Bed Mobility) Pt able to advance RLE towards EOB however demonstrates no active movement of LLE or LUE in order to complete bed mobility. Pt requiring maxAx2 to transition to sitting EOB. Pt able to hold seated balance w/ VC and CGA/minAx1 with intermittent SBA  -AC       Row Name 05/28/25 1547          Transfers    Comment, (Transfers) VC for hand placement and sequencing  -AC       Row Name 05/28/25 1548          Bed-Chair Transfer    Bed-Chair [place] (Transfers) maximum assist (25% patient effort);2 person assist;verbal cues;nonverbal cues (demo/gesture)  -AC     Assistive Device (Bed-Chair Transfers) other (see comments)  BUE support  -AC     Comment, (Bed-Chair Transfer) Pt completed SPT to bedside chair w/ BUE support and BLE knee blocking  -AC       Row Name 05/28/25 1544          Sit-Stand Transfer    Sit-Stand [place] (Transfers) maximum assist (25% patient effort);2 person assist;verbal cues;nonverbal cues (demo/gesture)  -AC     Assistive Device (Sit-Stand Transfers) other (see comments)  BUE support  -AC     Comment, (Sit-Stand Transfer) STS 2x from EOB  -AC       Row Name 05/28/25 1544          Gait/Stairs (Locomotion)    Patient was able to Ambulate no, other medical factors prevent ambulation  -AC     Reason Patient was unable to Ambulate Excessive Weakness  -AC               User Key  (r) = Recorded By, (t) = Taken By, (c) = Cosigned By      Initials Name Provider Type    AC Mayra Babcock PT Physical Therapist                   Obj/Interventions       Row Name 05/28/25 1546          Range of Motion Comprehensive    General Range of Motion lower extremity range of motion deficits identified  -AC     Comment, General Range of Motion RLE WFL, LLE no AROM, PROM WFL  -AC       Row Name 05/28/25  1547          Strength Comprehensive (MMT)    General Manual Muscle Testing (MMT) Assessment lower extremity strength deficits identified  -AC     Comment, General Manual Muscle Testing (MMT) Assessment RLE grossly 3+/5, Pt unable to complete active LLE DF/PF, SLR, or quad set  -       Row Name 05/28/25 1547          Balance    Balance Assessment sitting static balance;sitting dynamic balance;standing static balance;standing dynamic balance  -AC     Static Sitting Balance minimal assist  -AC     Dynamic Sitting Balance moderate assist  -AC     Position, Sitting Balance unsupported;sitting edge of bed  -AC     Static Standing Balance maximum assist;2-person assist  -AC     Dynamic Standing Balance maximum assist;2-person assist  -AC     Position/Device Used, Standing Balance supported;other (see comments)  BUE support  -AC     Balance Interventions sitting;standing;sit to stand;supported;static;dynamic  -       Row Name 05/28/25 1547          Sensory Assessment (Somatosensory)    Sensory Assessment (Somatosensory) other (see comments)  diminished LLE LT sensation  -AC               User Key  (r) = Recorded By, (t) = Taken By, (c) = Cosigned By      Initials Name Provider Type    AC Mayra Babcock, PT Physical Therapist                   Goals/Plan       Row Name 05/28/25 1552          Bed Mobility Goal 1 (PT)    Activity/Assistive Device (Bed Mobility Goal 1, PT) supine to sit  -AC     Interior Level/Cues Needed (Bed Mobility Goal 1, PT) minimum assist (75% or more patient effort)  -AC     Time Frame (Bed Mobility Goal 1, PT) short term goal (STG);5 days  -       Row Name 05/28/25 8044          Transfer Goal 1 (PT)    Activity/Assistive Device (Transfer Goal 1, PT) bed-to-chair/chair-to-bed  -AC     Interior Level/Cues Needed (Transfer Goal 1, PT) moderate assist (50-74% patient effort)  -AC     Time Frame (Transfer Goal 1, PT) short term goal (STG);5 days  -       Row Name 05/28/25 2884          Gait  Training Goal 1 (PT)    Activity/Assistive Device (Gait Training Goal 1, PT) gait (walking locomotion);decrease fall risk;improve balance and speed;increase endurance/gait distance  -AC     [place] Level (Gait Training Goal 1, PT) moderate assist (50-74% patient effort)  -AC     Distance (Gait Training Goal 1, PT) 25  -AC     Time Frame (Gait Training Goal 1, PT) long term goal (LTG);10 days  -AC       Row Name 05/28/25 4494          Therapy Assessment/Plan (PT)    Planned Therapy Interventions (PT) balance training;bed mobility training;gait training;home exercise program;patient/family education;strengthening;transfer training;neuromuscular re-education;ROM (range of motion);postural re-education;stretching  -AC               User Key  (r) = Recorded By, (t) = Taken By, (c) = Cosigned By      Initials Name Provider Type    AC Mayra Babcock, PT Physical Therapist                   Clinical Impression       Row Name 05/28/25 1540          Pain    Pretreatment Pain Rating 7/10  -AC     Posttreatment Pain Rating 7/10  -AC     Pain Location head  -AC     Pain Side/Orientation left  -AC     Pain Management Interventions activity modification encouraged;exercise or physical activity utilized;positioning techniques utilized  -AC     Response to Pain Interventions no change per patient report  -AC       Row Name 05/28/25 0994          Plan of Care Review    Plan of Care Reviewed With patient;family  -AC     Progress no change  -AC     Outcome Evaluation PT initial evaluation complete. Pt presents w/ LLE weakness, impaired balance, and decreased activity tolerance. Pt limited by significant lethargy throughout session requiring frequent VC to keep attention on task. Pt required maxAx2 for all mobility this date and was unable to demonstrate any active movement of LLE. In addition pt demonstrates significant L sided trunk lean throughout. Pt completed SPT to bedside chair w/ maxAx2, BUE support, and BLE knee blocking.  IPPT services warranted to address deficits listed above. D/c rec is IRF for best outcome.  -AC       Row Name 05/28/25 1549          Therapy Assessment/Plan (PT)    Rehab Potential (PT) good  -AC     Criteria for Skilled Interventions Met (PT) yes;meets criteria;skilled treatment is necessary  -AC     Therapy Frequency (PT) daily  -AC     Predicted Duration of Therapy Intervention (PT) 10 days  -AC       Row Name 05/28/25 1549          Vital Signs    Pre Systolic BP Rehab 112  -AC     Pre Treatment Diastolic BP 58  -AC     Intra Systolic BP Rehab 109  -AC     Intra Treatment Diastolic BP 75  -AC     Post Systolic BP Rehab 129  -AC     Post Treatment Diastolic BP 58  -AC     Pretreatment Heart Rate (beats/min) 68  -AC     Pre SpO2 (%) 99  -AC     O2 Delivery Pre Treatment room air  -AC     O2 Delivery Intra Treatment room air  -AC     O2 Delivery Post Treatment room air  -AC     Pre Patient Position Supine  -AC     Intra Patient Position Standing  -AC     Post Patient Position Sitting  -AC       Row Name 05/28/25 1549          Positioning and Restraints    Pre-Treatment Position in bed  -AC     Post Treatment Position chair  -AC     In Chair notified nsg;reclined;sitting;call light within reach;encouraged to call for assist;waffle cushion;legs elevated;on mechanical lift sling;exit alarm on;with family/caregiver  -AC               User Key  (r) = Recorded By, (t) = Taken By, (c) = Cosigned By      Initials Name Provider Type    Mayra Ram, PT Physical Therapist                   Outcome Measures       Row Name 05/28/25 6100          How much help from another person do you currently need...    Turning from your back to your side while in flat bed without using bedrails? 2  -AC     Moving from lying on back to sitting on the side of a flat bed without bedrails? 1  -AC     Moving to and from a bed to a chair (including a wheelchair)? 1  -AC     Standing up from a chair using your arms (e.g., wheelchair, bedside  chair)? 1  -AC     Climbing 3-5 steps with a railing? 1  -AC     To walk in hospital room? 1  -AC     AM-PAC 6 Clicks Score (PT) 7  -AC     Highest Level of Mobility Goal Bed Activities/Dependent Transfer-2  -       Row Name 05/28/25 1555          Modified [place] Scale    Pre-Stroke Modified [place] Scale 6 - Unable to determine (UTD) from the medical record documentation  -     Modified [place] Scale 4 - Moderately severe disability.  Unable to walk without assistance, and unable to attend to own bodily needs without assistance.  -       Row Name 05/28/25 155          Functional Assessment    Outcome Measure Options AM-PAC 6 Clicks Basic Mobility (PT);Modified [place]  -               User Key  (r) = Recorded By, (t) = Taken By, (c) = Cosigned By      Initials Name Provider Type    AC Mayra Babcock, PT Physical Therapist                                 Physical Therapy Education       Title: PT OT SLP Therapies (In Progress)       Topic: Physical Therapy (In Progress)       Point: Mobility training (In Progress)       Learning Progress Summary            Patient Acceptance, E, NR by  at 5/28/2025 1556                      Point: Home exercise program (Not Started)       Learner Progress:  Not documented in this visit.              Point: Body mechanics (In Progress)       Learning Progress Summary            Patient Acceptance, E, NR by  at 5/28/2025 1556                      Point: Precautions (In Progress)       Learning Progress Summary            Patient Acceptance, E, NR by  at 5/28/2025 1556                                      User Key       Initials Effective Dates Name Provider Type Discipline     07/11/24 -  Mayra Babcock, PT Physical Therapist PT                  PT Recommendation and Plan  Planned Therapy Interventions (PT): balance training, bed mobility training, gait training, home exercise program, patient/family education, strengthening, transfer training, neuromuscular re-education, ROM  (range of motion), postural re-education, stretching  Progress: no change  Outcome Evaluation: PT initial evaluation complete. Pt presents w/ LLE weakness, impaired balance, and decreased activity tolerance. Pt limited by significant lethargy throughout session requiring frequent VC to keep attention on task. Pt required maxAx2 for all mobility this date and was unable to demonstrate any active movement of LLE. In addition pt demonstrates significant L sided trunk lean throughout. Pt completed SPT to bedside chair w/ maxAx2, BUE support, and BLE knee blocking. IPPT services warranted to address deficits listed above. D/c rec is IRF for best outcome.     Time Calculation:   PT Evaluation Complexity  History, PT Evaluation Complexity: 1-2 personal factors and/or comorbidities  Examination of Body Systems (PT Eval Complexity): total of 3 or more elements  Clinical Presentation (PT Evaluation Complexity): evolving  Clinical Decision Making (PT Evaluation Complexity): moderate complexity  Overall Complexity (PT Evaluation Complexity): moderate complexity     PT Charges       Row Name 05/28/25 1558             Time Calculation    Start Time 1312  -AC      PT Received On 05/28/25  -AC      PT Goal Re-Cert Due Date 06/07/25  -AC         Untimed Charges    PT Eval/Re-eval Minutes 51  -AC         Total Minutes    Untimed Charges Total Minutes 51  -AC       Total Minutes 51  -AC                User Key  (r) = Recorded By, (t) = Taken By, (c) = Cosigned By      Initials Name Provider Type    AC Mayra Babcock, PT Physical Therapist                  Therapy Charges for Today       Code Description Service Date Service Provider Modifiers Qty    19025821271 HC PT EVAL MOD COMPLEXITY 4 5/28/2025 Mayra Babcock, PT GP 1            PT G-Codes  Outcome Measure Options: AM-PAC 6 Clicks Basic Mobility (PT), Modified [place]  AM-PAC 6 Clicks Score (PT): 7  Modified [place] Scale: 4 - Moderately severe disability.  Unable to walk without  assistance, and unable to attend to own bodily needs without assistance.  PT Discharge Summary  Anticipated Discharge Disposition (PT): inpatient rehabilitation facility    Mayra Babcock, PT  5/28/2025

## 2025-05-28 NOTE — PLAN OF CARE
Problem: Adult Inpatient Plan of Care  Goal: Plan of Care Review  Outcome: Progressing  Goal: Patient-Specific Goal (Individualized)  Outcome: Progressing  Goal: Absence of Hospital-Acquired Illness or Injury  Outcome: Progressing  Intervention: Identify and Manage Fall Risk  Recent Flowsheet Documentation  Taken 5/28/2025 0600 by Radha Mahmood RN  Safety Promotion/Fall Prevention: safety round/check completed  Taken 5/28/2025 0400 by Radha Mahmood RN  Safety Promotion/Fall Prevention: safety round/check completed  Taken 5/28/2025 0200 by Radha Mahmood RN  Safety Promotion/Fall Prevention: safety round/check completed  Taken 5/28/2025 0000 by Radha Mahmood RN  Safety Promotion/Fall Prevention: safety round/check completed  Taken 5/27/2025 2200 by Radha Mahmood RN  Safety Promotion/Fall Prevention: safety round/check completed  Taken 5/27/2025 2000 by Radha Mahmood RN  Safety Promotion/Fall Prevention: safety round/check completed  Intervention: Prevent Skin Injury  Recent Flowsheet Documentation  Taken 5/28/2025 0600 by Radha Mahmood RN  Body Position:   turned   supine   upper extremity elevated   lower extremity elevated  Skin Protection:   incontinence pads utilized   silicone foam dressing in place  Taken 5/28/2025 0400 by Radha Mahmood RN  Body Position:   turned   left   upper extremity elevated   lower extremity elevated  Skin Protection:   incontinence pads utilized   silicone foam dressing in place  Taken 5/28/2025 0200 by Radha Mahmood RN  Body Position:   turned   right   upper extremity elevated   lower extremity elevated  Skin Protection:   incontinence pads utilized   silicone foam dressing in place  Taken 5/28/2025 0000 by Radha Mahmood RN  Body Position:   turned   left   upper extremity elevated   lower extremity elevated  Skin Protection:   incontinence pads utilized   silicone foam dressing in place  Taken 5/27/2025 2200 by Timoteo  Radha LFOR RN  Body Position:   upper extremity elevated   lower extremity elevated   weight shifting  Skin Protection:   incontinence pads utilized   silicone foam dressing in place  Taken 5/27/2025 2000 by Radha Mahmood RN  Body Position:   turned   right   upper extremity elevated   lower extremity elevated  Skin Protection:   incontinence pads utilized   silicone foam dressing in place  Intervention: Prevent and Manage VTE (Venous Thromboembolism) Risk  Recent Flowsheet Documentation  Taken 5/28/2025 0600 by Radha Mahmood RN  VTE Prevention/Management:   bilateral   SCDs (sequential compression devices) on  Taken 5/28/2025 0400 by Radha Mahmood RN  VTE Prevention/Management:   bilateral   SCDs (sequential compression devices) on  Taken 5/28/2025 0200 by Radha Mahmood RN  VTE Prevention/Management:   bilateral   SCDs (sequential compression devices) on  Taken 5/28/2025 0000 by Radha Mahmood RN  VTE Prevention/Management:   bilateral   SCDs (sequential compression devices) on  Taken 5/27/2025 2200 by Radha Mahmood RN  VTE Prevention/Management:   bilateral   SCDs (sequential compression devices) on  Taken 5/27/2025 2000 by Radha Mahmood RN  VTE Prevention/Management:   bilateral   SCDs (sequential compression devices) on  Intervention: Prevent Infection  Recent Flowsheet Documentation  Taken 5/28/2025 0600 by Radha Mahmood RN  Infection Prevention: environmental surveillance performed  Taken 5/28/2025 0400 by Radha Mahmood RN  Infection Prevention: environmental surveillance performed  Taken 5/28/2025 0200 by Radha Mahmood RN  Infection Prevention: environmental surveillance performed  Taken 5/28/2025 0000 by Radha Mahmood RN  Infection Prevention: environmental surveillance performed  Taken 5/27/2025 2200 by Radha Mahmood RN  Infection Prevention: environmental surveillance performed  Taken 5/27/2025 2000 by Radha Mahmood RN  Infection  Prevention: environmental surveillance performed  Goal: Optimal Comfort and Wellbeing  Outcome: Progressing  Intervention: Provide Person-Centered Care  Recent Flowsheet Documentation  Taken 5/28/2025 0600 by Radha Mahmood RN  Trust Relationship/Rapport:   care explained   reassurance provided  Taken 5/28/2025 0400 by Radha Mahmood RN  Trust Relationship/Rapport:   care explained   reassurance provided  Taken 5/28/2025 0200 by Radha Mahmood RN  Trust Relationship/Rapport:   care explained   reassurance provided  Taken 5/28/2025 0000 by Radha Mahmood RN  Trust Relationship/Rapport:   care explained   reassurance provided  Taken 5/27/2025 2200 by Radha Mahmood RN  Trust Relationship/Rapport:   care explained   reassurance provided  Taken 5/27/2025 2000 by Radha Mahmood RN  Trust Relationship/Rapport:   care explained   reassurance provided  Goal: Readiness for Transition of Care  Outcome: Progressing     Problem: Fall Injury Risk  Goal: Absence of Fall and Fall-Related Injury  Outcome: Progressing  Intervention: Identify and Manage Contributors  Recent Flowsheet Documentation  Taken 5/28/2025 0600 by Radha Mahmood RN  Medication Review/Management: medications reviewed  Taken 5/28/2025 0400 by Radha Mahmood RN  Medication Review/Management: medications reviewed  Taken 5/28/2025 0200 by Radha Mahmood RN  Medication Review/Management: medications reviewed  Taken 5/28/2025 0000 by Radha Mahmood RN  Medication Review/Management: medications reviewed  Taken 5/27/2025 2200 by Radha Mahmood RN  Medication Review/Management: medications reviewed  Taken 5/27/2025 2000 by Radha Mahmood RN  Medication Review/Management: medications reviewed  Intervention: Promote Injury-Free Environment  Recent Flowsheet Documentation  Taken 5/28/2025 0600 by Radha Mahmood RN  Safety Promotion/Fall Prevention: safety round/check completed  Taken 5/28/2025 0400 by  Timoteo, Radha E, RN  Safety Promotion/Fall Prevention: safety round/check completed  Taken 5/28/2025 0200 by Radha Mahmood RN  Safety Promotion/Fall Prevention: safety round/check completed  Taken 5/28/2025 0000 by Radha Mahmood RN  Safety Promotion/Fall Prevention: safety round/check completed  Taken 5/27/2025 2200 by Radha Mahmood RN  Safety Promotion/Fall Prevention: safety round/check completed  Taken 5/27/2025 2000 by Radha Mahmood RN  Safety Promotion/Fall Prevention: safety round/check completed     Problem: Skin Injury Risk Increased  Goal: Skin Health and Integrity  Outcome: Progressing  Intervention: Optimize Skin Protection  Recent Flowsheet Documentation  Taken 5/28/2025 0600 by Radha Mahmood RN  Activity Management: activity encouraged  Pressure Reduction Techniques:   weight shift assistance provided   heels elevated off bed   positioned off wounds   pressure points protected  Head of Bed (HOB) Positioning: HOB at 30-45 degrees  Pressure Reduction Devices: pressure-redistributing mattress utilized  Skin Protection:   incontinence pads utilized   silicone foam dressing in place  Taken 5/28/2025 0400 by Radha Mahmood RN  Activity Management: activity encouraged  Pressure Reduction Techniques:   weight shift assistance provided   heels elevated off bed   positioned off wounds   pressure points protected  Head of Bed (HOB) Positioning: HOB at 30-45 degrees  Pressure Reduction Devices: pressure-redistributing mattress utilized  Skin Protection:   incontinence pads utilized   silicone foam dressing in place  Taken 5/28/2025 0200 by Radha Mahmood RN  Activity Management: activity encouraged  Pressure Reduction Techniques:   weight shift assistance provided   heels elevated off bed   positioned off wounds   pressure points protected  Head of Bed (HOB) Positioning: HOB at 30-45 degrees  Pressure Reduction Devices: pressure-redistributing mattress utilized  Skin  Protection:   incontinence pads utilized   silicone foam dressing in place  Taken 5/28/2025 0000 by Radha Mahmood RN  Activity Management: activity encouraged  Pressure Reduction Techniques:   weight shift assistance provided   heels elevated off bed   positioned off wounds   pressure points protected  Head of Bed (HOB) Positioning: HOB at 30-45 degrees  Pressure Reduction Devices: pressure-redistributing mattress utilized  Skin Protection:   incontinence pads utilized   silicone foam dressing in place  Taken 5/27/2025 2200 by Radha Mahmood RN  Activity Management: activity encouraged  Pressure Reduction Techniques:   heels elevated off bed   positioned off wounds   pressure points protected   weight shift assistance provided  Head of Bed (HOB) Positioning: HOB at 30-45 degrees  Pressure Reduction Devices: pressure-redistributing mattress utilized  Skin Protection:   incontinence pads utilized   silicone foam dressing in place  Taken 5/27/2025 2000 by Radha Mahmood RN  Activity Management: activity encouraged  Pressure Reduction Techniques:   heels elevated off bed   positioned off wounds   pressure points protected   weight shift assistance provided  Head of Bed (HOB) Positioning: HOB at 30-45 degrees  Pressure Reduction Devices: pressure-redistributing mattress utilized  Skin Protection:   incontinence pads utilized   silicone foam dressing in place     Problem: Comorbidity Management  Goal: Blood Pressure in Desired Range  Outcome: Progressing  Intervention: Maintain Blood Pressure Management  Recent Flowsheet Documentation  Taken 5/28/2025 0600 by Radha Mahmood RN  Medication Review/Management: medications reviewed  Taken 5/28/2025 0400 by Radha Mahmood RN  Medication Review/Management: medications reviewed  Taken 5/28/2025 0200 by Radha Mahmood RN  Medication Review/Management: medications reviewed  Taken 5/28/2025 0000 by Radha Mahmood RN  Medication Review/Management:  medications reviewed  Taken 5/27/2025 2200 by Radha Mahmood, RN  Medication Review/Management: medications reviewed  Taken 5/27/2025 2000 by Radha Mahmood, RN  Medication Review/Management: medications reviewed   Goal Outcome Evaluation:

## 2025-05-28 NOTE — CASE MANAGEMENT/SOCIAL WORK
Continued Stay Note  [facility]     Patient Name: Linda Schafer  MRN: [id number]  Today's Date: 5/28/2025    Admit Date: 5/24/2025    Plan: Home with spouse   Discharge Plan       Row Name 05/28/25 1021       Plan    Plan Home with spouse    Patient/Family in Agreement with Plan yes    Plan Comments Discussed in MDR. Plan is home with spouse pending PT/OT recs. PAtient needs a helmet before she can work with PT/OT. CM will continue to follow.    Final Discharge Disposition Code 01 - home or self-care                   Discharge Codes    No documentation.                       Gaudencio Benitez RN

## 2025-05-28 NOTE — PLAN OF CARE
Goal Outcome Evaluation:  Plan of Care Reviewed With: patient, family        Progress: no change  Outcome Evaluation: PT initial evaluation complete. Pt presents w/ LLE weakness, impaired balance, and decreased activity tolerance. Pt limited by significant lethargy throughout session requiring frequent VC to keep attention on task. Pt required maxAx2 for all mobility this date and was unable to demonstrate any active movement of LLE. Pt completed SPT to bedside chair w/ maxAx2, BUE support, and BLE knee blocking. IPPT services warranted to address deficits listed above. D/c rec is IRF for best outcome.    Anticipated Discharge Disposition (PT): inpatient rehabilitation facility

## 2025-05-28 NOTE — PLAN OF CARE
Goal Outcome Evaluation:  Plan of Care Reviewed With: patient        Progress: no change  Outcome Evaluation: OT evaluation completed. The pt presents below baseline with L sided weakness, LUE hypertonia, L field cut, balance deficits, decreased activity tolerance, and decreased safety awareness warranting continued IP OT services. The pt was assisted in SPT toward the R from the EOB to the chair with maxA x2, BUE support and B knees blocked. Recommend a d/c to IRF for best outcome.    Anticipated Discharge Disposition (OT): inpatient rehabilitation facility

## 2025-05-29 PROBLEM — G47.33 OBSTRUCTIVE SLEEP APNEA: Status: RESOLVED | Noted: 2025-05-27 | Resolved: 2025-05-29

## 2025-05-29 LAB
ANION GAP SERPL CALCULATED.3IONS-SCNC: 11 MMOL/L (ref 5–15)
BACTERIA SPEC AEROBE CULT: NORMAL
BUN SERPL-MCNC: 7.8 MG/DL (ref 8–23)
BUN/CREAT SERPL: 17 (ref 7–25)
CALCIUM SPEC-SCNC: 8.5 MG/DL (ref 8.6–10.5)
CHLORIDE SERPL-SCNC: 107 MMOL/L (ref 98–107)
CO2 SERPL-SCNC: 22 MMOL/L (ref 22–29)
CREAT SERPL-MCNC: 0.46 MG/DL (ref 0.57–1)
DEPRECATED RDW RBC AUTO: 40.2 FL (ref 37–54)
EGFRCR SERPLBLD CKD-EPI 2021: 108.4 ML/MIN/1.73
ERYTHROCYTE [DISTWIDTH] IN BLOOD BY AUTOMATED COUNT: 12.6 % (ref 12.3–15.4)
GLUCOSE SERPL-MCNC: 99 MG/DL (ref 65–99)
GRAM STN SPEC: NORMAL
HCT VFR BLD AUTO: 31.9 % (ref 34–46.6)
HGB BLD-MCNC: 10.5 G/DL (ref 12–15.9)
MCH RBC QN AUTO: 28.7 PG (ref 26.6–33)
MCHC RBC AUTO-ENTMCNC: 32.9 G/DL (ref 31.5–35.7)
MCV RBC AUTO: 87.2 FL (ref 79–97)
PHOSPHATE SERPL-MCNC: 3.3 MG/DL (ref 2.5–4.5)
PLATELET # BLD AUTO: 185 10*3/MM3 (ref 140–450)
PMV BLD AUTO: 12.1 FL (ref 6–12)
POTASSIUM SERPL-SCNC: 3.7 MMOL/L (ref 3.5–5.2)
RBC # BLD AUTO: 3.66 10*6/MM3 (ref 3.77–5.28)
SODIUM SERPL-SCNC: 140 MMOL/L (ref 136–145)
WBC NRBC COR # BLD AUTO: 10.52 10*3/MM3 (ref 3.4–10.8)

## 2025-05-29 PROCEDURE — 99233 SBSQ HOSP IP/OBS HIGH 50: CPT | Performed by: NURSE PRACTITIONER

## 2025-05-29 PROCEDURE — 85027 COMPLETE CBC AUTOMATED: CPT | Performed by: NURSE PRACTITIONER

## 2025-05-29 PROCEDURE — 80048 BASIC METABOLIC PNL TOTAL CA: CPT | Performed by: STUDENT IN AN ORGANIZED HEALTH CARE EDUCATION/TRAINING PROGRAM

## 2025-05-29 PROCEDURE — 84100 ASSAY OF PHOSPHORUS: CPT | Performed by: INTERNAL MEDICINE

## 2025-05-29 PROCEDURE — 25010000002 ENOXAPARIN PER 10 MG: Performed by: STUDENT IN AN ORGANIZED HEALTH CARE EDUCATION/TRAINING PROGRAM

## 2025-05-29 PROCEDURE — 99232 SBSQ HOSP IP/OBS MODERATE 35: CPT | Performed by: INTERNAL MEDICINE

## 2025-05-29 PROCEDURE — P9612 CATHETERIZE FOR URINE SPEC: HCPCS

## 2025-05-29 RX ORDER — POLYVINYL ALCOHOL 14 MG/ML
1 SOLUTION/ DROPS OPHTHALMIC
Status: DISCONTINUED | OUTPATIENT
Start: 2025-05-29 | End: 2025-05-30 | Stop reason: HOSPADM

## 2025-05-29 RX ORDER — KETOTIFEN FUMARATE 0.35 MG/ML
1 SOLUTION/ DROPS OPHTHALMIC 2 TIMES DAILY
Status: DISCONTINUED | OUTPATIENT
Start: 2025-05-29 | End: 2025-05-30 | Stop reason: HOSPADM

## 2025-05-29 RX ORDER — LOPERAMIDE HYDROCHLORIDE 2 MG/1
4 CAPSULE ORAL 4 TIMES DAILY PRN
Status: DISCONTINUED | OUTPATIENT
Start: 2025-05-29 | End: 2025-05-30 | Stop reason: HOSPADM

## 2025-05-29 RX ADMIN — PANTOPRAZOLE SODIUM 40 MG: 40 TABLET, DELAYED RELEASE ORAL at 06:18

## 2025-05-29 RX ADMIN — CETIRIZINE HYDROCHLORIDE 10 MG: 10 TABLET, FILM COATED ORAL at 20:17

## 2025-05-29 RX ADMIN — Medication 10 ML: at 08:08

## 2025-05-29 RX ADMIN — IBUPROFEN 600 MG: 600 TABLET, FILM COATED ORAL at 20:17

## 2025-05-29 RX ADMIN — ACETAMINOPHEN 650 MG: 325 TABLET ORAL at 17:13

## 2025-05-29 RX ADMIN — ACETAMINOPHEN 650 MG: 325 TABLET ORAL at 04:45

## 2025-05-29 RX ADMIN — Medication 10 ML: at 20:18

## 2025-05-29 RX ADMIN — LEVETIRACETAM 500 MG: 500 TABLET, FILM COATED ORAL at 08:07

## 2025-05-29 RX ADMIN — ATORVASTATIN CALCIUM 80 MG: 40 TABLET, FILM COATED ORAL at 20:16

## 2025-05-29 RX ADMIN — KETOTIFEN FUMARATE 1 DROP: 0.25 SOLUTION/ DROPS OPHTHALMIC at 20:17

## 2025-05-29 RX ADMIN — IBUPROFEN 600 MG: 600 TABLET, FILM COATED ORAL at 08:51

## 2025-05-29 RX ADMIN — LIDOCAINE 1 PATCH: 4 PATCH TOPICAL at 08:07

## 2025-05-29 RX ADMIN — ENOXAPARIN SODIUM 40 MG: 100 INJECTION SUBCUTANEOUS at 10:53

## 2025-05-29 RX ADMIN — MUPIROCIN 1 APPLICATION: 20 OINTMENT TOPICAL at 08:07

## 2025-05-29 RX ADMIN — KETOTIFEN FUMARATE 1 DROP: 0.25 SOLUTION/ DROPS OPHTHALMIC at 12:57

## 2025-05-29 RX ADMIN — LEVETIRACETAM 500 MG: 500 TABLET, FILM COATED ORAL at 20:17

## 2025-05-29 NOTE — PLAN OF CARE
Problem: Adult Inpatient Plan of Care  Goal: Plan of Care Review  Outcome: Progressing  Flowsheets (Taken 5/29/2025 0512)  Progress: no change  Plan of Care Reviewed With:   patient   spouse  Goal: Patient-Specific Goal (Individualized)  Outcome: Progressing  Goal: Absence of Hospital-Acquired Illness or Injury  Outcome: Progressing  Intervention: Identify and Manage Fall Risk  Recent Flowsheet Documentation  Taken 5/29/2025 0400 by Judy Rooney RN  Safety Promotion/Fall Prevention:   assistive device/personal items within reach   activity supervised   clutter free environment maintained   fall prevention program maintained   lighting adjusted   nonskid shoes/slippers when out of bed   room organization consistent   safety round/check completed  Taken 5/29/2025 0200 by Judy Rooney RN  Safety Promotion/Fall Prevention:   assistive device/personal items within reach   clutter free environment maintained   fall prevention program maintained   lighting adjusted   nonskid shoes/slippers when out of bed   room organization consistent   safety round/check completed  Taken 5/29/2025 0000 by Judy Rooney RN  Safety Promotion/Fall Prevention:   assistive device/personal items within reach   activity supervised   clutter free environment maintained   fall prevention program maintained   lighting adjusted   nonskid shoes/slippers when out of bed   room organization consistent   safety round/check completed  Taken 5/28/2025 2200 by Judy Rooney, RN  Safety Promotion/Fall Prevention:   assistive device/personal items within reach   clutter free environment maintained   fall prevention program maintained   lighting adjusted   nonskid shoes/slippers when out of bed   room organization consistent   safety round/check completed  Taken 5/28/2025 2000 by Judy Rooney, RN  Safety Promotion/Fall Prevention:   assistive device/personal items within reach   activity supervised   clutter free environment  maintained   fall prevention program maintained   lighting adjusted   nonskid shoes/slippers when out of bed   room organization consistent   safety round/check completed  Intervention: Prevent Skin Injury  Recent Flowsheet Documentation  Taken 5/29/2025 0400 by Judy Rooney RN  Body Position:   turned   heels elevated   upper extremity elevated   supine  Skin Protection:   incontinence pads utilized   protective footwear used   pulse oximeter probe site changed   silicone foam dressing in place   skin sealant/moisture barrier applied   transparent dressing maintained  Taken 5/29/2025 0200 by Judy Rooney RN  Body Position:   turned   left  Skin Protection:   incontinence pads utilized   transparent dressing maintained   silicone foam dressing in place   protective footwear used  Taken 5/29/2025 0000 by Judy Rooney RN  Body Position:   turned   heels elevated   upper extremity elevated   supine  Skin Protection:   incontinence pads utilized   protective footwear used   pulse oximeter probe site changed   silicone foam dressing in place   skin sealant/moisture barrier applied   transparent dressing maintained  Taken 5/28/2025 2200 by Judy Rooney RN  Skin Protection:   incontinence pads utilized   transparent dressing maintained   skin sealant/moisture barrier applied   silicone foam dressing in place   protective footwear used  Taken 5/28/2025 2000 by Judy Rooney RN  Body Position:   turned   right   heels elevated   upper extremity elevated  Skin Protection:   incontinence pads utilized   protective footwear used   pulse oximeter probe site changed   silicone foam dressing in place   skin sealant/moisture barrier applied   transparent dressing maintained  Intervention: Prevent and Manage VTE (Venous Thromboembolism) Risk  Recent Flowsheet Documentation  Taken 5/29/2025 0400 by Judy Rooney RN  VTE Prevention/Management:   bilateral   SCDs (sequential compression devices)  on  Taken 5/29/2025 0200 by Judy Rooney RN  VTE Prevention/Management:   bilateral   SCDs (sequential compression devices) on  Taken 5/29/2025 0000 by Judy Rooney RN  VTE Prevention/Management:   bilateral   SCDs (sequential compression devices) on  Taken 5/28/2025 2200 by Judy Rooney RN  VTE Prevention/Management:   bilateral   SCDs (sequential compression devices) on  Taken 5/28/2025 2000 by Judy Rooney RN  VTE Prevention/Management:   bilateral   SCDs (sequential compression devices) on  Intervention: Prevent Infection  Recent Flowsheet Documentation  Taken 5/29/2025 0400 by Judy Rooney RN  Infection Prevention:   environmental surveillance performed   hand hygiene promoted   personal protective equipment utilized   rest/sleep promoted  Taken 5/29/2025 0200 by Judy Rooney RN  Infection Prevention:   environmental surveillance performed   hand hygiene promoted   personal protective equipment utilized   rest/sleep promoted  Taken 5/29/2025 0000 by Judy Rooney RN  Infection Prevention:   environmental surveillance performed   hand hygiene promoted   personal protective equipment utilized   rest/sleep promoted  Taken 5/28/2025 2200 by Judy Rooney RN  Infection Prevention:   environmental surveillance performed   hand hygiene promoted   personal protective equipment utilized   rest/sleep promoted  Taken 5/28/2025 2000 by Judy Rooeny RN  Infection Prevention:   environmental surveillance performed   hand hygiene promoted   personal protective equipment utilized   rest/sleep promoted  Goal: Optimal Comfort and Wellbeing  Outcome: Progressing  Intervention: Monitor Pain and Promote Comfort  Recent Flowsheet Documentation  Taken 5/29/2025 0400 by Judy Rooney RN  Pain Management Interventions:   care clustered   relaxation techniques promoted   quiet environment facilitated   position adjusted   pillow support provided   pain medication given    cold applied  Taken 5/29/2025 0000 by Judy Rooney RN  Pain Management Interventions:   care clustered   relaxation techniques promoted   quiet environment facilitated   position adjusted   pillow support provided   cold applied  Taken 5/28/2025 2200 by Judy Rooney RN  Pain Management Interventions:   care clustered   cold applied   position adjusted   pillow support provided  Taken 5/28/2025 2048 by Judy Rooney RN  Pain Management Interventions:   pain medication given   cold applied   position adjusted  Taken 5/28/2025 2000 by Judy Rooney RN  Pain Management Interventions:   care clustered   relaxation techniques promoted   quiet environment facilitated   position adjusted   pillow support provided   pain medication given   cold applied  Intervention: Provide Person-Centered Care  Recent Flowsheet Documentation  Taken 5/29/2025 0400 by Judy Rooney RN  Trust Relationship/Rapport:   care explained   choices provided   emotional support provided   questions answered   questions encouraged   thoughts/feelings acknowledged   reassurance provided   empathic listening provided  Taken 5/29/2025 0200 by Judy Rooney RN  Trust Relationship/Rapport:   care explained   choices provided   thoughts/feelings acknowledged  Taken 5/29/2025 0000 by Judy Rooney RN  Trust Relationship/Rapport:   care explained   choices provided   emotional support provided   questions answered   questions encouraged   thoughts/feelings acknowledged   reassurance provided   empathic listening provided  Taken 5/28/2025 2200 by Judy Rooney RN  Trust Relationship/Rapport:   care explained   thoughts/feelings acknowledged  Taken 5/28/2025 2000 by Judy Rooney RN  Trust Relationship/Rapport:   care explained   choices provided   emotional support provided   questions answered   questions encouraged   thoughts/feelings acknowledged   reassurance provided   empathic listening  provided  Goal: Readiness for Transition of Care  Outcome: Progressing     Problem: Fall Injury Risk  Goal: Absence of Fall and Fall-Related Injury  Outcome: Progressing  Intervention: Identify and Manage Contributors  Recent Flowsheet Documentation  Taken 5/29/2025 0400 by Judy Rooney RN  Medication Review/Management: medications reviewed  Self-Care Promotion: independence encouraged  Taken 5/29/2025 0200 by Judy Rooney RN  Medication Review/Management: medications reviewed  Self-Care Promotion: independence encouraged  Taken 5/29/2025 0000 by Judy Rooney RN  Medication Review/Management: medications reviewed  Self-Care Promotion: independence encouraged  Taken 5/28/2025 2200 by Judy Rooney RN  Medication Review/Management: medications reviewed  Self-Care Promotion: independence encouraged  Taken 5/28/2025 2000 by Judy Rooney RN  Medication Review/Management: medications reviewed  Self-Care Promotion: independence encouraged  Intervention: Promote Injury-Free Environment  Recent Flowsheet Documentation  Taken 5/29/2025 0400 by Judy Rooney RN  Safety Promotion/Fall Prevention:   assistive device/personal items within reach   activity supervised   clutter free environment maintained   fall prevention program maintained   lighting adjusted   nonskid shoes/slippers when out of bed   room organization consistent   safety round/check completed  Taken 5/29/2025 0200 by Judy Rooney RN  Safety Promotion/Fall Prevention:   assistive device/personal items within reach   clutter free environment maintained   fall prevention program maintained   lighting adjusted   nonskid shoes/slippers when out of bed   room organization consistent   safety round/check completed  Taken 5/29/2025 0000 by Judy Rooney RN  Safety Promotion/Fall Prevention:   assistive device/personal items within reach   activity supervised   clutter free environment maintained   fall prevention  program maintained   lighting adjusted   nonskid shoes/slippers when out of bed   room organization consistent   safety round/check completed  Taken 5/28/2025 2200 by Judy Rooney RN  Safety Promotion/Fall Prevention:   assistive device/personal items within reach   clutter free environment maintained   fall prevention program maintained   lighting adjusted   nonskid shoes/slippers when out of bed   room organization consistent   safety round/check completed  Taken 5/28/2025 2000 by Judy Rooney RN  Safety Promotion/Fall Prevention:   assistive device/personal items within reach   activity supervised   clutter free environment maintained   fall prevention program maintained   lighting adjusted   nonskid shoes/slippers when out of bed   room organization consistent   safety round/check completed     Problem: Skin Injury Risk Increased  Goal: Skin Health and Integrity  Outcome: Progressing  Intervention: Optimize Skin Protection  Recent Flowsheet Documentation  Taken 5/29/2025 0400 by Judy Rooney, RN  Activity Management: activity encouraged  Pressure Reduction Techniques:   frequent weight shift encouraged   pressure points protected   rest period provided between sit times   weight shift assistance provided  Head of Bed (HOB) Positioning: HOB at 30-45 degrees  Pressure Reduction Devices:   specialty bed utilized   pressure-redistributing mattress utilized   positioning supports utilized   heel offloading device utilized  Skin Protection:   incontinence pads utilized   protective footwear used   pulse oximeter probe site changed   silicone foam dressing in place   skin sealant/moisture barrier applied   transparent dressing maintained  Taken 5/29/2025 0200 by Judy Rooney, RN  Activity Management: activity encouraged  Pressure Reduction Techniques:   frequent weight shift encouraged   heels elevated off bed   pressure points protected   rest period provided between sit times   weight shift  assistance provided  Head of Bed (HOB) Positioning: HOB at 30-45 degrees  Pressure Reduction Devices:   specialty bed utilized   pressure-redistributing mattress utilized   positioning supports utilized   heel offloading device utilized  Skin Protection:   incontinence pads utilized   transparent dressing maintained   silicone foam dressing in place   protective footwear used  Taken 5/29/2025 0000 by Judy Rooney RN  Activity Management: activity encouraged  Pressure Reduction Techniques:   frequent weight shift encouraged   pressure points protected   rest period provided between sit times   weight shift assistance provided  Head of Bed (HOB) Positioning: HOB at 30-45 degrees  Pressure Reduction Devices:   specialty bed utilized   pressure-redistributing mattress utilized   positioning supports utilized   heel offloading device utilized  Skin Protection:   incontinence pads utilized   protective footwear used   pulse oximeter probe site changed   silicone foam dressing in place   skin sealant/moisture barrier applied   transparent dressing maintained  Taken 5/28/2025 2200 by Judy Rooney RN  Activity Management: activity encouraged  Pressure Reduction Techniques:   frequent weight shift encouraged   pressure points protected   rest period provided between sit times   weight shift assistance provided   heels elevated off bed  Pressure Reduction Devices:   specialty bed utilized   pressure-redistributing mattress utilized   positioning supports utilized   heel offloading device utilized  Skin Protection:   incontinence pads utilized   transparent dressing maintained   skin sealant/moisture barrier applied   silicone foam dressing in place   protective footwear used  Taken 5/28/2025 2000 by Judy Rooney RN  Activity Management:   activity encouraged   back to bed  Pressure Reduction Techniques:   frequent weight shift encouraged   pressure points protected   rest period provided between sit  times   weight shift assistance provided  Head of Bed (HOB) Positioning: HOB at 30-45 degrees  Pressure Reduction Devices:   specialty bed utilized   pressure-redistributing mattress utilized   positioning supports utilized   heel offloading device utilized  Skin Protection:   incontinence pads utilized   protective footwear used   pulse oximeter probe site changed   silicone foam dressing in place   skin sealant/moisture barrier applied   transparent dressing maintained     Problem: Comorbidity Management  Goal: Blood Pressure in Desired Range  Outcome: Progressing  Intervention: Maintain Blood Pressure Management  Recent Flowsheet Documentation  Taken 5/29/2025 0400 by Judy Ronoey RN  Medication Review/Management: medications reviewed  Taken 5/29/2025 0200 by Judy Rooney RN  Medication Review/Management: medications reviewed  Taken 5/29/2025 0000 by Judy Rooney RN  Medication Review/Management: medications reviewed  Taken 5/28/2025 2200 by Judy Rooney, RN  Medication Review/Management: medications reviewed  Taken 5/28/2025 2000 by Judy Rooney RN  Medication Review/Management: medications reviewed   Goal Outcome Evaluation:  Plan of Care Reviewed With: patient, spouse        Progress: no change

## 2025-05-29 NOTE — PAYOR COMM NOTE
"Ref# [id number]   Clinical Update    DOMINICK Cordova, RN  Utilization Review  Phone [phone]  Fax [phone]    [place] [address]         Radha Mendoza (64 y.o. Female)       Date of Birth   1960    Social Security Number       Address   [address] [place] [id number]    Home Phone   [phone]    MRN   [id number]       [facility]   [facility]    Marital Status                               Admission Date   5/24/2025    Admission Type   Emergency    Admitting Provider   Va Espinosa MD    Attending Provider   Tia Blake MD    Department, Room/Bed   [facility] 2B ICU, N228/1       Discharge Date       Discharge Disposition       Discharge Destination                                 Attending Provider: Tia Blake MD    Allergies: Codeine, Penicillins    Isolation: None   Infection: None   Code Status: CPR    Ht: 157.5 cm (62.01\")   Wt: 72.5 kg (159 lb 13.3 oz)    Admission Cmt: None   Principal Problem: Acute ischemic stroke [I63.9]                   Active Insurance as of 5/24/2025       Primary Coverage       Payor Plan Insurance Group Employer/Plan Group    [facility] [employer] [facility] [employer] [id number]       Payor Plan Address Payor Plan Phone Number Payor Plan Fax Number Effective Dates    [address] [phone]  1/1/2015 - None Entered    [place] [address]         Subscriber Name Subscriber Birth Date Member ID       RADHA MENDOZA 1960 [id number]                     Emergency Contacts        (Rel.) Home Phone Work Phone Mobile Phone    Loc Mendoza (Spouse) [phone] -- [phone]    REJIRADHA MORTON (Son) -- -- [phone]    Nadia Arias (Daughter) -- -- [phone]              Current Facility-Administered Medications   Medication Dose Route Frequency Provider Last Rate Last Admin    acetaminophen (TYLENOL) tablet 650 mg  650 mg " Oral Q6H PRN Steve Garcia, PharmD   650 mg at 05/29/25 0445    atorvastatin (LIPITOR) tablet 80 mg  80 mg Oral Nightly Steve Garcia, David   80 mg at 05/28/25 2000    Calcium Replacement - Follow Nurse / BPA Driven Protocol   Not Applicable PRN Matt Olivarez MD        cetirizine (zyrTEC) tablet 10 mg  10 mg Oral Nightly Florecita Valentine PA-C   10 mg at 05/28/25 2000    enoxaparin sodium (LOVENOX) syringe 40 mg  40 mg Subcutaneous Q24H Matt Olivarez MD   40 mg at 05/29/25 1053    ibuprofen (ADVIL,MOTRIN) tablet 600 mg  600 mg Oral Q6H PRN Ursula Martin PA-C   600 mg at 05/29/25 0851    Ketotifen Fumarate (ZADITOR) 0.035 % ophthalmic solution 1 drop  1 drop Both Eyes BID Izzy Silvestre, APRN   1 drop at 05/29/25 1257    levETIRAcetam (KEPPRA) tablet 500 mg  500 mg Oral Q12H Tia Blake MD   500 mg at 05/29/25 0807    Lidocaine 4 % 1 patch  1 patch Transdermal Q24H Justin Unger, PharmD   1 patch at 05/29/25 0807    loperamide (IMODIUM) capsule 4 mg  4 mg Oral 4x Daily PRN Tia Blake MD        Magnesium Standard Dose Replacement - Follow Nurse / BPA Driven Protocol   Not Applicable PRN Matt Olivarez MD        ondansetron (ZOFRAN) injection 4 mg  4 mg Intravenous Q6H PRN Matt Olivarez MD   4 mg at 05/25/25 1749    pantoprazole (PROTONIX) EC tablet 40 mg  40 mg Oral Q AM Tia Blake MD   40 mg at 05/29/25 0618    Phosphorus Replacement - Follow Nurse / BPA Driven Protocol   Not Applicable PRN Matt Olivarez MD        polyvinyl alcohol (LIQUIFILM) 1.4 % ophthalmic solution 1 drop  1 drop Both Eyes Q1H PRN Izzy Silvestre, APRN        Potassium Replacement - Follow Nurse / BPA Driven Protocol   Not Applicable PRN Matt Olivarez MD        sodium chloride 0.9 % flush 10 mL  10 mL Intravenous Q12H Matt Olivarez MD   10 mL at 05/29/25 0808    sodium chloride 0.9 % flush 10 mL  10 mL Intravenous PRN Matt Olivarez MD        sodium chloride 0.9 %  flush 20 mL  20 mL Intravenous PRN Matt Olivarez MD        sodium chloride 0.9 % infusion 40 mL  40 mL Intravenous PRN Matt Olivarez MD        traMADol (ULTRAM) tablet 50 mg  50 mg Oral Q4H PRN Tia Blake MD         Lab Results (last 48 hours)       Procedure Component Value Units Date/Time    Anaerobic Culture - Surgical Site, Brain [[id number]]  (Normal) Collected: 05/25/25 1831    Specimen: Surgical Site from Brain Updated: 05/29/25 0719     Anaerobic Culture No anaerobes isolated at 3 days    Tissue / Bone Culture - Surgical Site, Brain [[id number]] Collected: 05/25/25 1831    Specimen: Surgical Site from Brain Updated: 05/29/25 0659     Tissue Culture No growth at 3 days     Gram Stain Many (4+) Red blood cells      Rare (1+) WBCs seen      No organisms seen    CBC (No Diff) [[id number]]  (Abnormal) Collected: 05/29/25 0446    Specimen: Blood Updated: 05/29/25 0500     WBC 10.52 10*3/mm3      RBC 3.66 10*6/mm3      Hemoglobin 10.5 g/dL      Hematocrit 31.9 %      MCV 87.2 fL      MCH 28.7 pg      MCHC 32.9 g/dL      RDW 12.6 %      RDW-SD 40.2 fl      MPV 12.1 fL      Platelets 185 10*3/mm3     Phosphorus [[id number]]  (Normal) Collected: 05/29/25 0021    Specimen: Blood Updated: 05/29/25 0114     Phosphorus 3.3 mg/dL     Basic Metabolic Panel [[id number]]  (Abnormal) Collected: 05/29/25 0021    Specimen: Blood Updated: 05/29/25 0110     Glucose 99 mg/dL      BUN 7.8 mg/dL      Creatinine 0.46 mg/dL      Sodium 140 mmol/L      Potassium 3.7 mmol/L      Chloride 107 mmol/L      CO2 22.0 mmol/L      Calcium 8.5 mg/dL      BUN/Creatinine Ratio 17.0     Anion Gap 11.0 mmol/L      eGFR 108.4 mL/min/1.73     Narrative:      GFR Categories in Chronic Kidney Disease (CKD)              GFR Category          GFR (mL/min/1.73)    Interpretation  G1                    90 or greater        Normal or high (1)  G2                    60-89                Mild decrease (1)  G3a                   45-59                 Mild to moderate decrease  G3b                   30-44                Moderate to severe decrease  G4                    15-29                Severe decrease  G5                    14 or less           Kidney failure    (1)In the absence of evidence of kidney disease, neither GFR category G1 or G2 fulfill the criteria for CKD.    eGFR calculation 2021 CKD-EPI creatinine equation, which does not include race as a factor    Phosphorus [[id number]]  (Abnormal) Collected: 05/28/25 1336    Specimen: Blood Updated: 05/28/25 1527     Phosphorus 2.2 mg/dL     Potassium [[id number]]  (Normal) Collected: 05/28/25 1336    Specimen: Blood Updated: 05/28/25 1418     Potassium 4.3 mmol/L      Comment: Slight hemolysis detected by analyzer. Result may be falsely elevated.       Prealbumin [[id number]]  (Abnormal) Collected: 05/28/25 0415    Specimen: Blood Updated: 05/28/25 1056     Prealbumin 10.2 mg/dL     Basic Metabolic Panel [[id number]]  (Abnormal) Collected: 05/28/25 0415    Specimen: Blood Updated: 05/28/25 0511     Glucose 104 mg/dL      BUN 12.8 mg/dL      Creatinine 0.47 mg/dL      Sodium 139 mmol/L      Potassium 3.3 mmol/L      Comment: Slight hemolysis detected by analyzer. Result may be falsely elevated.        Chloride 107 mmol/L      Comment: Result checked        CO2 24.0 mmol/L      Calcium 8.1 mg/dL      BUN/Creatinine Ratio 27.2     Anion Gap 8.0 mmol/L      eGFR 107.8 mL/min/1.73     Narrative:      GFR Categories in Chronic Kidney Disease (CKD)              GFR Category          GFR (mL/min/1.73)    Interpretation  G1                    90 or greater        Normal or high (1)  G2                    60-89                Mild decrease (1)  G3a                   45-59                Mild to moderate decrease  G3b                   30-44                Moderate to severe decrease  G4                    15-29                Severe decrease  G5                    14 or less           Kidney failure    (1)In the  absence of evidence of kidney disease, neither GFR category G1 or G2 fulfill the criteria for CKD.    eGFR calculation 2021 CKD-EPI creatinine equation, which does not include race as a factor    Phosphorus [[id number]]  (Abnormal) Collected: 05/28/25 0415    Specimen: Blood Updated: 05/28/25 0510     Phosphorus 1.9 mg/dL      Comment: Confirmed/called       Magnesium [[id number]]  (Normal) Collected: 05/28/25 0415    Specimen: Blood Updated: 05/28/25 0506     Magnesium 1.7 mg/dL     C-reactive Protein [[id number]]  (Abnormal) Collected: 05/28/25 0415    Specimen: Blood Updated: 05/28/25 0503     C-Reactive Protein 2.79 mg/dL           Imaging Results (Last 48 Hours)       ** No results found for the last 48 hours. **             Physician Progress Notes (last 48 hours)        Tia Blake MD at 05/29/25 1105          Critical Care Note     LOS: 5 days   Patient Care Team:  Micheline Heath DO as PCP - General (Family Medicine)    Chief Complaint/Reason for visit:    Chief Complaint   Patient presents with    Stroke     Acute right middle cerebral artery stroke  Cerebral edema status post decompressive craniotomy May 25  Left hemiparesis and expressive aphasia  Sinus bradycardia  Obstructive sleep apnea, suspected      Subjective     Interval History:     Remains afebrile.  Sinus bradycardia.  Room air saturation 94%.  Able to get her helmet and work with PT yesterday.  Max assist of 2 for mobility.  Mccain catheter removed and she is voiding.    Review of Systems:    All systems were reviewed and negative except as noted in subjective.    Medical history, surgical history, social history, family history reviewed    Objective     Intake/Output:    Intake/Output Summary (Last 24 hours) at 5/29/2025 1106  Last data filed at 5/29/2025 1030  Gross per 24 hour   Intake 480 ml   Output 1250 ml   Net -770 ml       Nutrition:  Diet: Regular/House; Texture: Mechanical Ground (NDD 2); Fluid Consistency: Thin (IDDSI  "0)    Infusions:             Telemetry: Sinus rhythm, sinus bradycardia             Vital Signs  Blood pressure 100/46, pulse 59, temperature 98.1 °F (36.7 °C), temperature source Oral, resp. rate 18, height 157.5 cm (62.01\"), weight 72.5 kg (159 lb 13.3 oz), SpO2 94%.    Physical Exam  Constitutional:       General: She is not in acute distress.  HENT:      Head:      Comments: Helmet in place     Nose: No congestion.      Mouth/Throat:      Pharynx: No oropharyngeal exudate.   Eyes:      Pupils: Pupils are equal, round, and reactive to light.   Neck:      Vascular: No carotid bruit.   Cardiovascular:      Rate and Rhythm: Normal rate and regular rhythm.      Heart sounds: No murmur heard.  Pulmonary:      Effort: Pulmonary effort is normal.      Breath sounds: No wheezing or rhonchi.   Abdominal:      General: Bowel sounds are normal.      Palpations: Abdomen is soft.   Musculoskeletal:      Right lower leg: No edema.      Left lower leg: No edema.   Skin:     General: Skin is warm and dry.   Neurological:      Mental Status: She is alert.      Comments: Oriented to name, hospital.  Follows commands with her right side.  Can wiggle her left fingers and toes.      Interval:  (post travel to MRI)  1a. Level of Consciousness: 1-->Not alert, but arousable by minor stimulation to obey, answer, or respond  1b. LOC Questions: 0-->Answers both questions correctly  1c. LOC Commands: 0-->Performs both tasks correctly  2. Best Gaze: 2-->Forced deviation, or total gaze paresis not overcome by the oculocephalic maneuver  3. Visual: 2-->Complete hemianopia  4. Facial Palsy: 2-->Partial paralysis (total or near-total paralysis of lower face)  5a. Motor Arm, Left: 3-->No effort against gravity, limb falls  5b. Motor Arm, Right: 0-->No drift, limb holds 90 (or 45) degrees for full 10 secs  6a. Motor Leg, Left: 3-->No effort against gravity, leg falls to bed immediately  6b. Motor Leg, Right: 0-->No drift, leg holds 30 degree " "position for full 5 secs  7. Limb Ataxia: 1-->Present in one limb  8. Sensory: 2-->Severe to total sensory loss, patient is not aware of being touched in the face, arm, and leg  9. Best Language: 1-->Mild-to-moderate aphasia, some obvious loss of fluency or facility of comprehension, without significant limitation on ideas expressed or form of expression. Reduction of speech and/or comprehension, however, makes conversation. . . (see row details)  10. Dysarthria: 1-->Mild-to-moderate dysarthria, patient slurs at least some words and, at worst, can be understood with some difficulty  11. Extinction and Inattention (formerly Neglect): 2-->Profound jorge-inattention/extinction more than 1 modality    Total (NIH Stroke Scale): 20      Results Review:     I reviewed the patient's new clinical results.   Results from last 7 days   Lab Units 05/29/25  0021 05/28/25  1336 05/28/25  0415 05/27/25  0623 05/26/25  1241 05/26/25  0601 05/24/25  1038 05/24/25  1037   SODIUM mmol/L 140  --  139 147*   < > 152*   < >  --    POTASSIUM mmol/L 3.7 4.3 3.3* 3.8   < > 4.1   < >  --    CHLORIDE mmol/L 107  --  107  --   --  120*   < >  --    CO2 mmol/L 22.0  --  24.0  --   --  23.0   < >  --    BUN mg/dL 7.8*  --  12.8  --   --  8   < >  --    CREATININE mg/dL 0.46*  --  0.47*  --   --  0.68   < >  --    CALCIUM mg/dL 8.5*  --  8.1*  --   --  8.9   < >  --    ALT (SGPT) U/L  --   --   --   --   --   --   --  19   AST (SGOT) U/L  --   --   --   --   --   --   --  27   GLUCOSE mg/dL 99  --  104*  --   --  125*   < >  --     < > = values in this interval not displayed.     Results from last 7 days   Lab Units 05/29/25  0446 05/26/25  0601 05/25/25  0607   WBC 10*3/mm3 10.52 19.20* 10.75   HEMOGLOBIN g/dL 10.5* 10.9* 12.2   HEMATOCRIT % 31.9* 35.4 38.7   PLATELETS 10*3/mm3 185 208 222         No results found for: \"BLOODCX\"  No results found for: \"URINECX\"    I reviewed the patient's new imaging including images and reports.    CT HEAD WO " CONTRAST    Date of Exam: 5/26/2025 5:17 AM EDT    Indication: Status post craniectomy.    Comparison: 5/25/2025    Technique: Axial CT images were obtained of the head without contrast administration.  Automated exposure control and iterative construction methods were used.      Findings:  Stable postoperative changes from recent right craniectomy. Stable large right MCA distribution infarct. No evidence of acute hemorrhage or midline shift. No extra-axial fluid collections are identified.   Impression:     Impression:  Stable exam. Postoperative change from recent right craniectomy with evolving large right MCA distribution infarct.        Electronically Signed: Milton Blancas MD   5/26/2025 9:47 AM EDT         Interpretation Summary         Left ventricular systolic function is normal. Calculated left ventricular EF = 62%    Mild mitral valve regurgitation is present.    Trace aortic valve regurgitation is present.    Normal left atrial cavity size noted. Saline test results are negative.      All medications reviewed.   atorvastatin, 80 mg, Oral, Nightly  cetirizine, 10 mg, Oral, Nightly  enoxaparin sodium, 40 mg, Subcutaneous, Q24H  levETIRAcetam, 500 mg, Oral, Q12H  Lidocaine, 1 patch, Transdermal, Q24H  pantoprazole, 40 mg, Oral, Q AM  sodium chloride, 10 mL, Intravenous, Q12H          Assessment & Plan       Acute ischemic stroke    Status post craniotomy    Obstructive sleep apnea    64-year-old woman, non-smoker, presenting on May 24 with right middle cerebral artery stroke, left hemiparesis.  She was outside the window for thrombolytic therapy. 5/25 Imaging revealed increasing edema with mass effect and clinical decline.  Therefore, she underwent decompressive craniotomy.  She had significant improvement in her mentation and has been able to follow commands with her right side.  She did work with PT yesterday once a helmet was placed.  She is tolerating p.o. diet.  Discussed with neurosurgery and we can  "leave her sodium within normal range.        #1 right middle cerebral artery  CVA  -May 24 CT perfusion occlusion right internal carotid artery and right M1 with evolving right middle cerebral artery stroke  - May 25 worsening edema with mass effect, status post decompressive craniotomy  - May 26 postoperative right craniotomy for large right middle cerebral artery infarct without hemorrhage or midline shift  - May 27 speech recommended dysphagia diet, mechanical ground texture with thin liquids  - Sodium 140, goal is now normal  - NIH stroke scale 20  - Echocardiogram, EF 62%, saline test negative  - Systolic blood pressure     - Keppra 500 mg twice daily  - Atorvastatin  - Goal systolic blood pressure less than 140  - Will need helmet before she can get up with physical therapy    #2 possible obstructive sleep apnea  - Daughter denies, she is not wearing BiPAP    #3 dyslipidemia  - Statin      Telemetry  Anticipate inpatient rehab, hopefully [facility]    VTE Prophylaxis:SCDS    Stress Ulcer Prophylaxis:protonix    Tia Blake MD  05/29/25  11:06 EDT      Time: Critical care 25 min  I personally provided care to this critically ill patient as documented above.  Critical care time does not include time spent on separately billed procedures.  None of my critical care time was concurrent with other critical care providers.     Electronically signed by Tia Blake MD at 05/29/25 1112       Matt Olivarez MD at 05/29/25 0841          NEUROSURGERY PROGRESS NOTE    Chief Complaint: Right MCA stroke    Subjective: Patient doing well this morning.  She is up and eating breakfast.    Objective    Vital Signs: Blood pressure 100/46, pulse 59, temperature 98.1 °F (36.7 °C), temperature source Oral, resp. rate 18, height 157.5 cm (62.01\"), weight 72.5 kg (159 lb 13.3 oz), SpO2 94%.    Physical Exam  Awake, alert and oriented x 3  Opens eyes spont  Pupils 3 mm reactive bilaterally  Extraocular " muscles intact bilaterally  Face symmetric bilaterally  Tongue midline  Follows commands briskly with good strength in the right upper and right lower extremity.  Spontaneous movement noted in the left lower extremity.  No significant movement noted in the left upper extremity       Intake/Output:   Intake/Output Summary (Last 24 hours) at 5/29/2025 0841  Last data filed at 5/29/2025 0400  Gross per 24 hour   Intake 480 ml   Output 900 ml   Net -420 ml       Current Medications:   Current Facility-Administered Medications:     acetaminophen (TYLENOL) tablet 650 mg, 650 mg, Oral, Q6H PRN, Steve Garcia, PharmD, 650 mg at 05/29/25 0445    atorvastatin (LIPITOR) tablet 80 mg, 80 mg, Oral, Nightly, Steve Garcia PharmD, 80 mg at 05/28/25 2000    sennosides-docusate (PERICOLACE) 8.6-50 MG per tablet 2 tablet, 2 tablet, Oral, BID, 2 tablet at 05/28/25 0839 **AND** polyethylene glycol (MIRALAX) packet 17 g, 17 g, Oral, Daily PRN **AND** bisacodyl (DULCOLAX) EC tablet 5 mg, 5 mg, Oral, Daily PRN, 5 mg at 05/28/25 0412 **AND** bisacodyl (DULCOLAX) suppository 10 mg, 10 mg, Rectal, Daily PRN, Franc Joel, APRN    Calcium Replacement - Follow Nurse / BPA Driven Protocol, , Not Applicable, PRN, Matt Olivarez MD    cetirizine (zyrTEC) tablet 10 mg, 10 mg, Oral, Nightly, Florecita Valentine PA-C, 10 mg at 05/28/25 2000    enoxaparin sodium (LOVENOX) syringe 40 mg, 40 mg, Subcutaneous, Q24H, Matt Olivarez MD, 40 mg at 05/28/25 1011    ibuprofen (ADVIL,MOTRIN) tablet 600 mg, 600 mg, Oral, Q6H PRN, Ursula Martin PA-C, 600 mg at 05/28/25 2048    levETIRAcetam (KEPPRA) tablet 500 mg, 500 mg, Oral, Q12H, Tia Blake MD, 500 mg at 05/29/25 0807    Lidocaine 4 % 1 patch, 1 patch, Transdermal, Q24H, Justin Unger, PharmD, 1 patch at 05/29/25 0807    Magnesium Standard Dose Replacement - Follow Nurse / BPA Driven Protocol, , Not Applicable, PRN, Matt Olivarez MD    [DISCONTINUED] ondansetron ODT  (ZOFRAN-ODT) disintegrating tablet 4 mg, 4 mg, Oral, Q6H PRN **OR** ondansetron (ZOFRAN) injection 4 mg, 4 mg, Intravenous, Q6H PRN, Matt Olivarez MD, 4 mg at 05/25/25 1749    pantoprazole (PROTONIX) EC tablet 40 mg, 40 mg, Oral, Q AM, Tia Blake MD, 40 mg at 05/29/25 0618    Phosphorus Replacement - Follow Nurse / BPA Driven Protocol, , Not Applicable, Juanis VOGEL Nicolas, MD    Potassium Replacement - Follow Nurse / BPA Driven Protocol, , Not Applicable, PRJuanis MENDOZA Nicolas, MD    sodium chloride 0.9 % flush 10 mL, 10 mL, Intravenous, Q12H, Matt Olivarez MD, 10 mL at 05/29/25 0808    sodium chloride 0.9 % flush 10 mL, 10 mL, Intravenous, Q12H, Matt Olivarez MD, 10 mL at 05/29/25 0808    sodium chloride 0.9 % flush 10 mL, 10 mL, Intravenous, Q12H, Matt Olivarez MD, 10 mL at 05/29/25 0808    sodium chloride 0.9 % flush 10 mL, 10 mL, Intravenous, PRNJuanis Nicolas, MD    sodium chloride 0.9 % flush 20 mL, 20 mL, Intravenous, PRNJuanis Nicolas, MD    sodium chloride 0.9 % infusion 40 mL, 40 mL, Intravenous, PRJuanis MENDOZA Nicolas, MD    traMADol (ULTRAM) tablet 50 mg, 50 mg, Oral, Q4H PRN, Steve Garcia, PharmD     Laboratory Results:       Lab 05/29/25  0446 05/28/25  0415 05/26/25  0601 05/25/25  0607 05/24/25  1038 05/24/25  1037   WBC 10.52  --  19.20* 10.75  --  7.38   HEMOGLOBIN 10.5*  --  10.9* 12.2  --  13.9   HEMOGLOBIN, POC  --   --   --   --  14.6  --    HEMATOCRIT 31.9*  --  35.4 38.7  --  42.7   HEMATOCRIT POC  --   --   --   --  43  --    PLATELETS 185  --  208 222  --  259   NEUTROS ABS  --   --  15.53*  --   --  5.80   IMMATURE GRANS (ABS)  --   --  0.08*  --   --  0.08*   LYMPHS ABS  --   --  1.95  --   --  1.07   MONOS ABS  --   --  1.59*  --   --  0.34   EOS ABS  --   --  0.00  --   --  0.02   MCV 87.2  --  91.5 89.4  --  86.6   CRP  --  2.79*  --   --   --   --    PROTIME  --   --   --   --   --  12.8   APTT  --   --   --   --   --  27.2         Lab  05/29/25  0021 05/28/25  1336 05/28/25  0415 05/27/25  0623 05/26/25  1909 05/26/25  1241 05/26/25  0601 05/25/25  1350 05/25/25  0607 05/24/25  1726 05/24/25  1038 05/24/25  1037   SODIUM 140  --  139 147* 153* 151* 152*   < > 147*   < >  --   --    POTASSIUM 3.7 4.3 3.3* 3.8 3.8 3.9 4.1   < > 3.3*   < >  --   --    CHLORIDE 107  --  107  --   --   --  120*  --  115*  --   --   --    CO2 22.0  --  24.0  --   --   --  23.0  --  22.0  --   --   --    ANION GAP 11.0  --  8.0  --   --   --  9.0  --  10.0  --   --   --    BUN 7.8*  --  12.8  --   --   --  8  --  8  --   --   --    CREATININE 0.46*  --  0.47*  --   --   --  0.68  --  0.65  --  0.80  --    EGFR 108.4  --  107.8  --   --   --  98.6  --  99.7  --  83.4  --    GLUCOSE 99  --  104*  --   --   --  125*  --  140*  --   --   --    CALCIUM 8.5*  --  8.1*  --   --   --  8.9  --  9.1  --   --   --    MAGNESIUM  --   --  1.7  --   --   --   --   --   --   --   --  2.1   PHOSPHORUS 3.3 2.2* 1.9*  --   --   --   --   --   --   --   --   --    HEMOGLOBIN A1C  --   --   --   --   --   --   --   --  5.30  --   --   --     < > = values in this interval not displayed.         Lab 05/24/25  1037   ALT (SGPT) 19   AST (SGOT) 27         Lab 05/24/25  1037   PROTIME 12.8   INR 0.91         Lab 05/25/25  0607   CHOLESTEROL 150   LDL CHOL 75   HDL CHOL 60   TRIGLYCERIDES 75             Brief Urine Lab Results       None          Microbiology Results (last 10 days)       Procedure Component Value - Date/Time    Anaerobic Culture - Surgical Site, Brain [[id number]]  (Normal) Collected: 05/25/25 1831    Lab Status: Preliminary result Specimen: Surgical Site from Brain Updated: 05/29/25 0719     Anaerobic Culture No anaerobes isolated at 3 days    Tissue / Bone Culture - Surgical Site, Brain [[id number]] Collected: 05/25/25 1831    Lab Status: Final result Specimen: Surgical Site from Brain Updated: 05/29/25 0659     Tissue Culture No growth at 3 days     Gram Stain Many (4+) Red  blood cells      Rare (1+) WBCs seen      No organisms seen             Diagnostic Imaging: I reviewed and independently interpreted the new imaging.     Assessment/Plan:    1. Acute CVA (cerebrovascular accident)    2. Stroke    3. Dysarthria    4. Oropharyngeal dysphagia         This is a 64-year-old female status post decompressive craniectomy for a large right MCA stroke on 5/26. Neurologically, the patient has remained stable. Her head CT shows good decompression.  She is on Lovenox for DVT prophylaxis.  She can start a baby aspirin daily 5 days after surgery.  She is cleared to be transferred out of the ICU from a neurosurgical perspective.  She will require inpatient rehab.  We will continue to follow peripherally.  Please call with any questions.      Any copied data from previous notes included in the (1) History of Present Illness, (2) Physical Examination and (3) Medical Decision Making and/or Assessment and Plan has been reviewed and is accurate as of 05/29/25      Matt Olivarez MD  05/29/25  08:41 EDT        Electronically signed by Matt Olivarez MD at 05/29/25 0843       Ursula Martin PA-C at 05/28/25 1201       Attestation signed by Matt Olivarez MD at 05/28/25 1351      I have reviewed this documentation and agree.                      Linda Schafer  1960    5/28/2025.    Ms. Schafer is a 64-year-old female who has undergone hemicraniectomy, 5/25/2025.  The patient requires a helmet for safety to participate with physical therapy and Occupational Therapy.    Ursula Martin PA-C      Electronically signed by Matt Olivarez MD at 05/28/25 1351       Tia Blake MD at 05/28/25 1101          Critical Care Note     LOS: 4 days   Patient Care Team:  Micheline Heath DO as PCP - General (Family Medicine)    Chief Complaint/Reason for visit:    Chief Complaint   Patient presents with    Stroke     Acute right middle cerebral artery stroke  Cerebral edema status  "post decompressive craniotomy May 25  Left hemiparesis and expressive aphasia  Sinus bradycardia  Obstructive sleep apnea, suspected  Hypernatremia, iatrogenic    Subjective     Interval History:     Was able to speak to her daughter today and patient does not have loud snoring or excessive somnolence and has never been diagnosed with sleep apnea.  She tolerated a p.o. diet yesterday.  She is maintaining sinus rhythm.    Review of Systems:    All systems were reviewed and negative except as noted in subjective.    Medical history, surgical history, social history, family history reviewed    Objective     Intake/Output:    Intake/Output Summary (Last 24 hours) at 5/28/2025 1102  Last data filed at 5/28/2025 0600  Gross per 24 hour   Intake 1700.9 ml   Output 1385 ml   Net 315.9 ml       Nutrition:  Diet: Regular/House; Texture: Mechanical Ground (NDD 2); Fluid Consistency: Thin (IDDSI 0)    Infusions:         Mechanical Ventilator Settings:                                                Telemetry: Sinus rhythm             Vital Signs  Blood pressure 120/55, pulse 68, temperature 99.5 °F (37.5 °C), temperature source Axillary, resp. rate 16, height 157.5 cm (62.01\"), weight 68.8 kg (151 lb 10.8 oz), SpO2 96%.    Physical Exam  Constitutional:       General: She is not in acute distress.     Comments: Resting   HENT:      Head:      Comments: Craniotomy dressing intact.      Nose: No congestion.      Mouth/Throat:      Pharynx: No oropharyngeal exudate.   Eyes:      Pupils: Pupils are equal, round, and reactive to light.   Neck:      Vascular: No carotid bruit.   Cardiovascular:      Rate and Rhythm: Normal rate and regular rhythm.      Heart sounds: No murmur heard.  Pulmonary:      Effort: Pulmonary effort is normal.      Breath sounds: No wheezing or rhonchi.   Abdominal:      General: Bowel sounds are normal.      Palpations: Abdomen is soft.   Musculoskeletal:      Right lower leg: No edema.      Left lower leg: " No edema.   Skin:     General: Skin is warm and dry.   Neurological:      Mental Status: She is alert.      Comments: Follows commands with her right side.      Interval:  (post travel to MRI)  1a. Level of Consciousness: 1-->Not alert, but arousable by minor stimulation to obey, answer, or respond  1b. LOC Questions: 0-->Answers both questions correctly  1c. LOC Commands: 0-->Performs both tasks correctly  2. Best Gaze: 2-->Forced deviation, or total gaze paresis not overcome by the oculocephalic maneuver  3. Visual: 2-->Complete hemianopia  4. Facial Palsy: 2-->Partial paralysis (total or near-total paralysis of lower face)  5a. Motor Arm, Left: 3-->No effort against gravity, limb falls  5b. Motor Arm, Right: 0-->No drift, limb holds 90 (or 45) degrees for full 10 secs  6a. Motor Leg, Left: 3-->No effort against gravity, leg falls to bed immediately  6b. Motor Leg, Right: 0-->No drift, leg holds 30 degree position for full 5 secs  7. Limb Ataxia: 1-->Present in one limb  8. Sensory: 2-->Severe to total sensory loss, patient is not aware of being touched in the face, arm, and leg  9. Best Language: 1-->Mild-to-moderate aphasia, some obvious loss of fluency or facility of comprehension, without significant limitation on ideas expressed or form of expression. Reduction of speech and/or comprehension, however, makes conversation. . . (see row details)  10. Dysarthria: 1-->Mild-to-moderate dysarthria, patient slurs at least some words and, at worst, can be understood with some difficulty  11. Extinction and Inattention (formerly Neglect): 2-->Profound jorge-inattention/extinction more than 1 modality    Total (NIH Stroke Scale): 20      Results Review:     I reviewed the patient's new clinical results.   Results from last 7 days   Lab Units 05/28/25  0415 05/27/25  0623 05/26/25  1909 05/26/25  1241 05/26/25  0601 05/25/25  1350 05/25/25  0607 05/24/25  1038 05/24/25  1037   SODIUM mmol/L 139 147* 153*   < > 152*   < >  "147*   < >  --    POTASSIUM mmol/L 3.3* 3.8 3.8   < > 4.1   < > 3.3*   < >  --    CHLORIDE mmol/L 107  --   --   --  120*  --  115*  --   --    CO2 mmol/L 24.0  --   --   --  23.0  --  22.0  --   --    BUN mg/dL 12.8  --   --   --  8  --  8  --   --    CREATININE mg/dL 0.47*  --   --   --  0.68  --  0.65   < >  --    CALCIUM mg/dL 8.1*  --   --   --  8.9  --  9.1  --   --    ALT (SGPT) U/L  --   --   --   --   --   --   --   --  19   AST (SGOT) U/L  --   --   --   --   --   --   --   --  27   GLUCOSE mg/dL 104*  --   --   --  125*  --  140*  --   --     < > = values in this interval not displayed.     Results from last 7 days   Lab Units 05/26/25  0601 05/25/25  0607 05/24/25  1038 05/24/25  1037   WBC 10*3/mm3 19.20* 10.75  --  7.38   HEMOGLOBIN g/dL 10.9* 12.2  --  13.9   HEMOGLOBIN, POC g/dL  --   --  14.6  --    HEMATOCRIT % 35.4 38.7  --  42.7   HEMATOCRIT POC %  --   --  43  --    PLATELETS 10*3/mm3 208 222  --  259         No results found for: \"BLOODCX\"  No results found for: \"URINECX\"    I reviewed the patient's new imaging including images and reports.    CT HEAD WO CONTRAST    Date of Exam: 5/26/2025 5:17 AM EDT    Indication: Status post craniectomy.    Comparison: 5/25/2025    Technique: Axial CT images were obtained of the head without contrast administration.  Automated exposure control and iterative construction methods were used.      Findings:  Stable postoperative changes from recent right craniectomy. Stable large right MCA distribution infarct. No evidence of acute hemorrhage or midline shift. No extra-axial fluid collections are identified.   Impression:     Impression:  Stable exam. Postoperative change from recent right craniectomy with evolving large right MCA distribution infarct.        Electronically Signed: Milton Blancas MD   5/26/2025 9:47 AM EDT         Interpretation Summary         Left ventricular systolic function is normal. Calculated left ventricular EF = 62%    Mild mitral valve " regurgitation is present.    Trace aortic valve regurgitation is present.    Normal left atrial cavity size noted. Saline test results are negative.      All medications reviewed.   atorvastatin, 80 mg, Oral, Nightly  cetirizine, 10 mg, Oral, Nightly  enoxaparin sodium, 40 mg, Subcutaneous, Q24H  levETIRAcetam, 500 mg, Oral, Q12H  Lidocaine, 1 patch, Transdermal, Q24H  mupirocin, 1 Application, Each Nare, BID  pantoprazole, 40 mg, Intravenous, Q AM  senna-docusate sodium, 2 tablet, Oral, BID  sodium chloride, 10 mL, Intravenous, Q12H  sodium chloride, 10 mL, Intravenous, Q12H  sodium chloride, 10 mL, Intravenous, Q12H          Assessment & Plan       Acute ischemic stroke    Status post craniotomy    Obstructive sleep apnea    64-year-old woman, non-smoker, presenting on May 24 with right middle cerebral artery stroke, left hemiparesis.  She was outside the window for thrombolytic therapy. 5/25 Imaging revealed increasing edema with mass effect and clinical decline.  Therefore, she underwent decompressive craniotomy.  She had significant improvement in her mentation and has been able to follow commands with her right side.  She remains flaccid left upper extremity, some movement of the left lower extremity.  Passed her swallow assessment and is tolerating a p.o. diet.      #1 right middle cerebral artery  CVA  -May 24 CT perfusion occlusion right internal carotid artery and right M1 with evolving right middle cerebral artery stroke  - May 25 worsening edema with mass effect, status post decompressive craniotomy  - May 26 postoperative right craniotomy for large right middle cerebral artery infarct without hemorrhage or midline shift  - May 27 speech recommended dysphagia diet, mechanical ground texture with thin liquids  - Sodium 147, goal 145-150  - NIH stroke scale 20  - Echocardiogram, EF 62%, saline test negative  - Keppra 500 mg twice daily  - Atorvastatin  - Goal systolic blood pressure less than 140  - Will  "need helmet before she can get up with physical therapy    #2 possible obstructive sleep apnea  - Daughter denies    #3 dyslipidemia  - Statin      Telemetry, once she gets her helmet    VTE Prophylaxis:SCDS    Stress Ulcer Prophylaxis:protonix    Tia Blake MD  05/28/25  11:02 EDT      Time: Critical care 20 min  I personally provided care to this critically ill patient as documented above.  Critical care time does not include time spent on separately billed procedures.  None of my critical care time was concurrent with other critical care providers.     Electronically signed by Tia Blake MD at 05/28/25 1111       Matt Olivarez MD at 05/28/25 0848          NEUROSURGERY PROGRESS NOTE    Chief Complaint: Stroke    Subjective: Stable overnight.  No new issues.    Objective    Vital Signs: Blood pressure 134/82, pulse 72, temperature 99.5 °F (37.5 °C), temperature source Axillary, resp. rate 16, height 157.5 cm (62.01\"), weight 68.8 kg (151 lb 10.8 oz), SpO2 96%.    Physical Exam  Resting, awakes easily.  Right eye swollen  Pupils are 3 mm and reactive bilaterally  Follows commands briskly with good strength in the right upper and right lower extremity.  Spontaneous movement noted in the left lower extremity.  No significant movement noted in the left upper extremity       Intake/Output:   Intake/Output Summary (Last 24 hours) at 5/28/2025 0848  Last data filed at 5/28/2025 0600  Gross per 24 hour   Intake 2021.7 ml   Output 1385 ml   Net 636.7 ml       Current Medications:   Current Facility-Administered Medications:     acetaminophen (TYLENOL) tablet 650 mg, 650 mg, Oral, Q6H PRN, Steve Garcia, PharmD, 650 mg at 05/28/25 0623    atorvastatin (LIPITOR) tablet 80 mg, 80 mg, Oral, Nightly, Steve Garcia, PharmD, 80 mg at 05/27/25 2004    sennosides-docusate (PERICOLACE) 8.6-50 MG per tablet 2 tablet, 2 tablet, Oral, BID, 2 tablet at 05/28/25 0839 **AND** polyethylene glycol (MIRALAX) " packet 17 g, 17 g, Oral, Daily PRN **AND** bisacodyl (DULCOLAX) EC tablet 5 mg, 5 mg, Oral, Daily PRN, 5 mg at 05/28/25 0412 **AND** bisacodyl (DULCOLAX) suppository 10 mg, 10 mg, Rectal, Daily PRN, Franc Joel, APRN    Calcium Replacement - Follow Nurse / BPA Driven Protocol, , Not Applicable, PRN, Matt Olivarez MD    cetirizine (zyrTEC) tablet 10 mg, 10 mg, Oral, Nightly, Florecita Valentine PA-C, 10 mg at 05/27/25 1916    levETIRAcetam (KEPPRA) 100 MG/ML oral solution 500 mg, 500 mg, Oral, Q12H, Steve Garcia, PharmD, 500 mg at 05/28/25 0839    Lidocaine 4 % 1 patch, 1 patch, Transdermal, Q24H, Justin Unger, PharmD, 1 patch at 05/28/25 0841    Magnesium Standard Dose Replacement - Follow Nurse / BPA Driven Protocol, , Not Applicable, PRN, Matt Olivarez MD    mupirocin (BACTROBAN) 2 % nasal ointment 1 Application, 1 Application, Each Nare, BID, Matt Olivarez MD, 1 Application at 05/28/25 0839    niCARdipine (CARDENE) 25mg in 250mL NS infusion, 5-15 mg/hr, Intravenous, Titrated, Matt Olivarez MD, Stopped at 05/25/25 1900    [DISCONTINUED] ondansetron ODT (ZOFRAN-ODT) disintegrating tablet 4 mg, 4 mg, Oral, Q6H PRN **OR** ondansetron (ZOFRAN) injection 4 mg, 4 mg, Intravenous, Q6H PRN, Matt Olivarez MD, 4 mg at 05/25/25 1749    pantoprazole (PROTONIX) injection 40 mg, 40 mg, Intravenous, Q AM, Va Espinosa MD, 40 mg at 05/28/25 0559    Phosphorus Replacement - Follow Nurse / BPA Driven Protocol, , Not Applicable, PRN, Matt Olivarez MD    potassium chloride (KLOR-CON M20) CR tablet 40 mEq, 40 mEq, Oral, Q4H, Tia Blake MD, 40 mEq at 05/28/25 0559    Potassium Replacement - Follow Nurse / BPA Driven Protocol, , Not Applicable, PRN, Matt Olivarez MD    sodium chloride 0.9 % flush 10 mL, 10 mL, Intravenous, Q12H, Matt Olivarez MD, 10 mL at 05/28/25 0841    sodium chloride 0.9 % flush 10 mL, 10 mL, Intravenous, Q12H, Matt Olivarez MD, 10 mL at 05/28/25 0841     sodium chloride 0.9 % flush 10 mL, 10 mL, Intravenous, Q12H, Matt Olivarez MD, 10 mL at 05/28/25 0841    sodium chloride 0.9 % flush 10 mL, 10 mL, Intravenous, PRN, Matt Olivarez MD    sodium chloride 0.9 % flush 20 mL, 20 mL, Intravenous, PRN, Matt Olivarez MD    sodium chloride 0.9 % infusion 40 mL, 40 mL, Intravenous, PRN, Matt Olivarez MD    sodium chloride 0.9 % infusion, 50 mL/hr, Intravenous, Continuous, Stephanie Javed PA-C, Last Rate: 50 mL/hr at 05/28/25 0839, 50 mL/hr at 05/28/25 0839    traMADol (ULTRAM) tablet 50 mg, 50 mg, Oral, Q4H PRN, Steve Garcia, PharmD     Laboratory Results:       Lab 05/28/25 0415 05/26/25  0601 05/25/25  0607 05/24/25  [facility] 05/24/25  1037   WBC  --  19.20* 10.75  --  7.38   HEMOGLOBIN  --  10.9* 12.2  --  13.9   HEMOGLOBIN, POC  --   --   --  14.6  --    HEMATOCRIT  --  35.4 38.7  --  42.7   HEMATOCRIT POC  --   --   --  43  --    PLATELETS  --  208 222  --  259   NEUTROS ABS  --  15.53*  --   --  5.80   IMMATURE GRANS (ABS)  --  0.08*  --   --  0.08*   LYMPHS ABS  --  1.95  --   --  1.07   MONOS ABS  --  1.59*  --   --  0.34   EOS ABS  --  0.00  --   --  0.02   MCV  --  91.5 89.4  --  86.6   CRP 2.79*  --   --   --   --    PROTIME  --   --   --   --  12.8   APTT  --   --   --   --  27.2         Lab 05/28/25  0415 05/27/25  0623 05/26/25  1909 05/26/25  1241 05/26/25  0601 05/25/25  1350 05/25/25  0607 05/24/25  1726 05/24/25  1038 05/24/25  1037   SODIUM 139 147* 153* 151* 152*   < > 147*   < >  --   --    POTASSIUM 3.3* 3.8 3.8 3.9 4.1   < > 3.3*   < >  --   --    CHLORIDE 107  --   --   --  120*  --  115*  --   --   --    CO2 24.0  --   --   --  23.0  --  22.0  --   --   --    ANION GAP 8.0  --   --   --  9.0  --  10.0  --   --   --    BUN 12.8  --   --   --  8  --  8  --   --   --    CREATININE 0.47*  --   --   --  0.68  --  0.65  --  0.80  --    EGFR 107.8  --   --   --  98.6  --  99.7  --  83.4  --    GLUCOSE 104*  --   --   --  125*  --  140*   --   --   --    CALCIUM 8.1*  --   --   --  8.9  --  9.1  --   --   --    MAGNESIUM 1.7  --   --   --   --   --   --   --   --  2.1   PHOSPHORUS 1.9*  --   --   --   --   --   --   --   --   --    HEMOGLOBIN A1C  --   --   --   --   --   --  5.30  --   --   --     < > = values in this interval not displayed.         Lab 05/24/25  1037   ALT (SGPT) 19   AST (SGOT) 27         Lab 05/24/25  1037   PROTIME 12.8   INR 0.91         Lab 05/25/25  0607   CHOLESTEROL 150   LDL CHOL 75   HDL CHOL 60   TRIGLYCERIDES 75             Brief Urine Lab Results       None          Microbiology Results (last 10 days)       Procedure Component Value - Date/Time    Tissue / Bone Culture - Surgical Site, Brain [[id number]] Collected: 05/25/25 1831    Lab Status: Preliminary result Specimen: Surgical Site from Brain Updated: 05/28/25 0739     Tissue Culture No growth at 2 days     Gram Stain Many (4+) Red blood cells      Rare (1+) WBCs seen      No organisms seen             Diagnostic Imaging: I reviewed and independently interpreted the new imaging.     Assessment/Plan:    1. Acute CVA (cerebrovascular accident)    2. Stroke    3. Dysarthria    4. Oropharyngeal dysphagia         This is a 64-year-old female status post decompressive craniectomy for a large right MCA stroke on 5/26. Neurologically, the patient has remained stable. Her head CT shows good decompression with mild swelling noted of the right hemisphere. Continue to keep systolic blood pressure less than 140. Continue care in the ICU.  We will start Lovenox for DVT prophylaxis this morning.  Appreciate ICU assistance.    Any copied data from previous notes included in the (1) History of Present Illness, (2) Physical Examination and (3) Medical Decision Making and/or Assessment and Plan has been reviewed and is accurate as of 05/28/25      Matt Olivarez MD  05/28/25  08:48 EDT        Electronically signed by Matt Olivarez MD at 05/28/25 0852       Kaleigh Estrada  CINDY Todd at 05/28/25 0818          Stroke Progress Note       Chief Complaint: Right MCA acute ischemic stroke    Subjective    Subjective     Subjective:  No acute events overnight.  Patient is currently resting in bed, daughter is at bedside.  She denies headache but does report mild tenderness at her surgical site.  She continues to have left hemiparesis, visual disturbance, sensory loss, and neglect.      Review of Systems   Constitutional:  Positive for activity change.   HENT:  Positive for trouble swallowing.    Eyes:  Positive for visual disturbance.   Respiratory: Negative.     Cardiovascular: Negative.    Gastrointestinal: Negative.  Negative for nausea and vomiting.   Genitourinary: Negative.    Musculoskeletal:  Positive for gait problem.   Neurological:  Positive for speech difficulty and weakness. Negative for numbness and headaches.           Objective      Temp:  [98 °F (36.7 °C)-99.5 °F (37.5 °C)] 99.5 °F (37.5 °C)  Heart Rate:  [48-93] 72  Resp:  [14-18] 16  BP: ()/(57-97) 134/82    Objective      Neurological Exam  Mental Status  Drowsy. Level of consciousness: Easily awakens to verbal stimuli, answers questions appropriately, and follows commands. Oriented to person, place, time and situation. Oriented to person, place, and time. Memory is normal. Moderate dysarthria present. Language is fluent with no aphasia.    Cranial Nerves  CN II: Vision test: Left homonymous hemianopia. Left homonymous hemianopsia.  CN III, IV, VI: Pupils equal round and reactive to light bilaterally.  CN V:  Left: Diminished sensation of the entire left side of the face.  CN VII:  Left: There is central facial weakness.  CN VIII: Hearing appears to be intact bilaterally.  CN XII: Tongue midline without atrophy or fasciculations.    Motor  Decreased muscle bulk throughout. Left hemiparesis.  RUE/RLE with 4/5 strength.    Sensory  Left hemisensory loss.  Left-sided hemispatial neglect: Extinction to double  simultaneous stimulation of the left arm and leg.    Coordination  Right: No obvious dysmetria noted.Left: Unable to assess 2/2 weakness. Unable to assess 2/2 weakness.    Gait    Not observed.      Physical Exam  Vitals and nursing note reviewed.   Constitutional:       General: She is not in acute distress.     Appearance: She is ill-appearing.      Comments: Drowsy however awakens easily to verbal stimuli   HENT:      Head:      Comments: Right crani surgical incision clean, dry, intact; open to air     Mouth/Throat:      Mouth: Mucous membranes are dry.   Eyes:      Pupils: Pupils are equal, round, and reactive to light.      Comments: Left homonymous hemianopia   Cardiovascular:      Rate and Rhythm: Normal rate and regular rhythm.   Pulmonary:      Effort: Pulmonary effort is normal. No respiratory distress.      Comments: On room air  Skin:     General: Skin is warm and dry.   Neurological:      Mental Status: She is oriented to person, place, and time.      Cranial Nerves: Cranial nerve deficit and dysarthria present.      Sensory: Sensory deficit present.      Motor: Weakness present.   Psychiatric:         Attention and Perception: She is inattentive.         Mood and Affect: Affect is flat.         Speech: Speech is slurred.         Behavior: Behavior is slowed. Behavior is cooperative.         Cognition and Memory: Cognition and memory normal.         Results Review:    I reviewed the patient's new clinical results.    CT Head Without Contrast  Result Date: 5/26/2025  Impression: Stable exam. Postoperative change from recent right craniectomy with evolving large right MCA distribution infarct. Electronically Signed: Milton Blancas MD  5/26/2025 9:47 AM EDT  Workstation ID: [id number]    CT Head Without Contrast  Result Date: 5/25/2025  Impression: Postoperative changes of right-sided craniectomy. Evolving large right MCA territory infarct. No acute intracranial hemorrhage. Similar associated mass effect  including narrowing of the right lateral ventricle. No significant midline shift or herniation. Electronically Signed: Carlton Christiansen MD  5/25/2025 8:20 PM EDT  Workstation ID: [id number]    CT Head Without Contrast  Result Date: 5/25/2025  Impression: Evolving right MCA distribution infarct. There is no gross hemorrhagic conversion. There is some increased mass effect on the right lateral ventricle with no significant subfalcine shift or uncal herniation Electronically Signed: Pio Amanda  5/25/2025 8:26 AM EDT  Workstation ID: [id number]    MRI Brain Without Contrast  Result Date: 5/24/2025  1.There is a large area of restricted diffusion in the right MCA distribution corresponding to findings on the previous head CT. Findings are compatible with acute infarct. There is some associated vasogenic edema. There is some effacement of the right lateral ventricle. However no midline shift is seen. 2.No evidence of acute hemorrhage. 3.The right ICA is occluded. Electronically Signed: Joe Grover MD  5/24/2025 8:54 PM EDT  Workstation ID: [id number]    XR Abdomen KUB  Result Date: 5/24/2025  Impression: Gastric suction tube with tip terminating at the mid stomach. Electronically Signed: Jr Ambrosio MD  5/24/2025 6:46 PM EDT  Workstation ID: [id number]    CT Head Without Contrast  Result Date: 5/24/2025  Impression: Evolving changes of right MCA territory infarct. Electronically Signed: Ed Jiménez MD  5/24/2025 4:55 PM EDT  Workstation ID: [id number]    XR Chest 1 View  Result Date: 5/24/2025  Impression: No acute cardiopulmonary abnormality. Electronically Signed: Jr Ambrosio MD  5/24/2025 11:20 AM EDT  Workstation ID: [id number]    CT Angiogram Head w AI Analysis of LVO  Result Date: 5/24/2025  Impression: 1.There is occlusion of the right internal carotid artery that may be subacute. 2.There is occlusion of the right M1 segment. 3.There are evolving changes of right MCA territory infarct. Electronically Signed:  Ed Jiménez MD  5/24/2025 11:04 AM EDT  Workstation ID: [id number]    CT Angiogram Neck  Result Date: 5/24/2025  Impression: 1.There is occlusion of the right internal carotid artery that may be subacute. 2.There is occlusion of the right M1 segment. 3.There are evolving changes of right MCA territory infarct. Electronically Signed: Ed Jiménez MD  5/24/2025 11:04 AM EDT  Workstation ID: [id number]    CT CEREBRAL PERFUSION WITH & WITHOUT CONTRAST  Result Date: 5/24/2025  Impression: 1.There is occlusion of the right internal carotid artery that may be subacute. 2.There is occlusion of the right M1 segment. 3.There are evolving changes of right MCA territory infarct. Electronically Signed: Ed Jiménez MD  5/24/2025 11:04 AM EDT  Workstation ID: [id number]    CT Head Without Contrast Stroke Protocol  Result Date: 5/24/2025  Impression: Hyperdense right MCA sign suggesting thrombus, with hypodensity in the adjacent right frontal and temporal lobes in the right MCA distribution suggesting acute infarction. Mild mass effect, but no midline shift. Electronically Signed: Luis Li MD  5/24/2025 10:37 AM EDT  Workstation ID: [id number]        WBC   Date Value Ref Range Status   05/26/2025 19.20 (H) 3.40 - 10.80 10*3/mm3 Final     Hemoglobin   Date Value Ref Range Status   05/26/2025 10.9 (L) 12.0 - 15.9 g/dL Final     Hematocrit   Date Value Ref Range Status   05/26/2025 35.4 34.0 - 46.6 % Final     Platelets   Date Value Ref Range Status   05/26/2025 208 140 - 450 10*3/mm3 Final       Lab Results   Component Value Date    GLUCOSE 104 (H) 05/28/2025    BUN 12.8 05/28/2025    CREATININE 0.47 (L) 05/28/2025     05/28/2025    K 3.3 (L) 05/28/2025     05/28/2025    CALCIUM 8.1 (L) 05/28/2025    PROTEINTOT 7.0 01/19/2021    ALBUMIN 4.42 01/19/2021    ALT 19 05/24/2025    AST 27 05/24/2025    ALKPHOS 63 01/19/2021    BILITOT 0.5 01/19/2021    GLOB 2.6 01/19/2021    AGRATIO 1.7 01/19/2021    BCR 27.2 (H) 05/28/2025     ANIONGAP 8.0 05/28/2025    EGFR 107.8 05/28/2025         Lab 05/25/25  0607   HEMOGLOBIN A1C 5.30     Lipid Panel          5/25/2025    06:07   Lipid Panel   Total Cholesterol 150    Triglycerides 75    HDL Cholesterol 60    VLDL Cholesterol 15    LDL Cholesterol  75    LDL/HDL Ratio 1.25        Results for orders placed during the hospital encounter of 05/24/25    Adult Transthoracic Echo Complete W/ Cont if Necessary Per Protocol (With Agitated Saline)    Interpretation Summary    Left ventricular systolic function is normal. Calculated left ventricular EF = 62%    Mild mitral valve regurgitation is present.    Trace aortic valve regurgitation is present.    Normal left atrial cavity size noted. Saline test results are negative.      Assessment/Plan     This is a 64-year-old female with hyperlipidemia, migraines, palpitations, and CATHRYN (daughter reports she has not been formally diagnosed) who presented [facility] emergency department on 5/24/2025 with right MCA syndrome.  CT head without contrast was performed and revealed hyperdense RMCA sign with hypodensity seen within the right frontal and temporal lobes concerning for acute stroke.  Given these findings and LKW >4.5 hours she was not an appropriate candidate for IV thrombolytic therapy.  CTA head/neck/perfusion revealed VENKAT occlusion at bifurcation into RMCA; estimated core infarct 70cc and estimated amount of ischemic penumbra 106 cc consistent with near completed infarct therefore she was not deemed to be emergent endovascular therapy candidate (discussing with Dr. Olivarez and Dr. Torres).  She was admitted to the neuro ICU for further workup and evaluation.     Antiplatelet PTA: None  Anticoagulant PTA: None      # Right MCA ischemic stroke status post decompressive craniectomy  # History of palpitations  -Current etiology is cryptogenic.  This is likely cardioembolic versus ESUS  -CTH wo on 5/25/2025 images reviewed, stable appearance  of RMCA stroke and post-operative changes  - Neurosurgery is following, appreciate recommendations  - Will relax sodium check 2 per shift.  Most recent sodium level was 139  - Agree with neurosurgery regarding starting DVT prophylaxis as soon as today  - If patient tolerated DVTs prophylaxis will consider starting aspirin 81 mg for secondary stroke prevention in the next 2 days (5/30) if it is okay from a neurosurgical standpoint; discussed with Dr. Olivarez  - Continue prophylactic Keppra 500mg BID   - Target blood pressure goals of less than 140/90  - If no A-fib is detected while inpatient the patient will need 2 to 4 weeks of cardiac monitoring as an outpatient to rule out/an atrial fibrillation  - PT/OT/SLP to see and assess when appropriate; will need protective helmet prior to getting OOB  - A1c 5.30 on admission  - Will need polysomnography as an outpatient    # Hyperlipidemia  - LDL on admission 75, goal <70 for secondary stroke prevention  - Continue atorvastatin 80 mg nightly     # Dysphagia  - SLP continues to follow  - Diet advanced to mechanical ground and thin liquid on 5/27    Plan of care was discussed with patient and daughter at bedside.  Stroke will continue to follow. Please call for any further questions or concerns    Kaleigh Estrada MSN, APRN, AGACNP-BC, AN-BC  Stroke Neurology            Electronically signed by Kaleigh Estrada APRN at 05/28/25 1304       Tia Blake MD at 05/27/25 1614          Critical Care Note     LOS: 3 days   Patient Care Team:  Micheline Heath DO as PCP - General (Family Medicine)    Chief Complaint/Reason for visit:    Chief Complaint   Patient presents with    Stroke     Acute right middle cerebral artery stroke  Cerebral edema status post decompressive craniotomy May 25  Left hemiparesis and expressive aphasia  Sinus bradycardia  Obstructive sleep apnea, suspected  Hypernatremia, iatrogenic    Subjective     Interval History:     Patient  "follows commands with her right side.  She is maintaining sinus rhythm.  Afebrile.  Speech assessed and are recommending mechanical ground textures with thin liquids.    Review of Systems:    All systems were reviewed and negative except as noted in subjective.    Medical history, surgical history, social history, family history reviewed    Objective     Intake/Output:    Intake/Output Summary (Last 24 hours) at 5/27/2025 1620  Last data filed at 5/27/2025 1200  Gross per 24 hour   Intake 2103.3 ml   Output 705 ml   Net 1398.3 ml       Nutrition:  Diet: Regular/House; Texture: Mechanical Ground (NDD 2); Fluid Consistency: Thin (IDDSI 0)    Infusions:  niCARdipine, 5-15 mg/hr, Last Rate: Stopped (05/25/25 1900)  sodium chloride, 50 mL/hr, Last Rate: 50 mL/hr (05/26/25 1800)        Mechanical Ventilator Settings:                                                Telemetry: Sinus rhythm             Vital Signs  Blood pressure 95/81, pulse 71, temperature 98 °F (36.7 °C), temperature source Axillary, resp. rate 18, height 157.5 cm (62.01\"), weight 66.1 kg (145 lb 11.6 oz), SpO2 96%.    Physical Exam  Constitutional:       General: She is not in acute distress.  HENT:      Head:      Comments: Craniotomy dressing intact.  Surgical drain removed     Nose: No congestion.      Mouth/Throat:      Pharynx: No oropharyngeal exudate.   Eyes:      Pupils: Pupils are equal, round, and reactive to light.   Neck:      Vascular: No carotid bruit.   Cardiovascular:      Rate and Rhythm: Normal rate and regular rhythm.      Heart sounds: No murmur heard.  Pulmonary:      Effort: Pulmonary effort is normal.      Breath sounds: No wheezing or rhonchi.   Abdominal:      General: Bowel sounds are normal.      Palpations: Abdomen is soft.   Musculoskeletal:      Right lower leg: No edema.      Left lower leg: No edema.   Neurological:      Mental Status: She is alert.      Comments: Awake, oriented to name, hospital, year.  Left hemiparesis.  " "Follows commands with the right side           Results Review:     I reviewed the patient's new clinical results.   Results from last 7 days   Lab Units 05/27/25  0623 05/26/25  1909 05/26/25  1241 05/26/25  0601 05/25/25  1350 05/25/25  0607 05/24/25  1726 05/24/25  1038 05/24/25  1037   SODIUM mmol/L 147* 153* 151* 152*   < > 147*   < >  --   --    POTASSIUM mmol/L 3.8 3.8 3.9 4.1   < > 3.3*   < >  --   --    CHLORIDE mmol/L  --   --   --  120*  --  115*  --   --   --    CO2 mmol/L  --   --   --  23.0  --  22.0  --   --   --    BUN mg/dL  --   --   --  8  --  8  --   --   --    CREATININE mg/dL  --   --   --  0.68  --  0.65  --  0.80  --    CALCIUM mg/dL  --   --   --  8.9  --  9.1  --   --   --    ALT (SGPT) U/L  --   --   --   --   --   --   --   --  19   AST (SGOT) U/L  --   --   --   --   --   --   --   --  27   GLUCOSE mg/dL  --   --   --  125*  --  140*  --   --   --     < > = values in this interval not displayed.     Results from last 7 days   Lab Units 05/26/25  0601 05/25/25  0607 05/24/25 1038 05/24/25  1037   WBC 10*3/mm3 19.20* 10.75  --  7.38   HEMOGLOBIN g/dL 10.9* 12.2  --  13.9   HEMOGLOBIN, POC g/dL  --   --  14.6  --    HEMATOCRIT % 35.4 38.7  --  42.7   HEMATOCRIT POC %  --   --  43  --    PLATELETS 10*3/mm3 208 222  --  259         No results found for: \"BLOODCX\"  No results found for: \"URINECX\"    I reviewed the patient's new imaging including images and reports.    CT HEAD WO CONTRAST    Date of Exam: 5/26/2025 5:17 AM EDT    Indication: Status post craniectomy.    Comparison: 5/25/2025    Technique: Axial CT images were obtained of the head without contrast administration.  Automated exposure control and iterative construction methods were used.      Findings:  Stable postoperative changes from recent right craniectomy. Stable large right MCA distribution infarct. No evidence of acute hemorrhage or midline shift. No extra-axial fluid collections are identified.   Impression:   "   Impression:  Stable exam. Postoperative change from recent right craniectomy with evolving large right MCA distribution infarct.        Electronically Signed: Milton Blancas MD   5/26/2025 9:47 AM EDT         Interpretation Summary         Left ventricular systolic function is normal. Calculated left ventricular EF = 62%    Mild mitral valve regurgitation is present.    Trace aortic valve regurgitation is present.    Normal left atrial cavity size noted. Saline test results are negative.      All medications reviewed.   atorvastatin, 80 mg, Nasogastric, Nightly  levETIRAcetam, 500 mg, Nasogastric, Q12H  Lidocaine, 1 patch, Transdermal, Q24H  mupirocin, 1 Application, Each Nare, BID  pantoprazole, 40 mg, Intravenous, Q AM  sodium chloride, 10 mL, Intravenous, Q12H  sodium chloride, 10 mL, Intravenous, Q12H  sodium chloride, 10 mL, Intravenous, Q12H          Assessment & Plan       Acute ischemic stroke    Status post craniotomy    Obstructive sleep apnea    64-year-old woman presenting on May 24 with right middle cerebral artery stroke, left hemiparesis.  She was outside the window for thrombolytic therapy. 5/25 Imaging revealed increasing edema with mass effect and clinical decline.  Therefore, she underwent decompressive craniotomy.  She had significant improvement in her mentation and has been able to follow commands with her right side.  She remains flaccid left upper extremity, some movement of the left lower extremity.      #1 right middle cerebral artery  CVA  -May 24 CT perfusion occlusion right internal carotid artery and right M1 with evolving right middle cerebral artery stroke  - May 25 worsening edema with mass effect, status post decompressive craniotomy  - May 26 postoperative right craniotomy for large right middle cerebral artery infarct without hemorrhage or midline shift  - May 27 speech recommended dysphagia diet, mechanical ground texture with thin liquids  - Sodium 147, goal 145-150  - NIH  stroke scale 22  - Echocardiogram, EF 62%, saline test negative  - Keppra 500 mg twice daily  - Atorvastatin  - Goal systolic blood pressure less than 140    #2 possible obstructive sleep apnea  - Loud snoring noted  - Supplemental oxygen at night          VTE Prophylaxis:SCDS    Stress Ulcer Prophylaxis:protonix    Tia Blake MD  05/27/25  16:20 EDT      Time: Critical care 30 min  I personally provided care to this critically ill patient as documented above.  Critical care time does not include time spent on separately billed procedures.  None of my critical care time was concurrent with other critical care providers.     Electronically signed by Tia Blake MD at 05/27/25 1633       Consult Notes (last 48 hours)  Notes from 05/27/25 1445 through 05/29/25 1445   No notes of this type exist for this encounter.

## 2025-05-29 NOTE — PROGRESS NOTES
"Critical Care Note     LOS: 5 days   Patient Care Team:  Micheline Heath DO as PCP - General (Family Medicine)    Chief Complaint/Reason for visit:    Chief Complaint   Patient presents with    Stroke     Acute right middle cerebral artery stroke  Cerebral edema status post decompressive craniotomy May 25  Left hemiparesis and expressive aphasia  Sinus bradycardia  Obstructive sleep apnea, suspected      Subjective     Interval History:     Remains afebrile.  Sinus bradycardia.  Room air saturation 94%.  Able to get her helmet and work with PT yesterday.  Max assist of 2 for mobility.  Mccain catheter removed and she is voiding.    Review of Systems:    All systems were reviewed and negative except as noted in subjective.    Medical history, surgical history, social history, family history reviewed    Objective     Intake/Output:    Intake/Output Summary (Last 24 hours) at 5/29/2025 1106  Last data filed at 5/29/2025 1030  Gross per 24 hour   Intake 480 ml   Output 1250 ml   Net -770 ml       Nutrition:  Diet: Regular/House; Texture: Mechanical Ground (NDD 2); Fluid Consistency: Thin (IDDSI 0)    Infusions:             Telemetry: Sinus rhythm, sinus bradycardia             Vital Signs  Blood pressure 100/46, pulse 59, temperature 98.1 °F (36.7 °C), temperature source Oral, resp. rate 18, height 157.5 cm (62.01\"), weight 72.5 kg (159 lb 13.3 oz), SpO2 94%.    Physical Exam  Constitutional:       General: She is not in acute distress.  HENT:      Head:      Comments: Helmet in place     Nose: No congestion.      Mouth/Throat:      Pharynx: No oropharyngeal exudate.   Eyes:      Pupils: Pupils are equal, round, and reactive to light.   Neck:      Vascular: No carotid bruit.   Cardiovascular:      Rate and Rhythm: Normal rate and regular rhythm.      Heart sounds: No murmur heard.  Pulmonary:      Effort: Pulmonary effort is normal.      Breath sounds: No wheezing or rhonchi.   Abdominal:      General: Bowel sounds " are normal.      Palpations: Abdomen is soft.   Musculoskeletal:      Right lower leg: No edema.      Left lower leg: No edema.   Skin:     General: Skin is warm and dry.   Neurological:      Mental Status: She is alert.      Comments: Oriented to name, hospital.  Follows commands with her right side.  Can wiggle her left fingers and toes.      Interval:  (post travel to MRI)  1a. Level of Consciousness: 1-->Not alert, but arousable by minor stimulation to obey, answer, or respond  1b. LOC Questions: 0-->Answers both questions correctly  1c. LOC Commands: 0-->Performs both tasks correctly  2. Best Gaze: 2-->Forced deviation, or total gaze paresis not overcome by the oculocephalic maneuver  3. Visual: 2-->Complete hemianopia  4. Facial Palsy: 2-->Partial paralysis (total or near-total paralysis of lower face)  5a. Motor Arm, Left: 3-->No effort against gravity, limb falls  5b. Motor Arm, Right: 0-->No drift, limb holds 90 (or 45) degrees for full 10 secs  6a. Motor Leg, Left: 3-->No effort against gravity, leg falls to bed immediately  6b. Motor Leg, Right: 0-->No drift, leg holds 30 degree position for full 5 secs  7. Limb Ataxia: 1-->Present in one limb  8. Sensory: 2-->Severe to total sensory loss, patient is not aware of being touched in the face, arm, and leg  9. Best Language: 1-->Mild-to-moderate aphasia, some obvious loss of fluency or facility of comprehension, without significant limitation on ideas expressed or form of expression. Reduction of speech and/or comprehension, however, makes conversation. . . (see row details)  10. Dysarthria: 1-->Mild-to-moderate dysarthria, patient slurs at least some words and, at worst, can be understood with some difficulty  11. Extinction and Inattention (formerly Neglect): 2-->Profound jorge-inattention/extinction more than 1 modality    Total (NIH Stroke Scale): 20      Results Review:     I reviewed the patient's new clinical results.   Results from last 7 days   Lab  "Units 05/29/25  0021 05/28/25  1336 05/28/25  0415 05/27/25  0623 05/26/25  1241 05/26/25  0601 05/24/25  1038 05/24/25  1037   SODIUM mmol/L 140  --  139 147*   < > 152*   < >  --    POTASSIUM mmol/L 3.7 4.3 3.3* 3.8   < > 4.1   < >  --    CHLORIDE mmol/L 107  --  107  --   --  120*   < >  --    CO2 mmol/L 22.0  --  24.0  --   --  23.0   < >  --    BUN mg/dL 7.8*  --  12.8  --   --  8   < >  --    CREATININE mg/dL 0.46*  --  0.47*  --   --  0.68   < >  --    CALCIUM mg/dL 8.5*  --  8.1*  --   --  8.9   < >  --    ALT (SGPT) U/L  --   --   --   --   --   --   --  19   AST (SGOT) U/L  --   --   --   --   --   --   --  27   GLUCOSE mg/dL 99  --  104*  --   --  125*   < >  --     < > = values in this interval not displayed.     Results from last 7 days   Lab Units 05/29/25  0446 05/26/25  0601 05/25/25  0607   WBC 10*3/mm3 10.52 19.20* 10.75   HEMOGLOBIN g/dL 10.5* 10.9* 12.2   HEMATOCRIT % 31.9* 35.4 38.7   PLATELETS 10*3/mm3 185 208 222         No results found for: \"BLOODCX\"  No results found for: \"URINECX\"    I reviewed the patient's new imaging including images and reports.    CT HEAD WO CONTRAST    Date of Exam: 5/26/2025 5:17 AM EDT    Indication: Status post craniectomy.    Comparison: 5/25/2025    Technique: Axial CT images were obtained of the head without contrast administration.  Automated exposure control and iterative construction methods were used.      Findings:  Stable postoperative changes from recent right craniectomy. Stable large right MCA distribution infarct. No evidence of acute hemorrhage or midline shift. No extra-axial fluid collections are identified.   Impression:     Impression:  Stable exam. Postoperative change from recent right craniectomy with evolving large right MCA distribution infarct.        Electronically Signed: Milton Blancas MD   5/26/2025 9:47 AM EDT         Interpretation Summary         Left ventricular systolic function is normal. Calculated left ventricular EF = 62%    " Mild mitral valve regurgitation is present.    Trace aortic valve regurgitation is present.    Normal left atrial cavity size noted. Saline test results are negative.      All medications reviewed.   atorvastatin, 80 mg, Oral, Nightly  cetirizine, 10 mg, Oral, Nightly  enoxaparin sodium, 40 mg, Subcutaneous, Q24H  levETIRAcetam, 500 mg, Oral, Q12H  Lidocaine, 1 patch, Transdermal, Q24H  pantoprazole, 40 mg, Oral, Q AM  sodium chloride, 10 mL, Intravenous, Q12H          Assessment & Plan       Acute ischemic stroke    Status post craniotomy    Obstructive sleep apnea    64-year-old woman, non-smoker, presenting on May 24 with right middle cerebral artery stroke, left hemiparesis.  She was outside the window for thrombolytic therapy. 5/25 Imaging revealed increasing edema with mass effect and clinical decline.  Therefore, she underwent decompressive craniotomy.  She had significant improvement in her mentation and has been able to follow commands with her right side.  She did work with PT yesterday once a helmet was placed.  She is tolerating p.o. diet.  Discussed with neurosurgery and we can leave her sodium within normal range.        #1 right middle cerebral artery  CVA  -May 24 CT perfusion occlusion right internal carotid artery and right M1 with evolving right middle cerebral artery stroke  - May 25 worsening edema with mass effect, status post decompressive craniotomy  - May 26 postoperative right craniotomy for large right middle cerebral artery infarct without hemorrhage or midline shift  - May 27 speech recommended dysphagia diet, mechanical ground texture with thin liquids  - Sodium 140, goal is now normal  - NIH stroke scale 20  - Echocardiogram, EF 62%, saline test negative  - Systolic blood pressure     - Keppra 500 mg twice daily  - Atorvastatin  - Goal systolic blood pressure less than 140  - Will need helmet before she can get up with physical therapy    #2 possible obstructive sleep apnea  -  Daughter denies, she is not wearing BiPAP    #3 dyslipidemia  - Statin      Telemetry  Anticipate inpatient rehab, hopefully [facility]    VTE Prophylaxis:SCDS    Stress Ulcer Prophylaxis:protonix    Tia Blake MD  05/29/25  11:06 EDT      Time: Critical care 25 min  I personally provided care to this critically ill patient as documented above.  Critical care time does not include time spent on separately billed procedures.  None of my critical care time was concurrent with other critical care providers.

## 2025-05-29 NOTE — CASE MANAGEMENT/SOCIAL WORK
Continued Stay Note  [facility]     Patient Name: Linda Schafer  MRN: [id number]  Today's Date: 5/29/2025    Admit Date: 5/24/2025    Plan: [facility]   Discharge Plan       Row Name 05/29/25 0833       Plan    Plan [facility]    Patient/Family in Agreement with Plan yes    Plan Comments Spoke with patient and spouse at bedside. Spouse stated he would like [facility] for rehab as opposed to closer to home. CM call [facility] with [facility] and made the referral. CM will continuen to follow.    Final Discharge Disposition Code 62 - inpatient rehab facility                   Discharge Codes    No documentation.                       Gaudencio Benitez RN

## 2025-05-29 NOTE — PROGRESS NOTES
"NEUROSURGERY PROGRESS NOTE    Chief Complaint: Right MCA stroke    Subjective: Patient doing well this morning.  She is up and eating breakfast.    Objective    Vital Signs: Blood pressure 100/46, pulse 59, temperature 98.1 °F (36.7 °C), temperature source Oral, resp. rate 18, height 157.5 cm (62.01\"), weight 72.5 kg (159 lb 13.3 oz), SpO2 94%.    Physical Exam  Awake, alert and oriented x 3  Opens eyes spont  Pupils 3 mm reactive bilaterally  Extraocular muscles intact bilaterally  Face symmetric bilaterally  Tongue midline  Follows commands briskly with good strength in the right upper and right lower extremity.  Spontaneous movement noted in the left lower extremity.  No significant movement noted in the left upper extremity       Intake/Output:   Intake/Output Summary (Last 24 hours) at 5/29/2025 0841  Last data filed at 5/29/2025 0400  Gross per 24 hour   Intake 480 ml   Output 900 ml   Net -420 ml       Current Medications:   Current Facility-Administered Medications:     acetaminophen (TYLENOL) tablet 650 mg, 650 mg, Oral, Q6H PRN, Steve Garcia, PharmD, 650 mg at 05/29/25 0445    atorvastatin (LIPITOR) tablet 80 mg, 80 mg, Oral, Nightly, Steve Garcia PharmD, 80 mg at 05/28/25 2000    sennosides-docusate (PERICOLACE) 8.6-50 MG per tablet 2 tablet, 2 tablet, Oral, BID, 2 tablet at 05/28/25 0839 **AND** polyethylene glycol (MIRALAX) packet 17 g, 17 g, Oral, Daily PRN **AND** bisacodyl (DULCOLAX) EC tablet 5 mg, 5 mg, Oral, Daily PRN, 5 mg at 05/28/25 0412 **AND** bisacodyl (DULCOLAX) suppository 10 mg, 10 mg, Rectal, Daily PRN, Franc Joel, APRN    Calcium Replacement - Follow Nurse / BPA Driven Protocol, , Not Applicable, PRN, Matt Olivarez MD    cetirizine (zyrTEC) tablet 10 mg, 10 mg, Oral, Nightly, Florecita Valentine PA-C, 10 mg at 05/28/25 2000    enoxaparin sodium (LOVENOX) syringe 40 mg, 40 mg, Subcutaneous, Q24H, Matt Olivarez MD, 40 mg at 05/28/25 1011    ibuprofen (ADVIL,MOTRIN) tablet " 600 mg, 600 mg, Oral, Q6H PRN, Ursula Martin PA-C, 600 mg at 05/28/25 2048    levETIRAcetam (KEPPRA) tablet 500 mg, 500 mg, Oral, Q12H, Tia Blake MD, 500 mg at 05/29/25 0807    Lidocaine 4 % 1 patch, 1 patch, Transdermal, Q24H, Justin Unger, PharmD, 1 patch at 05/29/25 0807    Magnesium Standard Dose Replacement - Follow Nurse / BPA Driven Protocol, , Not Applicable, Juanis VOGEL Nicolas, MD    [DISCONTINUED] ondansetron ODT (ZOFRAN-ODT) disintegrating tablet 4 mg, 4 mg, Oral, Q6H PRN **OR** ondansetron (ZOFRAN) injection 4 mg, 4 mg, Intravenous, Q6H PRN, Matt Olivarez MD, 4 mg at 05/25/25 1749    pantoprazole (PROTONIX) EC tablet 40 mg, 40 mg, Oral, Q AM, Tia Blake MD, 40 mg at 05/29/25 0618    Phosphorus Replacement - Follow Nurse / BPA Driven Protocol, , Not Applicable, PRJuanis MENDOZA Nicolas, MD    Potassium Replacement - Follow Nurse / BPA Driven Protocol, , Not Applicable, Juanis VOGEL Nicolas, MD    sodium chloride 0.9 % flush 10 mL, 10 mL, Intravenous, Q12H, Matt Olivarez MD, 10 mL at 05/29/25 0808    sodium chloride 0.9 % flush 10 mL, 10 mL, Intravenous, Q12H, Matt Olivarez MD, 10 mL at 05/29/25 0808    sodium chloride 0.9 % flush 10 mL, 10 mL, Intravenous, Q12H, Matt Olivarez MD, 10 mL at 05/29/25 0808    sodium chloride 0.9 % flush 10 mL, 10 mL, Intravenous, PRNJuanis Nicolas, MD    sodium chloride 0.9 % flush 20 mL, 20 mL, Intravenous, PRNJuanis Nicolas, MD    sodium chloride 0.9 % infusion 40 mL, 40 mL, Intravenous, PRNJuanis Nicolas, MD    traMADol (ULTRAM) tablet 50 mg, 50 mg, Oral, Q4H PRN, Steve Garcia, PharmD     Laboratory Results:       Lab 05/29/25  0446 05/28/25  0415 05/26/25  0601 05/25/25  0607 05/24/25  1038 05/24/25  1037   WBC 10.52  --  19.20* 10.75  --  7.38   HEMOGLOBIN 10.5*  --  10.9* 12.2  --  13.9   HEMOGLOBIN, POC  --   --   --   --  14.6  --    HEMATOCRIT 31.9*  --  35.4 38.7  --  42.7   HEMATOCRIT POC  --    --   --   --  43  --    PLATELETS 185  --  208 222  --  259   NEUTROS ABS  --   --  15.53*  --   --  5.80   IMMATURE GRANS (ABS)  --   --  0.08*  --   --  0.08*   LYMPHS ABS  --   --  1.95  --   --  1.07   MONOS ABS  --   --  1.59*  --   --  0.34   EOS ABS  --   --  0.00  --   --  0.02   MCV 87.2  --  91.5 89.4  --  86.6   CRP  --  2.79*  --   --   --   --    PROTIME  --   --   --   --   --  12.8   APTT  --   --   --   --   --  27.2         Lab 05/29/25  0021 05/28/25  1336 05/28/25  0415 05/27/25  0623 05/26/25  1909 05/26/25  1241 05/26/25  0601 05/25/25  1350 05/25/25  0607 05/24/25  1726 05/24/25  1038 05/24/25  1037   SODIUM 140  --  139 147* 153* 151* 152*   < > 147*   < >  --   --    POTASSIUM 3.7 4.3 3.3* 3.8 3.8 3.9 4.1   < > 3.3*   < >  --   --    CHLORIDE 107  --  107  --   --   --  120*  --  115*  --   --   --    CO2 22.0  --  24.0  --   --   --  23.0  --  22.0  --   --   --    ANION GAP 11.0  --  8.0  --   --   --  9.0  --  10.0  --   --   --    BUN 7.8*  --  12.8  --   --   --  8  --  8  --   --   --    CREATININE 0.46*  --  0.47*  --   --   --  0.68  --  0.65  --  0.80  --    EGFR 108.4  --  107.8  --   --   --  98.6  --  99.7  --  83.4  --    GLUCOSE 99  --  104*  --   --   --  125*  --  140*  --   --   --    CALCIUM 8.5*  --  8.1*  --   --   --  8.9  --  9.1  --   --   --    MAGNESIUM  --   --  1.7  --   --   --   --   --   --   --   --  2.1   PHOSPHORUS 3.3 2.2* 1.9*  --   --   --   --   --   --   --   --   --    HEMOGLOBIN A1C  --   --   --   --   --   --   --   --  5.30  --   --   --     < > = values in this interval not displayed.         Lab 05/24/25  1037   ALT (SGPT) 19   AST (SGOT) 27         Lab 05/24/25  1037   PROTIME 12.8   INR 0.91         Lab 05/25/25  0607   CHOLESTEROL 150   LDL CHOL 75   HDL CHOL 60   TRIGLYCERIDES 75             Brief Urine Lab Results       None          Microbiology Results (last 10 days)       Procedure Component Value - Date/Time    Anaerobic Culture - Surgical  Site, Brain [[id number]]  (Normal) Collected: 05/25/25 1831    Lab Status: Preliminary result Specimen: Surgical Site from Brain Updated: 05/29/25 0719     Anaerobic Culture No anaerobes isolated at 3 days    Tissue / Bone Culture - Surgical Site, Brain [[id number]] Collected: 05/25/25 1831    Lab Status: Final result Specimen: Surgical Site from Brain Updated: 05/29/25 0659     Tissue Culture No growth at 3 days     Gram Stain Many (4+) Red blood cells      Rare (1+) WBCs seen      No organisms seen             Diagnostic Imaging: I reviewed and independently interpreted the new imaging.     Assessment/Plan:    1. Acute CVA (cerebrovascular accident)    2. Stroke    3. Dysarthria    4. Oropharyngeal dysphagia         This is a 64-year-old female status post decompressive craniectomy for a large right MCA stroke on 5/26. Neurologically, the patient has remained stable. Her head CT shows good decompression.  She is on Lovenox for DVT prophylaxis.  She can start a baby aspirin daily 5 days after surgery.  She is cleared to be transferred out of the ICU from a neurosurgical perspective.  She will require inpatient rehab.  We will continue to follow peripherally.  Please call with any questions.      Any copied data from previous notes included in the (1) History of Present Illness, (2) Physical Examination and (3) Medical Decision Making and/or Assessment and Plan has been reviewed and is accurate as of 05/29/25      Matt Olivarez MD  05/29/25  08:41 EDT

## 2025-05-29 NOTE — CASE MANAGEMENT/SOCIAL WORK
Continued Stay Note  [facility]     Patient Name: Linda Schafer  MRN: [id number]  Today's Date: 5/29/2025    Admit Date: 5/24/2025    Plan: [facility]   Discharge Plan       Row Name 05/29/25 1046       Plan    Plan [facility]    Patient/Family in Agreement with Plan yes    Plan Comments Spoke with Stacy at [facility] and she can accept the patient and will start the insurance precert to day. CM will continue to follow.    Final Discharge Disposition Code 62 - inpatient rehab facility      Row Name 05/29/25 0833       Plan    Plan [facility]    Patient/Family in Agreement with Plan yes    Plan Comments Spoke with patient and spouse at bedside. Spouse stated he would like [facility] for rehab as opposed to closer to home. CM call Stacy with [facility] and made the referral. CM will continuen to follow.    Final Discharge Disposition Code 62 - inpatient rehab facility                   Discharge Codes    No documentation.                       Gaudencio Benitez RN

## 2025-05-29 NOTE — PROGRESS NOTES
Stroke Progress Note       Chief Complaint: Right MCA acute ischemic stroke    Subjective    Subjective     Subjective:  Resting in the chair.  is present at the bedside.   She tells me that she has had continued itching in both of her eyes, but particularly the right.   No new neurologic changes.     Review of Systems   Constitutional:  Positive for activity change.   HENT:  Positive for trouble swallowing.    Eyes:  Positive for itching and visual disturbance.   Respiratory: Negative.     Cardiovascular: Negative.    Gastrointestinal: Negative.  Negative for nausea and vomiting.   Genitourinary: Negative.    Musculoskeletal:  Positive for gait problem.   Neurological:  Positive for speech difficulty and weakness. Negative for numbness and headaches.      Objective      Temp:  [97.7 °F (36.5 °C)-98.3 °F (36.8 °C)] 98.1 °F (36.7 °C)  Heart Rate:  [49-87] 59  Resp:  [16-18] 18  BP: ()/() 100/46    Objective      Neurological Exam  Mental Status  Drowsy. Level of consciousness: Easily awakens to verbal stimuli, answers questions appropriately, and follows commands. Oriented to person, place, time and situation. Oriented to person, place, and time. Memory is normal. Moderate dysarthria present. Language is fluent with no aphasia.    Cranial Nerves  CN II: Vision test: Left homonymous hemianopia. Left homonymous hemianopsia.  CN III, IV, VI: Pupils equal round and reactive to light bilaterally.  CN V:  Left: Diminished sensation of the entire left side of the face.  CN VII:  Left: There is central facial weakness.  CN VIII: Hearing appears to be intact bilaterally.  CN XII: Tongue midline without atrophy or fasciculations.    Motor  Decreased muscle bulk throughout. Left hemiparesis.  RUE/RLE with 4/5 strength.    Sensory  Left hemisensory loss.  Left-sided hemispatial neglect: Extinction to double simultaneous stimulation of the left face, arm and leg.    Coordination  Right: No obvious dysmetria  noted.Left: Unable to assess 2/2 weakness. Unable to assess 2/2 weakness.    Gait    Not observed.    Physical Exam  Vitals and nursing note reviewed.   Constitutional:       General: She is not in acute distress.     Appearance: She is ill-appearing.      Comments: Drowsy however awakens easily to verbal stimuli   HENT:      Mouth/Throat:      Mouth: Mucous membranes are dry.   Eyes:      Pupils: Pupils are equal, round, and reactive to light.      Comments: Left homonymous hemianopia   Cardiovascular:      Rate and Rhythm: Normal rate and regular rhythm.   Pulmonary:      Effort: Pulmonary effort is normal. No respiratory distress.      Comments: On room air  Skin:     General: Skin is warm and dry.   Neurological:      Mental Status: She is oriented to person, place, and time.      Cranial Nerves: Cranial nerve deficit and dysarthria present.      Sensory: Sensory deficit present.      Motor: Weakness present.   Psychiatric:         Attention and Perception: She is inattentive.         Mood and Affect: Affect is flat.         Speech: Speech is slurred.         Behavior: Behavior is slowed. Behavior is cooperative.         Cognition and Memory: Cognition and memory normal.       Results Review:    I reviewed the patient's new clinical results.    CT Head Without Contrast  Result Date: 5/26/2025  Impression: Stable exam. Postoperative change from recent right craniectomy with evolving large right MCA distribution infarct. Electronically Signed: Milton Blancas MD  5/26/2025 9:47 AM EDT  Workstation ID: [id number]    CT Head Without Contrast  Result Date: 5/25/2025  Impression: Postoperative changes of right-sided craniectomy. Evolving large right MCA territory infarct. No acute intracranial hemorrhage. Similar associated mass effect including narrowing of the right lateral ventricle. No significant midline shift or herniation. Electronically Signed: Carlton Christiansen MD  5/25/2025 8:20 PM EDT  Workstation ID:  [id number]    CT Head Without Contrast  Result Date: 5/25/2025  Impression: Evolving right MCA distribution infarct. There is no gross hemorrhagic conversion. There is some increased mass effect on the right lateral ventricle with no significant subfalcine shift or uncal herniation Electronically Signed: Piotee Amanda  5/25/2025 8:26 AM EDT  Workstation ID: [id number]    MRI Brain Without Contrast  Result Date: 5/24/2025  1.There is a large area of restricted diffusion in the right MCA distribution corresponding to findings on the previous head CT. Findings are compatible with acute infarct. There is some associated vasogenic edema. There is some effacement of the right lateral ventricle. However no midline shift is seen. 2.No evidence of acute hemorrhage. 3.The right ICA is occluded. Electronically Signed: Joe Grover MD  5/24/2025 8:54 PM EDT  Workstation ID: [id number]    XR Abdomen KUB  Result Date: 5/24/2025  Impression: Gastric suction tube with tip terminating at the mid stomach. Electronically Signed: Jr Ambrosio MD  5/24/2025 6:46 PM EDT  Workstation ID: [id number]    CT Head Without Contrast  Result Date: 5/24/2025  Impression: Evolving changes of right MCA territory infarct. Electronically Signed: Ed Jiménez MD  5/24/2025 4:55 PM EDT  Workstation ID: [id number]    XR Chest 1 View  Result Date: 5/24/2025  Impression: No acute cardiopulmonary abnormality. Electronically Signed: Jr Ambrosio MD  5/24/2025 11:20 AM EDT  Workstation ID: [id number]    CT Angiogram Head w AI Analysis of LVO  Result Date: 5/24/2025  Impression: 1.There is occlusion of the right internal carotid artery that may be subacute. 2.There is occlusion of the right M1 segment. 3.There are evolving changes of right MCA territory infarct. Electronically Signed: Ed Jiménez MD  5/24/2025 11:04 AM EDT  Workstation ID: [id number]    CT Angiogram Neck  Result Date: 5/24/2025  Impression: 1.There is occlusion of the right internal carotid  artery that may be subacute. 2.There is occlusion of the right M1 segment. 3.There are evolving changes of right MCA territory infarct. Electronically Signed: Ed Jiménez MD  5/24/2025 11:04 AM EDT  Workstation ID: [id number]    CT CEREBRAL PERFUSION WITH & WITHOUT CONTRAST  Result Date: 5/24/2025  Impression: 1.There is occlusion of the right internal carotid artery that may be subacute. 2.There is occlusion of the right M1 segment. 3.There are evolving changes of right MCA territory infarct. Electronically Signed: Ed Jiménez MD  5/24/2025 11:04 AM EDT  Workstation ID: [id number]    CT Head Without Contrast Stroke Protocol  Result Date: 5/24/2025  Impression: Hyperdense right MCA sign suggesting thrombus, with hypodensity in the adjacent right frontal and temporal lobes in the right MCA distribution suggesting acute infarction. Mild mass effect, but no midline shift. Electronically Signed: Luis Li MD  5/24/2025 10:37 AM EDT  Workstation ID: [id number]        WBC   Date Value Ref Range Status   05/26/2025 19.20 (H) 3.40 - 10.80 10*3/mm3 Final     Hemoglobin   Date Value Ref Range Status   05/26/2025 10.9 (L) 12.0 - 15.9 g/dL Final     Hematocrit   Date Value Ref Range Status   05/26/2025 35.4 34.0 - 46.6 % Final     Platelets   Date Value Ref Range Status   05/26/2025 208 140 - 450 10*3/mm3 Final       Lab Results   Component Value Date    GLUCOSE 99 05/29/2025    BUN 7.8 (L) 05/29/2025    CREATININE 0.46 (L) 05/29/2025     05/29/2025    K 3.7 05/29/2025     05/29/2025    CALCIUM 8.5 (L) 05/29/2025    PROTEINTOT 7.0 01/19/2021    ALBUMIN 4.42 01/19/2021    ALT 19 05/24/2025    AST 27 05/24/2025    ALKPHOS 63 01/19/2021    BILITOT 0.5 01/19/2021    GLOB 2.6 01/19/2021    AGRATIO 1.7 01/19/2021    BCR 17.0 05/29/2025    ANIONGAP 11.0 05/29/2025    EGFR 108.4 05/29/2025         Lab 05/25/25  0607   HEMOGLOBIN A1C 5.30     Lipid Panel          5/25/2025    06:07   Lipid Panel   Total Cholesterol 150     Triglycerides 75    HDL Cholesterol 60    VLDL Cholesterol 15    LDL Cholesterol  75    LDL/HDL Ratio 1.25        Results for orders placed during the hospital encounter of 05/24/25    Adult Transthoracic Echo Complete W/ Cont if Necessary Per Protocol (With Agitated Saline)    Interpretation Summary    Left ventricular systolic function is normal. Calculated left ventricular EF = 62%    Mild mitral valve regurgitation is present.    Trace aortic valve regurgitation is present.    Normal left atrial cavity size noted. Saline test results are negative.      Assessment/Plan     This is a 64-year-old female with hyperlipidemia, migraines, palpitations, and CATHRYN (daughter reports she has not been formally diagnosed) who presented [facility] emergency department on 5/24/2025 with right MCA syndrome.  CT head without contrast was performed and revealed hyperdense RMCA sign with hypodensity seen within the right frontal and temporal lobes concerning for acute stroke.  Given these findings and LKW >4.5 hours she was not an appropriate candidate for IV thrombolytic therapy.  CTA head/neck/perfusion revealed VENKAT occlusion at bifurcation into RMCA; estimated core infarct 70cc and estimated amount of ischemic penumbra 106 cc consistent with near completed infarct therefore she was not deemed to be emergent endovascular therapy candidate (discussing with Dr. Olivarez and Dr. Torres).  She was admitted to the neuro ICU for further workup and evaluation.     Antiplatelet PTA: None  Anticoagulant PTA: None    # Right MCA ischemic stroke S/P decompressive craniectomy  - Current etiology is cryptogenic.  This is likely cardioembolic versus ESUS  - CT wo on 5/25/2025 images reviewed, stable appearance of RMCA stroke and post-operative changes  - Neurosurgery is following, appreciate recommendations  - Per neurosurgery patient is able to start baby aspirin 5 days after surgery, 5/30/25  - Continue prophylactic  Keppra  - Relax sodium goals, last Na 140  - Continue prophylactic Keppra 500mg BID   - Target blood pressure goals of less than 140/90, overall management per ICU medicine team   - If no A-fib is detected while inpatient the patient will need 2 to 4 weeks of cardiac monitoring as an outpatient to rule out atrial fibrillation  - PT/OT/SLP to see and assess when appropriate; will need protective helmet prior to getting OOB  - A1c 5.30 on admission  - Will need polysomnography as an outpatient  - LDL on admission 75, goal <70 for secondary stroke prevention  - Continue atorvastatin 80 mg nightly   - Initiate eyedrops to assist in relief of itching, discussed with pharmacy     Plan of care was discussed with patient and daughter at bedside as well as Dr. Matt.  Stroke Neurology will continue to follow. Please call for any further questions or concerns    CINDY Thomas  Stroke Neurology  05/29/25

## 2025-05-30 ENCOUNTER — TELEPHONE (OUTPATIENT)
Dept: NEUROSURGERY | Facility: CLINIC | Age: 65
End: 2025-05-30
Payer: COMMERCIAL

## 2025-05-30 VITALS
BODY MASS INDEX: 28.36 KG/M2 | HEIGHT: 62 IN | OXYGEN SATURATION: 97 % | SYSTOLIC BLOOD PRESSURE: 116 MMHG | WEIGHT: 154.1 LBS | HEART RATE: 66 BPM | DIASTOLIC BLOOD PRESSURE: 76 MMHG | RESPIRATION RATE: 16 BRPM | TEMPERATURE: 97.1 F

## 2025-05-30 DIAGNOSIS — R00.2 PALPITATIONS: Primary | ICD-10-CM

## 2025-05-30 LAB
ANION GAP SERPL CALCULATED.3IONS-SCNC: 10 MMOL/L (ref 5–15)
BUN SERPL-MCNC: 12.1 MG/DL (ref 8–23)
BUN/CREAT SERPL: 22.4 (ref 7–25)
CALCIUM SPEC-SCNC: 8.9 MG/DL (ref 8.6–10.5)
CHLORIDE SERPL-SCNC: 107 MMOL/L (ref 98–107)
CO2 SERPL-SCNC: 24 MMOL/L (ref 22–29)
CREAT SERPL-MCNC: 0.54 MG/DL (ref 0.57–1)
EGFRCR SERPLBLD CKD-EPI 2021: 104.2 ML/MIN/1.73
GLUCOSE SERPL-MCNC: 100 MG/DL (ref 65–99)
MAGNESIUM SERPL-MCNC: 1.7 MG/DL (ref 1.6–2.4)
PHOSPHATE SERPL-MCNC: 4.2 MG/DL (ref 2.5–4.5)
POTASSIUM SERPL-SCNC: 3.9 MMOL/L (ref 3.5–5.2)
SODIUM SERPL-SCNC: 141 MMOL/L (ref 136–145)

## 2025-05-30 PROCEDURE — 25010000002 ENOXAPARIN PER 10 MG: Performed by: STUDENT IN AN ORGANIZED HEALTH CARE EDUCATION/TRAINING PROGRAM

## 2025-05-30 PROCEDURE — 99239 HOSP IP/OBS DSCHRG MGMT >30: CPT | Performed by: NURSE PRACTITIONER

## 2025-05-30 PROCEDURE — 99233 SBSQ HOSP IP/OBS HIGH 50: CPT | Performed by: NURSE PRACTITIONER

## 2025-05-30 PROCEDURE — 97530 THERAPEUTIC ACTIVITIES: CPT

## 2025-05-30 PROCEDURE — 83735 ASSAY OF MAGNESIUM: CPT | Performed by: INTERNAL MEDICINE

## 2025-05-30 PROCEDURE — 84100 ASSAY OF PHOSPHORUS: CPT | Performed by: INTERNAL MEDICINE

## 2025-05-30 PROCEDURE — 80048 BASIC METABOLIC PNL TOTAL CA: CPT | Performed by: INTERNAL MEDICINE

## 2025-05-30 RX ORDER — MECLIZINE HYDROCHLORIDE 25 MG/1
25 TABLET ORAL ONCE
Status: COMPLETED | OUTPATIENT
Start: 2025-05-30 | End: 2025-05-30

## 2025-05-30 RX ORDER — LEVETIRACETAM 500 MG/1
500 TABLET ORAL EVERY 12 HOURS SCHEDULED
Start: 2025-05-30

## 2025-05-30 RX ORDER — GABAPENTIN 100 MG/1
100 CAPSULE ORAL ONCE
Status: COMPLETED | OUTPATIENT
Start: 2025-05-30 | End: 2025-05-30

## 2025-05-30 RX ORDER — ASPIRIN 81 MG/1
81 TABLET, CHEWABLE ORAL DAILY
Status: DISCONTINUED | OUTPATIENT
Start: 2025-05-30 | End: 2025-05-30 | Stop reason: HOSPADM

## 2025-05-30 RX ORDER — ATORVASTATIN CALCIUM 80 MG/1
80 TABLET, FILM COATED ORAL NIGHTLY
Start: 2025-05-30

## 2025-05-30 RX ORDER — KETOTIFEN FUMARATE 0.35 MG/ML
1 SOLUTION/ DROPS OPHTHALMIC 2 TIMES DAILY
Start: 2025-05-30

## 2025-05-30 RX ORDER — POLYVINYL ALCOHOL 14 MG/ML
1 SOLUTION/ DROPS OPHTHALMIC
Start: 2025-05-30

## 2025-05-30 RX ADMIN — IBUPROFEN 600 MG: 600 TABLET, FILM COATED ORAL at 13:43

## 2025-05-30 RX ADMIN — PANTOPRAZOLE SODIUM 40 MG: 40 TABLET, DELAYED RELEASE ORAL at 06:10

## 2025-05-30 RX ADMIN — ACETAMINOPHEN 650 MG: 325 TABLET ORAL at 07:25

## 2025-05-30 RX ADMIN — MECLIZINE HYDROCHLORIDE 25 MG: 25 TABLET ORAL at 13:56

## 2025-05-30 RX ADMIN — LIDOCAINE 1 PATCH: 4 PATCH TOPICAL at 09:01

## 2025-05-30 RX ADMIN — KETOTIFEN FUMARATE 1 DROP: 0.25 SOLUTION/ DROPS OPHTHALMIC at 09:02

## 2025-05-30 RX ADMIN — ASPIRIN 81 MG 81 MG: 81 TABLET ORAL at 10:23

## 2025-05-30 RX ADMIN — LEVETIRACETAM 500 MG: 500 TABLET, FILM COATED ORAL at 09:00

## 2025-05-30 RX ADMIN — Medication 10 ML: at 09:02

## 2025-05-30 RX ADMIN — ENOXAPARIN SODIUM 40 MG: 100 INJECTION SUBCUTANEOUS at 09:01

## 2025-05-30 RX ADMIN — GABAPENTIN 100 MG: 100 CAPSULE ORAL at 10:23

## 2025-05-30 RX ADMIN — ACETAMINOPHEN 650 MG: 325 TABLET ORAL at 00:28

## 2025-05-30 NOTE — DISCHARGE INSTRUCTIONS
Discussed the importance of medication compliance Aspirin 81mg daily and Atorvastatin 80mg nightly and lifestyle modifications adequate control of blood pressure, adequate control of cholesterol (goal LDL <70), adequate control of glucose (<140, A1c goal <7), increased physical activity, and implementation of healthy diet to help reduce the risk of future cerebrovascular events.  Also discussed the signs symptoms that would warrant the patient return back to the emergency department including unilateral weakness, unilateral numbness, visual disturbances, loss of balance, speech difficulties, and/or a sudden severe headache.  PT will need to ensure helmet is being worn when out of bed for therapies, etc until otherwise indicated by neurosurgery.     Stroke Neurology follow up: 7/7/2025 11:30 AM   Neurosurgery follow up in 2 weeks with [facility] prior. Office will call with appointment.

## 2025-05-30 NOTE — PLAN OF CARE
Goal Outcome Evaluation:      Awaiting discharge to [facility]. VSS. Remains free of injury/falls.        Problem: Adult Inpatient Plan of Care  Goal: Plan of Care Review  Outcome: Met  Goal: Patient-Specific Goal (Individualized)  Outcome: Met  Goal: Absence of Hospital-Acquired Illness or Injury  Outcome: Met  Intervention: Identify and Manage Fall Risk  Recent Flowsheet Documentation  Taken 5/30/2025 1200 by Karyn Mccauley RN  Safety Promotion/Fall Prevention:   safety round/check completed   room organization consistent   muscle strengthening facilitated   fall prevention program maintained   clutter free environment maintained  Taken 5/30/2025 0920 by Karyn Mccauley RN  Safety Promotion/Fall Prevention:   room organization consistent   toileting scheduled   safety round/check completed   muscle strengthening facilitated   fall prevention program maintained   clutter free environment maintained  Taken 5/30/2025 0725 by Karyn Mccauley RN  Safety Promotion/Fall Prevention:   safety round/check completed   toileting scheduled   room organization consistent   nonskid shoes/slippers when out of bed   lighting adjusted   fall prevention program maintained   clutter free environment maintained   assistive device/personal items within reach  Intervention: Prevent Skin Injury  Recent Flowsheet Documentation  Taken 5/30/2025 1200 by Karyn Mccauley RN  Body Position: weight shifting  Taken 5/30/2025 1000 by Karyn Mccauley RN  Body Position: weight shifting  Taken 5/30/2025 0920 by Karyn Mccauley RN  Body Position:   turned   left  Taken 5/30/2025 0725 by Karyn Mccauley RN  Body Position:   turned   right  Skin Protection:   silicone foam dressing in place   protective footwear used   incontinence pads utilized   transparent dressing maintained   pulse oximeter probe site changed  Intervention: Prevent and Manage VTE (Venous Thromboembolism) Risk  Recent Flowsheet Documentation  Taken 5/30/2025 0725 by  Karyn Mccauley RN  VTE Prevention/Management: (lovenox) other (see comments)  Intervention: Prevent Infection  Recent Flowsheet Documentation  Taken 5/30/2025 0725 by Karyn Mccauley RN  Infection Prevention:   visitors restricted/screened   single patient room provided   rest/sleep promoted   personal protective equipment utilized   hand hygiene promoted   equipment surfaces disinfected   environmental surveillance performed  Goal: Optimal Comfort and Wellbeing  Outcome: Met  Intervention: Provide Person-Centered Care  Recent Flowsheet Documentation  Taken 5/30/2025 1200 by Karyn Mccauley RN  Trust Relationship/Rapport:   care explained   choices provided   emotional support provided   empathic listening provided   questions encouraged   reassurance provided   thoughts/feelings acknowledged   questions answered  Taken 5/30/2025 0725 by Karyn Mccauley RN  Trust Relationship/Rapport:   care explained   choices provided   emotional support provided   empathic listening provided   questions answered   questions encouraged   reassurance provided   thoughts/feelings acknowledged  Goal: Readiness for Transition of Care  Outcome: Met     Problem: Fall Injury Risk  Goal: Absence of Fall and Fall-Related Injury  Outcome: Met  Intervention: Identify and Manage Contributors  Recent Flowsheet Documentation  Taken 5/30/2025 0725 by Karyn Mccauley RN  Medication Review/Management: medications reviewed  Intervention: Promote Injury-Free Environment  Recent Flowsheet Documentation  Taken 5/30/2025 1200 by Karyn Mccauley RN  Safety Promotion/Fall Prevention:   safety round/check completed   room organization consistent   muscle strengthening facilitated   fall prevention program maintained   clutter free environment maintained  Taken 5/30/2025 0920 by Karyn Mccauley RN  Safety Promotion/Fall Prevention:   room organization consistent   toileting scheduled   safety round/check completed   muscle strengthening  facilitated   fall prevention program maintained   clutter free environment maintained  Taken 5/30/2025 0725 by Karyn Mccauley RN  Safety Promotion/Fall Prevention:   safety round/check completed   toileting scheduled   room organization consistent   nonskid shoes/slippers when out of bed   lighting adjusted   fall prevention program maintained   clutter free environment maintained   assistive device/personal items within reach     Problem: Skin Injury Risk Increased  Goal: Skin Health and Integrity  Outcome: Met  Intervention: Optimize Skin Protection  Recent Flowsheet Documentation  Taken 5/30/2025 1200 by Karyn Mccauley RN  Activity Management: up in chair  Head of Bed (HOB) Positioning: HOB elevated  Taken 5/30/2025 1000 by Karyn Mccauley RN  Activity Management: up in chair  Head of Bed (HOB) Positioning: HOB elevated  Taken 5/30/2025 0920 by Karyn Mccauley RN  Activity Management: activity encouraged  Head of Bed (HOB) Positioning: HOB at 20-30 degrees  Taken 5/30/2025 0725 by Karyn Mccauley RN  Activity Management: activity encouraged  Pressure Reduction Techniques:   weight shift assistance provided   pressure points protected   rest period provided between sit times   heels elevated off bed   positioned off wounds   frequent weight shift encouraged  Head of Bed (HOB) Positioning: HOB at 20-30 degrees  Pressure Reduction Devices:   specialty bed utilized   pressure-redistributing mattress utilized   positioning supports utilized   heel offloading device utilized   foam padding utilized   elbow protectors utilized   chair cushion utilized  Skin Protection:   silicone foam dressing in place   protective footwear used   incontinence pads utilized   transparent dressing maintained   pulse oximeter probe site changed     Problem: Comorbidity Management  Goal: Blood Pressure in Desired Range  Outcome: Met  Intervention: Maintain Blood Pressure Management  Recent Flowsheet Documentation  Taken 5/30/2025  0725 by Karyn Mccauley, RN  Medication Review/Management: medications reviewed

## 2025-05-30 NOTE — PROGRESS NOTES
Stroke Progress Note       Chief Complaint: Right MCA acute ischemic stroke    Subjective    Subjective     Subjective:  Continued complaints of itching. She tells me that her eyes, scalp, and right side are all itching.   She is sitting up and feeding herself breakfast. Denies any other changes at this time. She is more awake this morning.     Review of Systems   Constitutional:  Positive for activity change.   HENT:  Positive for trouble swallowing.    Eyes:  Positive for itching and visual disturbance.   Respiratory: Negative.     Cardiovascular: Negative.    Gastrointestinal: Negative.  Negative for nausea and vomiting.   Genitourinary: Negative.    Musculoskeletal:  Positive for gait problem.   Neurological:  Positive for speech difficulty and weakness. Negative for numbness and headaches.      Objective      Temp:  [97.8 °F (36.6 °C)-99.3 °F (37.4 °C)] 98.9 °F (37.2 °C)  Heart Rate:  [49-79] 71  Resp:  [14-18] 16  BP: ()/(51-90) 119/73    Objective      Neurological Exam  Mental Status  Awake and alert. Level of consciousness: Easily awakens to verbal stimuli, answers questions appropriately, and follows commands. Oriented to person, place, time and situation. Oriented to person, place, and time. Memory is normal. Moderate dysarthria present. Language is fluent with no aphasia.    Cranial Nerves  CN II: Vision test: Left homonymous hemianopia. Left homonymous hemianopsia.  CN III, IV, VI: Pupils equal round and reactive to light bilaterally.  CN V:  Left: Diminished sensation of the entire left side of the face.  CN VII:  Left: There is central facial weakness.  CN VIII: Hearing appears to be intact bilaterally.  CN IX, X:  Right: Palate is normal.  Left: Palate is normal.  CN XII: Tongue midline without atrophy or fasciculations.    Motor  Decreased muscle bulk throughout. Left hemiparesis.  RUE/RLE with 4/5 strength.    Sensory  Left hemisensory loss.  Left-sided hemispatial neglect: Extinction to  double simultaneous stimulation of the left face, arm and leg.    Coordination  Right: No obvious dysmetria noted.Left: Unable to assess 2/2 weakness. Unable to assess 2/2 weakness.    Gait    Not observed.    Physical Exam  Vitals and nursing note reviewed.   Constitutional:       General: She is awake. She is not in acute distress.     Appearance: She is ill-appearing.      Comments: Drowsy however awakens easily to verbal stimuli   HENT:      Mouth/Throat:      Mouth: Mucous membranes are dry.   Eyes:      Pupils: Pupils are equal, round, and reactive to light.      Comments: Left homonymous hemianopia   Cardiovascular:      Rate and Rhythm: Normal rate and regular rhythm.   Pulmonary:      Effort: Pulmonary effort is normal. No respiratory distress.      Comments: On room air  Skin:     General: Skin is warm and dry.   Neurological:      Mental Status: She is alert and oriented to person, place, and time.      Cranial Nerves: Cranial nerve deficit and dysarthria present.      Sensory: Sensory deficit present.      Motor: Weakness present.   Psychiatric:         Attention and Perception: She is inattentive.         Mood and Affect: Affect is flat.         Speech: Speech is slurred.         Behavior: Behavior is slowed. Behavior is cooperative.         Cognition and Memory: Cognition and memory normal.       Results Review:    I reviewed the patient's new clinical results.    CT Head Without Contrast  Result Date: 5/26/2025  Impression: Stable exam. Postoperative change from recent right craniectomy with evolving large right MCA distribution infarct. Electronically Signed: Milton Blancas MD  5/26/2025 9:47 AM EDT  Workstation ID: [id number]    CT Head Without Contrast  Result Date: 5/25/2025  Impression: Postoperative changes of right-sided craniectomy. Evolving large right MCA territory infarct. No acute intracranial hemorrhage. Similar associated mass effect including narrowing of the right lateral ventricle. No  significant midline shift or herniation. Electronically Signed: Carlton Christiansen MD  5/25/2025 8:20 PM EDT  Workstation ID: [id number]    MRI Brain Without Contrast  Result Date: 5/24/2025  1.There is a large area of restricted diffusion in the right MCA distribution corresponding to findings on the previous head CT. Findings are compatible with acute infarct. There is some associated vasogenic edema. There is some effacement of the right lateral ventricle. However no midline shift is seen. 2.No evidence of acute hemorrhage. 3.The right ICA is occluded. Electronically Signed: Joe Grover MD  5/24/2025 8:54 PM EDT  Workstation ID: [id number]    CT Head Without Contrast  Result Date: 5/24/2025  Impression: Evolving changes of right MCA territory infarct. Electronically Signed: Ed Jiménez MD  5/24/2025 4:55 PM EDT  Workstation ID: [id number]    XR Chest 1 View  Result Date: 5/24/2025  Impression: No acute cardiopulmonary abnormality. Electronically Signed: Jr Ambrosio MD  5/24/2025 11:20 AM EDT  Workstation ID: [id number]    CT Angiogram Head w AI Analysis of LVO  Result Date: 5/24/2025  Impression: 1.There is occlusion of the right internal carotid artery that may be subacute. 2.There is occlusion of the right M1 segment. 3.There are evolving changes of right MCA territory infarct. Electronically Signed: Ed Jiménez MD  5/24/2025 11:04 AM EDT  Workstation ID: [id number]    CT Angiogram Neck  Result Date: 5/24/2025  Impression: 1.There is occlusion of the right internal carotid artery that may be subacute. 2.There is occlusion of the right M1 segment. 3.There are evolving changes of right MCA territory infarct. Electronically Signed: Ed Jiménez MD  5/24/2025 11:04 AM EDT  Workstation ID: [id number]    CT CEREBRAL PERFUSION WITH & WITHOUT CONTRAST  Result Date: 5/24/2025  Impression: 1.There is occlusion of the right internal carotid artery that may be subacute. 2.There is occlusion of the right M1 segment. 3.There  are evolving changes of right MCA territory infarct. Electronically Signed: Ed Jiménez MD  5/24/2025 11:04 AM EDT  Workstation ID: [id number]    CT Head Without Contrast Stroke Protocol  Result Date: 5/24/2025  Impression: Hyperdense right MCA sign suggesting thrombus, with hypodensity in the adjacent right frontal and temporal lobes in the right MCA distribution suggesting acute infarction. Mild mass effect, but no midline shift. Electronically Signed: Luis Li MD  5/24/2025 10:37 AM EDT  Workstation ID: [id number]    Lab Results   Component Value Date    GLUCOSE 100 (H) 05/30/2025    BUN 12.1 05/30/2025    CREATININE 0.54 (L) 05/30/2025     05/30/2025    K 3.9 05/30/2025     05/30/2025    CALCIUM 8.9 05/30/2025    PROTEINTOT 7.0 01/19/2021    ALBUMIN 4.42 01/19/2021    ALT 19 05/24/2025    AST 27 05/24/2025    ALKPHOS 63 01/19/2021    BILITOT 0.5 01/19/2021    GLOB 2.6 01/19/2021    AGRATIO 1.7 01/19/2021    BCR 22.4 05/30/2025    ANIONGAP 10.0 05/30/2025    EGFR 104.2 05/30/2025         Lab 05/25/25  0607   HEMOGLOBIN A1C 5.30     Lipid Panel          5/25/2025    06:07   Lipid Panel   Total Cholesterol 150    Triglycerides 75    HDL Cholesterol 60    VLDL Cholesterol 15    LDL Cholesterol  75    LDL/HDL Ratio 1.25        Results for orders placed during the hospital encounter of 05/24/25    Adult Transthoracic Echo Complete W/ Cont if Necessary Per Protocol (With Agitated Saline)    Interpretation Summary    Left ventricular systolic function is normal. Calculated left ventricular EF = 62%    Mild mitral valve regurgitation is present.    Trace aortic valve regurgitation is present.    Normal left atrial cavity size noted. Saline test results are negative.      Assessment/Plan     This is a 64-year-old female with hyperlipidemia, migraines, palpitations, and CATHRYN (daughter reports she has not been formally diagnosed) who presented [facility] emergency department on 5/24/2025 with  right MCA syndrome.  CT head without contrast was performed and revealed hyperdense RMCA sign with hypodensity seen within the right frontal and temporal lobes concerning for acute stroke.  Given these findings and LKW >4.5 hours she was not an appropriate candidate for IV thrombolytic therapy.  CTA head/neck/perfusion revealed VENKAT occlusion at bifurcation into RMCA; estimated core infarct 70cc and estimated amount of ischemic penumbra 106 cc consistent with near completed infarct therefore she was not deemed to be emergent endovascular therapy candidate (discussing with Dr. Olivarez and Dr. Torres).  She was admitted to the neuro ICU for further workup and evaluation.     Antiplatelet PTA: None  Anticoagulant PTA: None    # Right MCA ischemic stroke S/P decompressive craniectomy  - Current etiology is cryptogenic.  This is likely cardioembolic versus ESUS  - CT wo on 5/25/2025 images reviewed, stable appearance of RMCA stroke and post-operative changes  - Neurosurgery is following as needed, appreciate their recommendations  - Initiate ASA 81mg daily  - Continue prophylactic Keppra  - Na this morning 141  - Continue prophylactic Keppra 500mg BID   - Target blood pressure goals of less than 140/90, overall management per ICU medicine team   - If no A-fib is detected while inpatient the patient will need 2 to 4 weeks of cardiac monitoring as an outpatient to rule out atrial fibrillation  - PT/OT/SLP recommend IPR, needs helmet when OOB (at bedside)  - PT is an excellent candidate for inpatient rehab when appropriate for discharge   - A1c 5.30 on admission  - Will need polysomnography as an outpatient  - LDL on admission 75, goal <70 for secondary stroke prevention  - Continue atorvastatin 80 mg nightly   - Initiate eyedrops to assist in relief of itching, discussed with pharmacy   - One time dose of Gabapentin 100mg to assist with itching//sensory disturbance    From a stroke standpoint she is appropriate for  transition to telemetry. Plan of care was discussed with patient and son at bedside, ICU RN,  as well as Dr. Matt.  Stroke Neurology will continue to follow. Please call for any further questions or concerns    CINDY Thomas  Stroke Neurology  05/30/25

## 2025-05-30 NOTE — CASE MANAGEMENT/SOCIAL WORK
Case Management Discharge Note      Final Note: Plan is [facility] St[facility]. [facility] WC van is scheduled for 14:30. Please have patient in the [address] looby by 14:15. Nurse to call report to [phone] and fax discharge summary to [phone] . Son is in agrement with the plan.         Selected Continued Care - Admitted Since 5/24/2025       Destination Coordination complete.      Service Provider Services Address Phone Fax Patient Preferred    [facility] [address] [place] [address] [phone] --              Durable Medical Equipment    No services have been selected for the patient.                Dialysis/Infusion    No services have been selected for the patient.                Home Medical Care    No services have been selected for the patient.                Therapy    No services have been selected for the patient.                Community Resources    No services have been selected for the patient.                Community & DME    No services have been selected for the patient.                    Transportation Services  W/C Van: Other ([facility])    Final Discharge Disposition Code: 62 - inpatient rehab facility

## 2025-05-30 NOTE — DISCHARGE PLACEMENT REQUEST
"Radha Mendoza (64 y.o. Female)       Date of Birth   1960    Social Security Number       Address   [address] [place] [id number]    Home Phone   [phone]    MRN   [id number]       [facility]    Marital Status                               Admission Date   5/24/2025    Admission Type   Emergency    Admitting Provider   Va Espinosa MD    Attending Provider   Tia Blake MD    Department, Room/Bed   [facility] 2B ICU, N228/1       Discharge Date       Discharge Disposition   Rehab Facility or Unit (DC - External)    Discharge Destination                                 Attending Provider: Tia Blake MD    Allergies: Codeine, Penicillins    Isolation: None   Infection: None   Code Status: CPR    Ht: 157.5 cm (62.01\")   Wt: 69.9 kg (154 lb 1.6 oz)    Admission Cmt: None   Principal Problem: Acute ischemic stroke [I63.9]                   Active Insurance as of 5/24/2025       Primary Coverage       Payor Plan Insurance Group Employer/Plan Group    [facility] BLUE CROSS [employer] EMPLOYEE [id number]       Payor Plan Address Payor Plan Phone Number Payor Plan Fax Number Effective Dates    [address] [phone]  1/1/2015 - None Entered    [place] [address]         Subscriber Name Subscriber Birth Date Member ID       RADHA MENDOZA 1960 [id number]                     Emergency Contacts        (Rel.) Home Phone Work Phone Mobile Phone    Loc Mendoza (Spouse) [phone] -- [phone]    RADHA MENDOZA (Son) -- -- [phone]    HugoNadia (Daughter) -- -- [phone]                 Discharge Summary        Syeda Thapa, APRN at 05/30/25 0943            Date of Discharge:  5/30/2025    Discharge Diagnosis: Right MCA acute ischemic stroke    Presenting Problem/History of Present Illness    Ms. Mendoza is a 64 year old female with pmhx of hyperlipidemia and migraines who presented on " 5/24/25 with right MCA syndrome. CT head w/o showed hyperdense R MCA sign wth hypodensity in the right frontal and temporal lobes consistent with acute stroke. LKW > 4.5 hours and therefore she was not a TNK candidate. CTA H/N and perfusion showed R ICA occlusion at bifurcation into R MCA with estimated core infarct and ischemic penumbra suggesting near completed infarct. She was admitted to the neuro ICU. CT H on 5/25 showed worsening edema with mass effect. Neurosurgery was consulted. Patient underwent decompressive craniectomy on 5/26. Repeat CT H showed good decompression. She remained stable neurologically. Stroke/neuro followed patient throughout hospitalization with initiation of prophylactic Keppra and statin therapy. Echo showed no source for cardioembolic stroke. Plans are to start ASA today (5/20/25).  She was evaluated by SLP who cleared her for mechanical ground texture with thin liquids. PT/OT evaluated patient and recommended inpatient rehab. On 5/30 she was felt to be stable and ready for discharge to [facility]. She will be discharged with event monitor to evaluate for Afib/flutter and protective helmet to be worn before getting OOB. She will need outpatient sleep study.  She will follow up with neurosurgery in 2 weeks with a repeat head CT.  Follow up with neuro/stroke in 4-8 weeks. She was counseled on sigh/symptoms that will warrant ER evaluation.  .   Condition on Discharge: Stable    Discharge Disposition: [facility]    Procedures Performed  Procedure(s):  DECOMPRESSIVE CRANIECTOMY RIGHT       Consults:   Consults       Date and Time Order Name Status Description    5/24/2025 10:26 AM Inpatient Neurology Consult Stroke Completed             Vital Signs  Temp:  [97.8 °F (36.6 °C)-99.3 °F (37.4 °C)] 98.9 °F (37.2 °C)  Heart Rate:  [49-79] 71  Resp:  [14-18] 16  BP: ()/(52-90) 119/73    Physical Exam:  General: WD/WN/NAD  Skin: W/D. Scalp incision well approximated with  dressing over it that is c/d/I.  Abd: S/NT/ND. + BS  CV: RRR, no R/M/G.   Resp: non labored, CTA.   Neuro: Alert. See below.   Interval:  (post travel to MRI)  1a. Level of Consciousness: 0-->Alert, keenly responsive  1b. LOC Questions: 0-->Answers both questions correctly  1c. LOC Commands: 0-->Performs both tasks correctly  2. Best Gaze: 2-->Forced deviation, or total gaze paresis not overcome by the oculocephalic maneuver  3. Visual: 2-->Complete hemianopia  4. Facial Palsy: 2-->Partial paralysis (total or near-total paralysis of lower face)  5a. Motor Arm, Left: 3-->No effort against gravity, limb falls  5b. Motor Arm, Right: 0-->No drift, limb holds 90 (or 45) degrees for full 10 secs  6a. Motor Leg, Left: 3-->No effort against gravity, leg falls to bed immediately  6b. Motor Leg, Right: 0-->No drift, leg holds 30 degree position for full 5 secs  7. Limb Ataxia: 1-->Present in one limb  8. Sensory: 2-->Severe to total sensory loss, patient is not aware of being touched in the face, arm, and leg  9. Best Language: 1-->Mild-to-moderate aphasia, some obvious loss of fluency or facility of comprehension, without significant limitation on ideas expressed or form of expression. Reduction of speech and/or comprehension, however, makes conversation. . . (see row details)  10. Dysarthria: 1-->Mild-to-moderate dysarthria, patient slurs at least some words and, at worst, can be understood with some difficulty  11. Extinction and Inattention (formerly Neglect): 2-->Profound jorge-inattention/extinction more than 1 modality    Total (NIH Stroke Scale): 19       Discharge Medications     Discharge Medications        New Medications        Instructions Start Date   Ketotifen Fumarate 0.035 % solution  Commonly known as: ZADITOR   1 drop, Both Eyes, 2 Times Daily      levETIRAcetam 500 MG tablet  Commonly known as: KEPPRA   500 mg, Oral, Every 12 Hours Scheduled      polyvinyl alcohol 1.4 % ophthalmic solution  Commonly known  as: LIQUIFILM   1 drop, Both Eyes, Every 1 Hour PRN             Changes to Medications        Instructions Start Date   atorvastatin 80 MG tablet  Commonly known as: LIPITOR  What changed:   medication strength  how much to take  when to take this   80 mg, Oral, Nightly             Continue These Medications        Instructions Start Date   aspirin 81 MG tablet   81 mg, Daily      Calcium 500 MG tablet   500 mg, Daily      Co Q-10 100 MG capsule   100 mg, Nightly      dicyclomine 10 MG capsule  Commonly known as: BENTYL   10 mg, 2 Times Daily PRN      linaclotide 145 MCG capsule capsule  Commonly known as: LINZESS   145 mcg, Every Morning Before Breakfast      multivitamin tablet tablet   1 tablet, Daily      pantoprazole 20 MG EC tablet  Commonly known as: PROTONIX   20 mg, Oral, 2 Times Daily      TiZANidine 4 MG capsule  Commonly known as: ZANAFLEX   4 mg, Oral, 3 Times Daily PRN      vitamin B-12 100 MCG tablet  Commonly known as: CYANOCOBALAMIN   100 mcg, Daily      vitamin C 250 MG tablet  Commonly known as: ASCORBIC ACID   250 mg, Daily             Stop These Medications      naratriptan 2.5 MG tablet  Commonly known as: AMERGE              Discharge Diet:   Diet Instructions    FEES Reason for Referral 5/27/2025    Patient was referred for a FEES to assess the efficiency of his/her swallow function, rule out aspiration and make recommendations regarding safe dietary consistencies, effective compensatory strategies, and safe eating environment.         Recommendations/Treatment  SLP Swallowing Diagnosis: mild, pharyngeal dysphagia, moderate, oral dysphagia (05/27/25 1130)  Functional Impact: risk of aspiration/pneumonia, risk of malnutrition, risk of dehydration (05/27/25 1130)  Rehab Potential/Prognosis, Swallowing: good, to achieve stated therapy goals (05/27/25 1130)  Swallow Criteria for Skilled Therapeutic Interventions Met: demonstrates skilled criteria (05/27/25 1130)  Therapy Frequency (Swallow): 5  days per week (05/27/25 1130)  Predicted Duration Therapy Intervention (Days): 2 weeks (05/27/25 1130)  SLP Diet Recommendation: mechanical ground textures, thin liquids (05/27/25 1130)  Recommended Precautions and Strategies: upright posture during/after eating, assist with feeding (05/27/25 1130)  SLP Rec. for Method of Medication Administration: meds whole, with puree (05/27/25 1130)  Monitor for Signs of Aspiration: yes, notify SLP if any concerns (05/27/25 1130)  Anticipated Discharge Disposition (SLP): inpatient rehabilitation facility (05/27/25 1130)    Instrumental Set-up  Risks/Benefits Reviewed: risks/benefits explained, patient, agreed to eval (05/27/25 1130)  Nasal Entry: left: (05/27/25 1130)  Anatomic Considerations: no anatomic structural deviation (05/27/25 1130)  Utensils Used: Spoon, Cup, Straw (05/27/25 1130)  Consistencies Trialed: ice chips, thin liquids, pudding/puree, regular textures (05/27/25 1130)    Oral Preparation/ Oral Phase  Oral Phase: oral holding, prolonged manipulation, increased A-P transit time (05/27/25 1130)    Pharyngeal Phase  Initiation of Pharyngeal Swallow: WFL (05/27/25 1130)  Pharyngeal Phase: impaired pharyngeal phase of swallowing (05/27/25 1130)  Rosenbek's Scale: thin:, pudding/puree:, regular textures:, 1-->Level 1 (05/27/25 1130)  Residue: thin liquids, valleculae, pyriform sinuses, secondary to reduced base of tongue retraction, secondary to reduced hyolaryngeal excursion (05/27/25 1130)  Response to Residue: cleared residue, with spontaneous subsequent swallow (05/27/25 1130)  Attempted Compensatory Maneuvers: other (see comments) (compensations not necessary) (05/27/25 1130)  FEES Summary: NO penetration or aspiration. There was some residue of thin liquids in the valleculae and pyriform sinuses, especially around the NG tube, but pt effectively cleared residue spontaneously with an additional swallow each time. Oral phase is prolonged. Recommend mechanical soft  diet with thin liquids. SLP will upgrade solids in tx. (05/27/25 1130)                Follow-up Appointments  Future Appointments   Date Time Provider Department Center   7/7/2025 11:30 AM [facility] NEURO STRO[facility]   9/10/2025  9:00 AM Joe García MD [facility]     Additional Instructions for the Follow-ups that You Need to Schedule       Ambulatory Referral to Neurology   As directed      One month follow up    Discharge Follow-up with PCP   As directed       Currently Documented PCP:    Micheline Hetah DO    PCP Phone Number:    [phone]     Follow Up Details: after discharge from [facility]. Will need outpatient sleep study ordered by PCP        CT Head Without Contrast   May 31, 2025      Will need to be completed same day as neurosurgery follow-up prior to appointment    Exam reason: RMCA stroke//Crani   Does this patient have a diabetic monitoring/medication delivering device on?: No   Release to patient: Routine Release            Test Results Pending at Discharge  Pending Labs       Order Current Status    Anaerobic Culture - Surgical Site, Brain Preliminary result            Time: Discharge 35 min Electronically signed by CINDY Rosas, 05/30/25, 11:34 AM EDT.                Electronically signed by Syeda Thapa APRN at 05/30/25 1141

## 2025-05-30 NOTE — PROGRESS NOTES
"NEUROSURGERY PROGRESS NOTE    Chief Complaint: Right MCA stroke    Subjective: Stable overnight.    Objective    Vital Signs: Blood pressure 119/73, pulse 71, temperature 98.9 °F (37.2 °C), temperature source Oral, resp. rate 16, height 157.5 cm (62.01\"), weight 69.9 kg (154 lb 1.6 oz), SpO2 98%.    Physical Exam  Awake, alert and oriented x 3  Opens eyes spont  Pupils 3 mm reactive bilaterally  Extraocular muscles intact bilaterally  Face symmetric bilaterally  Tongue midline  Follows commands briskly with good strength in the right upper and right lower extremity.  Spontaneous movement noted in the left lower extremity.  No significant movement noted in the left upper extremity       Intake/Output:   Intake/Output Summary (Last 24 hours) at 5/30/2025 1049  Last data filed at 5/30/2025 1000  Gross per 24 hour   Intake 440 ml   Output 900 ml   Net -460 ml       Current Medications:   Current Facility-Administered Medications:     acetaminophen (TYLENOL) tablet 650 mg, 650 mg, Oral, Q6H PRN, Steve Garcia, PharmD, 650 mg at 05/30/25 0725    aspirin chewable tablet 81 mg, 81 mg, Oral, Daily, Izzy Silvestre APRN, 81 mg at 05/30/25 1023    atorvastatin (LIPITOR) tablet 80 mg, 80 mg, Oral, Nightly, Steve Garcia, PharmD, 80 mg at 05/29/25 2016    Calcium Replacement - Follow Nurse / BPA Driven Protocol, , Not Applicable, PRN, Matt Olivarez MD    cetirizine (zyrTEC) tablet 10 mg, 10 mg, Oral, Nightly, Florecita Valentine PA-C, 10 mg at 05/29/25 2017    enoxaparin sodium (LOVENOX) syringe 40 mg, 40 mg, Subcutaneous, Q24H, Matt Olivarez MD, 40 mg at 05/30/25 0901    ibuprofen (ADVIL,MOTRIN) tablet 600 mg, 600 mg, Oral, Q6H PRN, Ursula Martin PA-C, 600 mg at 05/29/25 2017    Ketotifen Fumarate (ZADITOR) 0.035 % ophthalmic solution 1 drop, 1 drop, Both Eyes, BID, Izzy Silvestre, APRN, 1 drop at 05/30/25 0902    levETIRAcetam (KEPPRA) tablet 500 mg, 500 mg, Oral, Q12H, Tia Blake MD, 500 mg at " 05/30/25 0900    Lidocaine 4 % 1 patch, 1 patch, Transdermal, Q24H, Justin Unger, PharmD, 1 patch at 05/30/25 0901    loperamide (IMODIUM) capsule 4 mg, 4 mg, Oral, 4x Daily PRN, Tia Blake MD    Magnesium Standard Dose Replacement - Follow Nurse / BPA Driven Protocol, , Not Applicable, PRNJuanis Nicolas, MD    [DISCONTINUED] ondansetron ODT (ZOFRAN-ODT) disintegrating tablet 4 mg, 4 mg, Oral, Q6H PRN **OR** ondansetron (ZOFRAN) injection 4 mg, 4 mg, Intravenous, Q6H PRN, Matt Olivarez MD, 4 mg at 05/25/25 1749    pantoprazole (PROTONIX) EC tablet 40 mg, 40 mg, Oral, Q AM, Tia Blake MD, 40 mg at 05/30/25 0610    Phosphorus Replacement - Follow Nurse / BPA Driven Protocol, , Not Applicable, PRN, Matt Olivarez MD    polyvinyl alcohol (LIQUIFILM) 1.4 % ophthalmic solution 1 drop, 1 drop, Both Eyes, Q1H PRN, Izzy Silvestre, APRN    Potassium Replacement - Follow Nurse / BPA Driven Protocol, , Not Applicable, PRNJuanis Nicolas, MD    sodium chloride 0.9 % flush 10 mL, 10 mL, Intravenous, Q12H, Matt Olivarez MD, 10 mL at 05/30/25 0902    sodium chloride 0.9 % flush 10 mL, 10 mL, Intravenous, PRN, Matt Olivarez MD    sodium chloride 0.9 % flush 20 mL, 20 mL, Intravenous, PRN, Matt Olivarez MD    sodium chloride 0.9 % infusion 40 mL, 40 mL, Intravenous, PRNJuanis Nicolas, MD    traMADol (ULTRAM) tablet 50 mg, 50 mg, Oral, Q4H PRN, Tia Blake MD     Laboratory Results:       Lab 05/29/25  0446 05/28/25  0415 05/26/25  0601 05/25/25  0607 05/24/25  1038 05/24/25  1037   WBC 10.52  --  19.20* 10.75  --  7.38   HEMOGLOBIN 10.5*  --  10.9* 12.2  --  13.9   HEMOGLOBIN, POC  --   --   --   --  14.6  --    HEMATOCRIT 31.9*  --  35.4 38.7  --  42.7   HEMATOCRIT POC  --   --   --   --  43  --    PLATELETS 185  --  208 222  --  259   NEUTROS ABS  --   --  15.53*  --   --  5.80   IMMATURE GRANS (ABS)  --   --  0.08*  --   --  0.08*   LYMPHS ABS  --   --   1.95  --   --  1.07   MONOS ABS  --   --  1.59*  --   --  0.34   EOS ABS  --   --  0.00  --   --  0.02   MCV 87.2  --  91.5 89.4  --  86.6   CRP  --  2.79*  --   --   --   --    PROTIME  --   --   --   --   --  12.8   APTT  --   --   --   --   --  27.2         Lab 05/30/25  0430 05/29/25  0021 05/28/25  1336 05/28/25  0415 05/27/25  0623 05/26/25  1909 05/26/25  1241 05/26/25  0601 05/25/25  1350 05/25/25  0607 05/24/25  1038 05/24/25  1037   SODIUM 141 140  --  139 147* 153*   < > 152*   < > 147*   < >  --    POTASSIUM 3.9 3.7 4.3 3.3* 3.8 3.8   < > 4.1   < > 3.3*   < >  --    CHLORIDE 107 107  --  107  --   --   --  120*  --  115*  --   --    CO2 24.0 22.0  --  24.0  --   --   --  23.0  --  22.0  --   --    ANION GAP 10.0 11.0  --  8.0  --   --   --  9.0  --  10.0  --   --    BUN 12.1 7.8*  --  12.8  --   --   --  8  --  8  --   --    CREATININE 0.54* 0.46*  --  0.47*  --   --   --  0.68  --  0.65   < >  --    EGFR 104.2 108.4  --  107.8  --   --   --  98.6  --  99.7   < >  --    GLUCOSE 100* 99  --  104*  --   --   --  125*  --  140*  --   --    CALCIUM 8.9 8.5*  --  8.1*  --   --   --  8.9  --  9.1  --   --    MAGNESIUM 1.7  --   --  1.7  --   --   --   --   --   --   --  2.1   PHOSPHORUS 4.2 3.3 2.2* 1.9*  --   --   --   --   --   --   --   --    HEMOGLOBIN A1C  --   --   --   --   --   --   --   --   --  5.30  --   --     < > = values in this interval not displayed.         Lab 05/24/25  1037   ALT (SGPT) 19   AST (SGOT) 27         Lab 05/24/25  1037   PROTIME 12.8   INR 0.91         Lab 05/25/25  0607   CHOLESTEROL 150   LDL CHOL 75   HDL CHOL 60   TRIGLYCERIDES 75             Brief Urine Lab Results       None          Microbiology Results (last 10 days)       Procedure Component Value - Date/Time    Anaerobic Culture - Surgical Site, Brain [[id number]]  (Normal) Collected: 05/25/25 1831    Lab Status: Preliminary result Specimen: Surgical Site from Brain Updated: 05/29/25 0719     Anaerobic Culture No  anaerobes isolated at 3 days    Tissue / Bone Culture - Surgical Site, Brain [[id number]] Collected: 05/25/25 1831    Lab Status: Final result Specimen: Surgical Site from Brain Updated: 05/29/25 0659     Tissue Culture No growth at 3 days     Gram Stain Many (4+) Red blood cells      Rare (1+) WBCs seen      No organisms seen             Diagnostic Imaging: I reviewed and independently interpreted the new imaging.     Assessment/Plan:    1. Acute CVA (cerebrovascular accident)    2. Stroke    3. Dysarthria    4. Oropharyngeal dysphagia    5. Palpitations         This is a 64-year-old female status post decompressive craniectomy for a large right MCA stroke on 5/26. Neurologically, the patient has remained stable. Her head CT shows good decompression.  She is on Lovenox for DVT prophylaxis.  Aspirin started today.  She is going to discharge to rehab today.  We will arrange for her to follow-up with me in 2 weeks with a new head CT.      Any copied data from previous notes included in the (1) History of Present Illness, (2) Physical Examination and (3) Medical Decision Making and/or Assessment and Plan has been reviewed and is accurate as of 05/30/25      Matt Olivarez MD  05/30/25  10:49 EDT

## 2025-05-30 NOTE — TELEPHONE ENCOUNTER
----- Message from Matt Olivarez sent at 5/30/2025 10:49 AM EDT -----  Please schedule a 2 week follow-up appointment with me with a noncontrasted head CT.  Thank you

## 2025-05-30 NOTE — PLAN OF CARE
Goal Outcome Evaluation:  Plan of Care Reviewed With: patient        Progress: improving  Outcome Evaluation: Pt performed STS transfers with modA x2 and SPT from bed to chair with maxA x2 and B UE support. L lateral lean and L knee buckling during transfer. Pt able to take one step forward and backward at chair x2 with maxA x2. Pt required assistance for advancement and placement of L LE and increased physical assistance for weight shifting to the L. Pt's mobility limited by weakness and fatigue. Continue to recommend PT skilled care and D/C to IP rehab facility.

## 2025-05-30 NOTE — PLAN OF CARE
Problem: Adult Inpatient Plan of Care  Goal: Plan of Care Review  Outcome: Progressing  Flowsheets (Taken 5/30/2025 0459)  Progress: improving  Plan of Care Reviewed With:   patient   family  Goal: Patient-Specific Goal (Individualized)  Outcome: Progressing  Goal: Absence of Hospital-Acquired Illness or Injury  Outcome: Progressing  Intervention: Identify and Manage Fall Risk  Recent Flowsheet Documentation  Taken 5/30/2025 0400 by Judy Rooney, RN  Safety Promotion/Fall Prevention:   assistive device/personal items within reach   activity supervised   clutter free environment maintained   fall prevention program maintained   lighting adjusted   nonskid shoes/slippers when out of bed   room organization consistent   safety round/check completed  Taken 5/30/2025 0200 by Judy Rooney, RN  Safety Promotion/Fall Prevention:   assistive device/personal items within reach   clutter free environment maintained   fall prevention program maintained   lighting adjusted   nonskid shoes/slippers when out of bed   room organization consistent   safety round/check completed  Taken 5/30/2025 0000 by Judy Rooney, RN  Safety Promotion/Fall Prevention:   assistive device/personal items within reach   activity supervised   clutter free environment maintained   fall prevention program maintained   lighting adjusted   nonskid shoes/slippers when out of bed   room organization consistent   safety round/check completed  Taken 5/29/2025 2200 by Judy Rooney, RN  Safety Promotion/Fall Prevention:   assistive device/personal items within reach   activity supervised   clutter free environment maintained   fall prevention program maintained   lighting adjusted   nonskid shoes/slippers when out of bed   room organization consistent   safety round/check completed  Taken 5/29/2025 2000 by Judy Rooney, RN  Safety Promotion/Fall Prevention:   assistive device/personal items within reach   activity supervised    clutter free environment maintained   fall prevention program maintained   lighting adjusted   nonskid shoes/slippers when out of bed   room organization consistent   safety round/check completed  Intervention: Prevent Skin Injury  Recent Flowsheet Documentation  Taken 5/30/2025 0400 by Judy Rooney RN  Body Position:   turned   heels elevated   upper extremity elevated   supine  Skin Protection:   incontinence pads utilized   protective footwear used   pulse oximeter probe site changed   silicone foam dressing in place   skin sealant/moisture barrier applied   transparent dressing maintained  Taken 5/30/2025 0200 by Judy Rooney RN  Body Position:   turned   right   upper extremity elevated   lower extremity elevated  Skin Protection:   incontinence pads utilized   transparent dressing maintained   skin sealant/moisture barrier applied   silicone foam dressing in place   protective footwear used  Taken 5/30/2025 0000 by Judy Rooney RN  Body Position:   turned   heels elevated   upper extremity elevated   supine  Skin Protection:   incontinence pads utilized   protective footwear used   pulse oximeter probe site changed   silicone foam dressing in place   skin sealant/moisture barrier applied   transparent dressing maintained  Taken 5/29/2025 2200 by Judy Rooney RN  Body Position:   turned   right   heels elevated   upper extremity elevated  Skin Protection:   incontinence pads utilized   protective footwear used   silicone foam dressing in place   skin sealant/moisture barrier applied   transparent dressing maintained  Taken 5/29/2025 2000 by Judy Rooney RN  Body Position:   turned   heels elevated   upper extremity elevated   supine  Skin Protection:   incontinence pads utilized   protective footwear used   pulse oximeter probe site changed   silicone foam dressing in place   skin sealant/moisture barrier applied   transparent dressing maintained  Intervention: Prevent  Infection  Recent Flowsheet Documentation  Taken 5/30/2025 0400 by Judy Rooney RN  Infection Prevention:   environmental surveillance performed   hand hygiene promoted   personal protective equipment utilized   rest/sleep promoted  Taken 5/30/2025 0200 by Judy Rooney RN  Infection Prevention:   environmental surveillance performed   hand hygiene promoted   personal protective equipment utilized   rest/sleep promoted  Taken 5/30/2025 0000 by Judy Rooney RN  Infection Prevention:   environmental surveillance performed   hand hygiene promoted   personal protective equipment utilized   rest/sleep promoted  Taken 5/29/2025 2200 by Judy Rooney RN  Infection Prevention:   environmental surveillance performed   hand hygiene promoted   personal protective equipment utilized   rest/sleep promoted  Taken 5/29/2025 2000 by Judy Rooney RN  Infection Prevention:   environmental surveillance performed   hand hygiene promoted   personal protective equipment utilized   rest/sleep promoted  Goal: Optimal Comfort and Wellbeing  Outcome: Progressing  Intervention: Monitor Pain and Promote Comfort  Recent Flowsheet Documentation  Taken 5/30/2025 0400 by Judy Rooney RN  Pain Management Interventions:   care clustered   relaxation techniques promoted   quiet environment facilitated   position adjusted   pillow support provided   cold applied  Taken 5/30/2025 0000 by Judy Rooney RN  Pain Management Interventions:   care clustered   relaxation techniques promoted   quiet environment facilitated   position adjusted   pillow support provided   pain medication given   cold applied  Taken 5/29/2025 2200 by Judy Rooney RN  Pain Management Interventions:   care clustered   relaxation techniques promoted   quiet environment facilitated   position adjusted   pillow support provided   cold applied  Taken 5/29/2025 2000 by Judy Rooney RN  Pain Management Interventions:   care  clustered   relaxation techniques promoted   quiet environment facilitated   position adjusted   pillow support provided   pain medication given   cold applied  Intervention: Provide Person-Centered Care  Recent Flowsheet Documentation  Taken 5/30/2025 0400 by Judy Rooney RN  Trust Relationship/Rapport:   care explained   choices provided   emotional support provided   questions answered   questions encouraged   thoughts/feelings acknowledged   reassurance provided   empathic listening provided  Taken 5/30/2025 0200 by Judy Rooney RN  Trust Relationship/Rapport:   care explained   choices provided   thoughts/feelings acknowledged   emotional support provided  Taken 5/30/2025 0000 by Judy Rooney RN  Trust Relationship/Rapport:   care explained   choices provided   emotional support provided   questions answered   questions encouraged   thoughts/feelings acknowledged   reassurance provided   empathic listening provided  Taken 5/29/2025 2200 by Judy Rooney RN  Trust Relationship/Rapport:   care explained   choices provided   emotional support provided   questions answered   questions encouraged   thoughts/feelings acknowledged   reassurance provided   empathic listening provided  Taken 5/29/2025 2000 by Judy Rooney RN  Trust Relationship/Rapport:   care explained   choices provided   emotional support provided   questions answered   questions encouraged   thoughts/feelings acknowledged   reassurance provided   empathic listening provided  Goal: Readiness for Transition of Care  Outcome: Progressing     Problem: Fall Injury Risk  Goal: Absence of Fall and Fall-Related Injury  Outcome: Progressing  Intervention: Identify and Manage Contributors  Recent Flowsheet Documentation  Taken 5/30/2025 0400 by Judy Rooney RN  Medication Review/Management: medications reviewed  Self-Care Promotion: independence encouraged  Taken 5/30/2025 0200 by Judy Rooney RN  Medication  Review/Management: medications reviewed  Self-Care Promotion: independence encouraged  Taken 5/30/2025 0000 by Judy Rooney RN  Medication Review/Management: medications reviewed  Self-Care Promotion: independence encouraged  Taken 5/29/2025 2200 by Judy Rooney RN  Medication Review/Management: medications reviewed  Self-Care Promotion: independence encouraged  Taken 5/29/2025 2000 by Judy Rooney RN  Medication Review/Management: medications reviewed  Self-Care Promotion: independence encouraged  Intervention: Promote Injury-Free Environment  Recent Flowsheet Documentation  Taken 5/30/2025 0400 by Judy Rooney RN  Safety Promotion/Fall Prevention:   assistive device/personal items within reach   activity supervised   clutter free environment maintained   fall prevention program maintained   lighting adjusted   nonskid shoes/slippers when out of bed   room organization consistent   safety round/check completed  Taken 5/30/2025 0200 by Judy Rooney RN  Safety Promotion/Fall Prevention:   assistive device/personal items within reach   clutter free environment maintained   fall prevention program maintained   lighting adjusted   nonskid shoes/slippers when out of bed   room organization consistent   safety round/check completed  Taken 5/30/2025 0000 by Judy Rooney, RN  Safety Promotion/Fall Prevention:   assistive device/personal items within reach   activity supervised   clutter free environment maintained   fall prevention program maintained   lighting adjusted   nonskid shoes/slippers when out of bed   room organization consistent   safety round/check completed  Taken 5/29/2025 2200 by Judy Rooney, RN  Safety Promotion/Fall Prevention:   assistive device/personal items within reach   activity supervised   clutter free environment maintained   fall prevention program maintained   lighting adjusted   nonskid shoes/slippers when out of bed   room organization  consistent   safety round/check completed  Taken 5/29/2025 2000 by Judy Rooney, RN  Safety Promotion/Fall Prevention:   assistive device/personal items within reach   activity supervised   clutter free environment maintained   fall prevention program maintained   lighting adjusted   nonskid shoes/slippers when out of bed   room organization consistent   safety round/check completed     Problem: Skin Injury Risk Increased  Goal: Skin Health and Integrity  Outcome: Progressing  Intervention: Optimize Skin Protection  Recent Flowsheet Documentation  Taken 5/30/2025 0400 by Judy Rooney, RN  Activity Management: activity encouraged  Pressure Reduction Techniques:   frequent weight shift encouraged   pressure points protected   rest period provided between sit times   weight shift assistance provided  Head of Bed (HOB) Positioning: HOB at 30-45 degrees  Pressure Reduction Devices:   specialty bed utilized   pressure-redistributing mattress utilized   positioning supports utilized   heel offloading device utilized  Skin Protection:   incontinence pads utilized   protective footwear used   pulse oximeter probe site changed   silicone foam dressing in place   skin sealant/moisture barrier applied   transparent dressing maintained  Taken 5/30/2025 0200 by Judy Rooney RN  Activity Management: activity encouraged  Pressure Reduction Techniques:   frequent weight shift encouraged   pressure points protected   rest period provided between sit times   weight shift assistance provided   heels elevated off bed  Head of Bed (HOB) Positioning: HOB at 30-45 degrees  Pressure Reduction Devices:   specialty bed utilized   pressure-redistributing mattress utilized   positioning supports utilized   heel offloading device utilized   foam padding utilized  Skin Protection:   incontinence pads utilized   transparent dressing maintained   skin sealant/moisture barrier applied   silicone foam dressing in place   protective  footwear used  Taken 5/30/2025 0000 by Judy Rooney RN  Activity Management: activity encouraged  Pressure Reduction Techniques:   frequent weight shift encouraged   pressure points protected   rest period provided between sit times   weight shift assistance provided  Head of Bed (HOB) Positioning: HOB at 30-45 degrees  Pressure Reduction Devices:   specialty bed utilized   pressure-redistributing mattress utilized   positioning supports utilized   heel offloading device utilized  Skin Protection:   incontinence pads utilized   protective footwear used   pulse oximeter probe site changed   silicone foam dressing in place   skin sealant/moisture barrier applied   transparent dressing maintained  Taken 5/29/2025 2200 by Judy Rooney RN  Activity Management: activity encouraged  Pressure Reduction Techniques:   frequent weight shift encouraged   pressure points protected   rest period provided between sit times   weight shift assistance provided  Head of Bed (\[facility]\"") Positioning: \[facility]\"" at 30-45 degrees  Pressure Reduction Devices:   specialty bed utilized   pressure-redistributing mattress utilized   positioning supports utilized   heel offloading device utilized  Skin Protection:   incontinence pads utilized   protective footwear used   silicone foam dressing in place   skin sealant/moisture barrier applied   transparent dressing maintained  Taken 5/29/2025 2000 by Judy Rooney RN  Activity Management:   activity encouraged   back to bed  Pressure Reduction Techniques:   frequent weight shift encouraged   pressure points protected   rest period provided between sit times   weight shift assistance provided  Head of Bed (HOB) Positioning: HOB at 30-45 degrees  Pressure Reduction Devices:   specialty bed utilized   pressure-redistributing mattress utilized   positioning supports utilized   heel offloading device utilized  Skin Protection:   incontinence pads utilized   protective footwear used   pulse  oximeter probe site changed   silicone foam dressing in place   skin sealant/moisture barrier applied   transparent dressing maintained     Problem: Comorbidity Management  Goal: Blood Pressure in Desired Range  Outcome: Progressing  Intervention: Maintain Blood Pressure Management  Recent Flowsheet Documentation  Taken 5/30/2025 0400 by Judy Rooney RN  Medication Review/Management: medications reviewed  Taken 5/30/2025 0200 by Judy Rooney, RN  Medication Review/Management: medications reviewed  Taken 5/30/2025 0000 by Judy Rooney RN  Medication Review/Management: medications reviewed  Taken 5/29/2025 2200 by Judy Rooney RN  Medication Review/Management: medications reviewed  Taken 5/29/2025 2000 by Judy Rooney RN  Medication Review/Management: medications reviewed   Goal Outcome Evaluation:  Plan of Care Reviewed With: patient, family        Progress: improving

## 2025-05-30 NOTE — THERAPY TREATMENT NOTE
Patient Name: Linda Schafer  : 1960    MRN: [id number]                              Today's Date: 2025       Admit Date: 2025    Visit Dx:     ICD-10-CM ICD-9-CM   1. Acute CVA (cerebrovascular accident)  I63.9 434.91   2. Stroke  I63.9 434.91   3. Dysarthria  R47.1 784.51   4. Oropharyngeal dysphagia  R13.12 787.22   5. Palpitations  R00.2 785.1     Patient Active Problem List   Diagnosis    Palpitations    Personal history of cardiac murmur    Migraine headache    Hiatal hernia    GERD (gastroesophageal reflux disease)    Diverticulosis    Diminished pulses in lower extremity    Dyslipidemia    Peripheral edema    Grade I diastolic dysfunction    Acute ischemic stroke    Status post craniotomy     Past Medical History:   Diagnosis Date    Atypical chest pain     Chest pain.Atypical chest pain    Chest pain     COVID-19     2022    COVID-19 vaccine administered     2021 ,  J and J ) 10/25/2021 - Moderna    Diminished pulses in lower extremity     Diverticulosis     Dizziness     Occasional    Dyslipidemia     Dyspnea     Edema     Fatigue     GERD (gastroesophageal reflux disease)     Hiatal hernia     Migraine headache     Palpitations     Personal history of cardiac murmur     SOB (shortness of breath)      Past Surgical History:   Procedure Laterality Date    CHOLECYSTECTOMY      CRANIOTOMY FOR SUBDURAL HEMATOMA Right 2025    Procedure: DECOMPRESSIVE CRANIECTOMY RIGHT;  Surgeon: Matt Olivarez MD;  Location: [facility];  Service: Neurosurgery;  Laterality: Right;    HYSTERECTOMY      OOPHORECTOMY      TUBAL ABDOMINAL LIGATION        General Information       Row Name 25 1023          Physical Therapy Time and Intention    Document Type therapy note (daily note)  -KG     Mode of Treatment physical therapy  -KG       Row Name 25 1023          General Information    Existing Precautions/Restrictions fall;other (see comments)  s/p craniectomy with helmet OOB; L  sided weakness; L UE hypertonic; L visual field cut  -KG     Barriers to Rehab medically complex;previous functional deficit;cognitive status;visual deficit  -KG       Row Name 05/30/25 1023          Cognition    Orientation Status (Cognition) oriented x 3  -KG       Row Name 05/30/25 1023          Safety Issues/Impairments Affecting Functional Mobility    Safety Issues Affecting Function (Mobility) awareness of need for assistance;insight into deficits/self-awareness;safety precaution awareness;safety precautions follow-through/compliance;sequencing abilities  -KG     Impairments Affecting Function (Mobility) balance;cognition;coordination;endurance/activity tolerance;motor control;motor planning;postural/trunk control;range of motion (ROM);sensation/sensory awareness;strength;visual/perceptual  -KG     Cognitive Impairments, Mobility Safety/Performance attention;awareness, need for assistance;insight into deficits/self-awareness;judgment;safety precaution awareness;safety precaution follow-through;sequencing abilities  -KG               User Key  (r) = Recorded By, (t) = Taken By, (c) = Cosigned By      Initials Name Provider Type    KG Sultana Noel, PT Physical Therapist                   Mobility       Row Name 05/30/25 1025          Bed Mobility    Bed Mobility supine-sit  -KG     Supine-Sit Chunky (Bed Mobility) maximum assist (25% patient effort);2 person assist;verbal cues  -KG     Assistive Device (Bed Mobility) head of bed elevated;repositioning sheet  -KG     Comment, (Bed Mobility) VC's for sequencing and technique. Pt required increased assistance at trunk and BLEs. Once seated EOB pt able to maintain static sitting balance with Leonides and R UE support on bedrail.  -KG       Row Name 05/30/25 1025          Transfers    Comment, (Transfers) VC's for sequencing and safe hand placement. Pt performed STS x3; 1x from EOB and 2x from chair. SPT from bed to chair with blocking of noah knees and B  UE support. L knee buckling during transfer. Pt able to take 1 step foward and backward at chair x2 with seated rest break. Pt able to clear L foot from ground; required assistance for advancement and proper placement of L LE. Required increased physical assistance for weight shifting to the L to advance R LE.  -KG       Row Name 05/30/25 1025          Bed-Chair Transfer    Bed-Chair Seymour (Transfers) maximum assist (25% patient effort);2 person assist;verbal cues  -KG     Assistive Device (Bed-Chair Transfers) other (see comments)  B UE support  -KG       Row Name 05/30/25 1025          Sit-Stand Transfer    Sit-Stand Seymour (Transfers) moderate assist (50% patient effort);2 person assist;verbal cues  -KG     Assistive Device (Sit-Stand Transfers) other (see comments)  B UE support  -KG       Row Name 05/30/25 1025          Gait/Stairs (Locomotion)    Seymour Level (Gait) unable to assess  -KG     Comment, (Gait/Stairs) Pt able to take one step forward and backward at chair x2. Additional mobility deferred due to weakness and fatigue.  -KG               User Key  (r) = Recorded By, (t) = Taken By, (c) = Cosigned By      Initials Name Provider Type    KG Sultana Noel, PT Physical Therapist                   Obj/Interventions       Row Name 05/30/25 1028          Balance    Balance Assessment sitting static balance;standing static balance;standing dynamic balance  -KG     Static Sitting Balance minimal assist  -KG     Position, Sitting Balance supported;sitting edge of bed  -KG     Static Standing Balance moderate assist;2-person assist  -KG     Dynamic Standing Balance maximum assist;2-person assist  -KG     Position/Device Used, Standing Balance supported  -KG               User Key  (r) = Recorded By, (t) = Taken By, (c) = Cosigned By      Initials Name Provider Type    KG Sultana Noel, PT Physical Therapist                   Goals/Plan    No documentation.                   Clinical Impression       Row Name 05/30/25 1028          Pain    Additional Documentation Pain Scale: FACES Pre/Post-Treatment (Group)  -KG       Row Name 05/30/25 1028          Pain Scale: FACES Pre/Post-Treatment    Pain: FACES Scale, Pretreatment 4-->hurts little more  -KG     Posttreatment Pain Rating 4-->hurts little more  -KG       Row Name 05/30/25 1028          Plan of Care Review    Plan of Care Reviewed With patient  -KG     Progress improving  -KG     Outcome Evaluation Pt performed STS transfers with modA x2 and SPT from bed to chair with maxA x2 and B UE support. L lateral lean and L knee buckling during transfer. Pt able to take one step forward and backward at chair x2 with maxA x2. Pt required assistance for advancement and placement of L LE and increased physical assistance for weight shifting to the L. Pt's mobility limited by weakness and fatigue. Continue to recommend PT skilled care and D/C to IP rehab facility.  -KG       Row Name 05/30/25 1028          Vital Signs    Pre Systolic BP Rehab 119  -KG     Pre Treatment Diastolic BP 73  -KG     Post Systolic BP Rehab 116  -KG     Post Treatment Diastolic BP 76  -KG     Pretreatment Heart Rate (beats/min) 61  -KG     Posttreatment Heart Rate (beats/min) 62  -KG     Pre SpO2 (%) 98  -KG     O2 Delivery Pre Treatment room air  -KG     Post SpO2 (%) 99  -KG     O2 Delivery Post Treatment room air  -KG     Pre Patient Position Supine  -KG     Intra Patient Position Standing  -KG     Post Patient Position Sitting  -KG       Row Name 05/30/25 1028          Positioning and Restraints    Pre-Treatment Position in bed  -KG     Post Treatment Position chair  -KG     In Chair notified nsg;reclined;call light within reach;encouraged to call for assist;exit alarm on;with family/caregiver;RUE elevated;LUE elevated;waffle cushion;on mechanical lift sling;legs elevated  -KG               User Key  (r) = Recorded By, (t) = Taken By, (c) = Cosigned By      Initials  Name Provider Type    Sultana Alas PT Physical Therapist                   Outcome Measures       Row Name 05/30/25 1032          How much help from another person do you currently need...    Turning from your back to your side while in flat bed without using bedrails? 2  -KG     Moving from lying on back to sitting on the side of a flat bed without bedrails? 2  -KG     Moving to and from a bed to a chair (including a wheelchair)? 2  -KG     Standing up from a chair using your arms (e.g., wheelchair, bedside chair)? 2  -KG     Climbing 3-5 steps with a railing? 1  -KG     To walk in hospital room? 1  -KG     AM-PAC 6 Clicks Score (PT) 10  -KG     Highest Level of Mobility Goal Move to Chair/Commode-4  -KG       Row Name 05/30/25 1032          Functional Assessment    Outcome Measure Options AM-PAC 6 Clicks Basic Mobility (PT)  -KG               User Key  (r) = Recorded By, (t) = Taken By, (c) = Cosigned By      Initials Name Provider Type    Sultana Alas PT Physical Therapist                                 Physical Therapy Education       Title: PT OT SLP Therapies (In Progress)       Topic: Physical Therapy (In Progress)       Point: Mobility training (In Progress)       Learning Progress Summary            Patient Acceptance, E, NR by KG at 5/30/2025 0928    Acceptance, E, NR by AC at 5/28/2025 1556                      Point: Home exercise program (In Progress)       Learning Progress Summary            Patient Acceptance, E, NR by KG at 5/30/2025 0928                      Point: Body mechanics (In Progress)       Learning Progress Summary            Patient Acceptance, E, NR by KG at 5/30/2025 0928    Acceptance, E, NR by AC at 5/28/2025 1556                      Point: Precautions (In Progress)       Learning Progress Summary            Patient Acceptance, E, NR by KG at 5/30/2025 0928    Acceptance, E, NR by AC at 5/28/2025 1556                                      User Key        Initials Effective Dates Name Provider Type Discipline    KG 05/22/20 -  Sultana Noel PT Physical Therapist PT    AC 07/11/24 -  Mayra Babcock PT Physical Therapist PT                  PT Recommendation and Plan     Progress: improving  Outcome Evaluation: Pt performed STS transfers with modA x2 and SPT from bed to chair with maxA x2 and B UE support. L lateral lean and L knee buckling during transfer. Pt able to take one step forward and backward at chair x2 with maxA x2. Pt required assistance for advancement and placement of L LE and increased physical assistance for weight shifting to the L. Pt's mobility limited by weakness and fatigue. Continue to recommend PT skilled care and D/C to  rehab facility.     Time Calculation:         PT Charges       Row Name 05/30/25 0928             Time Calculation    Start Time 0928  -KG      PT Received On 05/30/25  -KG      PT Goal Re-Cert Due Date 06/07/25  -KG         Time Calculation- PT    Total Timed Code Minutes- PT 23 minute(s)  -KG         Timed Charges    10367 - PT Therapeutic Activity Minutes 23  -KG         Total Minutes    Timed Charges Total Minutes 23  -KG       Total Minutes 23  -KG                User Key  (r) = Recorded By, (t) = Taken By, (c) = Cosigned By      Initials Name Provider Type    KG Sultana Noel, PT Physical Therapist                  Therapy Charges for Today       Code Description Service Date Service Provider Modifiers Qty    08433957967 HC PT THERAPEUTIC ACT EA 15 MIN 5/30/2025 Sultana Noel, PT GP 2    09012027925 HC PT THER SUPP EA 15 MIN 5/30/2025 Sultana Noel, PT GP 1            PT G-Codes  Outcome Measure Options: AM-PAC 6 Clicks Basic Mobility (PT)  AM-PAC 6 Clicks Score (PT): 10  AM-PAC 6 Clicks Score (OT): 8  Modified Deshawn Scale: 4 - Moderately severe disability.  Unable to walk without assistance, and unable to attend to own bodily needs without assistance.       Noreen Noel  PT  5/30/2025

## 2025-05-30 NOTE — DISCHARGE SUMMARY
Date of Discharge:  5/30/2025    Discharge Diagnosis: Right MCA acute ischemic stroke    Presenting Problem/History of Present Illness    Ms. Schafer is a 64 year old female with pmhx of hyperlipidemia and migraines who presented on 5/24/25 with right MCA syndrome. CT head w/o showed hyperdense R MCA sign wth hypodensity in the right frontal and temporal lobes consistent with acute stroke. LKW > 4.5 hours and therefore she was not a TNK candidate. CTA H/N and perfusion showed R ICA occlusion at bifurcation into R MCA with estimated core infarct and ischemic penumbra suggesting near completed infarct. She was admitted to the neuro ICU. CT H on 5/25 showed worsening edema with mass effect. Neurosurgery was consulted. Patient underwent decompressive craniectomy on 5/26. Repeat CT H showed good decompression. She remained stable neurologically. Stroke/neuro followed patient throughout hospitalization with initiation of prophylactic Keppra and statin therapy. Echo showed no source for cardioembolic stroke. Plans are to start ASA today (5/20/25).  She was evaluated by SLP who cleared her for mechanical ground texture with thin liquids. PT/OT evaluated patient and recommended inpatient rehab. On 5/30 she was felt to be stable and ready for discharge to [facility]. She will be discharged with event monitor to evaluate for Afib/flutter and protective helmet to be worn before getting OOB. She will need outpatient sleep study.  She will follow up with neurosurgery in 2 weeks with a repeat head CT.  Follow up with neuro/stroke in 4-8 weeks. She was counseled on sigh/symptoms that will warrant ER evaluation.  .   Condition on Discharge: Stable    Discharge Disposition: [facility]    Procedures Performed  Procedure(s):  DECOMPRESSIVE CRANIECTOMY RIGHT       Consults:   Consults       Date and Time Order Name Status Description    5/24/2025 10:26 AM Inpatient Neurology Consult Stroke Completed             Vital  Signs  Temp:  [97.8 °F (36.6 °C)-99.3 °F (37.4 °C)] 98.9 °F (37.2 °C)  Heart Rate:  [49-79] 71  Resp:  [14-18] 16  BP: ()/(52-90) 119/73    Physical Exam:  General: WD/WN/NAD  Skin: W/D. Scalp incision well approximated with dressing over it that is c/d/I.  Abd: S/NT/ND. + BS  CV: RRR, no R/M/G.   Resp: non labored, CTA.   Neuro: Alert. See below.   Interval:  (post travel to MRI)  1a. Level of Consciousness: 0-->Alert, keenly responsive  1b. LOC Questions: 0-->Answers both questions correctly  1c. LOC Commands: 0-->Performs both tasks correctly  2. Best Gaze: 2-->Forced deviation, or total gaze paresis not overcome by the oculocephalic maneuver  3. Visual: 2-->Complete hemianopia  4. Facial Palsy: 2-->Partial paralysis (total or near-total paralysis of lower face)  5a. Motor Arm, Left: 3-->No effort against gravity, limb falls  5b. Motor Arm, Right: 0-->No drift, limb holds 90 (or 45) degrees for full 10 secs  6a. Motor Leg, Left: 3-->No effort against gravity, leg falls to bed immediately  6b. Motor Leg, Right: 0-->No drift, leg holds 30 degree position for full 5 secs  7. Limb Ataxia: 1-->Present in one limb  8. Sensory: 2-->Severe to total sensory loss, patient is not aware of being touched in the face, arm, and leg  9. Best Language: 1-->Mild-to-moderate aphasia, some obvious loss of fluency or facility of comprehension, without significant limitation on ideas expressed or form of expression. Reduction of speech and/or comprehension, however, makes conversation. . . (see row details)  10. Dysarthria: 1-->Mild-to-moderate dysarthria, patient slurs at least some words and, at worst, can be understood with some difficulty  11. Extinction and Inattention (formerly Neglect): 2-->Profound jorge-inattention/extinction more than 1 modality    Total (NIH Stroke Scale): 19       Discharge Medications     Discharge Medications        New Medications        Instructions Start Date   Ketotifen Fumarate 0.035 %  solution  Commonly known as: ZADITOR   1 drop, Both Eyes, 2 Times Daily      levETIRAcetam 500 MG tablet  Commonly known as: KEPPRA   500 mg, Oral, Every 12 Hours Scheduled      polyvinyl alcohol 1.4 % ophthalmic solution  Commonly known as: LIQUIFILM   1 drop, Both Eyes, Every 1 Hour PRN             Changes to Medications        Instructions Start Date   atorvastatin 80 MG tablet  Commonly known as: LIPITOR  What changed:   medication strength  how much to take  when to take this   80 mg, Oral, Nightly             Continue These Medications        Instructions Start Date   aspirin 81 MG tablet   81 mg, Daily      Calcium 500 MG tablet   500 mg, Daily      Co Q-10 100 MG capsule   100 mg, Nightly      dicyclomine 10 MG capsule  Commonly known as: BENTYL   10 mg, 2 Times Daily PRN      linaclotide 145 MCG capsule capsule  Commonly known as: LINZESS   145 mcg, Every Morning Before Breakfast      multivitamin tablet tablet   1 tablet, Daily      pantoprazole 20 MG EC tablet  Commonly known as: PROTONIX   20 mg, Oral, 2 Times Daily      TiZANidine 4 MG capsule  Commonly known as: ZANAFLEX   4 mg, Oral, 3 Times Daily PRN      vitamin B-12 100 MCG tablet  Commonly known as: CYANOCOBALAMIN   100 mcg, Daily      vitamin C 250 MG tablet  Commonly known as: ASCORBIC ACID   250 mg, Daily             Stop These Medications      naratriptan 2.5 MG tablet  Commonly known as: AMERGE              Discharge Diet:   Diet Instructions    FEES Reason for Referral 5/27/2025    Patient was referred for a FEES to assess the efficiency of his/her swallow function, rule out aspiration and make recommendations regarding safe dietary consistencies, effective compensatory strategies, and safe eating environment.         Recommendations/Treatment  SLP Swallowing Diagnosis: mild, pharyngeal dysphagia, moderate, oral dysphagia (05/27/25 1130)  Functional Impact: risk of aspiration/pneumonia, risk of malnutrition, risk of dehydration (05/27/25  1130)  Rehab Potential/Prognosis, Swallowing: good, to achieve stated therapy goals (05/27/25 1130)  Swallow Criteria for Skilled Therapeutic Interventions Met: demonstrates skilled criteria (05/27/25 1130)  Therapy Frequency (Swallow): 5 days per week (05/27/25 1130)  Predicted Duration Therapy Intervention (Days): 2 weeks (05/27/25 1130)  SLP Diet Recommendation: mechanical ground textures, thin liquids (05/27/25 1130)  Recommended Precautions and Strategies: upright posture during/after eating, assist with feeding (05/27/25 1130)  SLP Rec. for Method of Medication Administration: meds whole, with puree (05/27/25 1130)  Monitor for Signs of Aspiration: yes, notify SLP if any concerns (05/27/25 1130)  Anticipated Discharge Disposition (SLP): inpatient rehabilitation facility (05/27/25 1130)    Instrumental Set-up  Risks/Benefits Reviewed: risks/benefits explained, patient, agreed to eval (05/27/25 1130)  Nasal Entry: left: (05/27/25 1130)  Anatomic Considerations: no anatomic structural deviation (05/27/25 1130)  Utensils Used: Spoon, Cup, Straw (05/27/25 1130)  Consistencies Trialed: ice chips, thin liquids, pudding/puree, regular textures (05/27/25 1130)    Oral Preparation/ Oral Phase  Oral Phase: oral holding, prolonged manipulation, increased A-P transit time (05/27/25 1130)    Pharyngeal Phase  Initiation of Pharyngeal Swallow: WFL (05/27/25 1130)  Pharyngeal Phase: impaired pharyngeal phase of swallowing (05/27/25 1130)  Rosenbek's Scale: thin:, pudding/puree:, regular textures:, 1-->Level 1 (05/27/25 1130)  Residue: thin liquids, valleculae, pyriform sinuses, secondary to reduced base of tongue retraction, secondary to reduced hyolaryngeal excursion (05/27/25 1130)  Response to Residue: cleared residue, with spontaneous subsequent swallow (05/27/25 1130)  Attempted Compensatory Maneuvers: other (see comments) (compensations not necessary) (05/27/25 1130)  FEES Summary: NO penetration or aspiration. There  was some residue of thin liquids in the valleculae and pyriform sinuses, especially around the NG tube, but pt effectively cleared residue spontaneously with an additional swallow each time. Oral phase is prolonged. Recommend mechanical soft diet with thin liquids. SLP will upgrade solids in tx. (05/27/25 4200)                Follow-up Appointments  Future Appointments   Date Time Provider Department Center   7/7/2025 11:30 AM [facility]   9/10/2025  9:00 AM Joe García MD [facility]     Additional Instructions for the Follow-ups that You Need to Schedule       Ambulatory Referral to Neurology   As directed      One month follow up    Discharge Follow-up with PCP   As directed       Currently Documented PCP:    Micheline Heath DO    PCP Phone Number:    [phone]     Follow Up Details: after discharge from [facility]. Will need outpatient sleep study ordered by PCP        CT Head Without Contrast   May 31, 2025      Will need to be completed same day as neurosurgery follow-up prior to appointment    Exam reason: RMCA stroke//Crani   Does this patient have a diabetic monitoring/medication delivering device on?: No   Release to patient: Routine Release            Test Results Pending at Discharge  Pending Labs       Order Current Status    Anaerobic Culture - Surgical Site, Brain Preliminary result            Time: Discharge 35 min Electronically signed by CINDY Rosas, 05/30/25, 11:34 AM EDT.

## 2025-05-31 LAB — BACTERIA SPEC ANAEROBE CULT: NORMAL

## 2025-06-03 ENCOUNTER — TELEPHONE (OUTPATIENT)
Dept: NEUROSURGERY | Facility: CLINIC | Age: 65
End: 2025-06-03
Payer: COMMERCIAL

## 2025-06-03 NOTE — TELEPHONE ENCOUNTER
Caller: BRIAN MAHER    Relationship: DANELLE    Best call back number: [phone]    What form or medical record are you requesting: WORK EXCUSE    Who is requesting this form or medical record from you: PATIENTS NIECE    How would you like to receive the form or medical records (pick-up, mail, fax): [employer]  If fax, what is the fax number:   f mail, what is the address:   If pick-up, provide patient with address and location details    Timeframe paperwork needed: ASAP    Additional notes: PATIENTS NIECE CALLED STATING THE PATIENT IS NEEDING A WORK EXCUSE FOR BEING REHAB RECOVERING FROM A STROKE     PATIENT IS SCHEDULED W DR MACHUCA ON 06.13.25    PLEASE SEND VIA [employer]    PATIENTS DANELLE IS ALSO GOING TO DROP OFF LA PAPERWORK SOMETIME TOMORROW     PLEASE ADVISE  THANK YOU

## 2025-06-04 NOTE — TELEPHONE ENCOUNTER
Encounter was sent to the wrong department/pool; should have been sent to the clinical pool.    Patient's niece is here to  work excuse. Okay to provide per provider. Work note created by check-out desk.    FMLA form has been given to Susanne to complete.

## 2025-06-13 ENCOUNTER — HOSPITAL ENCOUNTER (OUTPATIENT)
Dept: CT IMAGING | Facility: HOSPITAL | Age: 65
Discharge: HOME | End: 2025-06-13
Admitting: NURSE PRACTITIONER
Payer: COMMERCIAL

## 2025-06-13 ENCOUNTER — OFFICE VISIT (OUTPATIENT)
Dept: NEUROSURGERY | Facility: CLINIC | Age: 65
End: 2025-06-13
Payer: COMMERCIAL

## 2025-06-13 VITALS — HEIGHT: 62 IN | TEMPERATURE: 98.2 F | BODY MASS INDEX: 28.19 KG/M2

## 2025-06-13 DIAGNOSIS — Z98.890 STATUS POST CRANIECTOMY: ICD-10-CM

## 2025-06-13 DIAGNOSIS — Z86.73 HISTORY OF RECENT STROKE: Primary | ICD-10-CM

## 2025-06-13 DIAGNOSIS — Z98.890 STATUS POST CRANIOTOMY: ICD-10-CM

## 2025-06-13 PROCEDURE — 99024 POSTOP FOLLOW-UP VISIT: CPT | Performed by: STUDENT IN AN ORGANIZED HEALTH CARE EDUCATION/TRAINING PROGRAM

## 2025-06-13 PROCEDURE — 70450 CT HEAD/BRAIN W/O DYE: CPT

## 2025-06-13 RX ORDER — FAMOTIDINE 10 MG
20 TABLET ORAL
OUTPATIENT
Start: 2025-06-13

## 2025-06-13 RX ORDER — HYDROCHLOROTHIAZIDE 25 MG/1
TABLET ORAL
COMMUNITY

## 2025-06-13 RX ORDER — CHLORHEXIDINE GLUCONATE 40 MG/ML
SOLUTION TOPICAL
Qty: 120 ML | Refills: 0 | Status: SHIPPED | OUTPATIENT
Start: 2025-06-13

## 2025-06-13 NOTE — PROGRESS NOTES
"NEUROSURGERY PROGRESS NOTE    Patient: Linda Schafer  : 1960    Primary Care Provider: Micheline Heath DO    Chief Complaint: Stroke    Subjective: This is a 64-year-old female who underwent a right sided decompressive craniectomy approximately 3 weeks ago for malignant edema associated with a large right MCA infarct.  She is here today for follow-up.  She has been at rehab since discharge and is slowly improving.  She is having some headaches, but denies any new or worsening issues.    Objective    Vital Signs: Temperature 98.2 °F (36.8 °C), temperature source Infrared, height 157.5 cm (62\").    Physical Exam  Awake, alert and oriented x 3, conversive  Continues to have some neglect of the left side  5 of 5 in the right upper and right lower extremity  No spontaneous movement noted in the left upper left lower extremity  Incision is clean dry and intact  Flap is sunken    Current Medications:   Current Outpatient Medications:     atorvastatin (LIPITOR) 80 MG tablet, Take 1 tablet by mouth Every Night., Disp: , Rfl:     Calcium 500 MG tablet, Take 500 mg by mouth Daily. OTC, Disp: , Rfl:     Coenzyme Q10 (CO Q-10) 100 MG capsule, Take 100 mg by mouth Every Night. OTC, Disp: , Rfl:     hydroCHLOROthiazide 25 MG tablet, Take 1 tablet every day by oral route as needed., Disp: , Rfl:     Ketotifen Fumarate (ZADITOR) 0.035 % solution, Administer 1 drop to both eyes 2 (Two) Times a Day., Disp: , Rfl:     levETIRAcetam (KEPPRA) 500 MG tablet, Take 1 tablet by mouth Every 12 (Twelve) Hours., Disp: , Rfl:     linaclotide (LINZESS) 145 MCG capsule capsule, Take 1 capsule by mouth Every Morning Before Breakfast., Disp: , Rfl:     Methylphenidate HCl (RITALIN PO), Take  by mouth., Disp: , Rfl:     multivitamin (THERAGRAN) tablet tablet, Take 1 tablet by mouth Daily. OTC, Disp: , Rfl:     pantoprazole (PROTONIX) 20 MG EC tablet, Take 1 tablet by mouth 2 (Two) Times a Day. (Patient taking differently: Take 2 tablets " by mouth 2 (Two) Times a Day.), Disp: 180 tablet, Rfl: 3    polyvinyl alcohol (LIQUIFILM) 1.4 % ophthalmic solution, Administer 1 drop to both eyes Every 1 (One) Hour As Needed for Dry Eyes., Disp: , Rfl:     TiZANidine (ZANAFLEX) 4 MG capsule, Take 1 capsule by mouth 3 (Three) Times a Day As Needed for Muscle Spasms. (Patient taking differently: Take 1 capsule by mouth 2 (Two) Times a Day As Needed for Muscle Spasms.), Disp: 45 capsule, Rfl: 3    vitamin B-12 (CYANOCOBALAMIN) 100 MCG tablet, Take 1 tablet by mouth Daily. OTC, Disp: , Rfl:     vitamin C (ASCORBIC ACID) 250 MG tablet, Take 1 tablet by mouth Daily. OTC, Disp: , Rfl:     aspirin 81 MG tablet, Take 1 tablet by mouth Daily. OTC, Disp: , Rfl:     Chlorhexidine Gluconate 4 % solution, Shower each day with solution for 5 days beginning 5 days before surgery., Disp: 120 mL, Rfl: 0    dicyclomine (BENTYL) 10 MG capsule, Take 1 capsule by mouth 2 (Two) Times a Day As Needed for Abdominal Cramping. (Patient not taking: Reported on 6/13/2025), Disp: , Rfl:      Laboratory Results:                              Brief Urine Lab Results       None          Microbiology Results (last 10 days)       ** No results found for the last 240 hours. **            Diagnostic Imaging: I reviewed and independently interpreted the new imaging.     Assessment/Plan:  This is a 64-year-old female status post a right sided decompressive craniectomy for a right MCA stroke approximately 3 weeks ago.  Neurologically, the patient is stable and is not having any issues with her incision.  I removed her staples today.  Her flap is sunken and her head CT from today shows no significant swelling or mass effect from her area of the stroke.  Given these findings, I do think she is in a good place to proceed with a cranioplasty.  I reviewed the risks, benefits and alternatives with her which include but are not limited to bleeding, infection, bone absorption, seizures and stroke.  The patient  understood and would like to proceed with surgery.  We will look to get her scheduled in the near future.    Diagnoses and all orders for this visit:    1. History of recent stroke (Primary)  -     Ambulatory Referral to Neurology    2. Status post craniectomy  -     Case Request; Standing  -     CBC (No diff); Future  -     Basic metabolic panel; Future  -     famotidine (PEPCID) tablet 20 mg  -     dexAMETHasone (DECADRON) 10 mg in sodium chloride 0.9 % IVPB  -     ECG 12 Lead; Future  -     ceFAZolin (ANCEF) 2,000 mg in sodium chloride 0.9 % 100 mL IVPB  -     Case Request  -     Ambulatory Referral to Neurology    Other orders  -     Inpatient Admission; Standing  -     Follow Anesthesia Guidelines / Protocol; Future  -     Follow Anesthesia Guidelines / Protocol; Standing  -     Verify NPO Status; Standing  -     SCD (sequential compression device)- to be placed on patient in Pre-op; Standing  -     Verify / Perform Chlorhexidine Skin Prep; Standing  -     Provide NPO Instructions to Patient; Future  -     Chlorhexidine Gluconate 4 % solution; Shower each day with solution for 5 days beginning 5 days before surgery.  Dispense: 120 mL; Refill: 0        Linda Schafer  reports that she has never smoked. She has never been exposed to tobacco smoke. She has never used smokeless tobacco.       Matt Olivarez MD  06/13/25  11:54 EDT

## 2025-06-19 ENCOUNTER — LAB (OUTPATIENT)
Dept: LAB | Facility: HOSPITAL | Age: 65
End: 2025-06-19
Payer: COMMERCIAL

## 2025-06-19 ENCOUNTER — OFFICE VISIT (OUTPATIENT)
Dept: NEUROLOGY | Facility: CLINIC | Age: 65
End: 2025-06-19
Payer: COMMERCIAL

## 2025-06-19 VITALS
DIASTOLIC BLOOD PRESSURE: 74 MMHG | HEART RATE: 56 BPM | WEIGHT: 154 LBS | SYSTOLIC BLOOD PRESSURE: 108 MMHG | HEIGHT: 62 IN | OXYGEN SATURATION: 92 % | BODY MASS INDEX: 28.34 KG/M2 | TEMPERATURE: 98 F

## 2025-06-19 DIAGNOSIS — I69.398 SPASTICITY AS LATE EFFECT OF CEREBROVASCULAR ACCIDENT (CVA): ICD-10-CM

## 2025-06-19 DIAGNOSIS — Z86.73 HISTORY OF STROKE: ICD-10-CM

## 2025-06-19 DIAGNOSIS — Z86.73 HISTORY OF STROKE: Primary | ICD-10-CM

## 2025-06-19 DIAGNOSIS — R25.2 SPASTICITY AS LATE EFFECT OF CEREBROVASCULAR ACCIDENT (CVA): ICD-10-CM

## 2025-06-19 LAB — HCYS SERPL-MCNC: 7 UMOL/L (ref 0–15)

## 2025-06-19 PROCEDURE — 83090 ASSAY OF HOMOCYSTEINE: CPT

## 2025-06-19 PROCEDURE — 85303 CLOT INHIBIT PROT C ACTIVITY: CPT

## 2025-06-19 PROCEDURE — 81291 MTHFR GENE: CPT

## 2025-06-19 PROCEDURE — 85300 ANTITHROMBIN III ACTIVITY: CPT

## 2025-06-19 PROCEDURE — 36415 COLL VENOUS BLD VENIPUNCTURE: CPT

## 2025-06-19 PROCEDURE — 81240 F2 GENE: CPT

## 2025-06-19 PROCEDURE — 85306 CLOT INHIBIT PROT S FREE: CPT

## 2025-06-19 PROCEDURE — 85220 BLOOC CLOT FACTOR V TEST: CPT

## 2025-06-19 PROCEDURE — 81241 F5 GENE: CPT

## 2025-06-19 RX ORDER — BUTALBITAL, ACETAMINOPHEN AND CAFFEINE 50; 325; 40 MG/1; MG/1; MG/1
TABLET ORAL
COMMUNITY
Start: 2025-06-17

## 2025-06-19 RX ORDER — BACLOFEN 10 MG/1
10 TABLET ORAL 3 TIMES DAILY PRN
Qty: 90 TABLET | Refills: 1 | Status: SHIPPED | OUTPATIENT
Start: 2025-06-19 | End: 2026-06-19

## 2025-06-19 RX ORDER — BACLOFEN 5 MG/1
10 TABLET ORAL 3 TIMES DAILY
COMMUNITY
Start: 2025-06-17 | End: 2025-06-19

## 2025-06-19 NOTE — PROGRESS NOTES
New Patient Office Visit      Encounter Date: 2025   Patient Name: Linda Schafer  : 1960   MRN: [id number]   PCP: Micheline Heath DO    Referring Provider: CINDY Thomas     Chief Complaint:    Chief Complaint   Patient presents with    Establish Care for management of stroke       History of Present Illness: Linda Schafer is a 64 y.o. female who is here today to establish care.  Patient has prior medical history significant for hyperlipidemia, GERD, migraine headaches, palpitations. She presented to [facility] emergency department via air [facility] for stroke workup. Per flight crew they report that approximately 2:30 AM the patient was up to go to the bathroom and fell. They report her  helped her get back to bed with minimal assist and when they woke around 8 AM this morning she had dense left-sided hemiparesis and right gaze preference which prompted a call to 911. CT head was completed showing right MCA territory infarct with hyperdense right MCA. CTA head and neck shows complete occlusion of the right internal carotid artery through the right M1 segment. CT perfusion shows area of core infarct with similarly matching penumbra. NIH on initial examination 17. Secondary to CT head changes as well as extended last known well she was not deemed to be an appropriate IV thrombolytic therapy candidate.  Secondary to lack of ischemic penumbra and advanced CT head changes she was not deemed to be an emergent endovascular therapy candidate.  Initially she was placed on hypertonic saline to help minimize  edema.  Unfortunately despite these measures the patient required decompressive craniotomy with neurosurgery.  She was fit with a helmet to wear in the interim and discharged to [facility] rehab on aspirin and high intensity statin.  She was recommended to complete a heart monitor for ongoing evaluation of underlying atrial fibrillation.  She has completed wearing her  heart monitor and has returned the monitor but no report is available as of yet.  If this is negative for A-fib I would recommend pursuit of a loop recording device.  I have discussed this with the patient and her family today.  She is accompanied to today's visit by her  and 2 nieces.  She arrives in a wheelchair.  She has ongoing left-sided hemiplegia.  She completed inpatient rehab at [facility] and is discharging home.  She continues to take her aspirin and statin without issues or side effects.  She reports that she has been experiencing spasticity in her left arm and leg and has been taking baclofen for this.  They just increased her dose from 5 to 10 mg 3 times a day.  She will need a refill which I am happy to provide.  She has also been maintained on Keppra 500 mg twice a day for empiric treatment of seizures and also for prevention of headache.  She reports that her headache has been well controlled under this regimen.      She has a family history of blood clots, with her brother, father, and sister all having experienced them. However, she is unaware if they were tested for blood clotting disorders.  For this reason I have recommended the patient get a hypercoagulable panel drawn today.  I will call her with those results.  For any abnormalities we will refer her to hematology.      FAMILY HISTORY  - Mother: Atrial fibrillation, strokes  - Brother: Blood clots  - Father: Blood clots  - Sister: Blood clots      Stroke Risk Factors: hyperlipidemia, hypertension, and palpitations and family history of clotting      Subjective      Review of Systems:   Review of Systems   Constitutional:  Positive for activity change and fatigue. Negative for appetite change, chills, diaphoresis, fever, unexpected weight gain and unexpected weight loss.   HENT:  Negative for trouble swallowing.    Eyes:  Positive for blurred vision. Negative for double vision, photophobia and visual disturbance.   Respiratory:   Negative for cough and shortness of breath.    Cardiovascular:  Negative for chest pain, palpitations and leg swelling.   Gastrointestinal:  Positive for nausea (Occasional). Negative for blood in stool and vomiting.   Genitourinary:  Negative for hematuria.   Musculoskeletal:  Positive for gait problem.   Neurological:  Positive for facial asymmetry, weakness and numbness. Negative for dizziness, tremors, seizures, syncope, speech difficulty, light-headedness, headache, memory problem and confusion.       Past Medical History:   Past Medical History:   Diagnosis Date    Atypical chest pain     Chest pain.Atypical chest pain    Chest pain     COVID-19     06/30/2022    COVID-19 vaccine administered     04/02/2021 ,  J and J ) 10/25/2021 - Moderna    Diminished pulses in lower extremity     Diverticulosis     Dizziness     Occasional    Dyslipidemia     Dyspnea     Edema     Fatigue     GERD (gastroesophageal reflux disease)     Hiatal hernia     Hyperlipidemia     Migraine headache     Palpitations     Personal history of cardiac murmur     SOB (shortness of breath)     Stroke 5/24/25       Past Surgical History:   Past Surgical History:   Procedure Laterality Date    CHOLECYSTECTOMY      CRANIOTOMY  5/25/25    CRANIOTOMY FOR SUBDURAL HEMATOMA Right 05/25/2025    Procedure: DECOMPRESSIVE CRANIECTOMY RIGHT;  Surgeon: Matt Olivarez MD;  Location: [facility];  Service: Neurosurgery;  Laterality: Right;    HYSTERECTOMY      OOPHORECTOMY      TUBAL ABDOMINAL LIGATION         Family History:   Family History   Problem Relation Age of Onset    Hyperlipidemia Mother     Hypertension Mother     Stroke Mother     Anxiety disorder Mother     Stroke Father     Hypertension Father     Hyperlipidemia Father     Heart attack Father     Hyperlipidemia Other         Dyslipidemia    Stroke Other         CVA    Coronary artery disease Other     Diabetes Other     Hypertension Other     Sudden death Other         Sudden Cardiac  Death (SCD)       Social History:   Social History     Socioeconomic History    Marital status:    Tobacco Use    Smoking status: Never     Passive exposure: Never    Smokeless tobacco: Never   Vaping Use    Vaping status: Never Used   Substance and Sexual Activity    Alcohol use: No    Drug use: Never    Sexual activity: Defer       Medications:     Current Outpatient Medications:     aspirin 81 MG tablet, Take 1 tablet by mouth Daily. OTC, Disp: , Rfl:     atorvastatin (LIPITOR) 80 MG tablet, Take 1 tablet by mouth Every Night., Disp: , Rfl:     butalbital-acetaminophen-caffeine (FIORICET, ESGIC) -40 MG per tablet, , Disp: , Rfl:     Calcium 500 MG tablet, Take 500 mg by mouth Daily. OTC, Disp: , Rfl:     levETIRAcetam (KEPPRA) 500 MG tablet, Take 1 tablet by mouth Every 12 (Twelve) Hours., Disp: , Rfl:     linaclotide (LINZESS) 145 MCG capsule capsule, Take 1 capsule by mouth Every Morning Before Breakfast., Disp: , Rfl:     pantoprazole (PROTONIX) 20 MG EC tablet, Take 1 tablet by mouth 2 (Two) Times a Day. (Patient taking differently: Take 2 tablets by mouth 2 (Two) Times a Day.), Disp: 180 tablet, Rfl: 3    vitamin B-12 (CYANOCOBALAMIN) 100 MCG tablet, Take 1 tablet by mouth Daily. OTC, Disp: , Rfl:     vitamin C (ASCORBIC ACID) 250 MG tablet, Take 1 tablet by mouth Daily. OTC, Disp: , Rfl:     baclofen (LIORESAL) 10 MG tablet, Take 1 tablet by mouth 3 (Three) Times a Day As Needed for Muscle Spasms., Disp: 90 tablet, Rfl: 1    Chlorhexidine Gluconate 4 % solution, Shower each day with solution for 5 days beginning 5 days before surgery. (Patient not taking: Reported on 6/19/2025), Disp: 120 mL, Rfl: 0    Coenzyme Q10 (CO Q-10) 100 MG capsule, Take 100 mg by mouth Every Night. OTC (Patient not taking: Reported on 6/19/2025), Disp: , Rfl:     dicyclomine (BENTYL) 10 MG capsule, Take 1 capsule by mouth 2 (Two) Times a Day As Needed for Abdominal Cramping. (Patient not taking: Reported on 6/19/2025),  "Disp: , Rfl:     hydroCHLOROthiazide 25 MG tablet, Take 1 tablet every day by oral route as needed. (Patient not taking: Reported on 6/19/2025), Disp: , Rfl:     Ketotifen Fumarate (ZADITOR) 0.035 % solution, Administer 1 drop to both eyes 2 (Two) Times a Day. (Patient not taking: Reported on 6/19/2025), Disp: , Rfl:     Methylphenidate HCl (RITALIN PO), Take  by mouth. (Patient not taking: Reported on 6/19/2025), Disp: , Rfl:     multivitamin (THERAGRAN) tablet tablet, Take 1 tablet by mouth Daily. OTC (Patient not taking: Reported on 6/19/2025), Disp: , Rfl:     polyvinyl alcohol (LIQUIFILM) 1.4 % ophthalmic solution, Administer 1 drop to both eyes Every 1 (One) Hour As Needed for Dry Eyes. (Patient not taking: Reported on 6/19/2025), Disp: , Rfl:     Allergies:   Allergies   Allergen Reactions    Codeine      Codeine Derivatives    Penicillins Rash     Has tolerated Amoxicillin       Objective     Physical Exam:  Vital Signs:   Vitals:    06/19/25 1343   BP: 108/74   Pulse: 56   Temp: 98 °F (36.7 °C)   SpO2: 92%   Weight: 69.9 kg (154 lb)   Height: 157.5 cm (62.01\")     Body mass index is 28.16 kg/m².     Physical Exam  Vitals and nursing note reviewed.   Constitutional:       General: She is awake. She is not in acute distress.     Appearance: Normal appearance. She is normal weight. She is not ill-appearing.      Comments: 64-year-old  female   HENT:      Head:      Comments: Patient is wearing a helmet for protection due to missing bone flap.  She will go for bone flap replacement in July     Nose: Nose normal.      Mouth/Throat:      Mouth: Mucous membranes are moist.   Eyes:      General: Lids are normal.      Extraocular Movements: Extraocular movements intact.      Pupils: Pupils are equal, round, and reactive to light.   Cardiovascular:      Rate and Rhythm: Normal rate and regular rhythm.      Pulses: Normal pulses.   Pulmonary:      Effort: Pulmonary effort is normal. No respiratory distress. "   Skin:     General: Skin is warm and dry.   Neurological:      Mental Status: She is alert and oriented to person, place, and time.      Cranial Nerves: Cranial nerve deficit present.      Sensory: Sensory deficit present.      Motor: Weakness present.      Coordination: Coordination normal.      Gait: Gait abnormal.   Psychiatric:         Mood and Affect: Mood normal.         Speech: Speech normal.         Behavior: Behavior normal.       Neurological Exam  Mental Status  Awake and alert. Oriented to person, place, time and situation. Oriented to person, place, and time. Speech is normal. Language is fluent with no aphasia. Attention and concentration are normal. Fund of knowledge is appropriate for level of education.    Cranial Nerves  CN II: Right visual acuity: Counts fingers. Left visual acuity: Counts fingers. Visual fields full to confrontation.  CN III, IV, VI: Extraocular movements intact bilaterally. Normal lids and orbits bilaterally. Pupils equal round and reactive to light bilaterally.  CN V:  Right: Facial sensation is normal.  Left: Diminished sensation of the entire left side of the face.  CN VII:  Left: There is central facial weakness.  CN VIII: Hearing is normal to speech .  CN XI:  Left: Sternocleidomastoid strength is weak. Trapezius strength is weak.    Motor   Increased muscle tone. Spasticity present in left upper and lower extremity. Strength is 5/5 in all four extremities except as noted. Left hemiparesis.    Sensory  Light touch abnormality: Diminished sensation left arm and leg.     Coordination    No obvious dysmetria out of proportion to weakness.    Gait   Abnormal gait.  Wheelchair-bound.       Modified [place] Score: 4        0  No Symptoms    1 No significant disability. Able to carry out all usual activities, despite some symptoms.    2 Slight disability. Able to look after own affairs without assistance, but unable to carry out all previous activities.    3 Moderate disability.  "Requires some help, but able to walk unassisted.    4 Moderately severe disability. Unable to attend to own bodily needs without assistance, and unable to walk unassisted.    5 Severe disability. Requires constant nursing care and attention, bedridden, incontinent.    6 Dead       PHQ-9 Depression Screening  Little interest or pleasure in doing things? Not at all   Feeling down, depressed, or hopeless? Not at all   PHQ-2 Total Score 0   Trouble falling or staying asleep, or sleeping too much?     Feeling tired or having little energy?     Poor appetite or overeating?     Feeling bad about yourself - or that you are a failure or have let yourself or your family down?     Trouble concentrating on things, such as reading the newspaper or watching television?     Moving or speaking so slowly that other people could have noticed? Or the opposite - being so fidgety or restless that you have been moving around a lot more than usual?       Thoughts that you would be better off dead, or of hurting yourself in some way?     PHQ-9 Total Score     If you checked off any problems, how difficult have these problems made it for you to do your work, take care of things at home, or get along with other people?                STOP-Bang Score  Have you been diagnosed with Sleep Apnea?: no  Snoring?: yes  Tired?: no  Observed?: no  Pressure?: no  Stop Score: 1  Body Mass Index more than 35 kg/m2?: no  Age older than 50 year old?: yes  Neck large? \">17\"/43cm-M, >16\"/41cm-F: no  Gender=Male?: no  Total Stop-Bang Score: 2       STEADI Fall Risk Clinician Key Questions   Have you fallen in the past year?: Yes      Imaging Reviewed:   CT Head Without Contrast  Result Date: 6/13/2025  Impression: 1.Evolving large right MCA infarction status post right hemicraniectomy, with decreased associated mass effect. 2.No acute intracranial hemorrhage identified. Electronically Signed: Luis Li MD  6/13/2025 10:34 AM EDT  Workstation ID: " [id number]    CT Head Without Contrast  Result Date: 5/26/2025  Impression: Stable exam. Postoperative change from recent right craniectomy with evolving large right MCA distribution infarct. Electronically Signed: Milton Blancas MD  5/26/2025 9:47 AM EDT  Workstation ID: [id number]    CT Head Without Contrast  Result Date: 5/25/2025  Impression: Postoperative changes of right-sided craniectomy. Evolving large right MCA territory infarct. No acute intracranial hemorrhage. Similar associated mass effect including narrowing of the right lateral ventricle. No significant midline shift or herniation. Electronically Signed: Carlton Christiansen MD  5/25/2025 8:20 PM EDT  Workstation ID: [id number]    CT Head Without Contrast  Result Date: 5/25/2025  Impression: Evolving right MCA distribution infarct. There is no gross hemorrhagic conversion. There is some increased mass effect on the right lateral ventricle with no significant subfalcine shift or uncal herniation Electronically Signed: Pio Amanda  5/25/2025 8:26 AM EDT  Workstation ID: [id number]    MRI Brain Without Contrast  Result Date: 5/24/2025  1.There is a large area of restricted diffusion in the right MCA distribution corresponding to findings on the previous head CT. Findings are compatible with acute infarct. There is some associated vasogenic edema. There is some effacement of the right lateral ventricle. However no midline shift is seen. 2.No evidence of acute hemorrhage. 3.The right ICA is occluded. Electronically Signed: Joe Grover MD  5/24/2025 8:54 PM EDT  Workstation ID: [id number]    XR Abdomen KUB  Result Date: 5/24/2025  Impression: Gastric suction tube with tip terminating at the mid stomach. Electronically Signed: Jr Ambrosio MD  5/24/2025 6:46 PM EDT  Workstation ID: [id number]    CT Head Without Contrast  Result Date: 5/24/2025  Impression: Evolving changes of right MCA territory infarct. Electronically Signed: Ed Jiménez MD  5/24/2025 4:55 PM EDT   Workstation ID: [id number]    XR Chest 1 View  Result Date: 5/24/2025  Impression: No acute cardiopulmonary abnormality. Electronically Signed: Jr Ambrosio MD  5/24/2025 11:20 AM EDT  Workstation ID: [id number]    CT Angiogram Head w AI Analysis of LVO  Result Date: 5/24/2025  Impression: 1.There is occlusion of the right internal carotid artery that may be subacute. 2.There is occlusion of the right M1 segment. 3.There are evolving changes of right MCA territory infarct. Electronically Signed: Ed Jiménez MD  5/24/2025 11:04 AM EDT  Workstation ID: [id number]    CT Angiogram Neck  Result Date: 5/24/2025  Impression: 1.There is occlusion of the right internal carotid artery that may be subacute. 2.There is occlusion of the right M1 segment. 3.There are evolving changes of right MCA territory infarct. Electronically Signed: Ed Jiménez MD  5/24/2025 11:04 AM EDT  Workstation ID: [id number]    CT CEREBRAL PERFUSION WITH & WITHOUT CONTRAST  Result Date: 5/24/2025  Impression: 1.There is occlusion of the right internal carotid artery that may be subacute. 2.There is occlusion of the right M1 segment. 3.There are evolving changes of right MCA territory infarct. Electronically Signed: Ed Jiménez MD  5/24/2025 11:04 AM EDT  Workstation ID: [id number]    CT Head Without Contrast Stroke Protocol  Result Date: 5/24/2025  Impression: Hyperdense right MCA sign suggesting thrombus, with hypodensity in the adjacent right frontal and temporal lobes in the right MCA distribution suggesting acute infarction. Mild mass effect, but no midline shift. Electronically Signed: Luis Li MD  5/24/2025 10:37 AM EDT  Workstation ID: [id number]     Laboratory Results:   Hemoglobin   Date Value Ref Range Status   05/29/2025 10.5 (L) 12.0 - 15.9 g/dL Final     Hematocrit   Date Value Ref Range Status   05/29/2025 31.9 (L) 34.0 - 46.6 % Final     Platelets   Date Value Ref Range Status   05/29/2025 185 140 - 450 10*3/mm3 Final     Hemoglobin  A1C   Date Value Ref Range Status   05/25/2025 5.30 4.80 - 5.60 % Final     LDL Cholesterol    Date Value Ref Range Status   05/25/2025 75 0 - 100 mg/dL Final     AST (SGOT)   Date Value Ref Range Status   05/24/2025 27 1 - 32 U/L Final     ALT (SGPT)   Date Value Ref Range Status   05/24/2025 19 1 - 33 U/L Final             Assessment / Plan      Assessment/Plan:   # Right MCA ischemic stroke S/P decompressive craniectomy  - Current etiology is cryptogenic.  This is likely cardioembolic versus ESUS  - Neurosurgery is following and has seen the patient in clinic to prepare for cranioplasty in July. She is currently wearing helmet.    -Continue ASA 81mg daily for secondary stroke prevention   - Continue atorvastatin 80 mg nightly. LDL on admission 75, goal <70 for secondary stroke prevention.  - Continue prophylactic Keppra 500 mg BID. This is also being used to prevent headaches and per patient is very effective.   - Target blood pressure goals <130/80   - If no A-fib is detected on outpatient heart monitor would recommend loop insertion. Reviewed with patient today. No report available for recent monitor. Will call with results/   -Recommend hypercoaguable panel due to patient reporting family history of clots  - Continue PT/OT after cranioplasty as appropriate. Patient is very motivated to start therapy again.   - Follow-up in the stroke clinic in approximately 6 weeks.    #Spasticity  - Refill sent for baclofen 10 mg 3 times daily as needed for spasms  - Prior Auth initiated for Botox injections  - Will call patient when ready to schedule      Discussed the importance of medication compliance and lifestyle modifications (adequate blood pressure control, adequate control of hyperlipidemia, adequate glycemic control, increase physical activity, and healthy diet) to help reduce the risk of future cerebrovascular events.  Also discussed the signs symptoms that would warrant the patient return back to the emergency  department including unilateral weakness, unilateral numbness, visual disturbances, loss of balance, speech difficulties, and/or a sudden severe headache.      Follow Up:   Return in about 6 weeks (around 7/31/2025).    Patient or patient representative verbalized consent for the use of Ambient Listening during the visit with  CINDY Cobb for chart documentation. 6/19/2025  16:14 EDT      CINDY Cobb  [facility] [place] [facility]        1. continue supplement as ordered by MD guzman BID 2. follow for Phos supplement 3. follow weights and labs

## 2025-06-19 NOTE — PATIENT INSTRUCTIONS
-Continue aspirin 81 mg daily  -Continue Lipitor 80 mg nightly   -Continue Keppra 500 mg twice a day for headache management and seizure prevention   -Baclofen 10 mg three times a day as needed for spasms. Will start prior authorization for botox injections.   -BP goals 100-130/60-80  -Fall precautions. Continue to wear protective helmet.  -911 for any new stroke symptoms

## 2025-06-20 LAB
AT III ACT/NOR PPP CHRO: 163 % (ref 75–135)
F5 GENE MUT ANL BLD/T: NORMAL
FACTOR II, DNA ANALYSIS: NORMAL
PROT C ACT/NOR PPP CHRO: 174 % (ref 73–180)

## 2025-06-21 LAB
FACT V ACT/NOR PPP: 145 % (ref 70–150)
PROT S ACT/NOR PPP: 86 % (ref 63–140)

## 2025-06-24 LAB
IMP & REVIEW OF LAB RESULTS: NORMAL
MTHFR GENE MUT ANL BLD/T: NORMAL

## 2025-06-26 ENCOUNTER — HOSPITAL ENCOUNTER (INPATIENT)
Facility: HOSPITAL | Age: 65
LOS: 4 days | Discharge: NURSING, REHAB OR LONG-TERM CARE | End: 2025-07-03
Attending: EMERGENCY MEDICINE | Admitting: INTERNAL MEDICINE
Payer: COMMERCIAL

## 2025-06-26 ENCOUNTER — APPOINTMENT (OUTPATIENT)
Dept: CT IMAGING | Facility: HOSPITAL | Age: 65
End: 2025-06-26
Payer: COMMERCIAL

## 2025-06-26 ENCOUNTER — APPOINTMENT (OUTPATIENT)
Dept: CARDIOLOGY | Facility: HOSPITAL | Age: 65
End: 2025-06-26
Payer: COMMERCIAL

## 2025-06-26 ENCOUNTER — APPOINTMENT (OUTPATIENT)
Dept: GENERAL RADIOLOGY | Facility: HOSPITAL | Age: 65
End: 2025-06-26
Payer: COMMERCIAL

## 2025-06-26 DIAGNOSIS — I26.92 ACUTE SADDLE PULMONARY EMBOLISM WITHOUT ACUTE COR PULMONALE: Primary | ICD-10-CM

## 2025-06-26 DIAGNOSIS — R07.81 PLEURITIC CHEST PAIN: ICD-10-CM

## 2025-06-26 PROBLEM — I26.99 PULMONARY EMBOLUS: Status: ACTIVE | Noted: 2025-06-26

## 2025-06-26 PROBLEM — I82.402 ACUTE DEEP VEIN THROMBOSIS (DVT) OF LEFT LOWER EXTREMITY: Status: ACTIVE | Noted: 2025-06-26

## 2025-06-26 LAB
ALBUMIN SERPL-MCNC: 4.3 G/DL (ref 3.5–5.2)
ALBUMIN/GLOB SERPL: 1.2 G/DL
ALP SERPL-CCNC: 147 U/L (ref 39–117)
ALT SERPL W P-5'-P-CCNC: 37 U/L (ref 1–33)
ANION GAP SERPL CALCULATED.3IONS-SCNC: 14 MMOL/L (ref 5–15)
APTT PPP: 22.9 SECONDS (ref 60–90)
AST SERPL-CCNC: 61 U/L (ref 1–32)
BASOPHILS # BLD AUTO: 0.09 10*3/MM3 (ref 0–0.2)
BASOPHILS NFR BLD AUTO: 0.6 % (ref 0–1.5)
BH CV ECHO MEAS - AO ROOT DIAM: 2.8 CM
BH CV ECHO MEAS - IVS/LVPW: 1 CM
BH CV ECHO MEAS - IVSD: 0.8 CM
BH CV ECHO MEAS - LA DIMENSION: 2 CM
BH CV ECHO MEAS - LVIDD: 3.8 CM
BH CV ECHO MEAS - LVIDS: 2.1 CM
BH CV ECHO MEAS - LVOT AREA: 3.1 CM2
BH CV ECHO MEAS - LVOT DIAM: 2 CM
BH CV ECHO MEAS - LVPWD: 0.8 CM
BH CV ECHO MEAS - PA ACC TIME: 0.13 SEC
BH CV ECHO MEAS - PA V2 MAX: 114.8 CM/SEC
BH CV ECHO MEAS - TAPSE (>1.6): 1.93 CM
BH CV LOW VAS LEFT GASTRONEMIUS VESSEL: 1
BH CV LOW VAS LEFT PERONEAL VESSEL: 1
BH CV LOW VAS LEFT POSTERIOR TIBIAL VESSEL: 1
BH CV LOWER VASCULAR LEFT COMMON FEMORAL AUGMENT: NORMAL
BH CV LOWER VASCULAR LEFT COMMON FEMORAL COMPRESS: NORMAL
BH CV LOWER VASCULAR LEFT COMMON FEMORAL PHASIC: NORMAL
BH CV LOWER VASCULAR LEFT COMMON FEMORAL SPONT: NORMAL
BH CV LOWER VASCULAR LEFT DISTAL FEMORAL COMPRESS: NORMAL
BH CV LOWER VASCULAR LEFT GASTRONEMIUS COMPRESS: NORMAL
BH CV LOWER VASCULAR LEFT GREATER SAPH AK COMPRESS: NORMAL
BH CV LOWER VASCULAR LEFT GREATER SAPH BK COMPRESS: NORMAL
BH CV LOWER VASCULAR LEFT MID FEMORAL AUGMENT: NORMAL
BH CV LOWER VASCULAR LEFT MID FEMORAL COMPRESS: NORMAL
BH CV LOWER VASCULAR LEFT MID FEMORAL PHASIC: NORMAL
BH CV LOWER VASCULAR LEFT MID FEMORAL SPONT: NORMAL
BH CV LOWER VASCULAR LEFT PERONEAL COMPRESS: NORMAL
BH CV LOWER VASCULAR LEFT POPLITEAL AUGMENT: NORMAL
BH CV LOWER VASCULAR LEFT POPLITEAL COMPRESS: NORMAL
BH CV LOWER VASCULAR LEFT POPLITEAL PHASIC: NORMAL
BH CV LOWER VASCULAR LEFT POPLITEAL SPONT: NORMAL
BH CV LOWER VASCULAR LEFT POSTERIOR TIBIAL COMPRESS: NORMAL
BH CV LOWER VASCULAR LEFT PROFUNDA FEMORAL COMPRESS: NORMAL
BH CV LOWER VASCULAR LEFT PROXIMAL FEMORAL COMPRESS: NORMAL
BH CV LOWER VASCULAR LEFT SAPHENOFEMORAL JUNCTION AUGMENT: NORMAL
BH CV LOWER VASCULAR LEFT SAPHENOFEMORAL JUNCTION PHASIC: NORMAL
BH CV LOWER VASCULAR LEFT SAPHENOFEMORAL JUNCTION SPONT: NORMAL
BH CV LOWER VASCULAR RIGHT COMMON FEMORAL AUGMENT: NORMAL
BH CV LOWER VASCULAR RIGHT COMMON FEMORAL COMPRESS: NORMAL
BH CV LOWER VASCULAR RIGHT COMMON FEMORAL PHASIC: NORMAL
BH CV LOWER VASCULAR RIGHT COMMON FEMORAL SPONT: NORMAL
BH CV LOWER VASCULAR RIGHT DISTAL FEMORAL COMPRESS: NORMAL
BH CV LOWER VASCULAR RIGHT GASTRONEMIUS COMPRESS: NORMAL
BH CV LOWER VASCULAR RIGHT GREATER SAPH AK COMPRESS: NORMAL
BH CV LOWER VASCULAR RIGHT GREATER SAPH BK COMPRESS: NORMAL
BH CV LOWER VASCULAR RIGHT LESSER SAPH COMPRESS: NORMAL
BH CV LOWER VASCULAR RIGHT MID FEMORAL AUGMENT: NORMAL
BH CV LOWER VASCULAR RIGHT MID FEMORAL COMPRESS: NORMAL
BH CV LOWER VASCULAR RIGHT MID FEMORAL PHASIC: NORMAL
BH CV LOWER VASCULAR RIGHT MID FEMORAL SPONT: NORMAL
BH CV LOWER VASCULAR RIGHT PERONEAL COMPRESS: NORMAL
BH CV LOWER VASCULAR RIGHT POPLITEAL AUGMENT: NORMAL
BH CV LOWER VASCULAR RIGHT POPLITEAL COMPRESS: NORMAL
BH CV LOWER VASCULAR RIGHT POPLITEAL PHASIC: NORMAL
BH CV LOWER VASCULAR RIGHT POPLITEAL SPONT: NORMAL
BH CV LOWER VASCULAR RIGHT POSTERIOR TIBIAL COMPRESS: NORMAL
BH CV LOWER VASCULAR RIGHT PROFUNDA FEMORAL COMPRESS: NORMAL
BH CV LOWER VASCULAR RIGHT PROXIMAL FEMORAL COMPRESS: NORMAL
BH CV LOWER VASCULAR RIGHT SAPHENOFEMORAL JUNCTION COMPRESS: NORMAL
BH CV VAS BP LEFT ARM: NORMAL MMHG
BH CV VAS PRELIMINARY FINDINGS SCRIPTING: 1
BH CV XLRA - RV BASE: 3.2 CM
BH CV XLRA - RV LENGTH: 6.8 CM
BH CV XLRA - RV MID: 2.16 CM
BILIRUB SERPL-MCNC: 0.6 MG/DL (ref 0–1.2)
BUN SERPL-MCNC: 11.1 MG/DL (ref 8–23)
BUN/CREAT SERPL: 14.6 (ref 7–25)
CALCIUM SPEC-SCNC: 9.7 MG/DL (ref 8.6–10.5)
CHLORIDE SERPL-SCNC: 98 MMOL/L (ref 98–107)
CO2 SERPL-SCNC: 26 MMOL/L (ref 22–29)
CREAT SERPL-MCNC: 0.76 MG/DL (ref 0.57–1)
CV ZIO BASELINE AVG BPM: 73 BPM
CV ZIO BASELINE BPM HIGH: 160 BPM
CV ZIO BASELINE BPM LOW: 52 BPM
CV ZIO DEVICE ANALYSIS TIME: NORMAL
CV ZIO ECT SVE COUNT: 167 EPISODES
CV ZIO ECT SVE CPLT COUNT: 3 EPISODES
CV ZIO ECT SVE CPLT FREQ: NORMAL
CV ZIO ECT SVE FREQ: NORMAL
CV ZIO ECT SVE TPLT COUNT: 7 EPISODES
CV ZIO ECT SVE TPLT FREQ: NORMAL
CV ZIO ECT VE COUNT: 84 EPISODES
CV ZIO ECT VE CPLT COUNT: 0 EPISODES
CV ZIO ECT VE CPLT FREQ: 0
CV ZIO ECT VE FREQ: NORMAL
CV ZIO ECT VE TPLT COUNT: 0 EPISODES
CV ZIO ECT VE TPLT FREQ: 0
CV ZIO ECTOPIC SVE COUPLET RAW PERCENT: 0 %
CV ZIO ECTOPIC SVE ISOLATED PERCENT: 0.01 %
CV ZIO ECTOPIC SVE TRIPLET RAW PERCENT: 0 %
CV ZIO ECTOPIC VE COUPLET RAW PERCENT: 0 %
CV ZIO ECTOPIC VE ISOLATED PERCENT: 0.01 %
CV ZIO ECTOPIC VE TRIPLET RAW PERCENT: 0 %
CV ZIO ENROLLMENT END: NORMAL
CV ZIO ENROLLMENT START: NORMAL
CV ZIO L BIGEMINY DUR: 3.4 SEC
CV ZIO L BIGEMINY END: NORMAL
CV ZIO L BIGEMINY START: NORMAL
CV ZIO PATIENT EVENTS DIARIES: 3
CV ZIO PATIENT EVENTS TRIGGERS: 46
CV ZIO PAUSE COUNT: 0
CV ZIO PRESCRIPTION STATUS: NORMAL
CV ZIO SVT AVG BPM: 129 BPM
CV ZIO SVT BPM HIGH: 160 BPM
CV ZIO SVT BPM LOW: 79 BPM
CV ZIO SVT COUNT: 5
CV ZIO SVT F EPI AVG BPM: 129 BPM
CV ZIO SVT F EPI BEATS: 38 BEATS
CV ZIO SVT F EPI BPM HIGH: 160 BPM
CV ZIO SVT F EPI BPM LOW: 110 BPM
CV ZIO SVT F EPI DUR: 17.4 SEC
CV ZIO SVT F EPI END: NORMAL
CV ZIO SVT F EPI START: NORMAL
CV ZIO SVT L EPI AVG BPM: 129 BPM
CV ZIO SVT L EPI BEATS: 38 BEATS
CV ZIO SVT L EPI BPM HIGH: 160 BPM
CV ZIO SVT L EPI BPM LOW: 110 BPM
CV ZIO SVT L EPI DUR: 17.4 SEC
CV ZIO SVT L EPI END: NORMAL
CV ZIO SVT L EPI START: NORMAL
CV ZIO TOTAL  ENROLLMENT PERIOD: NORMAL
CV ZIO VT COUNT: 0
DEPRECATED RDW RBC AUTO: 39.9 FL (ref 37–54)
EGFRCR SERPLBLD CKD-EPI 2021: 88.7 ML/MIN/1.73
EOSINOPHIL # BLD AUTO: 0.18 10*3/MM3 (ref 0–0.4)
EOSINOPHIL NFR BLD AUTO: 1.3 % (ref 0.3–6.2)
ERYTHROCYTE [DISTWIDTH] IN BLOOD BY AUTOMATED COUNT: 12.8 % (ref 12.3–15.4)
GEN 5 1HR TROPONIN T REFLEX: 9 NG/L
GLOBULIN UR ELPH-MCNC: 3.6 GM/DL
GLUCOSE SERPL-MCNC: 112 MG/DL (ref 65–99)
HCT VFR BLD AUTO: 44.2 % (ref 34–46.6)
HGB BLD-MCNC: 14.5 G/DL (ref 12–15.9)
HOLD SPECIMEN: NORMAL
IMM GRANULOCYTES # BLD AUTO: 0.07 10*3/MM3 (ref 0–0.05)
IMM GRANULOCYTES NFR BLD AUTO: 0.5 % (ref 0–0.5)
INR PPP: 0.93 (ref 0.89–1.12)
LV EF 2D ECHO EST: 65 %
LYMPHOCYTES # BLD AUTO: 1.6 10*3/MM3 (ref 0.7–3.1)
LYMPHOCYTES NFR BLD AUTO: 11.4 % (ref 19.6–45.3)
MCH RBC QN AUTO: 28.3 PG (ref 26.6–33)
MCHC RBC AUTO-ENTMCNC: 32.8 G/DL (ref 31.5–35.7)
MCV RBC AUTO: 86.2 FL (ref 79–97)
MONOCYTES # BLD AUTO: 0.79 10*3/MM3 (ref 0.1–0.9)
MONOCYTES NFR BLD AUTO: 5.6 % (ref 5–12)
NEUTROPHILS NFR BLD AUTO: 11.31 10*3/MM3 (ref 1.7–7)
NEUTROPHILS NFR BLD AUTO: 80.6 % (ref 42.7–76)
NRBC BLD AUTO-RTO: 0 /100 WBC (ref 0–0.2)
NT-PROBNP SERPL-MCNC: 68 PG/ML (ref 0–900)
PLATELET # BLD AUTO: 267 10*3/MM3 (ref 140–450)
PMV BLD AUTO: 11.7 FL (ref 6–12)
POTASSIUM SERPL-SCNC: 3.8 MMOL/L (ref 3.5–5.2)
PROT SERPL-MCNC: 7.9 G/DL (ref 6–8.5)
PROTHROMBIN TIME: 13.1 SECONDS (ref 12.2–15.3)
QT INTERVAL: 382 MS
QTC INTERVAL: 448 MS
RBC # BLD AUTO: 5.13 10*6/MM3 (ref 3.77–5.28)
SODIUM SERPL-SCNC: 138 MMOL/L (ref 136–145)
TROPONIN T NUMERIC DELTA: -2 NG/L
TROPONIN T SERPL HS-MCNC: 11 NG/L
UFH PPP CHRO-ACNC: 0.1 IU/ML (ref 0.3–0.7)
UFH PPP CHRO-ACNC: 0.5 IU/ML (ref 0.3–0.7)
UFH PPP CHRO-ACNC: >1.1 IU/ML (ref 0.3–0.7)
UFH PPP CHRO-ACNC: >1.1 IU/ML (ref 0.3–0.7)
WBC NRBC COR # BLD AUTO: 14.04 10*3/MM3 (ref 3.4–10.8)
WHOLE BLOOD HOLD COAG: NORMAL
WHOLE BLOOD HOLD SPECIMEN: NORMAL

## 2025-06-26 PROCEDURE — 63710000001 ONDANSETRON ODT 4 MG TABLET DISPERSIBLE: Performed by: FAMILY MEDICINE

## 2025-06-26 PROCEDURE — 25010000002 MORPHINE PER 10 MG: Performed by: EMERGENCY MEDICINE

## 2025-06-26 PROCEDURE — 25010000002 MORPHINE PER 10 MG: Performed by: NURSE PRACTITIONER

## 2025-06-26 PROCEDURE — 83880 ASSAY OF NATRIURETIC PEPTIDE: CPT | Performed by: EMERGENCY MEDICINE

## 2025-06-26 PROCEDURE — 93321 DOPPLER ECHO F-UP/LMTD STD: CPT | Performed by: INTERNAL MEDICINE

## 2025-06-26 PROCEDURE — 25010000002 HEPARIN (PORCINE) 25000-0.45 UT/250ML-% SOLUTION: Performed by: EMERGENCY MEDICINE

## 2025-06-26 PROCEDURE — 85610 PROTHROMBIN TIME: CPT | Performed by: EMERGENCY MEDICINE

## 2025-06-26 PROCEDURE — 84484 ASSAY OF TROPONIN QUANT: CPT | Performed by: EMERGENCY MEDICINE

## 2025-06-26 PROCEDURE — 99223 1ST HOSP IP/OBS HIGH 75: CPT | Performed by: FAMILY MEDICINE

## 2025-06-26 PROCEDURE — 93325 DOPPLER ECHO COLOR FLOW MAPG: CPT | Performed by: INTERNAL MEDICINE

## 2025-06-26 PROCEDURE — 25510000001 IOPAMIDOL PER 1 ML: Performed by: EMERGENCY MEDICINE

## 2025-06-26 PROCEDURE — 71275 CT ANGIOGRAPHY CHEST: CPT

## 2025-06-26 PROCEDURE — 36415 COLL VENOUS BLD VENIPUNCTURE: CPT

## 2025-06-26 PROCEDURE — 25010000002 HEPARIN (PORCINE) PER 1000 UNITS: Performed by: EMERGENCY MEDICINE

## 2025-06-26 PROCEDURE — 85730 THROMBOPLASTIN TIME PARTIAL: CPT | Performed by: EMERGENCY MEDICINE

## 2025-06-26 PROCEDURE — 71045 X-RAY EXAM CHEST 1 VIEW: CPT

## 2025-06-26 PROCEDURE — 93308 TTE F-UP OR LMTD: CPT | Performed by: INTERNAL MEDICINE

## 2025-06-26 PROCEDURE — 93321 DOPPLER ECHO F-UP/LMTD STD: CPT

## 2025-06-26 PROCEDURE — 99221 1ST HOSP IP/OBS SF/LOW 40: CPT | Performed by: NURSE PRACTITIONER

## 2025-06-26 PROCEDURE — 93970 EXTREMITY STUDY: CPT | Performed by: INTERNAL MEDICINE

## 2025-06-26 PROCEDURE — 93325 DOPPLER ECHO COLOR FLOW MAPG: CPT

## 2025-06-26 PROCEDURE — 99291 CRITICAL CARE FIRST HOUR: CPT

## 2025-06-26 PROCEDURE — 93005 ELECTROCARDIOGRAM TRACING: CPT | Performed by: EMERGENCY MEDICINE

## 2025-06-26 PROCEDURE — 85520 HEPARIN ASSAY: CPT

## 2025-06-26 PROCEDURE — 93308 TTE F-UP OR LMTD: CPT

## 2025-06-26 PROCEDURE — 85025 COMPLETE CBC W/AUTO DIFF WBC: CPT | Performed by: EMERGENCY MEDICINE

## 2025-06-26 PROCEDURE — 85520 HEPARIN ASSAY: CPT | Performed by: EMERGENCY MEDICINE

## 2025-06-26 PROCEDURE — 93970 EXTREMITY STUDY: CPT

## 2025-06-26 PROCEDURE — 80053 COMPREHEN METABOLIC PANEL: CPT | Performed by: EMERGENCY MEDICINE

## 2025-06-26 RX ORDER — IOPAMIDOL 755 MG/ML
100 INJECTION, SOLUTION INTRAVASCULAR
Status: COMPLETED | OUTPATIENT
Start: 2025-06-26 | End: 2025-06-26

## 2025-06-26 RX ORDER — ACETAMINOPHEN 650 MG/1
650 SUPPOSITORY RECTAL EVERY 4 HOURS PRN
Status: DISCONTINUED | OUTPATIENT
Start: 2025-06-26 | End: 2025-06-28

## 2025-06-26 RX ORDER — SODIUM CHLORIDE 9 MG/ML
40 INJECTION, SOLUTION INTRAVENOUS AS NEEDED
Status: DISCONTINUED | OUTPATIENT
Start: 2025-06-26 | End: 2025-07-03 | Stop reason: HOSPADM

## 2025-06-26 RX ORDER — ATORVASTATIN CALCIUM 40 MG/1
80 TABLET, FILM COATED ORAL NIGHTLY
Status: DISCONTINUED | OUTPATIENT
Start: 2025-06-26 | End: 2025-07-03 | Stop reason: HOSPADM

## 2025-06-26 RX ORDER — CETIRIZINE HYDROCHLORIDE 10 MG/1
10 TABLET ORAL NIGHTLY
Status: DISCONTINUED | OUTPATIENT
Start: 2025-06-26 | End: 2025-07-03 | Stop reason: HOSPADM

## 2025-06-26 RX ORDER — SODIUM CHLORIDE 0.9 % (FLUSH) 0.9 %
10 SYRINGE (ML) INJECTION AS NEEDED
Status: DISCONTINUED | OUTPATIENT
Start: 2025-06-26 | End: 2025-07-03 | Stop reason: HOSPADM

## 2025-06-26 RX ORDER — PANTOPRAZOLE SODIUM 40 MG/1
40 TABLET, DELAYED RELEASE ORAL 2 TIMES DAILY
Status: DISCONTINUED | OUTPATIENT
Start: 2025-06-26 | End: 2025-07-03 | Stop reason: HOSPADM

## 2025-06-26 RX ORDER — ONDANSETRON 4 MG/1
4 TABLET, FILM COATED ORAL EVERY 8 HOURS PRN
COMMUNITY

## 2025-06-26 RX ORDER — MORPHINE SULFATE 2 MG/ML
1 INJECTION, SOLUTION INTRAMUSCULAR; INTRAVENOUS ONCE
Status: COMPLETED | OUTPATIENT
Start: 2025-06-26 | End: 2025-06-26

## 2025-06-26 RX ORDER — ACETAMINOPHEN 160 MG/5ML
650 SOLUTION ORAL EVERY 4 HOURS PRN
Status: DISCONTINUED | OUTPATIENT
Start: 2025-06-26 | End: 2025-06-28

## 2025-06-26 RX ORDER — BACLOFEN 10 MG/1
10 TABLET ORAL 3 TIMES DAILY PRN
Status: DISCONTINUED | OUTPATIENT
Start: 2025-06-26 | End: 2025-07-03 | Stop reason: HOSPADM

## 2025-06-26 RX ORDER — UBIDECARENONE 75 MG
100 CAPSULE ORAL DAILY
Status: DISCONTINUED | OUTPATIENT
Start: 2025-06-27 | End: 2025-07-03 | Stop reason: HOSPADM

## 2025-06-26 RX ORDER — BISACODYL 5 MG/1
5 TABLET, DELAYED RELEASE ORAL DAILY PRN
Status: DISCONTINUED | OUTPATIENT
Start: 2025-06-26 | End: 2025-07-03 | Stop reason: HOSPADM

## 2025-06-26 RX ORDER — SODIUM CHLORIDE 0.9 % (FLUSH) 0.9 %
10 SYRINGE (ML) INJECTION EVERY 12 HOURS SCHEDULED
Status: DISCONTINUED | OUTPATIENT
Start: 2025-06-26 | End: 2025-07-03 | Stop reason: HOSPADM

## 2025-06-26 RX ORDER — ONDANSETRON 2 MG/ML
4 INJECTION INTRAMUSCULAR; INTRAVENOUS EVERY 6 HOURS PRN
Status: DISCONTINUED | OUTPATIENT
Start: 2025-06-26 | End: 2025-07-03 | Stop reason: HOSPADM

## 2025-06-26 RX ORDER — AMOXICILLIN 250 MG
2 CAPSULE ORAL 2 TIMES DAILY PRN
Status: DISCONTINUED | OUTPATIENT
Start: 2025-06-26 | End: 2025-07-03 | Stop reason: HOSPADM

## 2025-06-26 RX ORDER — ASCORBIC ACID 500 MG
500 TABLET ORAL DAILY
Status: DISCONTINUED | OUTPATIENT
Start: 2025-06-27 | End: 2025-07-03 | Stop reason: HOSPADM

## 2025-06-26 RX ORDER — HEPARIN SODIUM 10000 [USP'U]/100ML
14 INJECTION, SOLUTION INTRAVENOUS
Status: DISCONTINUED | OUTPATIENT
Start: 2025-06-26 | End: 2025-06-28

## 2025-06-26 RX ORDER — KETOTIFEN FUMARATE 0.35 MG/ML
1 SOLUTION/ DROPS OPHTHALMIC 2 TIMES DAILY
COMMUNITY

## 2025-06-26 RX ORDER — PANTOPRAZOLE SODIUM 40 MG/1
40 TABLET, DELAYED RELEASE ORAL 2 TIMES DAILY
COMMUNITY

## 2025-06-26 RX ORDER — HEPARIN SODIUM 1000 [USP'U]/ML
50 INJECTION, SOLUTION INTRAVENOUS; SUBCUTANEOUS AS NEEDED
Status: DISCONTINUED | OUTPATIENT
Start: 2025-06-26 | End: 2025-06-26

## 2025-06-26 RX ORDER — NITROGLYCERIN 0.4 MG/1
0.4 TABLET SUBLINGUAL
Status: DISCONTINUED | OUTPATIENT
Start: 2025-06-26 | End: 2025-07-03 | Stop reason: HOSPADM

## 2025-06-26 RX ORDER — LEVETIRACETAM 500 MG/1
500 TABLET ORAL EVERY 12 HOURS SCHEDULED
Status: DISCONTINUED | OUTPATIENT
Start: 2025-06-26 | End: 2025-07-03 | Stop reason: HOSPADM

## 2025-06-26 RX ORDER — HEPARIN SODIUM 1000 [USP'U]/ML
80 INJECTION, SOLUTION INTRAVENOUS; SUBCUTANEOUS ONCE
Status: COMPLETED | OUTPATIENT
Start: 2025-06-26 | End: 2025-06-26

## 2025-06-26 RX ORDER — CETIRIZINE HYDROCHLORIDE 10 MG/1
10 TABLET ORAL NIGHTLY
COMMUNITY

## 2025-06-26 RX ORDER — HEPARIN SODIUM 1000 [USP'U]/ML
25 INJECTION, SOLUTION INTRAVENOUS; SUBCUTANEOUS AS NEEDED
Status: DISCONTINUED | OUTPATIENT
Start: 2025-06-26 | End: 2025-06-26

## 2025-06-26 RX ORDER — BISACODYL 10 MG
10 SUPPOSITORY, RECTAL RECTAL DAILY PRN
Status: DISCONTINUED | OUTPATIENT
Start: 2025-06-26 | End: 2025-07-03 | Stop reason: HOSPADM

## 2025-06-26 RX ORDER — ASPIRIN 81 MG/1
81 TABLET ORAL DAILY
Status: DISCONTINUED | OUTPATIENT
Start: 2025-06-27 | End: 2025-07-03 | Stop reason: HOSPADM

## 2025-06-26 RX ORDER — ONDANSETRON 4 MG/1
4 TABLET, ORALLY DISINTEGRATING ORAL EVERY 6 HOURS PRN
Status: DISCONTINUED | OUTPATIENT
Start: 2025-06-26 | End: 2025-07-03 | Stop reason: HOSPADM

## 2025-06-26 RX ORDER — BUTALBITAL, ACETAMINOPHEN AND CAFFEINE 50; 325; 40 MG/1; MG/1; MG/1
1 TABLET ORAL EVERY 6 HOURS PRN
Status: DISCONTINUED | OUTPATIENT
Start: 2025-06-26 | End: 2025-07-03 | Stop reason: HOSPADM

## 2025-06-26 RX ORDER — KETOTIFEN FUMARATE 0.35 MG/ML
1 SOLUTION/ DROPS OPHTHALMIC 2 TIMES DAILY
Status: DISCONTINUED | OUTPATIENT
Start: 2025-06-26 | End: 2025-07-03 | Stop reason: HOSPADM

## 2025-06-26 RX ORDER — ACETAMINOPHEN 325 MG/1
650 TABLET ORAL EVERY 4 HOURS PRN
Status: DISCONTINUED | OUTPATIENT
Start: 2025-06-26 | End: 2025-07-03 | Stop reason: HOSPADM

## 2025-06-26 RX ORDER — POLYETHYLENE GLYCOL 3350 17 G/17G
17 POWDER, FOR SOLUTION ORAL DAILY PRN
Status: DISCONTINUED | OUTPATIENT
Start: 2025-06-26 | End: 2025-07-03 | Stop reason: HOSPADM

## 2025-06-26 RX ORDER — MORPHINE SULFATE 2 MG/ML
2 INJECTION, SOLUTION INTRAMUSCULAR; INTRAVENOUS ONCE
Status: COMPLETED | OUTPATIENT
Start: 2025-06-26 | End: 2025-06-26

## 2025-06-26 RX ORDER — LUBIPROSTONE 8 UG/1
8 CAPSULE ORAL 2 TIMES DAILY
Status: DISCONTINUED | OUTPATIENT
Start: 2025-06-26 | End: 2025-06-28

## 2025-06-26 RX ADMIN — HEPARIN SODIUM 5620 UNITS: 1000 INJECTION INTRAVENOUS; SUBCUTANEOUS at 09:00

## 2025-06-26 RX ADMIN — LEVETIRACETAM 500 MG: 500 TABLET, FILM COATED ORAL at 20:38

## 2025-06-26 RX ADMIN — ACETAMINOPHEN 650 MG: 325 TABLET, FILM COATED ORAL at 20:37

## 2025-06-26 RX ADMIN — MORPHINE SULFATE 2 MG: 2 INJECTION, SOLUTION INTRAMUSCULAR; INTRAVENOUS at 06:18

## 2025-06-26 RX ADMIN — Medication 10 ML: at 21:55

## 2025-06-26 RX ADMIN — HEPARIN SODIUM 18 UNITS/KG/HR: 10000 INJECTION, SOLUTION INTRAVENOUS at 09:01

## 2025-06-26 RX ADMIN — ATORVASTATIN CALCIUM 80 MG: 40 TABLET, FILM COATED ORAL at 20:38

## 2025-06-26 RX ADMIN — ONDANSETRON 4 MG: 4 TABLET, ORALLY DISINTEGRATING ORAL at 20:44

## 2025-06-26 RX ADMIN — LUBIPROSTONE 8 MCG: 8 CAPSULE ORAL at 21:57

## 2025-06-26 RX ADMIN — ACETAMINOPHEN 650 MG: 325 TABLET, FILM COATED ORAL at 14:01

## 2025-06-26 RX ADMIN — MORPHINE SULFATE 1 MG: 2 INJECTION, SOLUTION INTRAMUSCULAR; INTRAVENOUS at 22:50

## 2025-06-26 RX ADMIN — BACLOFEN 10 MG: 10 TABLET ORAL at 21:57

## 2025-06-26 RX ADMIN — CETIRIZINE HYDROCHLORIDE 10 MG: 10 TABLET, FILM COATED ORAL at 20:38

## 2025-06-26 RX ADMIN — IOPAMIDOL 75 ML: 755 INJECTION, SOLUTION INTRAVENOUS at 06:42

## 2025-06-26 RX ADMIN — BISACODYL 10 MG: 10 SUPPOSITORY RECTAL at 21:57

## 2025-06-26 RX ADMIN — PANTOPRAZOLE SODIUM 40 MG: 40 TABLET, DELAYED RELEASE ORAL at 20:38

## 2025-06-26 NOTE — H&P
[facility] Medicine Services  HISTORY AND PHYSICAL    Patient Name: Linda Schafer  : 1960  MRN: [id number]  Primary Care Physician: Micheline Heath DO  Date of admission: 2025      Subjective   Subjective     Chief Complaint:  Pain with inspiration, dyspnea     HPI:  Linda Schafer is a 64 y.o. female with PMHx HLD, migraines, recent Right MCA ischemic stroke S/P decompressive craniectomy per Dr Olivarez on . She was discharged to [facility]  until . Daughter feels she was released too early but was told insurance would no longer pay for the rehab. She has only been able to stand and pivot at home.   Yesterday afternoon she noticed pain when taking a deep breath and some dyspnea. Denies chest pain. No LE pain. She presented to ED for evaluation and found to have large saddle PE. She has been started on Heparin drip. Cardiology was contacted as well.     Personal History     Past Medical History:   Diagnosis Date    Atypical chest pain     Chest pain.Atypical chest pain    Chest pain     COVID-19     2022    COVID-19 vaccine administered     2021 ,  J [employer] J ) 10/25/2021 - Moderna    Diminished pulses in lower extremity     Diverticulosis     Dizziness     Occasional    Dyslipidemia     Dyspnea     Edema     Fatigue     GERD (gastroesophageal reflux disease)     Hiatal hernia     Hyperlipidemia     Migraine headache     Palpitations     Personal history of cardiac murmur     SOB (shortness of breath)     Stroke 27           Past Surgical History:   Procedure Laterality Date    CHOLECYSTECTOMY      CRANIOTOMY  27    CRANIOTOMY FOR SUBDURAL HEMATOMA Right 2025    Procedure: DECOMPRESSIVE CRANIECTOMY RIGHT;  Surgeon: Matt Olivarez MD;  Location: [facility];  Service: Neurosurgery;  Laterality: Right;    HYSTERECTOMY      OOPHORECTOMY      TUBAL ABDOMINAL LIGATION         Family History: family history includes Anxiety disorder in her  mother; Coronary artery disease in an other family member; Diabetes in an other family member; Heart attack in her father; Hyperlipidemia in her father, mother, and another family member; Hypertension in her father, mother, and another family member; Stroke in her father, mother, and another family member; Sudden death in an other family member.     Social History:  reports that she has never smoked. She has never been exposed to tobacco smoke. She has never used smokeless tobacco. She reports that she does not drink alcohol and does not use drugs.  Social History     Social History Narrative    Not on file       Medications:  Available home medication information reviewed.  aspirin, atorvastatin, baclofen, butalbital-acetaminophen-caffeine, cetirizine, levETIRAcetam, linaclotide, ondansetron, pantoprazole, vitamin B-12, and vitamin C    Allergies   Allergen Reactions    Codeine      Codeine Derivatives    Penicillins Rash     Has tolerated Amoxicillin       Objective   Objective     Vital Signs:   Temp:  [98.3 °F (36.8 °C)-99.6 °F (37.6 °C)] 99.6 °F (37.6 °C)  Heart Rate:  [68-89] 88  Resp:  [19-20] 20  BP: (102-123)/(54-78) 110/61  Flow (L/min) (Oxygen Therapy):  [2] 2       Physical Exam   Constitutional: Awake, alert, NAD, non-toxic  Eyes: PERRLA, sclerae anicteric, no conjunctival injection  HENT: NCAT, mucous membranes moist  Neck: Supple, no thyromegaly, no lymphadenopathy, trachea midline  Respiratory: Clear to auscultation bilaterally, nonlabored respirations   Cardiovascular: RRR, no murmurs, rubs, or gallops, palpable pedal pulses bilaterally  Gastrointestinal: Positive bowel sounds, soft, nontender, nondistended  Musculoskeletal: +LLE swelling, no clubbing or cyanosis to extremities  Psychiatric: Appropriate affect, cooperative  Neurologic: Oriented x 3, speech clear  Skin: No rashes     Result Review:  I have personally reviewed the results from the time of this admission to 6/26/2025 14:22 EDT and  agree with these findings:  [x]  Laboratory list / accordion  []  Microbiology  [x]  Radiology  [x]  EKG/Telemetry   []  Cardiology/Vascular   []  Pathology  [x]  Old records  []  Other:    LAB RESULTS:      Lab 06/26/25  0613   WBC 14.04*   HEMOGLOBIN 14.5   HEMATOCRIT 44.2   PLATELETS 267   NEUTROS ABS 11.31*   IMMATURE GRANS (ABS) 0.07*   LYMPHS ABS 1.60   MONOS ABS 0.79   EOS ABS 0.18   MCV 86.2   PROTIME 13.1   INR 0.93   APTT 22.9*   HEPARIN ANTI-XA 0.10*         Lab 06/26/25  0613   SODIUM 138   POTASSIUM 3.8   CHLORIDE 98   CO2 26.0   ANION GAP 14.0   BUN 11.1   CREATININE 0.76   EGFR 88.7   GLUCOSE 112*   CALCIUM 9.7         Lab 06/26/25 0613   TOTAL PROTEIN 7.9   ALBUMIN 4.3   GLOBULIN 3.6   ALT (SGPT) 37*   AST (SGOT) 61*   BILIRUBIN 0.6   ALK PHOS 147*         Lab 06/26/25  0800 06/26/25  0613   PROBNP  --  68.0   HSTROP T 9 11                     Microbiology Results (last 10 days)       ** No results found for the last 240 hours. **            Duplex Venous Lower Extremity - Bilateral CAR  Result Date: 6/26/2025    Acute left lower extremity deep vein thrombosis noted in the posterial tibial, peroneal and gastrocnemius.   All other veins appeared normal bilaterally.     CT Angiogram Chest Pulmonary Embolism  Result Date: 6/26/2025  CT ANGIOGRAM CHEST PULMONARY EMBOLISM Date of Exam: 6/26/2025 6:32 AM EDT Indication: left pleuritic chest pain. Comparison: None available. Technique: Axial CT images were obtained of the chest after the uneventful intravenous administration of 75 mL [id number]-[id number] utilizing pulmonary embolism protocol.  In addition, a 3-D volume rendered image was created for interpretation.  Reconstructed coronal and sagittal images were also obtained. Automated exposure control and iterative construction methods were used. Findings: Pulmonary arteries: Adequate opacification of the pulmonary arteries. There is a large saddle embolus. There are also filling defects extending into the  bilateral upper and lower lobes as well as the right middle lobe with both segmental and subsegmental  involvement. The ventricular ratio is normal. There is no evidence of right heart failure. Lungs and Pleura: There is bilateral basilar and lingular atelectasis. Mediastinum/Cassi: No mediastinal or hilar lymphadenopathy. Lymph nodes: No axillary or supraclavicular adenopathy. Cardiovascular: The cardiac chambers are within normal limits. The pericardium is normal. The aorta and its arch branch vessels are unremarkable.   Upper Abdomen: Status post cholecystectomy. The upper abdominal contents are unremarkable.      Bones and Soft Tissue: No suspicious osseous lesion.     Impression: Impression: Large saddle embolus with bilateral segmental and subsegmental pulmonary emboli. There is no evidence of right heart failure. Electronically Signed: Daryn Shell MD  6/26/2025 7:03 AM EDT  Workstation ID: [id number]    XR Chest 1 View  Result Date: 6/26/2025  XR CHEST 1 VW Date of Exam: 6/26/2025 5:22 AM EDT Indication: SOA triage protocol Comparison: 5/24/2025 Findings: Cardiac and mediastinal contours are normal. Pulmonary vascularity is normal. There is mild infiltrate or atelectasis at the left base. The lungs are otherwise clear. No pneumothorax.     Impression: Mild infiltrate or atelectasis at the left base. Electronically Signed: Joe Grover MD  6/26/2025 5:36 AM EDT  Workstation ID: [id number]      Results for orders placed during the hospital encounter of 06/26/25    Adult Transthoracic Echo Limited W/ Cont if Necessary Per Protocol    Interpretation Summary    Left ventricular systolic function is normal. Estimated left ventricular EF = 65%    The cardiac valves are anatomically and functionally normal.      Assessment & Plan   Assessment & Plan       Pulmonary embolus    Hiatal hernia    GERD (gastroesophageal reflux disease)    Diverticulosis    Dyslipidemia    Grade I diastolic dysfunction    Status post  craniotomy    Acute deep vein thrombosis (DVT) of left lower extremity    Acute Large Saddle PE w/ bilateral segmental and subsegmental pulmonary emboli  Acute LLE DVT    -Cardiology contacted by ED, no current plan for thrombectomy  -Heparin drip   -Discussed with carlee Styles for AC  -Echo pending  -Endorses family Hx of blood clots  -Hypercoagulable panel ordered per Stroke Neurology 6/19 and in review, only + result is for an Antithrombin III which is slightly elevated. Typically this is insignificant in isolation and is not indicative of a clotting disorder on its own per Neurology.  -Patient recently returned Holter Monitor    Recent R MCA ischemic stroke S/P decompressive craniectomy   -Continue ASA, Statin  -Continue prophylactic Keppra 500mg BID  -Eventual plan for craniotomy with Dr Olivarez  -Has been wearing helmet     Migraines  -Keppra has been effective for HA  -PRN Fiorcet     Constipation  -On [facility] outpatient, formulary sub [place]     VTE Prophylaxis:  Pharmacologic VTE prophylaxis orders are present.          CODE STATUS:    Code Status and Medical Interventions: CPR (Attempt to Resuscitate); Full Support   Ordered at: 06/26/25 1159     Code Status (Patient has no pulse and is not breathing):    CPR (Attempt to Resuscitate)     Medical Interventions (Patient has pulse or is breathing):    Full Support     Level Of Support Discussed With:    Patient       Expected Discharge suspect will need rehab again  Expected Discharge Date: 6/28/2025; Expected Discharge Time:      Sonia Sandra DO  06/26/25

## 2025-06-26 NOTE — PLAN OF CARE
This is a 64-year-old female well-known to me as I performed a right sided decompressive craniectomy for stroke approximately 1 month ago.  She was scheduled to undergo cranioplasty in the coming weeks, but unfortunately presented to the emergency room last night with a saddle pulmonary embolism.  She is cleared to be started on heparin as well as oral anticoagulation from a neurosurgical standpoint.  Unfortunately, due to this PE we are going to have to delay her surgery for several months.  We will arrange for her to follow-up with me in approximately 4 to 6 weeks to check on how she is doing.  Please call with any other questions or concerns.

## 2025-06-26 NOTE — ED PROVIDER NOTES
[place]    EMERGENCY DEPARTMENT ENCOUNTER      Pt Name: Linda Schafer  MRN: [id number]  YOB: 1960  Date of evaluation: 6/26/2025  Provider: Francesco Holland MD    CHIEF COMPLAINT       Chief Complaint   Patient presents with    Shortness of Breath         HISTORY OF PRESENT ILLNESS   Linda Schafer is a 64 y.o. female who presents to the emergency department for evaluation of pleuritic left-sided chest pain, difficulty breathing, and a cough.  The symptoms started about 14 to 15 hours ago around 2:30 in the afternoon yesterday.  Symptoms been worsening since that time and so came to the ER.  She has specific concern for a blood clot given her recent stroke and relative immobility.  She takes aspirin but no other blood thinning medications.  No fevers or chills.  No unilateral extremity swelling    REVIEW OF SYSTEMS     ROS:  A chief complaint appropriate review of systems was completed and is negative except as noted in the HPI.      PAST MEDICAL HISTORY     Past Medical History:   Diagnosis Date    Atypical chest pain     Chest pain.Atypical chest pain    Chest pain     COVID-19     06/30/2022    COVID-19 vaccine administered     04/02/2021 ,  J and J ) 10/25/2021 - Moderna    Diminished pulses in lower extremity     Diverticulosis     Dizziness     Occasional    Dyslipidemia     Dyspnea     Edema     Fatigue     GERD (gastroesophageal reflux disease)     Hiatal hernia     Hyperlipidemia     Migraine headache     Palpitations     Personal history of cardiac murmur     SOB (shortness of breath)     Stroke 5/24/25         SURGICAL HISTORY       Past Surgical History:   Procedure Laterality Date    CHOLECYSTECTOMY      CRANIOTOMY  5/25/25    CRANIOTOMY FOR SUBDURAL HEMATOMA Right 05/25/2025    Procedure: DECOMPRESSIVE CRANIECTOMY RIGHT;  Surgeon: Matt Olivarez MD;  Location: [facility];  Service: Neurosurgery;  Laterality: Right;    HYSTERECTOMY      OOPHORECTOMY      TUBAL ABDOMINAL LIGATION            CURRENT MEDICATIONS       Current Facility-Administered Medications:     heparin (porcine) injection 1,760 Units, 25 Units/kg, Intravenous, PRN, Francesco Holland MD    heparin (porcine) injection 3,520 Units, 50 Units/kg, Intravenous, PRN, Francesco Holland MD    heparin (porcine) injection 5,620 Units, 80 Units/kg, Intravenous, Once, Francesco Holland MD    heparin 65529 units/250 mL (100 units/mL) in 0.45 % NaCl infusion, 18 Units/kg/hr, Intravenous, Titrated, Francesco Holland MD    Pharmacy to Dose Heparin, , Not Applicable, Continuous PRN, Francesco Holland MD    sodium chloride 0.9 % flush 10 mL, 10 mL, Intravenous, PRN, Francesco Holland MD    Current Outpatient Medications:     aspirin 81 MG tablet, Take 1 tablet by mouth Daily. OTC, Disp: , Rfl:     atorvastatin (LIPITOR) 80 MG tablet, Take 1 tablet by mouth Every Night., Disp: , Rfl:     baclofen (LIORESAL) 10 MG tablet, Take 1 tablet by mouth 3 (Three) Times a Day As Needed for Muscle Spasms., Disp: 90 tablet, Rfl: 1    butalbital-acetaminophen-caffeine (FIORICET, ESGIC) -40 MG per tablet, , Disp: , Rfl:     levETIRAcetam (KEPPRA) 500 MG tablet, Take 1 tablet by mouth Every 12 (Twelve) Hours., Disp: , Rfl:     linaclotide (LINZESS) 145 MCG capsule capsule, Take 1 capsule by mouth Every Morning Before Breakfast., Disp: , Rfl:     vitamin B-12 (CYANOCOBALAMIN) 100 MCG tablet, Take 1 tablet by mouth Daily. OTC, Disp: , Rfl:     vitamin C (ASCORBIC ACID) 250 MG tablet, Take 2 tablets by mouth Daily. OTC, Disp: , Rfl:     Calcium 500 MG tablet, Take 500 mg by mouth Daily. OTC (Patient not taking: Reported on 6/26/2025), Disp: , Rfl:     Chlorhexidine Gluconate 4 % solution, Shower each day with solution for 5 days beginning 5 days before surgery. (Patient not taking: Reported on 6/19/2025), Disp: 120 mL, Rfl: 0    Coenzyme Q10 (CO Q-10) 100 MG capsule, Take 100 mg by mouth Every Night. OTC (Patient not taking: Reported on 6/19/2025), Disp: , Rfl:     dicyclomine  (BENTYL) 10 MG capsule, Take 1 capsule by mouth 2 (Two) Times a Day As Needed for Abdominal Cramping. (Patient not taking: Reported on 6/19/2025), Disp: , Rfl:     hydroCHLOROthiazide 25 MG tablet, Take 1 tablet every day by oral route as needed. (Patient not taking: Reported on 6/19/2025), Disp: , Rfl:     Ketotifen Fumarate (ZADITOR) 0.035 % solution, Administer 1 drop to both eyes 2 (Two) Times a Day. (Patient not taking: Reported on 6/19/2025), Disp: , Rfl:     Methylphenidate HCl (RITALIN PO), Take  by mouth. (Patient not taking: Reported on 6/19/2025), Disp: , Rfl:     multivitamin (THERAGRAN) tablet tablet, Take 1 tablet by mouth Daily. OTC (Patient not taking: Reported on 6/19/2025), Disp: , Rfl:     pantoprazole (PROTONIX) 20 MG EC tablet, Take 1 tablet by mouth 2 (Two) Times a Day. (Patient taking differently: Take 2 tablets by mouth 2 (Two) Times a Day.), Disp: 180 tablet, Rfl: 3    polyvinyl alcohol (LIQUIFILM) 1.4 % ophthalmic solution, Administer 1 drop to both eyes Every 1 (One) Hour As Needed for Dry Eyes. (Patient not taking: Reported on 6/19/2025), Disp: , Rfl:     ALLERGIES     Codeine and Penicillins    FAMILY HISTORY       Family History   Problem Relation Age of Onset    Hyperlipidemia Mother     Hypertension Mother     Stroke Mother     Anxiety disorder Mother     Stroke Father     Hypertension Father     Hyperlipidemia Father     Heart attack Father     Hyperlipidemia Other         Dyslipidemia    Stroke Other         CVA    Coronary artery disease Other     Diabetes Other     Hypertension Other     Sudden death Other         Sudden Cardiac Death (SCD)          SOCIAL HISTORY       Social History     Socioeconomic History    Marital status:    Tobacco Use    Smoking status: Never     Passive exposure: Never    Smokeless tobacco: Never   Vaping Use    Vaping status: Never Used   Substance and Sexual Activity    Alcohol use: No    Drug use: Never    Sexual activity: Defer          PHYSICAL EXAM    (up to 7 for level 4, 8 or more for level 5)     Vitals:    06/26/25 0600 06/26/25 0616 06/26/25 0630 06/26/25 0700   BP:  109/61 102/54 111/69   BP Location:       Patient Position:       Pulse: 70 71 68 71   Resp:       Temp:       TempSrc:       SpO2: 98% 95% 95% 92%   Weight:       Height:           Physical Exam  Constitutional:       General: She is not in acute distress.  HENT:      Head: Normocephalic and atraumatic.   Eyes:      Conjunctiva/sclera: Conjunctivae normal.      Pupils: Pupils are equal, round, and reactive to light.   Cardiovascular:      Rate and Rhythm: Normal rate and regular rhythm.      Pulses: Normal pulses.      Heart sounds: No murmur heard.     No gallop.   Pulmonary:      Effort: Pulmonary effort is normal. No respiratory distress.      Breath sounds: No wheezing or rales.   Chest:      Chest wall: No tenderness.   Abdominal:      General: Abdomen is flat. There is no distension.      Tenderness: There is no abdominal tenderness.   Musculoskeletal:         General: No swelling or deformity. Normal range of motion.      Comments: Mild nonpitting edema to the bilateral lower extremities   Skin:     General: Skin is warm and dry.      Capillary Refill: Capillary refill takes less than 2 seconds.   Neurological:      General: No focal deficit present.      Mental Status: She is alert and oriented to person, place, and time.   Psychiatric:         Mood and Affect: Mood normal.         Behavior: Behavior normal.            DIAGNOSTIC RESULTS     EKG: All EKGs are interpreted by the Emergency Department Physician who either signs or Co-signs this chart in the absence of a cardiologist.    ECG 12 Lead Dyspnea   Final Result   Test Reason : Dyspnea   Blood Pressure :   */*   mmHG   Vent. Rate :  83 BPM     Atrial Rate :  83 BPM      P-R Int : 136 ms          QRS Dur :  68 ms       QT Int : 382 ms       P-R-T Axes :  37  11  23 degrees     QTcB Int : 448 ms      Normal  sinus rhythm   Low voltage QRS   Cannot rule out Anterior infarct , age undetermined   Abnormal ECG   When compared with ECG of 24-May-2025 14:52,   Nonspecific T wave abnormality now evident in Anterior leads   Confirmed by MD GÓMEZ KYLE ([id number]) on 6/26/2025 5:48:57 AM      Referred By: EDMD           Confirmed By: HARLEEN GÓMEZ MD      ECG 12 Lead Dyspnea    (Results Pending)         RADIOLOGY:   [x] Radiologist's Report Reviewed:  CT Angiogram Chest Pulmonary Embolism   Final Result   Impression:   Large saddle embolus with bilateral segmental and subsegmental pulmonary emboli. There is no evidence of right heart failure.                     Electronically Signed: Daryn Shell MD     6/26/2025 7:03 AM EDT     Workstation ID: [id number]      XR Chest 1 View   Final Result   Mild infiltrate or atelectasis at the left base.            Electronically Signed: Joe Grover MD     6/26/2025 5:36 AM EDT     Workstation ID: [id number]          I ordered and independently reviewed the above noted radiographic studies.        LABS:  I independently interpreted all laboratory studies conducted during this ED visit.  The results of these studies can be seen below and my independent interpretation in the ED course      EMERGENCY DEPARTMENT COURSE and DIFFERENTIAL DIAGNOSIS/MDM:   Vitals:  AS OF 07:15 EDT    BP - 111/69  HR - 71  TEMP - 98.3 °F (36.8 °C) (Oral)  O2 SATS - 92%        Discussion below represents my analysis of pertinent findings related to patient's condition, differential diagnosis, treatment plan and final disposition.      Differential diagnosis:  The differential diagnosis associated with the patient's presentation includes: Pulmonary embolism, pleurisy, pericarditis, pneumonia, viral respiratory infection      Independent interpretations (ECG/rhythm strip/X-ray/US/CT scan): See ED course      Additional sources:  Discussed/obtained information from independent historians:   [] Spouse:   [] Parent:   []  Friend:   [] EMS:   [x] Other: Daughter provides significant portion of HPI    External record review:  5/30/2025 reviewed most recent discharge summary, patient had right frontal and temporal lobe strokes      Patient's care impacted by:   [] Diabetes   [] Hypertension   [] Coronary Artery Disease   [] Cancer   [x] Other: Recent stroke and immobility    Care significantly affected by Social Determinants of Health (housing and economic circumstances, unemployment)    [] Yes     [x] No   If yes, Patient's care significantly limited by  Social Determinants of Health including:    [] Inadequate housing    [] Low income    [] Alcoholism and drug addiction in family    [] Problems related to primary support group    [] Unemployment    [] Problems related to employment    [] Other Social Determinants of Health:     I considered prescription management with:    [] Pain medication:   [] Antiviral:   [] Antibiotic:   [] Other:    Additional orders considered but not ordered:  The following testing was considered but ultimately not selected:     ED Course:    ED Course as of 06/26/25 0715   Thu Jun 26, 2025   0548 Twelve-lead ECG independently interpreted by myself demonstrates normal sinus rhythm, no ST segment elevation or depression.  Normal axis. [KB]   0699 I independently interpreted the CT pulmonary embolism study which demonstrates saddle pulmonary embolus.  Full report from radiology pending.  Heparin drip initiated [KB]      ED Course User Index  [KB] Francesco Holland MD         Diagnostic studies were conducted.    Unification of pulmonary embolism heparin drip was initiated.  Patient does not have elevated cardiac biomarkers, hemodynamic instability, substantial oxygen requirement.  At this stage in her diagnostic workup no indication for consultation for mechanical thrombectomy.  Patient will be admitted to hospital medicine for further evaluation and treatment    PROCEDURES:  Critical Care    Performed by:  Francesco Holland MD  Authorized by: Francesco Holland MD    Critical care provider statement:     Critical care time was exclusive of:  Separately billable procedures and treating other patients    Critical care was necessary to treat or prevent imminent or life-threatening deterioration of the following conditions: Identification and treatment of saddle pulmonary embolism.    Critical care was time spent personally by me on the following activities:  Development of treatment plan with patient or surrogate, discussions with consultants, evaluation of patient's response to treatment, examination of patient, obtaining history from patient or surrogate, ordering and performing treatments and interventions, ordering and review of laboratory studies, ordering and review of radiographic studies, pulse oximetry, re-evaluation of patient's condition and review of old charts    I assumed direction of critical care for this patient from another provider in my specialty: no      Care discussed with: admitting provider        CRITICAL CARE TIME    37    CONSULTS   [facility] medicine    FINAL IMPRESSION      1. Acute saddle pulmonary embolism without acute cor pulmonale    2. Pleuritic chest pain          DISPOSITION/PLAN     ED Disposition       ED Disposition   Decision to Admit    Condition   --    Comment   --                 Comment: Please note this report has been produced using speech recognition software.      Francesco Holland MD  Attending Emergency Physician    Recent Results (from the past 24 hours)   ECG 12 Lead Dyspnea    Collection Time: 06/26/25  5:29 AM   Result Value Ref Range    QT Interval 382 ms    QTC Interval 448 ms   Comprehensive Metabolic Panel    Collection Time: 06/26/25  6:13 AM    Specimen: Blood   Result Value Ref Range    Glucose 112 (H) 65 - 99 mg/dL    BUN 11.1 8.0 - 23.0 mg/dL    Creatinine 0.76 0.57 - 1.00 mg/dL    Sodium 138 136 - 145 mmol/L    Potassium 3.8 3.5 - 5.2 mmol/L    Chloride 98 98 - 107  mmol/L    CO2 26.0 22.0 - 29.0 mmol/L    Calcium 9.7 8.6 - 10.5 mg/dL    Total Protein 7.9 6.0 - 8.5 g/dL    Albumin 4.3 3.5 - 5.2 g/dL    ALT (SGPT) 37 (H) 1 - 33 U/L    AST (SGOT) 61 (H) 1 - 32 U/L    Alkaline Phosphatase 147 (H) 39 - 117 U/L    Total Bilirubin 0.6 0.0 - 1.2 mg/dL    Globulin 3.6 gm/dL    A/G Ratio 1.2 g/dL    BUN/Creatinine Ratio 14.6 7.0 - 25.0    Anion Gap 14.0 5.0 - 15.0 mmol/L    eGFR 88.7 >60.0 mL/min/1.73   BNP    Collection Time: 06/26/25  6:13 AM    Specimen: Blood   Result Value Ref Range    proBNP 68.0 0.0 - 900.0 pg/mL   High Sensitivity Troponin T    Collection Time: 06/26/25  6:13 AM    Specimen: Blood   Result Value Ref Range    HS Troponin T 11 <14 ng/L   Green Top (Gel)    Collection Time: 06/26/25  6:13 AM   Result Value Ref Range    Extra Tube Hold for add-ons.    Lavender Top    Collection Time: 06/26/25  6:13 AM   Result Value Ref Range    Extra Tube hold for add-on    Gold Top - SST    Collection Time: 06/26/25  6:13 AM   Result Value Ref Range    Extra Tube Hold for add-ons.    Gray Top    Collection Time: 06/26/25  6:13 AM   Result Value Ref Range    Extra Tube Hold for add-ons.    Light Blue Top    Collection Time: 06/26/25  6:13 AM   Result Value Ref Range    Extra Tube Hold for add-ons.    CBC Auto Differential    Collection Time: 06/26/25  6:13 AM    Specimen: Blood   Result Value Ref Range    WBC 14.04 (H) 3.40 - 10.80 10*3/mm3    RBC 5.13 3.77 - 5.28 10*6/mm3    Hemoglobin 14.5 12.0 - 15.9 g/dL    Hematocrit 44.2 34.0 - 46.6 %    MCV 86.2 79.0 - 97.0 fL    MCH 28.3 26.6 - 33.0 pg    MCHC 32.8 31.5 - 35.7 g/dL    RDW 12.8 12.3 - 15.4 %    RDW-SD 39.9 37.0 - 54.0 fl    MPV 11.7 6.0 - 12.0 fL    Platelets 267 140 - 450 10*3/mm3    Neutrophil % 80.6 (H) 42.7 - 76.0 %    Lymphocyte % 11.4 (L) 19.6 - 45.3 %    Monocyte % 5.6 5.0 - 12.0 %    Eosinophil % 1.3 0.3 - 6.2 %    Basophil % 0.6 0.0 - 1.5 %    Immature Grans % 0.5 0.0 - 0.5 %    Neutrophils, Absolute 11.31 (H) 1.70  - 7.00 10*3/mm3    Lymphocytes, Absolute 1.60 0.70 - 3.10 10*3/mm3    Monocytes, Absolute 0.79 0.10 - 0.90 10*3/mm3    Eosinophils, Absolute 0.18 0.00 - 0.40 10*3/mm3    Basophils, Absolute 0.09 0.00 - 0.20 10*3/mm3    Immature Grans, Absolute 0.07 (H) 0.00 - 0.05 10*3/mm3    nRBC 0.0 0.0 - 0.2 /100 WBC     Note: In addition to lab results from this visit, the labs listed above may include labs taken at another facility or during a different encounter within the last 24 hours. Please correlate lab times with ED admission and discharge times for further clarification of the services performed during this visit.                 Francesco Holland MD  06/26/25 0771

## 2025-06-26 NOTE — CASE MANAGEMENT/SOCIAL WORK
Discharge Planning Assessment  [facility]     Patient Name: Linda Schafer  MRN: [id number]  Today's Date: 6/26/2025    Admit Date: 6/26/2025    Plan: IDP   Discharge Needs Assessment       Row Name 06/26/25 1108       Living Environment    People in Home spouse    Name(s) of People in Home Loc Schafer    Current Living Arrangements home    Potentially Unsafe Housing Conditions unable to assess    Primary Care Provided by spouse/significant other;child(muna)    Provides Primary Care For no one, unable/limited ability to care for self    Family Caregiver if Needed child(muna), adult;spouse    Family Caregiver Names Loc Schafer-spouse and adult children    Quality of Family Relationships involved;helpful;supportive       Resource/Environmental Concerns    Resource/Environmental Concerns none       Transition Planning    Patient/Family Anticipates Transition to inpatient rehabilitation facility    Transportation Anticipated other (see comments)  may need assistance with transportation       Discharge Needs Assessment    Readmission Within the Last 30 Days unable to assess    Equipment Currently Used at Home wheelchair;shower chair;commode                   Discharge Plan       Row Name 06/26/25 1111       Plan    Plan IDP    Plan Comments MSW met with pt. and a family member. Pt. lives with her spouse Loc Schafer in [facility]. Pt.'s daughter reports that pt. was independent prior to previous admission, but now needs full assistance at baseline. Pt. has a wheelchair, bedside commode, and shower chair. No O2, or HH currently. Pt.'s daughter states that pt. needs intense therapy. Pt. may need some assistance with transportation when medically ready to d/c. Pt. doesn't have an advanced directive or ACP documentation on file. CM will continue to follow pt. throughout her stay.                  Selected Continued Care - Prior Encounters Includes continued care and service providers with selected services from  prior encounters from 3/28/2025 to 6/26/2025      Discharged on 5/30/2025 Admission date: 5/24/2025 - Discharge disposition: Rehab Facility or Unit (DC - External)      Destination       Service Provider Services Address Phone Fax Patient Preferred    [facility] [address] [facility] [address] [phone] --                             Demographic Summary       Row Name 06/26/25 1106       General Information    Admission Type inpatient    Arrived From home    Referral Source admission list;emergency department    Reason for Consult discharge planning    Preferred Language English                   Functional Status       Row Name 06/26/25 1107       Functional Status, IADL    Medications completely dependent    Meal Preparation completely dependent    Housekeeping completely dependent    Laundry completely dependent    Shopping completely dependent    IADL Comments Independent prior to previous admission       Mental Status Summary    Recent Changes in Mental Status/Cognitive Functioning unable to assess                   Psychosocial    No documentation.                  Abuse/Neglect    No documentation.                  Legal    No documentation.                  Substance Abuse    No documentation.                  Patient Forms    No documentation.                     MIKE Butts

## 2025-06-26 NOTE — CONSULTS
[facility] at [facility]   Consult Note    Referring Provider: Dr. Holland    Reason for Consultation: pulmonary embolism    Patient Care Team:  Micheline Heath DO as PCP - General (Family Medicine)    Problem List:  Pulmonary embolism  Diagnosed 6/2025 6/26/25 CTA chest with large saddle embolus with bilateral and subsegmental PE. No right heart strain.   Echo: 6/26/2025 EF 65%.  No significant valve disease.  CVA  5/2025 MRI brain large area of restricted diffusion in the right MCA distribution. Noted acute infarct. No midline shift. Right ICA occluded. Right frontal and temporal lobe infarct.  5/2025 ECHO EF 62%. Mild MR, trace AR. Saline results negative.   Worsened edema requiring craniectomy   Holter monitor relatively benign.  5 episodes of SVT longest being 17 seconds with fastest rate of 129 bpm.  Patient triggers corresponded to sinus rhythm.  Dyslipidemia  Carotid artery disease  Known occluded right ICA  Diverticulosis  GERD/HH  Migraines  Surgeries:  Tubal ligation  Hysterectomy  Craniotomy for SDH  Cholecystectomy           Allergies   Allergen Reactions    Codeine      Codeine Derivatives    Penicillins Rash     Has tolerated Amoxicillin           Current Facility-Administered Medications:     heparin (porcine) injection 5,620 Units, 80 Units/kg, Intravenous, Once, Francesco Holland MD    heparin 17961 units/250 mL (100 units/mL) in 0.45 % NaCl infusion, 18 Units/kg/hr, Intravenous, Titrated, Francesco Holland MD    Pharmacy to Dose Heparin, , Not Applicable, Continuous PRN, Francesco Holland MD    sodium chloride 0.9 % flush 10 mL, 10 mL, Intravenous, PRN, Francesco Holland MD    Current Outpatient Medications:     aspirin 81 MG tablet, Take 1 tablet by mouth Daily. OTC, Disp: , Rfl:     atorvastatin (LIPITOR) 80 MG tablet, Take 1 tablet by mouth Every Night., Disp: , Rfl:     baclofen (LIORESAL) 10 MG tablet, Take 1 tablet by mouth 3 (Three) Times a Day As Needed for Muscle Spasms., Disp: 90  tablet, Rfl: 1    butalbital-acetaminophen-caffeine (FIORICET, ESGIC) -40 MG per tablet, , Disp: , Rfl:     levETIRAcetam (KEPPRA) 500 MG tablet, Take 1 tablet by mouth Every 12 (Twelve) Hours., Disp: , Rfl:     linaclotide (LINZESS) 145 MCG capsule capsule, Take 1 capsule by mouth Every Morning Before Breakfast., Disp: , Rfl:     vitamin B-12 (CYANOCOBALAMIN) 100 MCG tablet, Take 1 tablet by mouth Daily. OTC, Disp: , Rfl:     vitamin C (ASCORBIC ACID) 250 MG tablet, Take 2 tablets by mouth Daily. OTC, Disp: , Rfl:     Calcium 500 MG tablet, Take 500 mg by mouth Daily. OTC (Patient not taking: Reported on 6/26/2025), Disp: , Rfl:     Chlorhexidine Gluconate 4 % solution, Shower each day with solution for 5 days beginning 5 days before surgery. (Patient not taking: Reported on 6/19/2025), Disp: 120 mL, Rfl: 0    Coenzyme Q10 (CO Q-10) 100 MG capsule, Take 100 mg by mouth Every Night. OTC (Patient not taking: Reported on 6/19/2025), Disp: , Rfl:     dicyclomine (BENTYL) 10 MG capsule, Take 1 capsule by mouth 2 (Two) Times a Day As Needed for Abdominal Cramping. (Patient not taking: Reported on 6/19/2025), Disp: , Rfl:     hydroCHLOROthiazide 25 MG tablet, Take 1 tablet every day by oral route as needed. (Patient not taking: Reported on 6/19/2025), Disp: , Rfl:     Ketotifen Fumarate (ZADITOR) 0.035 % solution, Administer 1 drop to both eyes 2 (Two) Times a Day. (Patient not taking: Reported on 6/19/2025), Disp: , Rfl:     Methylphenidate HCl (RITALIN PO), Take  by mouth. (Patient not taking: Reported on 6/19/2025), Disp: , Rfl:     multivitamin (THERAGRAN) tablet tablet, Take 1 tablet by mouth Daily. OTC (Patient not taking: Reported on 6/19/2025), Disp: , Rfl:     pantoprazole (PROTONIX) 20 MG EC tablet, Take 1 tablet by mouth 2 (Two) Times a Day. (Patient taking differently: Take 2 tablets by mouth 2 (Two) Times a Day.), Disp: 180 tablet, Rfl: 3    polyvinyl alcohol (LIQUIFILM) 1.4 % ophthalmic solution,  Administer 1 drop to both eyes Every 1 (One) Hour As Needed for Dry Eyes. (Patient not taking: Reported on 6/19/2025), Disp: , Rfl:     heparin, 18 Units/kg/hr  Pharmacy to Dose Heparin,         (Not in a hospital admission)        Subjective .   History of present illness:    Patient is a 64-year-old  female who we are asked to see today for further evaluation of bilateral pulmonary embolism.  Patient has no previous history of blood clots.  Patient was admitted last month secondary to CVA.  Patient had craniectomy secondary to cerebral edema.  She had planned to undergo cranioplasty next month.  However, patient developed shortness of breath and pleuritic chest pain.  Due to the symptoms she was brought to the hospital for further evaluation.  CTA was performed and showed bilateral PE.  Venous duplex notes acute left lower extremity DVT, below the knee.  Patient was admitted to telemetry.  She is currently on a heparin drip.  Patient's family notes that neurosurgery has been by to evaluate the patient and is currently postponing her surgery until she has completed 6 months of anticoagulant therapy patient's family notes that she is not able to be very mobile.  They feel that she needs further skilled rehabilitation.      Social History     Socioeconomic History    Marital status:    Tobacco Use    Smoking status: Never     Passive exposure: Never    Smokeless tobacco: Never   Vaping Use    Vaping status: Never Used   Substance and Sexual Activity    Alcohol use: No    Drug use: Never    Sexual activity: Defer     Family History   Problem Relation Age of Onset    Hyperlipidemia Mother     Hypertension Mother     Stroke Mother     Anxiety disorder Mother     Stroke Father     Hypertension Father     Hyperlipidemia Father     Heart attack Father     Hyperlipidemia Other         Dyslipidemia    Stroke Other         CVA    Coronary artery disease Other     Diabetes Other     Hypertension Other      "Sudden death Other         Sudden Cardiac Death (SCD)         Review of Systems:  Review of Systems   Constitutional: Positive for malaise/fatigue. Negative for fever.   HENT:  Negative for nosebleeds.    Eyes:  Negative for redness and visual disturbance.   Cardiovascular:  Positive for chest pain and dyspnea on exertion. Negative for orthopnea, palpitations and paroxysmal nocturnal dyspnea.   Respiratory:  Positive for shortness of breath. Negative for cough, snoring, sputum production and wheezing.    Hematologic/Lymphatic: Negative for bleeding problem.   Skin:  Negative for flushing, itching and rash.   Musculoskeletal:  Positive for muscle weakness. Negative for falls, joint pain and muscle cramps.   Gastrointestinal:  Positive for constipation. Negative for abdominal pain, diarrhea, heartburn, nausea and vomiting.   Genitourinary:  Negative for hematuria.   Neurological:  Negative for excessive daytime sleepiness, dizziness, headaches, tremors and weakness.   Psychiatric/Behavioral:  Negative for substance abuse. The patient is not nervous/anxious.               Objective   Vitals:  /66   Pulse 72   Temp 98.3 °F (36.8 °C) (Oral)   Resp 19   Ht 157.5 cm (62\")   Wt 70.3 kg (155 lb)   SpO2 92%   BMI 28.35 kg/m²    No intake or output data in the 24 hours ending 06/26/25 0840    Vitals reviewed.   Constitutional:       Appearance: Well-developed and not in distress.   Neck:      Vascular: No JVD.      Trachea: No tracheal deviation.   Pulmonary:      Effort: Pulmonary effort is normal.      Breath sounds: Normal breath sounds.   Cardiovascular:      Normal rate. Regular rhythm.      Murmurs: There is no murmur.   Pulses:     Intact distal pulses.   Edema:     Peripheral edema absent.   Abdominal:      Tenderness: There is no abdominal tenderness.   Musculoskeletal:         General: Deformity (noted previous craniectomy) present. Skin:     General: Skin is warm and dry.   Neurological:      Mental " Status: Alert and oriented to person, place, and time.              Results Review:  I reviewed the patient's new clinical results.  Results from last 7 days   Lab Units 06/26/25  0613   WBC 10*3/mm3 14.04*   HEMOGLOBIN g/dL 14.5   HEMATOCRIT % 44.2   PLATELETS 10*3/mm3 267     Results from last 7 days   Lab Units 06/26/25  0613   SODIUM mmol/L 138   POTASSIUM mmol/L 3.8   CHLORIDE mmol/L 98   CO2 mmol/L 26.0   BUN mg/dL 11.1   CREATININE mg/dL 0.76   CALCIUM mg/dL 9.7   BILIRUBIN mg/dL 0.6   ALK PHOS U/L 147*   ALT (SGPT) U/L 37*   AST (SGOT) U/L 61*   GLUCOSE mg/dL 112*     Results from last 7 days   Lab Units 06/26/25  0613   SODIUM mmol/L 138   POTASSIUM mmol/L 3.8   CHLORIDE mmol/L 98   CO2 mmol/L 26.0   BUN mg/dL 11.1   CREATININE mg/dL 0.76   GLUCOSE mg/dL 112*   CALCIUM mg/dL 9.7     Results from last 7 days   Lab Units 06/26/25  0613   INR  0.93     Lab Results   Lab Value Date/Time    TROPONINT 9 06/26/2025 0800    TROPONINT 11 06/26/2025 0613             Results from last 7 days   Lab Units 06/26/25  0613   PROBNP pg/mL 68.0           Tele:  SR    EKG: SR no acute changes      Assessment & Plan     Bilateral pulmonary embolism  No evidence of RV strain  Cardiac enzymes negative  Currently on 3 L.  Patient relatively immobile  Acute left lower extremity DVT  Recent CVA  Status post decompressive craniectomy in May  Wears helmet when out of bed.  Was previously scheduled for cranioplasty in July.  Per family this is going to be postponed until patient has completed her 6 months of anticoagulation.  No atrial fibrillation noted on recent monitor.  Dyslipidemia        Plan:    Overall, patient is stable from cardiac standpoint.  No indication for invasive therapy of DVT or bilateral PE.  Transition from heparin to NOAC anytime from cardiac standpoint.  Cardiology will sign off.  Please call if any questions.      CINDY Ramirez     Dictated utilizing Dragon dictation

## 2025-06-26 NOTE — Clinical Note
Level of Care: Telemetry [5]   Diagnosis: Pulmonary embolus [[id number]]   Certification: I Certify That Inpatient Hospital Services Are Medically Necessary For Greater Than 2 Midnights

## 2025-06-26 NOTE — CONSULTS
Pharmacy to Dose Heparin Infusion Note    Linda Schafer is a 64 y.o. female receiving heparin infusion.     Therapy for (VTE/Cardiac): VTE  Patient Weight: 70.3 kg  Initial Bolus (Y/N): Yes  Any Bolus (Y/N): Yes     Signs or Symptoms of Bleeding: N/A      VTE (PE/DVT)   Initial Bolus: 80 units/kg (Max 10,000 units)  Initial rate: 18 units/kg/hr (Max 1,500 units/hr)    Anti-Xa Bolus   Dose Infusion Hold   Time Infusion Rate Change (units/kg/hr) Repeat  Anti-Xa   < 0.11 50 units/kg  (4000 units Max) None Increase by  4 units/kg/hr 6 hours   0.11 - 0.19 25 units/kg  (2000 units Max) None Increase by  3 units/kg/hr 6 hours   0.2 - 0.29 0 None Increase by  2 units/kg/hr 6 hours   0.3 - 0.7 0 None No Change 6 hours (after 2 consecutive levels in range check qAM)   0.71 - 0.8 0 None Decrease by  1 units/kg/hr 6 hours   0.81 - 0.9 0 None Decrease by  2 units/kg/hr 6 hours   0.91 - 1 0 60 minutes Decrease by  3 units/kg/hr 6 hours   >1 0 Hold  After Anti-Xa less than 0.7 decrease previous rate by  4 units/kg/hr  Every 2 hours until Anti-Xa less than 0.7 then when infusion restarts in 6 hours     Results from last 7 days   Lab Units 06/26/25  0613   INR  0.93   HEMOGLOBIN g/dL 14.5   HEMATOCRIT % 44.2   PLATELETS 10*3/mm3 267       Date   Time   Anti-Xa Current Rate (units/kg/hr) Bolus   (units) Rate Change   (units/kg/hr) New Rate (units/kg/hr) Repeat  Anti-Xa Comments /  Pump Check    6/26 0900 0.1 New 5620 +18 18      1134 --- 18 -- -- 18 1500 Bolus given 0900, drip started 1000. Pump confirmed                 Neptali Meyer IV, PharmD, BCPS  6/26/2025  11:31 EDT

## 2025-06-26 NOTE — ED NOTES
Linda Schafer    Nursing Report ED to Floor:  Mental status: A&Ox4  Ambulatory status: bedrest  Oxygen Therapy:  room air  Cardiac Rhythm: nsr  Admitted from: home  Safety Concerns:  none  Precautions: none  Social Issues: none  ED Room #:  16    ED Nurse Phone Extension - [phone] or may call [phone].      HPI:   Chief Complaint   Patient presents with    Shortness of Breath       Past Medical History:  Past Medical History:   Diagnosis Date    Atypical chest pain     Chest pain.Atypical chest pain    Chest pain     COVID-19     06/30/2022    COVID-19 vaccine administered     04/02/2021 ,  J and J ) 10/25/2021 - Moderna    Diminished pulses in lower extremity     Diverticulosis     Dizziness     Occasional    Dyslipidemia     Dyspnea     Edema     Fatigue     GERD (gastroesophageal reflux disease)     Hiatal hernia     Hyperlipidemia     Migraine headache     Palpitations     Personal history of cardiac murmur     SOB (shortness of breath)     Stroke 5/24/25        Past Surgical History:  Past Surgical History:   Procedure Laterality Date    CHOLECYSTECTOMY      CRANIOTOMY  5/25/25    CRANIOTOMY FOR SUBDURAL HEMATOMA Right 05/25/2025    Procedure: DECOMPRESSIVE CRANIECTOMY RIGHT;  Surgeon: Matt Olivarez MD;  [facility];  Service: Neurosurgery;  Laterality: Right;    HYSTERECTOMY      OOPHORECTOMY      TUBAL ABDOMINAL LIGATION          Admitting Doctor:   Sonia Sandra DO    Consulting Provider(s):  Consults       Date and Time Order Name Status Description    6/26/2025  7:50 AM Inpatient Cardiology Consult      5/24/2025 10:26 AM Inpatient Neurology Consult Stroke Completed              Admitting Diagnosis:   The primary encounter diagnosis was Acute saddle pulmonary embolism without acute cor pulmonale. A diagnosis of Pleuritic chest pain was also pertinent to this visit.    Most Recent Vitals:   Vitals:    06/26/25 1100 06/26/25 1117 06/26/25 1130 06/26/25 1200   BP: 112/78 113/70 113/55 116/69   BP  "Location:       Patient Position:       Pulse: 87  86 89   Resp:       Temp:       TempSrc:       SpO2: 92%  92% 92%   Weight:  70.3 kg (155 lb)     Height:  157.5 cm (62.01\")         Active LDAs/IV Access:   Lines, Drains & Airways       Active LDAs       Name Placement date Placement time Site Days    Peripheral IV 06/26/25 0613 20 G Anterior;Right Forearm 06/26/25 0613  Forearm  less than 1                    Labs (abnormal labs have a star):   Labs Reviewed   COMPREHENSIVE METABOLIC PANEL - Abnormal; Notable for the following components:       Result Value    Glucose 112 (*)     ALT (SGPT) 37 (*)     AST (SGOT) 61 (*)     Alkaline Phosphatase 147 (*)     All other components within normal limits    Narrative:     GFR Categories in Chronic Kidney Disease (CKD)              GFR Category          GFR (mL/min/1.73)    Interpretation  G1                    90 or greater        Normal or high (1)  G2                    60-89                Mild decrease (1)  G3a                   45-59                Mild to moderate decrease  G3b                   30-44                Moderate to severe decrease  G4                    15-29                Severe decrease  G5                    14 or less           Kidney failure    (1)In the absence of evidence of kidney disease, neither GFR category G1 or G2 fulfill the criteria for CKD.    eGFR calculation 2021 CKD-EPI creatinine equation, which does not include race as a factor   CBC WITH AUTO DIFFERENTIAL - Abnormal; Notable for the following components:    WBC 14.04 (*)     Neutrophil % 80.6 (*)     Lymphocyte % 11.4 (*)     Neutrophils, Absolute 11.31 (*)     Immature Grans, Absolute 0.07 (*)     All other components within normal limits   HEPARIN ANTI XA - Abnormal; Notable for the following components:    Heparin Anti-Xa (UFH) 0.10 (*)     All other components within normal limits   APTT - Abnormal; Notable for the following components:    PTT 22.9 (*)     All other " components within normal limits    Narrative:     PTT = The equivalent PTT values for the therapeutic range of heparin levels at 0.3 to 0.5 U/ml are 60 to 70 seconds.   BNP (IN-HOUSE) - Normal    Narrative:     This assay is used as an aid in the diagnosis of individuals suspected of having heart failure. It can be used as an aid in the diagnosis of acute decompensated heart failure (ADHF) in patients presenting with signs and symptoms of ADHF to the emergency department (ED). In addition, NT-proBNP of <300 pg/mL indicates ADHF is not likely.    Age Range Result Interpretation  NT-proBNP Concentration (pg/mL:      <50             Positive            >450                   Gray                 300-450                    Negative             <300    50-75           Positive            >900                  Gray                300-900                  Negative            <300      >75             Positive            >1800                  Gray                300-1800                  Negative            <300   TROPONIN - Normal    Narrative:     High Sensitive Troponin T Reference Range:  <14.0 ng/L- Negative Female for AMI  <22.0 ng/L- Negative Male for AMI  >=14 - Abnormal Female indicating possible myocardial injury.  >=22 - Abnormal Male indicating possible myocardial injury.   Clinicians would have to utilize clinical acumen, EKG, Troponin, and serial changes to determine if it is an Acute Myocardial Infarction or myocardial injury due to an underlying chronic condition.        PROTIME-INR - Normal   HIGH SENSITIVITIY TROPONIN T 1HR - Normal    Narrative:     High Sensitive Troponin T Reference Range:  <14.0 ng/L- Negative Female for AMI  <22.0 ng/L- Negative Male for AMI  >=14 - Abnormal Female indicating possible myocardial injury.  >=22 - Abnormal Male indicating possible myocardial injury.   Clinicians would have to utilize clinical acumen, EKG, Troponin, and serial changes to determine if it is an Acute  Myocardial Infarction or myocardial injury due to an underlying chronic condition.        RAINBOW DRAW    Narrative:     The following orders were created for panel order [place] Draw.  Procedure                               Abnormality         Status                     ---------                               -----------         ------                     Green Top (Gel)[[id number]]                                  Final result               Lavender Top[[id number]]                                     Final result               Gold Top - SST[[id number]]                                   Final result               Estes Top[[id number]]                                         Final result               Light Blue Top[[id number]]                                   Final result                 Please view results for these tests on the individual orders.   HEPARIN ANTI XA   CBC AND DIFFERENTIAL    Narrative:     The following orders were created for panel order CBC & Differential.  Procedure                               Abnormality         Status                     ---------                               -----------         ------                     CBC Auto Differential[[id number]]        Abnormal            Final result                 Please view results for these tests on the individual orders.   GREEN TOP   LAVENDER TOP   GOLD TOP - SST   GRAY TOP   LIGHT BLUE TOP       Meds Given in ED:   Medications   sodium chloride 0.9 % flush 10 mL (has no administration in time range)   heparin 83687 units/250 mL (100 units/mL) in 0.45 % NaCl infusion (18 Units/kg/hr × 70.3 kg Intravenous Currently Infusing 6/26/25 1200)   Pharmacy to Dose Heparin (has no administration in time range)   sodium chloride 0.9 % flush 10 mL (has no administration in time range)   sodium chloride 0.9 % flush 10 mL (has no administration in time range)   sodium chloride 0.9 % infusion 40 mL (has no administration in time range)   nitroglycerin  (NITROSTAT) SL tablet 0.4 mg (has no administration in time range)   Potassium Replacement - Follow Nurse / BPA Driven Protocol (has no administration in time range)   Magnesium Standard Dose Replacement - Follow Nurse / BPA Driven Protocol (has no administration in time range)   Phosphorus Replacement - Follow Nurse / BPA Driven Protocol (has no administration in time range)   Calcium Replacement - Follow Nurse / BPA Driven Protocol (has no administration in time range)   acetaminophen (TYLENOL) tablet 650 mg (has no administration in time range)     Or   acetaminophen (TYLENOL) 160 MG/5ML oral solution 650 mg (has no administration in time range)     Or   acetaminophen (TYLENOL) suppository 650 mg (has no administration in time range)   sennosides-docusate (PERICOLACE) 8.6-50 MG per tablet 2 tablet (has no administration in time range)     And   polyethylene glycol (MIRALAX) packet 17 g (has no administration in time range)     And   bisacodyl (DULCOLAX) EC tablet 5 mg (has no administration in time range)     And   bisacodyl (DULCOLAX) suppository 10 mg (has no administration in time range)   ondansetron ODT (ZOFRAN-ODT) disintegrating tablet 4 mg (has no administration in time range)     Or   ondansetron (ZOFRAN) injection 4 mg (has no administration in time range)   morphine injection 2 mg (2 mg Intravenous Given 6/26/25 0618)   iopamidol (ISOVUE-370) 76 % injection 100 mL (75 mL Intravenous Given 6/26/25 0642)   heparin (porcine) injection 5,620 Units (5,620 Units Intravenous Given 6/26/25 0900)     heparin, 18 Units/kg/hr, Last Rate: 18 Units/kg/hr (06/26/25 1200)  Pharmacy to Dose Heparin,          Last NIH score:                                                          Dysphagia screening results:  Patient Factors Component (Dysphagia:Stroke or Rule-out)  Best Eye Response: 4-->(E4) spontaneous (06/26/25 0611)  Best Motor Response: 6-->(M6) obeys commands (06/26/25 0611)  Best Verbal Response: 5-->(V5)  oriented (06/26/25 0611)  [place] Coma Scale Score: 15 (06/26/25 0611)     [place] Coma Scale:  No data recorded     CIWA:        Restraint Type:            Isolation Status:  No active isolations

## 2025-06-27 ENCOUNTER — TELEPHONE (OUTPATIENT)
Dept: NEUROSURGERY | Facility: CLINIC | Age: 65
End: 2025-06-27
Payer: COMMERCIAL

## 2025-06-27 DIAGNOSIS — Z86.73 HISTORY OF STROKE: Primary | ICD-10-CM

## 2025-06-27 PROBLEM — I26.99 PULMONARY EMBOLISM: Status: ACTIVE | Noted: 2025-06-27

## 2025-06-27 LAB
ANION GAP SERPL CALCULATED.3IONS-SCNC: 10 MMOL/L (ref 5–15)
BASOPHILS # BLD AUTO: 0.07 10*3/MM3 (ref 0–0.2)
BASOPHILS NFR BLD AUTO: 0.6 % (ref 0–1.5)
BUN SERPL-MCNC: 13.3 MG/DL (ref 8–23)
BUN/CREAT SERPL: 23.8 (ref 7–25)
CALCIUM SPEC-SCNC: 8.8 MG/DL (ref 8.6–10.5)
CHLORIDE SERPL-SCNC: 99 MMOL/L (ref 98–107)
CO2 SERPL-SCNC: 25 MMOL/L (ref 22–29)
CREAT SERPL-MCNC: 0.56 MG/DL (ref 0.57–1)
DEPRECATED RDW RBC AUTO: 40 FL (ref 37–54)
EGFRCR SERPLBLD CKD-EPI 2021: 103.3 ML/MIN/1.73
EOSINOPHIL # BLD AUTO: 0.21 10*3/MM3 (ref 0–0.4)
EOSINOPHIL NFR BLD AUTO: 1.8 % (ref 0.3–6.2)
ERYTHROCYTE [DISTWIDTH] IN BLOOD BY AUTOMATED COUNT: 12.7 % (ref 12.3–15.4)
GLUCOSE SERPL-MCNC: 112 MG/DL (ref 65–99)
HCT VFR BLD AUTO: 35.5 % (ref 34–46.6)
HGB BLD-MCNC: 11.5 G/DL (ref 12–15.9)
IMM GRANULOCYTES # BLD AUTO: 0.07 10*3/MM3 (ref 0–0.05)
IMM GRANULOCYTES NFR BLD AUTO: 0.6 % (ref 0–0.5)
LYMPHOCYTES # BLD AUTO: 2.26 10*3/MM3 (ref 0.7–3.1)
LYMPHOCYTES NFR BLD AUTO: 19.9 % (ref 19.6–45.3)
MCH RBC QN AUTO: 28.2 PG (ref 26.6–33)
MCHC RBC AUTO-ENTMCNC: 32.4 G/DL (ref 31.5–35.7)
MCV RBC AUTO: 87 FL (ref 79–97)
MONOCYTES # BLD AUTO: 1.11 10*3/MM3 (ref 0.1–0.9)
MONOCYTES NFR BLD AUTO: 9.8 % (ref 5–12)
NEUTROPHILS NFR BLD AUTO: 67.3 % (ref 42.7–76)
NEUTROPHILS NFR BLD AUTO: 7.66 10*3/MM3 (ref 1.7–7)
NRBC BLD AUTO-RTO: 0 /100 WBC (ref 0–0.2)
PLATELET # BLD AUTO: 227 10*3/MM3 (ref 140–450)
PMV BLD AUTO: 11.5 FL (ref 6–12)
POTASSIUM SERPL-SCNC: 3.8 MMOL/L (ref 3.5–5.2)
RBC # BLD AUTO: 4.08 10*6/MM3 (ref 3.77–5.28)
SODIUM SERPL-SCNC: 134 MMOL/L (ref 136–145)
UFH PPP CHRO-ACNC: 0.58 IU/ML (ref 0.3–0.7)
UFH PPP CHRO-ACNC: 0.64 IU/ML (ref 0.3–0.7)
UFH PPP CHRO-ACNC: 0.67 IU/ML (ref 0.3–0.7)
WBC NRBC COR # BLD AUTO: 11.38 10*3/MM3 (ref 3.4–10.8)

## 2025-06-27 PROCEDURE — 99232 SBSQ HOSP IP/OBS MODERATE 35: CPT | Performed by: INTERNAL MEDICINE

## 2025-06-27 PROCEDURE — 85520 HEPARIN ASSAY: CPT

## 2025-06-27 PROCEDURE — G0378 HOSPITAL OBSERVATION PER HR: HCPCS

## 2025-06-27 PROCEDURE — 97166 OT EVAL MOD COMPLEX 45 MIN: CPT

## 2025-06-27 PROCEDURE — 25010000002 HEPARIN (PORCINE) 25000-0.45 UT/250ML-% SOLUTION

## 2025-06-27 PROCEDURE — 85025 COMPLETE CBC W/AUTO DIFF WBC: CPT | Performed by: EMERGENCY MEDICINE

## 2025-06-27 PROCEDURE — 97535 SELF CARE MNGMENT TRAINING: CPT

## 2025-06-27 PROCEDURE — 80048 BASIC METABOLIC PNL TOTAL CA: CPT | Performed by: FAMILY MEDICINE

## 2025-06-27 PROCEDURE — 25010000002 ONDANSETRON PER 1 MG: Performed by: FAMILY MEDICINE

## 2025-06-27 PROCEDURE — 97162 PT EVAL MOD COMPLEX 30 MIN: CPT

## 2025-06-27 RX ADMIN — ATORVASTATIN CALCIUM 80 MG: 40 TABLET, FILM COATED ORAL at 20:10

## 2025-06-27 RX ADMIN — ONDANSETRON 4 MG: 2 INJECTION INTRAMUSCULAR; INTRAVENOUS at 08:26

## 2025-06-27 RX ADMIN — PANTOPRAZOLE SODIUM 40 MG: 40 TABLET, DELAYED RELEASE ORAL at 20:11

## 2025-06-27 RX ADMIN — HEPARIN SODIUM 14 UNITS/KG/HR: 10000 INJECTION, SOLUTION INTRAVENOUS at 08:26

## 2025-06-27 RX ADMIN — LUBIPROSTONE 8 MCG: 8 CAPSULE ORAL at 09:44

## 2025-06-27 RX ADMIN — ACETAMINOPHEN 650 MG: 325 TABLET, FILM COATED ORAL at 09:43

## 2025-06-27 RX ADMIN — ACETAMINOPHEN 650 MG: 325 TABLET, FILM COATED ORAL at 16:37

## 2025-06-27 RX ADMIN — LEVETIRACETAM 500 MG: 500 TABLET, FILM COATED ORAL at 20:10

## 2025-06-27 RX ADMIN — BACLOFEN 10 MG: 10 TABLET ORAL at 09:44

## 2025-06-27 RX ADMIN — PANTOPRAZOLE SODIUM 40 MG: 40 TABLET, DELAYED RELEASE ORAL at 09:44

## 2025-06-27 RX ADMIN — CETIRIZINE HYDROCHLORIDE 10 MG: 10 TABLET, FILM COATED ORAL at 20:10

## 2025-06-27 RX ADMIN — ASPIRIN 81 MG: 81 TABLET, COATED ORAL at 09:43

## 2025-06-27 RX ADMIN — VITAM B12 100 MCG: 100 TAB at 09:44

## 2025-06-27 RX ADMIN — LEVETIRACETAM 500 MG: 500 TABLET, FILM COATED ORAL at 09:44

## 2025-06-27 RX ADMIN — Medication 10 ML: at 09:44

## 2025-06-27 RX ADMIN — OXYCODONE HYDROCHLORIDE AND ACETAMINOPHEN 500 MG: 500 TABLET ORAL at 09:43

## 2025-06-27 RX ADMIN — KETOTIFEN FUMARATE 1 DROP: 0.25 SOLUTION/ DROPS OPHTHALMIC at 20:21

## 2025-06-27 RX ADMIN — KETOTIFEN FUMARATE 1 DROP: 0.25 SOLUTION/ DROPS OPHTHALMIC at 09:46

## 2025-06-27 RX ADMIN — ACETAMINOPHEN 650 MG: 325 TABLET, FILM COATED ORAL at 03:15

## 2025-06-27 RX ADMIN — LUBIPROSTONE 8 MCG: 8 CAPSULE ORAL at 20:10

## 2025-06-27 RX ADMIN — Medication 10 ML: at 20:10

## 2025-06-27 NOTE — PLAN OF CARE
Goal Outcome Evaluation:  Plan of Care Reviewed With: patient        Progress: no change  Outcome Evaluation: Patient presents with weakness (L>R), decreased balance, trunk control deficits, and R neglect affecting functional mobility. Max A x2 required to transfer, with L lean noted. IP PT services warranted to support increased independence. Recommend SNF at d/c.    Anticipated Discharge Disposition (PT): skilled nursing facility

## 2025-06-27 NOTE — PROGRESS NOTES
Pharmacy to Dose Heparin Infusion Note    Linda Schafer is a 64 y.o. female receiving heparin infusion.     Therapy for (VTE/Cardiac): VTE  Patient Weight: 70.3 kg  Initial Bolus (Y/N): Yes  Any Bolus (Y/N): Yes     Signs or Symptoms of Bleeding: N/A      VTE (PE/DVT)   Initial Bolus: 80 units/kg (Max 10,000 units)  Initial rate: 18 units/kg/hr (Max 1,500 units/hr)    Anti-Xa Bolus   Dose Infusion Hold   Time Infusion Rate Change (units/kg/hr) Repeat  Anti-Xa   < 0.11 50 units/kg  (4000 units Max) None Increase by  4 units/kg/hr 6 hours   0.11 - 0.19 25 units/kg  (2000 units Max) None Increase by  3 units/kg/hr 6 hours   0.2 - 0.29 0 None Increase by  2 units/kg/hr 6 hours   0.3 - 0.7 0 None No Change 6 hours (after 2 consecutive levels in range check qAM)   0.71 - 0.8 0 None Decrease by  1 units/kg/hr 6 hours   0.81 - 0.9 0 None Decrease by  2 units/kg/hr 6 hours   0.91 - 1 0 60 minutes Decrease by  3 units/kg/hr 6 hours   >1 0 Hold  After Anti-Xa less than 0.7 decrease previous rate by  4 units/kg/hr  Every 2 hours until Anti-Xa less than 0.7 then when infusion restarts in 6 hours     Results from last 7 days   Lab Units 06/27/25  0429 06/26/25  0613   INR   --  0.93   HEMOGLOBIN g/dL 11.5* 14.5   HEMATOCRIT % 35.5 44.2   PLATELETS 10*3/mm3 227 267       Date   Time   Anti-Xa Current Rate (units/kg/hr) Bolus   (units) Rate Change   (units/kg/hr) New Rate (units/kg/hr) Repeat  Anti-Xa Comments /  Pump Check    6/26 0900 0.1 New 5620 +18 18      1134 --- 18 -- -- 18 1500 Bolus given 0900, drip started 1000. Pump confirmed    6/26 1728 >1.1 18 -- held held 2030 Anti-Xa > 1.1, STAT recheck with the same result. Holding heparin and checking q2h until anti-Xa < 0.7. DW RN.    6/26 2059 0.5 held -- +14 14 0400 RUBÉN RN   6/27 0429 0.58 14 -- -- 14 1100 Kimberly 3241 -b   6/27 1153 0.64 14 -- -- 14 1800 RUBÉN RN; pump verified                                                                                                                                                                         Mary Crowe, [facility]  6/27/2025  12:41 EDT

## 2025-06-27 NOTE — PLAN OF CARE
Goal Outcome Evaluation:   Continues to be on Hep gtt at 14 units/kg/hr. Pt medicated for pain as needed. Denies nausea, vomiting. Tolerating PO intake well. Resting in bed. Denies any needs at this time.

## 2025-06-27 NOTE — THERAPY EVALUATION
Patient Name: Linda Schafer  : 1960    MRN: [id number]                              Today's Date: 2025       Admit Date: 2025    Visit Dx:     ICD-10-CM ICD-9-CM   1. Acute saddle pulmonary embolism without acute cor pulmonale  I26.92 415.13   2. Pleuritic chest pain  R07.81 786.52     Patient Active Problem List   Diagnosis    Palpitations    Personal history of cardiac murmur    Migraine headache    Hiatal hernia    GERD (gastroesophageal reflux disease)    Diverticulosis    Diminished pulses in lower extremity    Dyslipidemia    Peripheral edema    Grade I diastolic dysfunction    Acute ischemic stroke    Status post craniotomy    Status post craniectomy    Pulmonary embolus    Acute deep vein thrombosis (DVT) of left lower extremity     Past Medical History:   Diagnosis Date    Atypical chest pain     Chest pain.Atypical chest pain    Chest pain     COVID-19     2022    COVID-19 vaccine administered     2021 ,  J and J ) 10/25/2021 - Moderna    Diminished pulses in lower extremity     Diverticulosis     Dizziness     Occasional    Dyslipidemia     Dyspnea     Edema     Fatigue     GERD (gastroesophageal reflux disease)     Hiatal hernia     Hyperlipidemia     Migraine headache     Palpitations     Personal history of cardiac murmur     SOB (shortness of breath)     Stroke 25     Past Surgical History:   Procedure Laterality Date    CHOLECYSTECTOMY      CRANIOTOMY  25    CRANIOTOMY FOR SUBDURAL HEMATOMA Right 2025    Procedure: DECOMPRESSIVE CRANIECTOMY RIGHT;  Surgeon: Matt Olivarez MD;  Location: [facility];  Service: Neurosurgery;  Laterality: Right;    HYSTERECTOMY      OOPHORECTOMY      TUBAL ABDOMINAL LIGATION        General Information       Row Name 25 0955          OT Time and Intention    Document Type evaluation  -LR     Mode of Treatment occupational therapy  -LR       Row Name 25 0955          General Information    Patient Profile  Reviewed yes  -LR     Prior Level of Function independent:;feeding;max assist:;transfer;bed mobility;gait;bathing  After being D/C from [facility], pt was requiring 3 people to assist her with bathing and 2 person assist for transferring to w/c or [facility]. Hospital bed at home  -LR     Existing Precautions/Restrictions fall;other (see comments)  R sided decompressive craniectomy for stroke s/p 1 month ago, L sided residual deficits, helmet when OOB  -LR     Barriers to Rehab medically complex;previous functional deficit  -LR       Row Name 06/27/25 0955          Living Environment    Current Living Arrangements home  -LR     People in Home spouse;other (see comments)  children also assist majority of the time. Very good family support  -LR       Row Name 06/27/25 0955          Home Main Entrance    Number of Stairs, Main Entrance other (see comments)  ramp  -LR       Row Name 06/27/25 0955          Stairs Within Home, Primary    Number of Stairs, Within Home, Primary none  -LR       Row Name 06/27/25 0955          Cognition    Orientation Status (Cognition) oriented x 3  -LR       Row Name 06/27/25 0955          Safety Issues/Impairments Affecting Functional Mobility    Safety Issues Affecting Function (Mobility) awareness of need for assistance;safety precaution awareness;insight into deficits/self-awareness  -LR     Impairments Affecting Function (Mobility) balance;endurance/activity tolerance;strength;coordination;sensation/sensory awareness;grasp;motor control;motor planning;pain;postural/trunk control;range of motion (ROM)  -LR               User Key  (r) = Recorded By, (t) = Taken By, (c) = Cosigned By      Initials Name Provider Type    LR Dodie Ruiz OT Occupational Therapist                     Mobility/ADL's       Row Name 06/27/25 1005          Bed Mobility    Bed Mobility supine-sit  -LR     Supine-Sit [place] (Bed Mobility) maximum assist (25% patient effort);2 person assist;verbal cues;nonverbal cues  (demo/gesture)  -LR     Bed Mobility, Safety Issues decreased use of arms for pushing/pulling;decreased use of legs for bridging/pushing  -LR     Assistive Device (Bed Mobility) bed rails;head of bed elevated;repositioning sheet  -LR       Row Name 06/27/25 1005          Transfers    Transfers sit-stand transfer;bed-chair transfer  -LR       Row Name 06/27/25 1005          Bed-Chair Transfer    Bed-Chair [place] (Transfers) maximum assist (25% patient effort);2 person assist;nonverbal cues (demo/gesture);verbal cues  -LR     Assistive Device (Bed-Chair Transfers) other (see comments)  BUE support  -LR       Row Name 06/27/25 1005          Sit-Stand Transfer    Sit-Stand [place] (Transfers) moderate assist (50% patient effort);2 person assist;verbal cues;nonverbal cues (demo/gesture)  -LR     Assistive Device (Sit-Stand Transfers) other (see comments)  BUE support  -LR       Row Name 06/27/25 1005          Functional Mobility    Patient was able to Ambulate no, other medical factors prevent ambulation  -LR       Row Name 06/27/25 1005          Activities of Daily Living    BADL Assessment/Intervention upper body dressing;grooming;feeding;lower body dressing  -LR       Row Name 06/27/25 1005          Hygiene Care    Oral Care teeth brushed - regular toothbrush  -LR       Row Name 06/27/25 1005          Upper Body Dressing Assessment/Training    [place] Level (Upper Body Dressing) don;pajama/robe;maximum assist (25% patient effort)  -LR     Position (Upper Body Dressing) edge of bed sitting  -LR       Row Name 06/27/25 1005          Grooming Assessment/Training    [place] Level (Grooming) wash face, hands;moderate assist (50% patient effort);oral care regimen;set up;other (see comments);maximum assist (25% patient effort)  lotion  -LR     Position (Grooming) supported sitting  -LR       Row Name 06/27/25 1005          Self-Feeding Assessment/Training    [place] Level (Feeding) liquids to  mouth;moderate assist (50% patient effort)  -LR     Position (Feeding) supported sitting  -LR       Row Name 06/27/25 1005          Lower Body Dressing Assessment/Training    [place] Level (Lower Body Dressing) don;socks;dependent (less than 25% patient effort)  -LR     Position (Lower Body Dressing) sitting up in bed  -LR               User Key  (r) = Recorded By, (t) = Taken By, (c) = Cosigned By      Initials Name Provider Type    LR Dodie Ruiz, OT Occupational Therapist                   Obj/Interventions       Row Name 06/27/25 1010          Sensory Assessment (Somatosensory)    Sensory Assessment (Somatosensory) left UE  -LR     Left UE Sensory Assessment general sensation;light touch awareness;impaired  -LR     Sensory Subjective Reports numbness  -LR       [facility] Name 06/27/25 1010          Vision Assessment/Intervention    Visual Impairment/Limitations WFL  -LR       [facility] Name 06/27/25 1010          Range of Motion Comprehensive    General Range of Motion upper extremity range of motion deficits identified  -LR       Row Name 06/27/25 1010          Strength Comprehensive (MMT)    General Manual Muscle Testing (MMT) Assessment upper extremity strength deficits identified  -LR     Comment, General Manual Muscle Testing (MMT) Assessment LUE grossly 1/5 MMT; RUE grossly 4/5 MMT  -LR       Row Name 06/27/25 1010          Shoulder (Therapeutic Exercise)    Shoulder (Therapeutic Exercise) PROM (passive range of motion)  -LR     Shoulder PROM (Therapeutic Exercise) left;flexion;extension;internal rotation;external rotation;sitting;10 repetitions  -[facility] Name 06/27/25 1010          Elbow/Forearm (Therapeutic Exercise)    Elbow/Forearm (Therapeutic Exercise) PROM (passive range of motion)  -LR     Elbow/Forearm PROM (Therapeutic Exercise) left;flexion;extension;sitting;10 repetitions  -[facility] Name 06/27/25 1010          Wrist (Therapeutic Exercise)    Wrist (Therapeutic Exercise) PROM (passive  range of motion)  -LR     Wrist PROM (Therapeutic Exercise) left;flexion;extension;10 repetitions  -LR       Row Name 06/27/25 1010          Hand (Therapeutic Exercise)    Hand (Therapeutic Exercise) PROM (passive range of motion)  -LR     Hand PROM (Therapeutic Exercise) left;finger flexion;finger extension;10 repetitions  -LR       Row Name 06/27/25 1010          Motor Skills    Therapeutic Exercise shoulder;elbow/forearm;wrist;hand  -LR       Row Name 06/27/25 1010          Balance    Balance Assessment sitting static balance;sitting dynamic balance;standing static balance;standing dynamic balance  -LR     Static Sitting Balance minimal assist;1-person assist;verbal cues;non-verbal cues (demo/gesture);other (see comments)  bouts of CGA  -LR     Dynamic Sitting Balance moderate assist;1-person assist;verbal cues;non-verbal cues (demo/gesture)  -LR     Position, Sitting Balance unsupported;sitting edge of bed  -LR     Static Standing Balance moderate assist;2-person assist;verbal cues;non-verbal cues (demo/gesture)  -LR     Dynamic Standing Balance maximum assist;2-person assist;verbal cues;non-verbal cues (demo/gesture)  -LR     Position/Device Used, Standing Balance supported;other (see comments)  BUE support  -LR     Balance Interventions sitting;standing;sit to stand;supported;static;dynamic;occupation based/functional task  -LR               User Key  (r) = Recorded By, (t) = Taken By, (c) = Cosigned By      Initials Name Provider Type    LR Dodie Ruiz, LOU Occupational Therapist                   Goals/Plan       Row Name 06/27/25 1023          Bed Mobility Goal 1 (OT)    Activity/Assistive Device (Bed Mobility Goal 1, OT) rolling to left;rolling to right  -LR     [place] Level/Cues Needed (Bed Mobility Goal 1, OT) moderate assist (50-74% patient effort);other (see comments)  x1  -LR     Time Frame (Bed Mobility Goal 1, OT) short term goal (STG);4 days  -LR     Progress/Outcomes (Bed Mobility Goal  1, OT) goal ongoing;new goal  -LR       Row Name 06/27/25 1023          Transfer Goal 1 (OT)    Activity/Assistive Device (Transfer Goal 1, OT) sit-to-stand/stand-to-sit;bed-to-chair/chair-to-bed;commode, bedside without drop arms  -LR     [place] Level/Cues Needed (Transfer Goal 1, OT) moderate assist (50-74% patient effort);other (see comments)  x2  -LR     Time Frame (Transfer Goal 1, OT) long term goal (LTG);10 days  -LR     Progress/Outcome (Transfer Goal 1, OT) new goal;goal ongoing  -LR       Row Name 06/27/25 1023          Grooming Goal 1 (OT)    Activity/Device (Grooming Goal 1, OT) hair care;oral care;wash face, hands  -LR     [place] (Grooming Goal 1, OT) set-up required  -LR     Time Frame (Grooming Goal 1, OT) short term goal (STG);1 week  -LR     Strategies/Barriers (Grooming Goal 1, OT) sitting in chair  -LR     Progress/Outcome (Grooming Goal 1, OT) new goal;goal ongoing  -LR       Row Name 06/27/25 1023          Self-Feeding Goal 1 (OT)    Activity/Device (Self-Feeding Goal 1, OT) liquids to mouth  -LR     [place] Level/Cues Needed (Self-Feeding Goal 1, OT) set-up required  -LR     Time Frame (Self-Feeding Goal 1, OT) short term goal (STG);3 days  -LR     Strategies/Barriers (Self-Feeding Goal 1, OT) minimal spilling  -LR     Progress/Outcomes (Self-Feeding Goal 1, OT) new goal;goal ongoing  -LR       Row Name 06/27/25 1023          Therapy Assessment/Plan (OT)    Planned Therapy Interventions (OT) activity tolerance training;adaptive equipment training;BADL retraining;strengthening exercise;occupation/activity based interventions;transfer/mobility retraining;functional balance retraining;IADL retraining;passive ROM/stretching;ROM/therapeutic exercise;patient/caregiver education/training  -LR               User Key  (r) = Recorded By, (t) = Taken By, (c) = Cosigned By      Initials Name Provider Type    LR Dodie Ruiz, OT Occupational Therapist                   Clinical  Impression       Row Name 06/27/25 1013          Pain Assessment    Pain Location chest;neck  -LR     Pain Side/Orientation generalized  -LR     Pain Management Interventions activity modification encouraged;exercise or physical activity utilized;nursing notified  -LR     Response to Pain Interventions activity level improved;activity participation with tolerable pain  -LR     Additional Documentation Pain Scale: FACES Pre/Post-Treatment (Group)  -LR       Row Name 06/27/25 1013          Pain Scale: FACES Pre/Post-Treatment    Pain: FACES Scale, Pretreatment 0-->no hurt  -LR     Posttreatment Pain Rating 2-->hurts little bit  -LR       Row Name 06/27/25 1013          Plan of Care Review    Plan of Care Reviewed With patient;daughter  -LR     Progress no change  -LR     Outcome Evaluation OT eval complete. Pt presents below baseline with elevated pain, limited endurance, strength, sensation, postural control, balance deficits, and motor function/control warranting IPOT. Nausea subsiding throughout session. Pt very motivated for therapy. Dtr at bed side and very supportive and helpful. Max Ax2 for bed mobility. Sitting balance improving with time at EOB. Increased support needed this session with grooming tasks. Pt also reports that neck pain was better towards end of session. Positioning techniques utilized for LUE. Ther ex tolerated well. Recommend IPOT POC and SNF at D/C.  -LR       Row Name 06/27/25 1013          Therapy Assessment/Plan (OT)    Patient/Family Therapy Goal Statement (OT) Pt states that she wants to be able to do more for herself  -LR     Rehab Potential (OT) good  -LR     Criteria for Skilled Therapeutic Interventions Met (OT) yes;skilled treatment is necessary  -LR     Therapy Frequency (OT) daily  -LR     Predicted Duration of Therapy Intervention (OT) 10 days  -LR       Row Name 06/27/25 1013          Therapy Plan Review/Discharge Plan (OT)    Anticipated Discharge Disposition (OT) skilled  nursing facility  -LR       Row Name 06/27/25 1013          Vital Signs    Pre Systolic BP Rehab 111  -LR     Pre Treatment Diastolic BP 62  -LR     Pre SpO2 (%) 97  -LR     O2 Delivery Pre Treatment room air  -LR     O2 Delivery Intra Treatment room air  -LR     O2 Delivery Post Treatment room air  -LR       Row Name 06/27/25 1013          Positioning and Restraints    Pre-Treatment Position in bed  -LR     Post Treatment Position chair  -LR     In Chair notified nsg;reclined;call light within reach;encouraged to call for assist;exit alarm on;waffle cushion;on mechanical lift sling;with family/caregiver;legs elevated  -LR               User Key  (r) = Recorded By, (t) = Taken By, (c) = Cosigned By      Initials Name Provider Type    LR Doide Ruiz, OT Occupational Therapist                   Outcome Measures       Row Name 06/27/25 1025          How much help from another is currently needed...    Putting on and taking off regular lower body clothing? 1  -LR     Bathing (including washing, rinsing, and drying) 2  -LR     Toileting (which includes using toilet bed pan or urinal) 1  -LR     Putting on and taking off regular upper body clothing 2  -LR     Taking care of personal grooming (such as brushing teeth) 2  -LR     Eating meals 2  -LR     AM-PAC 6 Clicks Score (OT) 10  -LR       Row Name 06/27/25 0815          How much help from another person do you currently need...    Turning from your back to your side while in flat bed without using bedrails? 2  -NS     Moving from lying on back to sitting on the side of a flat bed without bedrails? 2  -NS     Moving to and from a bed to a chair (including a wheelchair)? 2  -NS     Standing up from a chair using your arms (e.g., wheelchair, bedside chair)? 2  -NS     Climbing 3-5 steps with a railing? 1  -NS     To walk in hospital room? 1  -NS     AM-PAC 6 Clicks Score (PT) 10  -NS     Highest Level of Mobility Goal Move to Chair/Commode-4  -NS       Row Name  06/27/25 1025 06/27/25 0815       Functional Assessment    Outcome Measure Options AM-PAC 6 Clicks Daily Activity (OT)  -LR AM-PAC 6 Clicks Basic Mobility (PT)  -NS              User Key  (r) = Recorded By, (t) = Taken By, (c) = Cosigned By      Initials Name Provider Type    NS Ciarra Norman, PT Physical Therapist    LR Dodie Ruiz, OT Occupational Therapist                    Occupational Therapy Education       Title: PT OT SLP Therapies (In Progress)       Topic: Occupational Therapy (In Progress)       Point: ADL training (In Progress)       Learning Progress Summary            Patient Acceptance, E, NR by LR at 6/27/2025 1025   Family Acceptance, E, NR by LR at 6/27/2025 1025                      Point: Home exercise program (In Progress)       Learning Progress Summary            Patient Acceptance, E, NR by LR at 6/27/2025 1025   Family Acceptance, E, NR by LR at 6/27/2025 1025                      Point: Precautions (In Progress)       Learning Progress Summary            Patient Acceptance, E, NR by LR at 6/27/2025 1025   Family Acceptance, E, NR by LR at 6/27/2025 1025                      Point: Body mechanics (In Progress)       Learning Progress Summary            Patient Acceptance, E, NR by LR at 6/27/2025 1025   Family Acceptance, E, NR by LR at 6/27/2025 1025                                      User Key       Initials Effective Dates Name Provider Type Discipline     10/09/24 -  Dodie Ruiz, OT Occupational Therapist OT                  OT Recommendation and Plan  Planned Therapy Interventions (OT): activity tolerance training, adaptive equipment training, BADL retraining, strengthening exercise, occupation/activity based interventions, transfer/mobility retraining, functional balance retraining, IADL retraining, passive ROM/stretching, ROM/therapeutic exercise, patient/caregiver education/training  Therapy Frequency (OT): daily  Plan of Care Review  Plan of Care Reviewed With:  patient, daughter  Progress: no change  Outcome Evaluation: OT eval complete. Pt presents below baseline with elevated pain, limited endurance, strength, sensation, postural control, balance deficits, and motor function/control warranting IPOT. Nausea subsiding throughout session. Pt very motivated for therapy. Dtr at bed side and very supportive and helpful. Max Ax2 for bed mobility. Sitting balance improving with time at EOB. Increased support needed this session with grooming tasks. Pt also reports that neck pain was better towards end of session. Positioning techniques utilized for LUE. Ther ex tolerated well. Recommend IPOT POC and SNF at D/C.     Time Calculation:   Evaluation Complexity (OT)  Review Occupational Profile/Medical/Therapy History Complexity: expanded/moderate complexity  Assessment, Occupational Performance/Identification of Deficit Complexity: 3-5 performance deficits  Clinical Decision Making Complexity (OT): detailed assessment/moderate complexity  Overall Complexity of Evaluation (OT): moderate complexity     Time Calculation- OT       Row Name 06/27/25 1027             Time Calculation- OT    OT Start Time 0810  -LR      OT Received On 06/27/25  -LR      OT Goal Re-Cert Due Date 07/07/25  -LR         Timed Charges    56665 - OT Self Care/Mgmt Minutes 8  -LR         Untimed Charges    OT Eval/Re-eval Minutes 62  -LR         Total Minutes    Timed Charges Total Minutes 8  -LR      Untimed Charges Total Minutes 62  -LR       Total Minutes 70  -LR                User Key  (r) = Recorded By, (t) = Taken By, (c) = Cosigned By      Initials Name Provider Type    LR Dodie Ruiz OT Occupational Therapist                  Therapy Charges for Today       Code Description Service Date Service Provider Modifiers Qty    [id number] HC-OT EVAL MOD COMPLEXITY 5 6/27/2025 Dodie Ruiz, OT  1    [id number]  OT SELF CARE/MGMT/TRAIN EA 15 MIN 6/27/2025 Dodie Ruiz OT GO 1                  Dodie Ruiz, OT  6/27/2025

## 2025-06-27 NOTE — PROGRESS NOTES
[facility] Medicine Services  PROGRESS NOTE    Patient Name: Lnida Schafer  : 1960  MRN: [id number]    Date of Admission: 2025  Primary Care Physician: Micheline Heath DO    Subjective   Subjective     CC:  dyspnea    HPI:  Doing okay this am, only having sharp pains when she breathes deeply.  No other issues overnight. Daughter at bedside       Objective   Objective     Vital Signs:   Temp:  [97.5 °F (36.4 °C)-99.6 °F (37.6 °C)] 98.2 °F (36.8 °C)  Heart Rate:  [65-88] 65  Resp:  [18-20] 18  BP: ()/(49-68) 98/49  Flow (L/min) (Oxygen Therapy):  [2] 2     Physical Exam:  Constitutional: No acute distress, awake, alert  HENT: R craniectomy scar, mucous membranes moist  Respiratory: Clear to auscultation bilaterally, respiratory effort normal   Cardiovascular: RRR, no murmurs, rubs, or gallops  Gastrointestinal: Positive bowel sounds, soft, nontender, nondistended  Musculoskeletal: No bilateral ankle edema  Psychiatric: flat affect, cooperative  Neurologic: Oriented x 3, strength symmetric in all extremities, Cranial Nerves grossly intact to confrontation, speech clear  Skin: No rashes      Results Reviewed:  LAB RESULTS:      Lab 25  1153 25  0429 25  2059 25  1728 25  1548 25  0800 25  0613   WBC  --  11.38*  --   --   --   --  14.04*   HEMOGLOBIN  --  11.5*  --   --   --   --  14.5   HEMATOCRIT  --  35.5  --   --   --   --  44.2   PLATELETS  --  227  --   --   --   --  267   NEUTROS ABS  --  7.66*  --   --   --   --  11.31*   IMMATURE GRANS (ABS)  --  0.07*  --   --   --   --  0.07*   LYMPHS ABS  --  2.26  --   --   --   --  1.60   MONOS ABS  --  1.11*  --   --   --   --  0.79   EOS ABS  --  0.21  --   --   --   --  0.18   MCV  --  87.0  --   --   --   --  86.2   PROTIME  --   --   --   --   --   --  13.1   APTT  --   --   --   --   --   --  22.9*   HEPARIN ANTI-XA 0.64 0.58 0.50 >1.10* >1.10*  --  0.10*   HSTROP T  --   --    --   --   --  9 11         Lab 06/27/25  0429 06/26/25  0613   SODIUM 134* 138   POTASSIUM 3.8 3.8   CHLORIDE 99 98   CO2 25.0 26.0   ANION GAP 10.0 14.0   BUN 13.3 11.1   CREATININE 0.56* 0.76   EGFR 103.3 88.7   GLUCOSE 112* 112*   CALCIUM 8.8 9.7         Lab 06/26/25  0613   TOTAL PROTEIN 7.9   ALBUMIN 4.3   GLOBULIN 3.6   ALT (SGPT) 37*   AST (SGOT) 61*   BILIRUBIN 0.6   ALK PHOS 147*         Lab 06/26/25  0800 06/26/25  0613   PROBNP  --  68.0   HSTROP T 9 11   PROTIME  --  13.1   INR  --  0.93                 Brief Urine Lab Results       None            Microbiology Results Abnormal       None            Duplex Venous Lower Extremity - Bilateral CAR  Result Date: 6/26/2025    Acute left lower extremity deep vein thrombosis noted in the posterial tibial, peroneal and gastrocnemius.   All other veins appeared normal bilaterally.     CT Angiogram Chest Pulmonary Embolism  Result Date: 6/26/2025  CT ANGIOGRAM CHEST PULMONARY EMBOLISM Date of Exam: 6/26/2025 6:32 AM EDT Indication: left pleuritic chest pain. Comparison: None available. Technique: Axial CT images were obtained of the chest after the uneventful intravenous administration of 75 mL Isovue-370 utilizing pulmonary embolism protocol.  In addition, a 3-D volume rendered image was created for interpretation.  Reconstructed coronal and sagittal images were also obtained. Automated exposure control and iterative construction methods were used. Findings: Pulmonary arteries: Adequate opacification of the pulmonary arteries. There is a large saddle embolus. There are also filling defects extending into the bilateral upper and lower lobes as well as the right middle lobe with both segmental and subsegmental  involvement. The ventricular ratio is normal. There is no evidence of right heart failure. Lungs and Pleura: There is bilateral basilar and lingular atelectasis. Mediastinum/Cassi: No mediastinal or hilar lymphadenopathy. Lymph nodes: No axillary or  supraclavicular adenopathy. Cardiovascular: The cardiac chambers are within normal limits. The pericardium is normal. The aorta and its arch branch vessels are unremarkable.   Upper Abdomen: Status post cholecystectomy. The upper abdominal contents are unremarkable.      Bones and Soft Tissue: No suspicious osseous lesion.     Impression: Impression: Large saddle embolus with bilateral segmental and subsegmental pulmonary emboli. There is no evidence of right heart failure. Electronically Signed: Daryn Shell MD  6/26/2025 7:03 AM EDT  Workstation ID: [id number]    XR Chest 1 View  Result Date: 6/26/2025  XR CHEST 1 VW Date of Exam: 6/26/2025 5:22 AM EDT Indication: SOA triage protocol Comparison: 5/24/2025 Findings: Cardiac and mediastinal contours are normal. Pulmonary vascularity is normal. There is mild infiltrate or atelectasis at the left base. The lungs are otherwise clear. No pneumothorax.     Impression: Mild infiltrate or atelectasis at the left base. Electronically Signed: Joe Grover MD  6/26/2025 5:36 AM EDT  Workstation ID: [id number]      Results for orders placed during the hospital encounter of 06/26/25    Adult Transthoracic Echo Limited W/ Cont if Necessary Per Protocol    Interpretation Summary    Left ventricular systolic function is normal. Estimated left ventricular EF = 65%    The cardiac valves are anatomically and functionally normal.      Current medications:  Scheduled Meds:vitamin C, 500 mg, Oral, Daily  aspirin, 81 mg, Oral, Daily  atorvastatin, 80 mg, Oral, Nightly  cetirizine, 10 mg, Oral, Nightly  Ketotifen Fumarate, 1 drop, Both Eyes, BID  levETIRAcetam, 500 mg, Oral, Q12H  lubiprostone, 8 mcg, Oral, BID  pantoprazole, 40 mg, Oral, BID  sodium chloride, 10 mL, Intravenous, Q12H  vitamin B-12, 100 mcg, Oral, Daily      Continuous Infusions:heparin, 14 Units/kg/hr, Last Rate: 14 Units/kg/hr (06/27/25 0826)  Pharmacy to Dose Heparin,       PRN Meds:.  acetaminophen **OR** acetaminophen  **OR** acetaminophen    baclofen    senna-docusate sodium **AND** polyethylene glycol **AND** bisacodyl **AND** bisacodyl    butalbital-acetaminophen-caffeine    Calcium Replacement - Follow Nurse / BPA Driven Protocol    Magnesium Standard Dose Replacement - Follow Nurse / BPA Driven Protocol    nitroglycerin    ondansetron ODT **OR** ondansetron    Pharmacy to Dose Heparin    Phosphorus Replacement - Follow Nurse / BPA Driven Protocol    Potassium Replacement - Follow Nurse / BPA Driven Protocol    sodium chloride    sodium chloride    sodium chloride    Assessment & Plan   Assessment & Plan     Active Hospital Problems    Diagnosis  POA    **Pulmonary embolus [I26.99]  Yes    Pulmonary embolism [I26.99]  Yes    Acute deep vein thrombosis (DVT) of left lower extremity [I82.402]  Unknown    Status post craniotomy [Z98.890]  Not Applicable    Grade I diastolic dysfunction [I51.89]  Yes    Hiatal hernia [K44.9]  Yes    GERD (gastroesophageal reflux disease) [K21.9]  Yes    Diverticulosis [K57.90]  Yes    Dyslipidemia [E78.5]  Yes      Resolved Hospital Problems   No resolved problems to display.        Brief Hospital Course to date:  Linda Schafer is a 64 y.o. female  with PMHx HLD, migraines, recent Right MCA ischemic stroke S/P decompressive craniectomy per Dr Olivarez on 5/26 who presented with dyspnea and was found to have a large saddle PE.    Plan:    Acute Large Saddle PE w/ bilateral segmental and subsegmental pulmonary emboli  Acute LLE DVT    -Cardiology contacted by ED, no current plan for thrombectomy  -Heparin drip   -Discussed with carlee Styles for AC  -Echo reviewed- no evidence of R heart strain   -Endorses family Hx of blood clots  -Hypercoagulable panel ordered per Stroke Neurology 6/19 and in review, only + result is for an Antithrombin III which is slightly elevated. Typically this is insignificant in isolation and is not indicative of a clotting disorder on its own per Neurology.  -Patient  recently returned Holter Monitor     Recent R MCA ischemic stroke S/P decompressive craniectomy   -Continue ASA, Statin  -Continue prophylactic Keppra 500mg BID  -Eventual plan for craniotomy with Dr Olivarez  -Has been wearing helmet      Migraines  -Keppra has been effective for HA  -PRN Fiorcet      Constipation  -On [employer] outpatient, formulary sub [place]      Total time spent: Time Spent: Time Spent: 35 minutes  Time spent includes time reviewing chart, face-to-face time, counseling patient/family/caregiver, ordering medications/tests/procedures, communicating with other health care professionals, documenting clinical information in the electronic health record, and coordination of care.      Expected Discharge Location and Transportation: [facility]  Expected Discharge   Expected Discharge Date: 6/30/2025; Expected Discharge Time:      VTE Prophylaxis:  Pharmacologic VTE prophylaxis orders are present.         AM-PAC 6 Clicks Score (PT): 10 (06/27/25 0815)    CODE STATUS:   Code Status and Medical Interventions: CPR (Attempt to Resuscitate); Full Support   Ordered at: 06/26/25 1159     Code Status (Patient has no pulse and is not breathing):    CPR (Attempt to Resuscitate)     Medical Interventions (Patient has pulse or is breathing):    Full Support     Level Of Support Discussed With:    Patient       Yashira Small MD  06/27/25

## 2025-06-27 NOTE — PLAN OF CARE
Goal Outcome Evaluation:  Plan of Care Reviewed With: patient, daughter        Progress: no change  Outcome Evaluation: OT eval complete. Pt presents below baseline with elevated pain, limited endurance, strength, sensation, postural control, balance deficits, and motor function/control warranting IPOT. Nausea subsiding throughout session. Pt very motivated for therapy. Dtr at bed side and very supportive and helpful. Max Ax2 for bed mobility. Sitting balance improving with time at EOB. Increased support needed this session with grooming tasks. Pt also reports that neck pain was better towards end of session. Positioning techniques utilized for LUE. Ther ex tolerated well. Recommend IPOT POC and SNF at D/C.    Anticipated Discharge Disposition (OT): skilled nursing facility

## 2025-06-27 NOTE — TELEPHONE ENCOUNTER
Received staff message from Dr. Olivarez.     Tried to call patient to schedule appointment. Left a voicemail. She is still currently in the hospital.

## 2025-06-27 NOTE — CASE MANAGEMENT/SOCIAL WORK
"Continued Stay Note  [facility]     Patient Name: Linda Schafer  MRN: [id number]  Today's Date: 6/27/2025    Admit Date: 6/26/2025    Plan: Rehab   Discharge Plan       Row Name 06/27/25 1452       Plan    Plan Rehab    Plan Comments Pt is on a heparin drip and 2L NC. She required a 2 person max assist to transfer to the chair. Therapy recommended SNF. I spoke with Pt and daughter, Nadia, in room. Pt verbalized mixed feelings about going to rehab. Daughter states Pt was recently at [facility] but was discharged home without HH or follow up services. Daughter states [facility] told them HH was not covered by her insurance but they have since discovered that is not correct. Since discharge, Pt's physical abilities have declined. Pt seems to be torn between going home with HH versus returning to [facility]. CM explained the difference between acute rehab, SNF, and HH. CM asked about the home support available should she return home immediately after this hospital stay or after a short term rehab stay. Daughter states while there is family with her 24/7, they \"all work.\" Daughter provided the name and phone number of Pt's Mart , Cristina, [phone]. CM left a voicemail for Cristina requesting a return call. Pt and daughter were given a SNF patient choice list to review and my contact information. CM will continue to follow hospital course and assist as more information becomes available.    Final Discharge Disposition Code 03 - skilled nursing facility (SNF)                   Discharge Codes    No documentation.                 Expected Discharge Date and Time       Expected Discharge Date Expected Discharge Time    Jun 30, 2025               Chelo Laguna RN    "

## 2025-06-27 NOTE — TELEPHONE ENCOUNTER
----- Message from Matt Olivarez sent at 6/26/2025 12:57 PM EDT -----  Please schedule a follow-up appointment with me in 4 to 6 weeks.  If they would rather be seen in [place], we can do it there and around that time.  Thanks

## 2025-06-27 NOTE — THERAPY EVALUATION
Patient Name: Linda Schafer  : 1960    MRN: [id number]                              Today's Date: 2025       Admit Date: 2025    Visit Dx:     ICD-10-CM ICD-9-CM   1. Acute saddle pulmonary embolism without acute cor pulmonale  I26.92 415.13   2. Pleuritic chest pain  R07.81 786.52     Patient Active Problem List   Diagnosis    Palpitations    Personal history of cardiac murmur    Migraine headache    Hiatal hernia    GERD (gastroesophageal reflux disease)    Diverticulosis    Diminished pulses in lower extremity    Dyslipidemia    Peripheral edema    Grade I diastolic dysfunction    Acute ischemic stroke    Status post craniotomy    Status post craniectomy    Pulmonary embolus    Acute deep vein thrombosis (DVT) of left lower extremity     Past Medical History:   Diagnosis Date    Atypical chest pain     Chest pain.Atypical chest pain    Chest pain     COVID-19     2022    COVID-19 vaccine administered     2021 ,  J and J ) 10/25/2021 - Moderna    Diminished pulses in lower extremity     Diverticulosis     Dizziness     Occasional    Dyslipidemia     Dyspnea     Edema     Fatigue     GERD (gastroesophageal reflux disease)     Hiatal hernia     Hyperlipidemia     Migraine headache     Palpitations     Personal history of cardiac murmur     SOB (shortness of breath)     Stroke 27     Past Surgical History:   Procedure Laterality Date    CHOLECYSTECTOMY      CRANIOTOMY  25    CRANIOTOMY FOR SUBDURAL HEMATOMA Right 2025    Procedure: DECOMPRESSIVE CRANIECTOMY RIGHT;  Surgeon: Matt Olivarez MD;  Location: [facility];  Service: Neurosurgery;  Laterality: Right;    HYSTERECTOMY      OOPHORECTOMY      TUBAL ABDOMINAL LIGATION        General Information       Row Name 25 0815          Physical Therapy Time and Intention    Document Type evaluation  -NS     Mode of Treatment physical therapy  -NS       Row Name 25 0815          General Information    Patient  Profile Reviewed yes  -NS     Prior Level of Function max assist:;w/c or scooter;ADL's;transfer;bed mobility  prior to recent CVA, pt was independent. Since discharge from rehab, pt has been requiring 2 assist to transfer. Daughter feels pt was d/c from rehab too soon.  -NS     Existing Precautions/Restrictions fall;other (see comments)  R sided decompressive craniectomy for stroke s/p 1 month ago, L sided residual deficits, helmet OOB  -NS     Barriers to Rehab medically complex;previous functional deficit  -NS       Row Name 06/27/25 0815          Living Environment    Current Living Arrangements home  -NS     People in Home spouse  children come by throughout the day to assist  -NS       Row Name 06/27/25 0815          Home Main Entrance    Number of Stairs, Main Entrance none  ramp to enter  -NS       Row Name 06/27/25 0815          Stairs Within Home, Primary    Number of Stairs, Within Home, Primary none  -NS       Row Name 06/27/25 0815          Cognition    Orientation Status (Cognition) oriented x 3  -NS       Row Name 06/27/25 0815          Safety Issues/Impairments Affecting Functional Mobility    Safety Issues Affecting Function (Mobility) safety precaution awareness;safety precautions follow-through/compliance;sequencing abilities  -NS     Impairments Affecting Function (Mobility) balance;coordination;endurance/activity tolerance;motor planning;pain;muscle tone abnormal;strength;visual/perceptual;grasp;motor control;range of motion (ROM);postural/trunk control  -NS               User Key  (r) = Recorded By, (t) = Taken By, (c) = Cosigned By      Initials Name Provider Type    Ciarra Valverde PT Physical Therapist                   Mobility       Row Name 06/27/25 0815          Bed Mobility    Bed Mobility supine-sit  -NS     Supine-Sit [place] (Bed Mobility) maximum assist (25% patient effort);2 person assist;verbal cues  -NS     Assistive Device (Bed Mobility) bed rails;head of bed  elevated;repositioning sheet  -NS     Comment, (Bed Mobility) cues for sequencing, assist needed at trunk and LEs  -NS       Row Name 06/27/25 0815          Transfers    Comment, (Transfers) Cues for hand and foot placement, strong L lateral lean in standing needing L knee blocked due to buckling. Difficulty weight shifting to advance to the chair.  -NS       Row Name 06/27/25 0815          Bed-Chair Transfer    Bed-Chair [place] (Transfers) maximum assist (25% patient effort);2 person assist  -NS     Assistive Device (Bed-Chair Transfers) other (see comments)  BUE support  -NS       Row Name 06/27/25 0815          Sit-Stand Transfer    Sit-Stand [place] (Transfers) moderate assist (50% patient effort);2 person assist  -NS     Assistive Device (Sit-Stand Transfers) other (see comments)  BUE support  -NS               User Key  (r) = Recorded By, (t) = Taken By, (c) = Cosigned By      Initials Name Provider Type    Ciarra Valverde PT Physical Therapist                   Obj/Interventions       Row Name 06/27/25 0815          Range of Motion Comprehensive    General Range of Motion bilateral lower extremity ROM WFL  -NS     Comment, General Range of Motion passively WFL  -NS       Row Name 06/27/25 0815          Strength Comprehensive (MMT)    General Manual Muscle Testing (MMT) Assessment lower extremity strength deficits identified  -NS     Comment, General Manual Muscle Testing (MMT) Assessment RLE: grossly 4/5, L ankle DF: 0/5, L knee ext: 2-/5, L hip flexion: 1/5  -NS       Row Name 06/27/25 0815          Balance    Balance Assessment sitting static balance;sitting dynamic balance;standing static balance;standing dynamic balance  -NS     Static Sitting Balance minimal assist  -NS     Dynamic Sitting Balance moderate assist  -NS     Position, Sitting Balance unsupported;sitting edge of bed  -NS     Static Standing Balance moderate assist;2-person assist  -NS     Dynamic Standing Balance maximum  assist;2-person assist  -NS     Position/Device Used, Standing Balance supported  -NS     Comment, Balance progressed to moments CGA for static sitting  -NS       Row Name 06/27/25 0815          Sensory Assessment (Somatosensory)    Sensory Assessment (Somatosensory) LE sensation intact  -NS               User Key  (r) = Recorded By, (t) = Taken By, (c) = Cosigned By      Initials Name Provider Type    Ciarra Valverde PT Physical Therapist                   Goals/Plan       Row Name 06/27/25 0815          Bed Mobility Goal 1 (PT)    Activity/Assistive Device (Bed Mobility Goal 1, PT) supine to sit  -NS     [place] Level/Cues Needed (Bed Mobility Goal 1, PT) moderate assist (50-74% patient effort)  -NS     Time Frame (Bed Mobility Goal 1, PT) long term goal (LTG);10 days  -NS       Row Name 06/27/25 0815          Transfer Goal 1 (PT)    Activity/Assistive Device (Transfer Goal 1, PT) sit-to-stand/stand-to-sit;bed-to-chair/chair-to-bed  -NS     [place] Level/Cues Needed (Transfer Goal 1, PT) minimum assist (75% or more patient effort)  x2  -NS     Time Frame (Transfer Goal 1, PT) long term goal (LTG);10 days  -NS       Row Name 06/27/25 0815          Problem Specific Goal 1 (PT)    Problem Specific Goal 1 (PT) Patient will sit EOB for 3 mins with SBA demonstrating improved trunk control.  -NS     Time Frame (Problem Specific Goal 1, PT) short-term goal (STG);5 days  -NS       Row Name 06/27/25 0815          Therapy Assessment/Plan (PT)    Planned Therapy Interventions (PT) bed mobility training;balance training;home exercise program;neuromuscular re-education;ROM (range of motion);strengthening;stretching;patient/family education;transfer training;postural re-education;wheelchair management/propulsion training  -NS               User Key  (r) = Recorded By, (t) = Taken By, (c) = Cosigned By      Initials Name Provider Type    Ciarra Valverde PT Physical Therapist                   Clinical Impression        Row Name 06/27/25 0815          Pain    Pretreatment Pain Rating 0/10 - no pain  -NS     Posttreatment Pain Rating 0/10 - no pain  -NS       Row Name 06/27/25 0815          Plan of Care Review    Plan of Care Reviewed With patient  -NS     Progress no change  -NS     Outcome Evaluation Patient presents with weakness (L>R), decreased balance, trunk control deficits, and R neglect affecting functional mobility. Max A x2 required to transfer, with L lean noted. IP PT services warranted to support increased independence. Recommend SNF at d/c.  -NS       Row Name 06/27/25 0815          Therapy Assessment/Plan (PT)    Patient/Family Therapy Goals Statement (PT) to transfer more easily  -NS     Rehab Potential (PT) good  -NS     Criteria for Skilled Interventions Met (PT) yes;meets criteria;skilled treatment is necessary  -NS     Therapy Frequency (PT) daily  -NS     Predicted Duration of Therapy Intervention (PT) 10 days  -NS       Row Name 06/27/25 0815          Vital Signs    Pre Systolic BP Rehab 111  -NS     Pre Treatment Diastolic BP 62  -NS     Pre SpO2 (%) 98  -NS     O2 Delivery Pre Treatment room air  -NS     Post SpO2 (%) 98  -NS     O2 Delivery Post Treatment room air  -NS     Pre Patient Position Supine  -NS     Intra Patient Position Standing  -NS     Post Patient Position Sitting  -NS       Row Name 06/27/25 0815          Positioning and Restraints    Pre-Treatment Position in bed  -NS     Post Treatment Position chair  -NS     In Chair notified nsg;reclined;call light within reach;encouraged to call for assist;exit alarm on;waffle cushion;legs elevated;on mechanical lift sling;LUE elevated;with family/caregiver  -NS               User Key  (r) = Recorded By, (t) = Taken By, (c) = Cosigned By      Initials Name Provider Type    Ciarra Valverde, PT Physical Therapist                   Outcome Measures       Row Name 06/27/25 0815          How much help from another person do you currently need...     Turning from your back to your side while in flat bed without using bedrails? 2  -NS     Moving from lying on back to sitting on the side of a flat bed without bedrails? 2  -NS     Moving to and from a bed to a chair (including a wheelchair)? 2  -NS     Standing up from a chair using your arms (e.g., wheelchair, bedside chair)? 2  -NS     Climbing 3-5 steps with a railing? 1  -NS     To walk in hospital room? 1  -NS     AM-PAC 6 Clicks Score (PT) 10  -NS     Highest Level of Mobility Goal Move to Chair/Commode-4  -NS       Row Name 06/27/25 0815          Functional Assessment    Outcome Measure Options AM-PAC 6 Clicks Basic Mobility (PT)  -NS               User Key  (r) = Recorded By, (t) = Taken By, (c) = Cosigned By      Initials Name Provider Type    Ciarra Valverde, GRICELDA Physical Therapist                                 Physical Therapy Education       Title: PT OT SLP Therapies (In Progress)       Topic: Physical Therapy (In Progress)       Point: Mobility training (Done)       Learning Progress Summary            Patient Acceptance, E, VU by NS at 6/27/2025 1018   Family Acceptance, E, VU by NS at 6/27/2025 1018                      Point: Home exercise program (Not Started)       Learner Progress:  Not documented in this visit.              Point: Body mechanics (Done)       Learning Progress Summary            Patient Acceptance, E, VU by NS at 6/27/2025 1018   Family Acceptance, E, VU by NS at 6/27/2025 1018                      Point: Precautions (Done)       Learning Progress Summary            Patient Acceptance, E, VU by NS at 6/27/2025 1018   Family Acceptance, E, VU by NS at 6/27/2025 1018                                      User Key       Initials Effective Dates Name Provider Type Discipline    NS 06/16/21 -  Ciarra Norman, GRICELDA Physical Therapist PT                  PT Recommendation and Plan  Planned Therapy Interventions (PT): bed mobility training, balance training, home exercise program,  neuromuscular re-education, ROM (range of motion), strengthening, stretching, patient/family education, transfer training, postural re-education, wheelchair management/propulsion training  Progress: no change  Outcome Evaluation: Patient presents with weakness (L>R), decreased balance, trunk control deficits, and R neglect affecting functional mobility. Max A x2 required to transfer, with L lean noted. IP PT services warranted to support increased independence. Recommend SNF at d/c.     Time Calculation:   PT Evaluation Complexity  History, PT Evaluation Complexity: 3 or more personal factors and/or comorbidities  Examination of Body Systems (PT Eval Complexity): total of 4 or more elements  Clinical Presentation (PT Evaluation Complexity): evolving  Clinical Decision Making (PT Evaluation Complexity): moderate complexity  Overall Complexity (PT Evaluation Complexity): moderate complexity     PT Charges       Row Name 06/27/25 0815             Time Calculation    Start Time 0815  -NS      PT Received On 06/27/25  -NS      PT Goal Re-Cert Due Date 07/07/25  -NS         Untimed Charges    PT Eval/Re-eval Minutes 65  -NS         Total Minutes    Untimed Charges Total Minutes 65  -NS       Total Minutes 65  -NS                User Key  (r) = Recorded By, (t) = Taken By, (c) = Cosigned By      Initials Name Provider Type    Ciarra Valverde, PT Physical Therapist                  Therapy Charges for Today       Code Description Service Date Service Provider Modifiers Qty    [id number] HC-PT EVAL MOD COMPLEXITY 5 6/27/2025 Ciarra Norman, PT  1            PT G-Codes  Outcome Measure Options: AM-PAC 6 Clicks Basic Mobility (PT)  AM-PAC 6 Clicks Score (PT): 10  PT Discharge Summary  Anticipated Discharge Disposition (PT): skilled nursing facility    Ciarra Norman PT  6/27/2025

## 2025-06-27 NOTE — PLAN OF CARE
Goal Outcome Evaluation:  Plan of Care Reviewed With: patient        Progress: improving  Outcome Evaluation: Complaints of pain in left side radiating to back and right sided head pain. One time dose of morphine 1mg given 2250. Pain relieved. VSS. Pt stated last BM on 6/23, reports taking Miralax prior to admission. Requested suppository, dulcolax given without bm. Pt requesting enema.

## 2025-06-28 LAB
ANION GAP SERPL CALCULATED.3IONS-SCNC: 9 MMOL/L (ref 5–15)
BASOPHILS # BLD AUTO: 0.07 10*3/MM3 (ref 0–0.2)
BASOPHILS NFR BLD AUTO: 0.8 % (ref 0–1.5)
BUN SERPL-MCNC: 13.7 MG/DL (ref 8–23)
BUN/CREAT SERPL: 25.8 (ref 7–25)
CALCIUM SPEC-SCNC: 9 MG/DL (ref 8.6–10.5)
CHLORIDE SERPL-SCNC: 101 MMOL/L (ref 98–107)
CO2 SERPL-SCNC: 27 MMOL/L (ref 22–29)
CREAT SERPL-MCNC: 0.53 MG/DL (ref 0.57–1)
DEPRECATED RDW RBC AUTO: 40.7 FL (ref 37–54)
EGFRCR SERPLBLD CKD-EPI 2021: 104.7 ML/MIN/1.73
EOSINOPHIL # BLD AUTO: 0.3 10*3/MM3 (ref 0–0.4)
EOSINOPHIL NFR BLD AUTO: 3.5 % (ref 0.3–6.2)
ERYTHROCYTE [DISTWIDTH] IN BLOOD BY AUTOMATED COUNT: 12.7 % (ref 12.3–15.4)
GLUCOSE SERPL-MCNC: 88 MG/DL (ref 65–99)
HCT VFR BLD AUTO: 36.3 % (ref 34–46.6)
HGB BLD-MCNC: 11.6 G/DL (ref 12–15.9)
IMM GRANULOCYTES # BLD AUTO: 0.04 10*3/MM3 (ref 0–0.05)
IMM GRANULOCYTES NFR BLD AUTO: 0.5 % (ref 0–0.5)
LYMPHOCYTES # BLD AUTO: 2.34 10*3/MM3 (ref 0.7–3.1)
LYMPHOCYTES NFR BLD AUTO: 27.2 % (ref 19.6–45.3)
MCH RBC QN AUTO: 28 PG (ref 26.6–33)
MCHC RBC AUTO-ENTMCNC: 32 G/DL (ref 31.5–35.7)
MCV RBC AUTO: 87.7 FL (ref 79–97)
MONOCYTES # BLD AUTO: 0.78 10*3/MM3 (ref 0.1–0.9)
MONOCYTES NFR BLD AUTO: 9.1 % (ref 5–12)
NEUTROPHILS NFR BLD AUTO: 5.06 10*3/MM3 (ref 1.7–7)
NEUTROPHILS NFR BLD AUTO: 58.9 % (ref 42.7–76)
NRBC BLD AUTO-RTO: 0 /100 WBC (ref 0–0.2)
PLATELET # BLD AUTO: 223 10*3/MM3 (ref 140–450)
PMV BLD AUTO: 12.2 FL (ref 6–12)
POTASSIUM SERPL-SCNC: 4.1 MMOL/L (ref 3.5–5.2)
RBC # BLD AUTO: 4.14 10*6/MM3 (ref 3.77–5.28)
SODIUM SERPL-SCNC: 137 MMOL/L (ref 136–145)
UFH PPP CHRO-ACNC: 0.62 IU/ML (ref 0.3–0.7)
WBC NRBC COR # BLD AUTO: 8.59 10*3/MM3 (ref 3.4–10.8)

## 2025-06-28 PROCEDURE — 85520 HEPARIN ASSAY: CPT

## 2025-06-28 PROCEDURE — 85025 COMPLETE CBC W/AUTO DIFF WBC: CPT | Performed by: INTERNAL MEDICINE

## 2025-06-28 PROCEDURE — 25810000003 SODIUM CHLORIDE 0.9 % SOLUTION: Performed by: PHYSICIAN ASSISTANT

## 2025-06-28 PROCEDURE — G0378 HOSPITAL OBSERVATION PER HR: HCPCS

## 2025-06-28 PROCEDURE — 99232 SBSQ HOSP IP/OBS MODERATE 35: CPT | Performed by: PHYSICIAN ASSISTANT

## 2025-06-28 PROCEDURE — 25010000002 HEPARIN (PORCINE) 25000-0.45 UT/250ML-% SOLUTION

## 2025-06-28 PROCEDURE — 80048 BASIC METABOLIC PNL TOTAL CA: CPT | Performed by: INTERNAL MEDICINE

## 2025-06-28 RX ORDER — LUBIPROSTONE 24 UG/1
24 CAPSULE ORAL
Status: DISCONTINUED | OUTPATIENT
Start: 2025-06-29 | End: 2025-07-03 | Stop reason: HOSPADM

## 2025-06-28 RX ORDER — SODIUM CHLORIDE 9 MG/ML
125 INJECTION, SOLUTION INTRAVENOUS CONTINUOUS
Status: ACTIVE | OUTPATIENT
Start: 2025-06-28 | End: 2025-06-28

## 2025-06-28 RX ORDER — LUBIPROSTONE 8 UG/1
16 CAPSULE ORAL 2 TIMES DAILY WITH MEALS
Status: COMPLETED | OUTPATIENT
Start: 2025-06-28 | End: 2025-06-28

## 2025-06-28 RX ORDER — LUBIPROSTONE 8 UG/1
8 CAPSULE ORAL 2 TIMES DAILY WITH MEALS
Status: DISCONTINUED | OUTPATIENT
Start: 2025-06-28 | End: 2025-06-28

## 2025-06-28 RX ADMIN — BACLOFEN 10 MG: 10 TABLET ORAL at 10:13

## 2025-06-28 RX ADMIN — PANTOPRAZOLE SODIUM 40 MG: 40 TABLET, DELAYED RELEASE ORAL at 21:27

## 2025-06-28 RX ADMIN — ACETAMINOPHEN 650 MG: 325 TABLET, FILM COATED ORAL at 21:21

## 2025-06-28 RX ADMIN — OXYCODONE HYDROCHLORIDE AND ACETAMINOPHEN 500 MG: 500 TABLET ORAL at 08:53

## 2025-06-28 RX ADMIN — ACETAMINOPHEN 650 MG: 325 TABLET, FILM COATED ORAL at 04:58

## 2025-06-28 RX ADMIN — PANTOPRAZOLE SODIUM 40 MG: 40 TABLET, DELAYED RELEASE ORAL at 08:53

## 2025-06-28 RX ADMIN — Medication 10 ML: at 08:54

## 2025-06-28 RX ADMIN — KETOTIFEN FUMARATE 1 DROP: 0.25 SOLUTION/ DROPS OPHTHALMIC at 21:37

## 2025-06-28 RX ADMIN — SODIUM CHLORIDE 125 ML/HR: 9 INJECTION, SOLUTION INTRAVENOUS at 15:00

## 2025-06-28 RX ADMIN — LUBIPROSTONE 8 MCG: 8 CAPSULE ORAL at 08:53

## 2025-06-28 RX ADMIN — HEPARIN SODIUM 14 UNITS/KG/HR: 10000 INJECTION, SOLUTION INTRAVENOUS at 04:54

## 2025-06-28 RX ADMIN — ATORVASTATIN CALCIUM 80 MG: 40 TABLET, FILM COATED ORAL at 21:27

## 2025-06-28 RX ADMIN — APIXABAN 10 MG: 5 TABLET, FILM COATED ORAL at 21:35

## 2025-06-28 RX ADMIN — VITAM B12 100 MCG: 100 TAB at 08:53

## 2025-06-28 RX ADMIN — APIXABAN 10 MG: 5 TABLET, FILM COATED ORAL at 08:53

## 2025-06-28 RX ADMIN — LEVETIRACETAM 500 MG: 500 TABLET, FILM COATED ORAL at 21:27

## 2025-06-28 RX ADMIN — LEVETIRACETAM 500 MG: 500 TABLET, FILM COATED ORAL at 08:53

## 2025-06-28 RX ADMIN — KETOTIFEN FUMARATE 1 DROP: 0.25 SOLUTION/ DROPS OPHTHALMIC at 08:53

## 2025-06-28 RX ADMIN — LUBIPROSTONE 16 MCG: 8 CAPSULE ORAL at 17:03

## 2025-06-28 RX ADMIN — ASPIRIN 81 MG: 81 TABLET, COATED ORAL at 08:53

## 2025-06-28 RX ADMIN — ACETAMINOPHEN 650 MG: 325 TABLET, FILM COATED ORAL at 12:18

## 2025-06-28 RX ADMIN — CETIRIZINE HYDROCHLORIDE 10 MG: 10 TABLET, FILM COATED ORAL at 21:26

## 2025-06-28 RX ADMIN — Medication 10 ML: at 21:41

## 2025-06-28 NOTE — PLAN OF CARE
Goal Outcome Evaluation:              Outcome Evaluation: VSS on 2L. NSR. oriented X 4. complain of headache, given tylenol with relief. dc'd Heparin gtt. NS @125ml.hr.

## 2025-06-28 NOTE — PLAN OF CARE
Goal Outcome Evaluation:  Plan of Care Reviewed With: patient        Progress: improving  Outcome Evaluation: VSS, 2L nasal canula. Patient complaints of left sided pain. Tylenol given, patient states helped with pain. Heparin 14units/kg/hr. Patient alert and oriented x4.

## 2025-06-28 NOTE — PROGRESS NOTES
[facility] Medicine Services  PROGRESS NOTE    Patient Name: Linda Schafer  : 1960  MRN: [id number]    Date of Admission: 2025  Primary Care Physician: Micheline Heath,     Subjective     CC: f/u PE    HPI:  On bedside commode. O2 sats are stable on room air. Still having some sharp pains when she breathes in deeply. Denies abdominal pain, nausea or vomiting. Constipated. Discussed rehab options with family and updated . Daughter feels patient's mobility declined significantly after return home from [facility].     Objective     Vital Signs:   Temp:  [97.9 °F (36.6 °C)-98.7 °F (37.1 °C)] 98.3 °F (36.8 °C)  Heart Rate:  [64-84] 71  Resp:  [16-18] 18  BP: ()/(46-65) 103/58  Flow (L/min) (Oxygen Therapy):  [2] 2     Physical Exam:  Constitutional: No acute distress, awake, alert and conversant. Sitting on commode.   HENT: NCAT, mucous membranes moist. Helmet on - did not remove for exam   Respiratory: Clear to auscultation bilaterally, normal respiratory effort. Sat 93% on RA   Cardiovascular: RRR  Gastrointestinal: Positive bowel sounds, soft, nontender, nondistended  Musculoskeletal: (+) LLE edema  Psychiatric: Appropriate affect, cooperative with exam  Neurologic: Oriented x 3, moves all extremities spontaneously without focal deficits, speech clear    Results Reviewed:  LAB RESULTS:      Lab 25  0341 25  1752 25  1153 25  0429 25  2059 25  1548 25  0800 25  0613   WBC 8.59  --   --  11.38*  --   --   --  14.04*   HEMOGLOBIN 11.6*  --   --  11.5*  --   --   --  14.5   HEMATOCRIT 36.3  --   --  35.5  --   --   --  44.2   PLATELETS 223  --   --  227  --   --   --  267   NEUTROS ABS 5.06  --   --  7.66*  --   --   --  11.31*   IMMATURE GRANS (ABS) 0.04  --   --  0.07*  --   --   --  0.07*   LYMPHS ABS 2.34  --   --  2.26  --   --   --  1.60   MONOS ABS 0.78  --   --  1.11*  --   --   --  0.79   EOS ABS 0.30  --    --  0.21  --   --   --  0.18   MCV 87.7  --   --  87.0  --   --   --  86.2   PROTIME  --   --   --   --   --   --   --  13.1   APTT  --   --   --   --   --   --   --  22.9*   HEPARIN ANTI-XA 0.62 0.67 0.64 0.58 0.50   < >  --  0.10*   HSTROP T  --   --   --   --   --   --  9 11    < > = values in this interval not displayed.         Lab 06/28/25  0341 06/27/25  0429 06/26/25  0613   SODIUM 137 134* 138   POTASSIUM 4.1 3.8 3.8   CHLORIDE 101 99 98   CO2 27.0 25.0 26.0   ANION GAP 9.0 10.0 14.0   BUN 13.7 13.3 11.1   CREATININE 0.53* 0.56* 0.76   EGFR 104.7 103.3 88.7   GLUCOSE 88 112* 112*   CALCIUM 9.0 8.8 9.7         Lab 06/26/25  0613   TOTAL PROTEIN 7.9   ALBUMIN 4.3   GLOBULIN 3.6   ALT (SGPT) 37*   AST (SGOT) 61*   BILIRUBIN 0.6   ALK PHOS 147*         Lab 06/26/25  0800 06/26/25  0613   PROBNP  --  68.0   HSTROP T 9 11   PROTIME  --  13.1   INR  --  0.93     Brief Urine Lab Results       None          Microbiology Results Abnormal       None          No radiology results from the last 24 hrs    Results for orders placed during the hospital encounter of 06/26/25    Adult Transthoracic Echo Limited W/ Cont if Necessary Per Protocol    Interpretation Summary    Left ventricular systolic function is normal. Estimated left ventricular EF = 65%    The cardiac valves are anatomically and functionally normal.    Current medications:  Scheduled Meds:apixaban, 10 mg, Oral, Q12H   Followed by  [START ON 7/5/2025] apixaban, 5 mg, Oral, Q12H  vitamin C, 500 mg, Oral, Daily  aspirin, 81 mg, Oral, Daily  atorvastatin, 80 mg, Oral, Nightly  cetirizine, 10 mg, Oral, Nightly  Ketotifen Fumarate, 1 drop, Both Eyes, BID  levETIRAcetam, 500 mg, Oral, Q12H  lubiprostone, 8 mcg, Oral, BID  pantoprazole, 40 mg, Oral, BID  sodium chloride, 10 mL, Intravenous, Q12H  vitamin B-12, 100 mcg, Oral, Daily      Continuous Infusions:   PRN Meds:.  acetaminophen **OR** acetaminophen **OR** acetaminophen    baclofen    senna-docusate sodium  **AND** polyethylene glycol **AND** bisacodyl **AND** bisacodyl    butalbital-acetaminophen-caffeine    Calcium Replacement - Follow Nurse / BPA Driven Protocol    Magnesium Standard Dose Replacement - Follow Nurse / BPA Driven Protocol    nitroglycerin    ondansetron ODT **OR** ondansetron    Phosphorus Replacement - Follow Nurse / BPA Driven Protocol    Potassium Replacement - Follow Nurse / BPA Driven Protocol    sodium chloride    sodium chloride    sodium chloride    Assessment & Plan     Active Hospital Problems    Diagnosis  POA    **Pulmonary embolus [I26.99]  Yes    Pulmonary embolism [I26.99]  Yes    Acute deep vein thrombosis (DVT) of left lower extremity [I82.402]  Unknown    Status post craniotomy [Z98.890]  Not Applicable    Grade I diastolic dysfunction [I51.89]  Yes    Hiatal hernia [K44.9]  Yes    GERD (gastroesophageal reflux disease) [K21.9]  Yes    Diverticulosis [K57.90]  Yes    Dyslipidemia [E78.5]  Yes      Resolved Hospital Problems   No resolved problems to display.     Brief Hospital Course to date:  Linda Schafer is a 64 y.o. female with PMH significant for HLD, GERD and migraine headaches. Recently [facility]30[facility]2025 for acute R MCA CVA with R ICA occlusion. On 5/25, CTH showed worsening edema with mass effect. NS consulted and she underwent decompressive craniectomy on 5/26/25 (with plan for OP cranioplasty). She discharged to [facility] with holter monitor and protective helmet (to be worn when OOB). She was referred to sleep medicine for OP sleep study. She discharged from [facility] on 6/17/25. She followed up with Stroke Neurology APRN on 6/19/25    She presented to [facility] ED on 6/26/25 for evaluation of pain with deep breaths and shortness of breath. She was found to have a large saddle PE on CTA.     Large saddle pulmonary embolus  Acute LLE DVT  - CTA chest with large saddle embolus with bilateral segmental and subsegmental pulmonary emboli with no R heart  strain noted  - posterior tibial, peroneal and gastrocnemius veins   - ECHO with EF 65%, no R heart strain  - On heparin gtt - transition to PO Eliquis today   - Hypercoagulable panel ordered during last admission per Stroke Neurology 6/19 and in review, only + result is for an Antithrombin III which is slightly elevated. Per neurology, this is generally insignificant in isolation and is not indicative of a clotting disorder on its own     Recent R MCA CVA s/p decompressive craniectomy by Dr. Olivarez 5/26/25  - Continue ASA, high-intensity statin  - On prophylactic Keppra 500mg BID   - Helmet on when OOB  - Craniectomy scheduled 7/7 but now on hold due to PE/anticoagulation. Plan to follow up with Dr. Olivarez on 7/25/25.  - PT/OT following. Patient/family interested in return to [facility]   - Followed by stroke neurology team - OP Holter monitor results are pending. Will notify team of her admission.     Migraine headaches, Keppra has been effective. PRN Fioricet available  Chronic constipation, home Linzess not on formulary. On Amitiza 8mg BID, increase to 24mg daily in AM     Expected Discharge Location and Transportation: rehab  Expected Discharge Expected Discharge Date: 7/1/2025; Expected Discharge Time:      VTE Prophylaxis:Pharmacologic VTE prophylaxis orders are present.    AM-PAC 6 Clicks Score (PT): 10 (06/27/25 2010)    CODE STATUS:   Code Status and Medical Interventions: CPR (Attempt to Resuscitate); Full Support   Ordered at: 06/26/25 1159     Code Status (Patient has no pulse and is not breathing):    CPR (Attempt to Resuscitate)     Medical Interventions (Patient has pulse or is breathing):    Full Support     Level Of Support Discussed With:    Patient     Lucille Cook PA-C  06/28/25

## 2025-06-29 LAB
ANION GAP SERPL CALCULATED.3IONS-SCNC: 10 MMOL/L (ref 5–15)
BACTERIA UR QL AUTO: ABNORMAL /HPF
BILIRUB UR QL STRIP: NEGATIVE
BUN SERPL-MCNC: 10.2 MG/DL (ref 8–23)
BUN/CREAT SERPL: 19.6 (ref 7–25)
CALCIUM SPEC-SCNC: 9 MG/DL (ref 8.6–10.5)
CHLORIDE SERPL-SCNC: 102 MMOL/L (ref 98–107)
CLARITY UR: ABNORMAL
CO2 SERPL-SCNC: 26 MMOL/L (ref 22–29)
COLOR UR: YELLOW
CREAT SERPL-MCNC: 0.52 MG/DL (ref 0.57–1)
DEPRECATED RDW RBC AUTO: 41.2 FL (ref 37–54)
EGFRCR SERPLBLD CKD-EPI 2021: 105.2 ML/MIN/1.73
ERYTHROCYTE [DISTWIDTH] IN BLOOD BY AUTOMATED COUNT: 12.6 % (ref 12.3–15.4)
GLUCOSE SERPL-MCNC: 106 MG/DL (ref 65–99)
GLUCOSE UR STRIP-MCNC: NEGATIVE MG/DL
HCT VFR BLD AUTO: 33.8 % (ref 34–46.6)
HGB BLD-MCNC: 10.5 G/DL (ref 12–15.9)
HGB UR QL STRIP.AUTO: NEGATIVE
HYALINE CASTS UR QL AUTO: ABNORMAL /LPF
KETONES UR QL STRIP: NEGATIVE
LEUKOCYTE ESTERASE UR QL STRIP.AUTO: NEGATIVE
MAGNESIUM SERPL-MCNC: 1.9 MG/DL (ref 1.6–2.4)
MCH RBC QN AUTO: 27.5 PG (ref 26.6–33)
MCHC RBC AUTO-ENTMCNC: 31.1 G/DL (ref 31.5–35.7)
MCV RBC AUTO: 88.5 FL (ref 79–97)
NITRITE UR QL STRIP: NEGATIVE
PH UR STRIP.AUTO: 7.5 [PH] (ref 5–8)
PLATELET # BLD AUTO: 250 10*3/MM3 (ref 140–450)
PMV BLD AUTO: 11.8 FL (ref 6–12)
POTASSIUM SERPL-SCNC: 3.9 MMOL/L (ref 3.5–5.2)
PROT UR QL STRIP: NEGATIVE
RBC # BLD AUTO: 3.82 10*6/MM3 (ref 3.77–5.28)
RBC # UR STRIP: ABNORMAL /HPF
REF LAB TEST METHOD: ABNORMAL
SODIUM SERPL-SCNC: 138 MMOL/L (ref 136–145)
SP GR UR STRIP: 1.01 (ref 1–1.03)
SQUAMOUS #/AREA URNS HPF: ABNORMAL /HPF
UFH PPP CHRO-ACNC: >1.1 IU/ML (ref 0.3–0.7)
UROBILINOGEN UR QL STRIP: ABNORMAL
WBC # UR STRIP: ABNORMAL /HPF
WBC NRBC COR # BLD AUTO: 6.66 10*3/MM3 (ref 3.4–10.8)

## 2025-06-29 PROCEDURE — 99232 SBSQ HOSP IP/OBS MODERATE 35: CPT | Performed by: PHYSICIAN ASSISTANT

## 2025-06-29 PROCEDURE — G0378 HOSPITAL OBSERVATION PER HR: HCPCS

## 2025-06-29 PROCEDURE — 83735 ASSAY OF MAGNESIUM: CPT | Performed by: PHYSICIAN ASSISTANT

## 2025-06-29 PROCEDURE — 25010000002 ONDANSETRON PER 1 MG: Performed by: FAMILY MEDICINE

## 2025-06-29 PROCEDURE — 81001 URINALYSIS AUTO W/SCOPE: CPT | Performed by: PHYSICIAN ASSISTANT

## 2025-06-29 PROCEDURE — 85520 HEPARIN ASSAY: CPT

## 2025-06-29 PROCEDURE — 80048 BASIC METABOLIC PNL TOTAL CA: CPT | Performed by: PHYSICIAN ASSISTANT

## 2025-06-29 PROCEDURE — 25010000002 ONDANSETRON PER 1 MG: Performed by: PHYSICIAN ASSISTANT

## 2025-06-29 PROCEDURE — 85027 COMPLETE CBC AUTOMATED: CPT | Performed by: PHYSICIAN ASSISTANT

## 2025-06-29 RX ADMIN — KETOTIFEN FUMARATE 1 DROP: 0.25 SOLUTION/ DROPS OPHTHALMIC at 08:54

## 2025-06-29 RX ADMIN — PANTOPRAZOLE SODIUM 40 MG: 40 TABLET, DELAYED RELEASE ORAL at 21:14

## 2025-06-29 RX ADMIN — OXYCODONE HYDROCHLORIDE AND ACETAMINOPHEN 500 MG: 500 TABLET ORAL at 08:53

## 2025-06-29 RX ADMIN — ATORVASTATIN CALCIUM 80 MG: 40 TABLET, FILM COATED ORAL at 21:14

## 2025-06-29 RX ADMIN — PANTOPRAZOLE SODIUM 40 MG: 40 TABLET, DELAYED RELEASE ORAL at 08:51

## 2025-06-29 RX ADMIN — ASPIRIN 81 MG: 81 TABLET, COATED ORAL at 08:53

## 2025-06-29 RX ADMIN — Medication 10 ML: at 08:53

## 2025-06-29 RX ADMIN — ACETAMINOPHEN 650 MG: 325 TABLET, FILM COATED ORAL at 21:18

## 2025-06-29 RX ADMIN — APIXABAN 10 MG: 5 TABLET, FILM COATED ORAL at 08:53

## 2025-06-29 RX ADMIN — CETIRIZINE HYDROCHLORIDE 10 MG: 10 TABLET, FILM COATED ORAL at 21:14

## 2025-06-29 RX ADMIN — LEVETIRACETAM 500 MG: 500 TABLET, FILM COATED ORAL at 08:53

## 2025-06-29 RX ADMIN — LEVETIRACETAM 500 MG: 500 TABLET, FILM COATED ORAL at 21:14

## 2025-06-29 RX ADMIN — BACLOFEN 10 MG: 10 TABLET ORAL at 21:18

## 2025-06-29 RX ADMIN — APIXABAN 10 MG: 5 TABLET, FILM COATED ORAL at 21:14

## 2025-06-29 RX ADMIN — Medication 10 ML: at 21:19

## 2025-06-29 RX ADMIN — ONDANSETRON 4 MG: 2 INJECTION INTRAMUSCULAR; INTRAVENOUS at 08:51

## 2025-06-29 RX ADMIN — KETOTIFEN FUMARATE 1 DROP: 0.25 SOLUTION/ DROPS OPHTHALMIC at 21:14

## 2025-06-29 RX ADMIN — ACETAMINOPHEN 650 MG: 325 TABLET, FILM COATED ORAL at 16:11

## 2025-06-29 RX ADMIN — LUBIPROSTONE 24 MCG: 24 CAPSULE ORAL at 08:53

## 2025-06-29 RX ADMIN — VITAM B12 100 MCG: 100 TAB at 08:53

## 2025-06-29 NOTE — PROGRESS NOTES
[facility] Medicine Services  PROGRESS NOTE    Patient Name: Linda Schafer  : 1960  MRN: [id number]    Date of Admission: 2025  Primary Care Physician: Micheline Heath,     Subjective     CC: f/u PE    HPI:  In bed, son dozing on couch at bedside. RN put on O2 for sat 89% overnight. Does feel like sharp, left-sided pains are improving but have not completely resolved. Notes episodes of sweats with nausea that started while she was at [facility].     Objective     Vital Signs:   Temp:  [98 °F (36.7 °C)-98.3 °F (36.8 °C)] 98.1 °F (36.7 °C)  Heart Rate:  [60-84] 70  Resp:  [16-18] 18  BP: ()/(45-59) 112/59  Flow (L/min) (Oxygen Therapy):  [2] 2     Physical Exam:  Constitutional: No acute distress, awake, alert and conversant. Sitting up in bed   HENT: NCAT, mucous membranes moist. Helmet on - did not remove for exam   Respiratory: Clear to auscultation bilaterally, normal respiratory effort. Sat 98% on 2L NC  Cardiovascular: RRR  Gastrointestinal: Positive bowel sounds, soft, nontender, nondistended  Musculoskeletal: (+) LLE edema  Psychiatric: Appropriate affect, cooperative with exam  Neurologic: Oriented x 3, moves all extremities spontaneously without focal deficits, speech clear  Skin: R craniectomy incision c/d/i     Results Reviewed:  LAB RESULTS:      Lab 25  0354 25  0341 25  1752 25  1153 25  0429 25  1548 25  0800 25  0613   WBC 6.66 8.59  --   --  11.38*  --   --  14.04*   HEMOGLOBIN 10.5* 11.6*  --   --  11.5*  --   --  14.5   HEMATOCRIT 33.8* 36.3  --   --  35.5  --   --  44.2   PLATELETS 250 223  --   --  227  --   --  267   NEUTROS ABS  --  5.06  --   --  7.66*  --   --  11.31*   IMMATURE GRANS (ABS)  --  0.04  --   --  0.07*  --   --  0.07*   LYMPHS ABS  --  2.34  --   --  2.26  --   --  1.60   MONOS ABS  --  0.78  --   --  1.11*  --   --  0.79   EOS ABS  --  0.30  --   --  0.21  --   --  0.18   MCV 88.5 87.7   --   --  87.0  --   --  86.2   PROTIME  --   --   --   --   --   --   --  13.1   APTT  --   --   --   --   --   --   --  22.9*   HEPARIN ANTI-XA >1.10* 0.62 0.67 0.64 0.58   < >  --  0.10*   HSTROP T  --   --   --   --   --   --  9 11    < > = values in this interval not displayed.         Lab 06/29/25  0354 06/28/25  0341 06/27/25  0429 06/26/25  0613   SODIUM 138 137 134* 138   POTASSIUM 3.9 4.1 3.8 3.8   CHLORIDE 102 101 99 98   CO2 26.0 27.0 25.0 26.0   ANION GAP 10.0 9.0 10.0 14.0   BUN 10.2 13.7 13.3 11.1   CREATININE 0.52* 0.53* 0.56* 0.76   EGFR 105.2 104.7 103.3 88.7   GLUCOSE 106* 88 112* 112*   CALCIUM 9.0 9.0 8.8 9.7   MAGNESIUM 1.9  --   --   --          Lab 06/26/25  0613   TOTAL PROTEIN 7.9   ALBUMIN 4.3   GLOBULIN 3.6   ALT (SGPT) 37*   AST (SGOT) 61*   BILIRUBIN 0.6   ALK PHOS 147*         Lab 06/26/25  0800 06/26/25  0613   PROBNP  --  68.0   HSTROP T 9 11   PROTIME  --  13.1   INR  --  0.93     Brief Urine Lab Results       None          Microbiology Results Abnormal       None          No radiology results from the last 24 hrs    Results for orders placed during the hospital encounter of 06/26/25    Adult Transthoracic Echo Limited W/ Cont if Necessary Per Protocol    Interpretation Summary    Left ventricular systolic function is normal. Estimated left ventricular EF = 65%    The cardiac valves are anatomically and functionally normal.    Current medications:  Scheduled Meds:apixaban, 10 mg, Oral, Q12H   Followed by  [START ON 7/5/2025] apixaban, 5 mg, Oral, Q12H  vitamin C, 500 mg, Oral, Daily  aspirin, 81 mg, Oral, Daily  atorvastatin, 80 mg, Oral, Nightly  cetirizine, 10 mg, Oral, Nightly  Ketotifen Fumarate, 1 drop, Both Eyes, BID  levETIRAcetam, 500 mg, Oral, Q12H  lubiprostone, 24 mcg, Oral, Daily With Breakfast  pantoprazole, 40 mg, Oral, BID  sodium chloride, 10 mL, Intravenous, Q12H  vitamin B-12, 100 mcg, Oral, Daily      Continuous Infusions:   PRN Meds:.  acetaminophen **OR**  [DISCONTINUED] acetaminophen **OR** [DISCONTINUED] acetaminophen    baclofen    senna-docusate sodium **AND** polyethylene glycol **AND** bisacodyl **AND** bisacodyl    butalbital-acetaminophen-caffeine    Calcium Replacement - Follow Nurse / BPA Driven Protocol    Magnesium Standard Dose Replacement - Follow Nurse / BPA Driven Protocol    nitroglycerin    ondansetron ODT **OR** ondansetron    Phosphorus Replacement - Follow Nurse / BPA Driven Protocol    Potassium Replacement - Follow Nurse / BPA Driven Protocol    sodium chloride    sodium chloride    sodium chloride    Assessment & Plan     Active Hospital Problems    Diagnosis  POA    **Pulmonary embolus [I26.99]  Yes    Pulmonary embolism [I26.99]  Yes    Acute deep vein thrombosis (DVT) of left lower extremity [I82.402]  Unknown    Status post craniotomy [Z98.890]  Not Applicable    Grade I diastolic dysfunction [I51.89]  Yes    Hiatal hernia [K44.9]  Yes    GERD (gastroesophageal reflux disease) [K21.9]  Yes    Diverticulosis [K57.90]  Yes    Dyslipidemia [E78.5]  Yes      Resolved Hospital Problems   No resolved problems to display.     Brief Hospital Course to date:  Linda Schafer is a 64 y.o. female with PMH significant for HLD, GERD and migraine headaches. Recently admitted to [facility] 5/24-5/30/2025 for acute R MCA CVA with R ICA occlusion. On 5/25, CTH showed worsening edema with mass effect. NS consulted and she underwent decompressive craniectomy on 5/26/25 (with plan for OP cranioplasty). She discharged to [facility] with holter monitor and protective helmet (to be worn when OOB). She was referred to sleep medicine for OP sleep study. She discharged from [facility] on 6/17/25. She followed up with Stroke Neurology APRN on 6/19/25    She presented to [facility] ED on 6/26/25 for evaluation of pain with deep breaths and shortness of breath. She was found to have a large saddle PE on CTA.     Large saddle pulmonary embolus  Acute LLE DVT  - CTA chest with  large saddle embolus with bilateral segmental and subsegmental pulmonary emboli with no R heart strain noted  - posterior tibial, peroneal and gastrocnemius veins   - ECHO with EF 65%, no R heart strain  - On heparin gtt - transition to PO Eliquis today   - Hypercoagulable panel ordered during last admission per Stroke Neurology 6/19 and in review, only + result is for an Antithrombin III which is slightly elevated. Per neurology, this is generally insignificant in isolation and is not indicative of a clotting disorder on its own     Recent R MCA CVA s/p decompressive craniectomy by Dr. Olivarez 5/26/25  - Continue ASA, high-intensity statin  - On prophylactic Keppra 500mg BID   - Helmet on when OOB  - Craniectomy scheduled 7/7 but now on hold due to PE/anticoagulation. Plan to follow up with Dr. Olivarez on 7/25/25.  - PT/OT following. Patient/family interested in return to [facility]   - Followed by stroke neurology team - OP Holter monitor results are pending. Will notify team of her admission.     Episodes of sweating / nausea  - Could be neurological related to recent surgery but I suspect more likely vasomotor related to estrogen withdrawal  - Historically, has been prescribed Estradiol patch 0.05mg 2x/week. Last filled 84 day supply on 3/18.   - Did not wear patch during 5/24-5/30 admission and not at [facility] - so now 4-6 weeks into estrogen withdrawal   - Recommend AGAINST restarting estrogen given DVT/PE     Migraine headaches, Keppra has been effective. PRN Fioricet available  Chronic constipation, home Linzess not on formulary. On Amitiza 24mg daily     Expected Discharge Location and Transportation: rehab  Expected Discharge Expected Discharge Date: 7/1/2025; Expected Discharge Time:      VTE Prophylaxis:Pharmacologic VTE prophylaxis orders are present.    AM-PAC 6 Clicks Score (PT): 10 (06/28/25 2121)    CODE STATUS:   Code Status and Medical Interventions: CPR (Attempt to Resuscitate); Full Support   Ordered  at: 06/26/25 1159     Code Status (Patient has no pulse and is not breathing):    CPR (Attempt to Resuscitate)     Medical Interventions (Patient has pulse or is breathing):    Full Support     Level Of Support Discussed With:    Patient     Lucille Cook PA-C  06/29/25

## 2025-06-29 NOTE — PLAN OF CARE
Outcome Evaluation: 2L nasal canula. Early in shift, complains of head ache and neck pain rating at 9/10. Request PRN tylenol. Improved after administration. Nasal canula stayed off patient for a 2-3 hours, O2 stayed low 90%. Applied 2L nasal canula when O2 dipped to 89%. Improved to 97%.

## 2025-06-30 ENCOUNTER — TELEPHONE (OUTPATIENT)
Dept: NEUROSURGERY | Facility: CLINIC | Age: 65
End: 2025-06-30
Payer: COMMERCIAL

## 2025-06-30 LAB
DEPRECATED RDW RBC AUTO: 41.2 FL (ref 37–54)
ERYTHROCYTE [DISTWIDTH] IN BLOOD BY AUTOMATED COUNT: 12.7 % (ref 12.3–15.4)
HCT VFR BLD AUTO: 37.1 % (ref 34–46.6)
HGB BLD-MCNC: 11.5 G/DL (ref 12–15.9)
MCH RBC QN AUTO: 27.5 PG (ref 26.6–33)
MCHC RBC AUTO-ENTMCNC: 31 G/DL (ref 31.5–35.7)
MCV RBC AUTO: 88.8 FL (ref 79–97)
PLATELET # BLD AUTO: 310 10*3/MM3 (ref 140–450)
PMV BLD AUTO: 11.9 FL (ref 6–12)
RBC # BLD AUTO: 4.18 10*6/MM3 (ref 3.77–5.28)
WBC NRBC COR # BLD AUTO: 7.16 10*3/MM3 (ref 3.4–10.8)

## 2025-06-30 PROCEDURE — 85027 COMPLETE CBC AUTOMATED: CPT | Performed by: PHYSICIAN ASSISTANT

## 2025-06-30 PROCEDURE — 25010000002 ONDANSETRON PER 1 MG: Performed by: FAMILY MEDICINE

## 2025-06-30 PROCEDURE — 99232 SBSQ HOSP IP/OBS MODERATE 35: CPT | Performed by: PHYSICIAN ASSISTANT

## 2025-06-30 RX ORDER — ECHINACEA PURPUREA EXTRACT 125 MG
2 TABLET ORAL
Status: DISCONTINUED | OUTPATIENT
Start: 2025-06-30 | End: 2025-07-03 | Stop reason: HOSPADM

## 2025-06-30 RX ORDER — OXYMETAZOLINE HYDROCHLORIDE 0.05 G/100ML
2 SPRAY NASAL 2 TIMES DAILY
Status: DISCONTINUED | OUTPATIENT
Start: 2025-06-30 | End: 2025-07-01

## 2025-06-30 RX ORDER — PROCHLORPERAZINE MALEATE 5 MG/1
5 TABLET ORAL EVERY 6 HOURS PRN
Status: DISCONTINUED | OUTPATIENT
Start: 2025-06-30 | End: 2025-07-03 | Stop reason: HOSPADM

## 2025-06-30 RX ORDER — L.ACID,PARA/B.BIFIDUM/S.THERM 8B CELL
1 CAPSULE ORAL DAILY
Status: DISCONTINUED | OUTPATIENT
Start: 2025-06-30 | End: 2025-07-03 | Stop reason: HOSPADM

## 2025-06-30 RX ADMIN — PANTOPRAZOLE SODIUM 40 MG: 40 TABLET, DELAYED RELEASE ORAL at 21:06

## 2025-06-30 RX ADMIN — Medication 1 CAPSULE: at 08:49

## 2025-06-30 RX ADMIN — LEVETIRACETAM 500 MG: 500 TABLET, FILM COATED ORAL at 08:45

## 2025-06-30 RX ADMIN — LUBIPROSTONE 24 MCG: 24 CAPSULE ORAL at 08:45

## 2025-06-30 RX ADMIN — ACETAMINOPHEN 650 MG: 325 TABLET, FILM COATED ORAL at 19:08

## 2025-06-30 RX ADMIN — OXYCODONE HYDROCHLORIDE AND ACETAMINOPHEN 500 MG: 500 TABLET ORAL at 08:45

## 2025-06-30 RX ADMIN — LEVETIRACETAM 500 MG: 500 TABLET, FILM COATED ORAL at 21:06

## 2025-06-30 RX ADMIN — KETOTIFEN FUMARATE 1 DROP: 0.25 SOLUTION/ DROPS OPHTHALMIC at 21:06

## 2025-06-30 RX ADMIN — ATORVASTATIN CALCIUM 80 MG: 40 TABLET, FILM COATED ORAL at 21:06

## 2025-06-30 RX ADMIN — Medication 10 ML: at 08:46

## 2025-06-30 RX ADMIN — Medication 10 ML: at 21:07

## 2025-06-30 RX ADMIN — CETIRIZINE HYDROCHLORIDE 10 MG: 10 TABLET, FILM COATED ORAL at 21:06

## 2025-06-30 RX ADMIN — ASPIRIN 81 MG: 81 TABLET, COATED ORAL at 08:45

## 2025-06-30 RX ADMIN — APIXABAN 10 MG: 5 TABLET, FILM COATED ORAL at 21:06

## 2025-06-30 RX ADMIN — PANTOPRAZOLE SODIUM 40 MG: 40 TABLET, DELAYED RELEASE ORAL at 08:45

## 2025-06-30 RX ADMIN — SALINE NASAL SPRAY 2 SPRAY: 1.5 SOLUTION NASAL at 19:08

## 2025-06-30 RX ADMIN — VITAM B12 100 MCG: 100 TAB at 08:45

## 2025-06-30 RX ADMIN — KETOTIFEN FUMARATE 1 DROP: 0.25 SOLUTION/ DROPS OPHTHALMIC at 08:46

## 2025-06-30 RX ADMIN — BACLOFEN 10 MG: 10 TABLET ORAL at 08:50

## 2025-06-30 RX ADMIN — APIXABAN 10 MG: 5 TABLET, FILM COATED ORAL at 08:45

## 2025-06-30 RX ADMIN — ONDANSETRON 4 MG: 2 INJECTION INTRAMUSCULAR; INTRAVENOUS at 09:19

## 2025-06-30 RX ADMIN — Medication 5 MG: at 21:06

## 2025-06-30 RX ADMIN — BACLOFEN 10 MG: 10 TABLET ORAL at 21:06

## 2025-06-30 NOTE — PLAN OF CARE
Goal Outcome Evaluation:           Progress: improving  Outcome Evaluation: RA. SR on tele. PRN tylenol given for HA. Pt slept between care.

## 2025-06-30 NOTE — PROGRESS NOTES
[facility] Medicine Services  PROGRESS NOTE    Patient Name: Linda Schafer  : 1960  MRN: [id number]    Date of Admission: 2025  Primary Care Physician: Micheline Heath,     Subjective     CC: f/u PE    HPI:  In bed. Feeling about the same. Daughter at bedside.   Nosebleed yesterday evening - has since resolved. Encouraged to keep O2 off as much as possible if O2 sats allow. Per daughter, patient seemed disoriented and not her self yesterday but seems back to baseline today. Hasn't been sleeping very well    Objective     Vital Signs:   Temp:  [97.9 °F (36.6 °C)-98.2 °F (36.8 °C)] 98.1 °F (36.7 °C)  Heart Rate:  [68-73] 73  Resp:  [16-18] 16  BP: (108-117)/(59-67) 108/67     Physical Exam:  Constitutional: No acute distress, awake, alert and conversant. Sitting up in bed   HENT: NCAT, mucous membranes moist. Helmet on - did not remove for exam   Respiratory: Clear to auscultation bilaterally, normal respiratory effort on room air   Cardiovascular: RRR  Gastrointestinal: Positive bowel sounds, soft, nontender, nondistended  Musculoskeletal: (+) LLE edema  Psychiatric: Appropriate affect, cooperative with exam  Neurologic: Oriented x 3, moves all extremities spontaneously without focal deficits, speech clear  Skin: R craniectomy incision c/d/i     Results Reviewed:  LAB RESULTS:      Lab 25  0342 25  0354 25  0341 25  1752 25  1153 25  0429 25  1548 25  0800 25  0613   WBC 7.16 6.66 8.59  --   --  11.38*  --   --  14.04*   HEMOGLOBIN 11.5* 10.5* 11.6*  --   --  11.5*  --   --  14.5   HEMATOCRIT 37.1 33.8* 36.3  --   --  35.5  --   --  44.2   PLATELETS 310 250 223  --   --  227  --   --  267   NEUTROS ABS  --   --  5.06  --   --  7.66*  --   --  11.31*   IMMATURE GRANS (ABS)  --   --  0.04  --   --  0.07*  --   --  0.07*   LYMPHS ABS  --   --  2.34  --   --  2.26  --   --  1.60   MONOS ABS  --   --  0.78  --   --  1.11*   --   --  0.79   EOS ABS  --   --  0.30  --   --  0.21  --   --  0.18   MCV 88.8 88.5 87.7  --   --  87.0  --   --  86.2   PROTIME  --   --   --   --   --   --   --   --  13.1   APTT  --   --   --   --   --   --   --   --  22.9*   HEPARIN ANTI-XA  --  >1.10* 0.62 0.67 0.64 0.58   < >  --  0.10*   HSTROP T  --   --   --   --   --   --   --  9 11    < > = values in this interval not displayed.         Lab 06/29/25  0354 06/28/25  0341 06/27/25  0429 06/26/25  0613   SODIUM 138 137 134* 138   POTASSIUM 3.9 4.1 3.8 3.8   CHLORIDE 102 101 99 98   CO2 26.0 27.0 25.0 26.0   ANION GAP 10.0 9.0 10.0 14.0   BUN 10.2 13.7 13.3 11.1   CREATININE 0.52* 0.53* 0.56* 0.76   EGFR 105.2 104.7 103.3 88.7   GLUCOSE 106* 88 112* 112*   CALCIUM 9.0 9.0 8.8 9.7   MAGNESIUM 1.9  --   --   --          Lab 06/26/25  0613   TOTAL PROTEIN 7.9   ALBUMIN 4.3   GLOBULIN 3.6   ALT (SGPT) 37*   AST (SGOT) 61*   BILIRUBIN 0.6   ALK PHOS 147*         Lab 06/26/25  0800 06/26/25  0613   PROBNP  --  68.0   HSTROP T 9 11   PROTIME  --  13.1   INR  --  0.93     Brief Urine Lab Results  (Last result in the past 365 days)        Color   Clarity   Blood   Leuk Est   Nitrite   Protein   CREAT   Urine HCG        06/29/25 1441 Yellow   Cloudy   Negative   Negative   Negative   Negative                 Microbiology Results Abnormal       None          No radiology results from the last 24 hrs    Results for orders placed during the hospital encounter of 06/26/25    Adult Transthoracic Echo Limited W/ Cont if Necessary Per Protocol    Interpretation Summary    Left ventricular systolic function is normal. Estimated left ventricular EF = 65%    The cardiac valves are anatomically and functionally normal.    Current medications:  Scheduled Meds:apixaban, 10 mg, Oral, Q12H   Followed by  [START ON 7/5/2025] apixaban, 5 mg, Oral, Q12H  vitamin C, 500 mg, Oral, Daily  aspirin, 81 mg, Oral, Daily  atorvastatin, 80 mg, Oral, Nightly  cetirizine, 10 mg, Oral,  Nightly  Ketotifen Fumarate, 1 drop, Both Eyes, BID  lactobacillus acidophilus, 1 capsule, Oral, Daily  levETIRAcetam, 500 mg, Oral, Q12H  lubiprostone, 24 mcg, Oral, Daily With Breakfast  melatonin, 5 mg, Oral, Nightly  oxymetazoline, 2 spray, Each Nare, BID  pantoprazole, 40 mg, Oral, BID  sodium chloride, 10 mL, Intravenous, Q12H  vitamin B-12, 100 mcg, Oral, Daily      Continuous Infusions:   PRN Meds:.  acetaminophen **OR** [DISCONTINUED] acetaminophen **OR** [DISCONTINUED] acetaminophen    baclofen    senna-docusate sodium **AND** polyethylene glycol **AND** bisacodyl **AND** bisacodyl    butalbital-acetaminophen-caffeine    Calcium Replacement - Follow Nurse / BPA Driven Protocol    Magnesium Standard Dose Replacement - Follow Nurse / BPA Driven Protocol    nitroglycerin    ondansetron ODT **OR** ondansetron    Phosphorus Replacement - Follow Nurse / BPA Driven Protocol    Potassium Replacement - Follow Nurse / BPA Driven Protocol    prochlorperazine    sodium chloride    sodium chloride    sodium chloride    sodium chloride    Assessment & Plan     Active Hospital Problems    Diagnosis  POA    **Pulmonary embolus [I26.99]  Yes    Pulmonary embolism [I26.99]  Yes    Acute deep vein thrombosis (DVT) of left lower extremity [I82.402]  Unknown    Status post craniotomy [Z98.890]  Not Applicable    Grade I diastolic dysfunction [I51.89]  Yes    Hiatal hernia [K44.9]  Yes    GERD (gastroesophageal reflux disease) [K21.9]  Yes    Diverticulosis [K57.90]  Yes    Dyslipidemia [E78.5]  Yes      Resolved Hospital Problems   No resolved problems to display.     Brief Hospital Course to date:  Linda Schafer is a 64 y.o. female with PMH significant for HLD, GERD and migraine headaches. Recently admitted to [facility] 5/24-5/30/2025 for acute R MCA CVA with R ICA occlusion. On 5/25, CTH showed worsening edema with mass effect. NS consulted and she underwent decompressive craniectomy on 5/26/25 (with plan for  "OP cranioplasty). She discharged to [facility] with holter monitor and protective helmet (to be worn when OOB). She was referred to sleep medicine for OP sleep study. She discharged from [facility] on 6/17/25. She followed up with Stroke Neurology APRN on 6/19/25    She presented to [facility] ED on 6/26/25 for evaluation of pain with deep breaths and shortness of breath. She was found to have a large saddle PE on CTA.     Large saddle pulmonary embolus  Acute LLE DVT  - CTA chest with large saddle embolus with bilateral segmental and subsegmental pulmonary emboli with no R heart strain noted  - posterior tibial, peroneal and gastrocnemius veins   - ECHO with EF 65%, no R heart strain  - On heparin gtt - transition to PO Eliquis today   - Hypercoagulable panel ordered during last admission per Stroke Neurology 6/19 and in review, only + result is for an Antithrombin III which is slightly elevated. Per neurology, this is generally insignificant in isolation and is not indicative of a clotting disorder on its own     Recent R MCA CVA s/p decompressive craniectomy by Dr. Olivarez 5/26/25  - Continue ASA, high-intensity statin  - On prophylactic Keppra 500mg BID   - Helmet on when OOB  - Craniectomy scheduled 7/7 but now on hold due to PE/anticoagulation. Plan to follow up with Dr. Olivarez on 7/25/25.  - PT/OT following. Patient/family interested in return to [facility]   - Followed by stroke neurology team - OP Holter monitor showed 5 episodes of SVT (longest episode 17.4 seconds with max ), per reading physician, \"relatively benign study\"    Episodes of sweating / nausea  - Could be neurological related to recent surgery or SVT but I suspect more likely vasomotor related to estrogen withdrawal  - Historically, has been prescribed Estradiol patch 0.05mg 2x/week. Last filled 84 day supply on 3/18.   - Did not wear patch during 5/24-5/30 admission and not at [facility] - so now 4-6 weeks into estrogen withdrawal   - Recommend AGAINST restarting " estrogen given DVT/PE     Migraine headaches, Keppra has been effective. PRN Fioricet available  Chronic constipation, home Linzess not on formulary. On Amitiza 24mg daily     Expected Discharge Location and Transportation: rehab  Expected Discharge Expected Discharge Date: 7/1/2025; Expected Discharge Time:      VTE Prophylaxis:Pharmacologic VTE prophylaxis orders are present.    AM-PAC 6 Clicks Score (PT): 9 (06/30/25 0800)    CODE STATUS:   Code Status and Medical Interventions: CPR (Attempt to Resuscitate); Full Support   Ordered at: 06/26/25 1159     Code Status (Patient has no pulse and is not breathing):    CPR (Attempt to Resuscitate)     Medical Interventions (Patient has pulse or is breathing):    Full Support     Level Of Support Discussed With:    Patient     Lucille Cook PA-C  06/30/25

## 2025-06-30 NOTE — PROGRESS NOTES
Nutrition Services    Patient Name:  Linda Schafer  YOB: 1960  MRN: [id number]  Admit Date:  6/26/2025    Pt screened for chewing/swallowing difficulties. Spoke w/pt and family at bedside. Pt on regular/thin diet and denies dysphagia. Pt notes she mostly has difficulty w/pills and gag reflex. Pt denies need for adjustments to diet textures. RD will continue to follow per protocol.     Electronically signed by:  No Landis MS,RD,LD  06/30/25 13:25 EDT

## 2025-06-30 NOTE — CASE MANAGEMENT/SOCIAL WORK
Continued Stay Note  [facility]     Patient Name: Linda Schafer  MRN: [id number]  Today's Date: 6/30/2025    Admit Date: 6/26/2025    Plan: [facility]   Discharge Plan       Row Name 06/30/25 1218       Plan    Plan [facility]    Plan Comments Per discussion in MDR, Pt c/o nausea. She is on room air. I spoke with Pt and daughter, in room. Daughter voiced concerns about Pt's helmet fitting incorrectly. CM left a message with EVA Walter @ Dr Olivarez's office requesting assistance with this concern. Referral pending for acute rehab at [facility] (spoke with April). CM requested updated PT/OT notes which are needed to start precert. With Pt and daughter's consent, CM provided an update to Dafne , Cristina. CM will continue to follow for medical readiness.    Final Discharge Disposition Code 03 - skilled nursing facility (SNF)                   Discharge Codes    No documentation.                 Expected Discharge Date and Time       Expected Discharge Date Expected Discharge Time    Jul 1, 2025               Chelo Laguna RN

## 2025-06-30 NOTE — PLAN OF CARE
Problem: Adult Inpatient Plan of Care  Goal: Absence of Hospital-Acquired Illness or Injury  Intervention: Identify and Manage Fall Risk  Recent Flowsheet Documentation  Taken 6/30/2025 1800 by Divina Ray RN  Safety Promotion/Fall Prevention:   activity supervised   assistive device/personal items within reach   clutter free environment maintained   fall prevention program maintained   lighting adjusted   nonskid shoes/slippers when out of bed   room organization consistent   safety round/check completed  Taken 6/30/2025 1600 by Divina Ray RN  Safety Promotion/Fall Prevention:   activity supervised   assistive device/personal items within reach   clutter free environment maintained   fall prevention program maintained   lighting adjusted   nonskid shoes/slippers when out of bed   room organization consistent   safety round/check completed  Taken 6/30/2025 1400 by Divina Ray RN  Safety Promotion/Fall Prevention:   activity supervised   assistive device/personal items within reach   clutter free environment maintained   fall prevention program maintained   lighting adjusted   nonskid shoes/slippers when out of bed   room organization consistent   safety round/check completed  Taken 6/30/2025 1200 by Divina Ray RN  Safety Promotion/Fall Prevention:   activity supervised   assistive device/personal items within reach   clutter free environment maintained   fall prevention program maintained   lighting adjusted   nonskid shoes/slippers when out of bed   room organization consistent   safety round/check completed  Taken 6/30/2025 1000 by Divina Ray RN  Safety Promotion/Fall Prevention:   activity supervised   assistive device/personal items within reach   clutter free environment maintained   fall prevention program maintained   lighting adjusted   nonskid shoes/slippers when out of bed   room organization consistent   safety round/check  completed  Taken 6/30/2025 0800 by Divina Ray RN  Safety Promotion/Fall Prevention:   activity supervised   assistive device/personal items within reach   clutter free environment maintained   fall prevention program maintained   lighting adjusted   nonskid shoes/slippers when out of bed   room organization consistent   safety round/check completed  Intervention: Prevent Skin Injury  Recent Flowsheet Documentation  Taken 6/30/2025 1800 by Divina Ray RN  Skin Protection:   incontinence pads utilized   silicone foam dressing in place   skin sealant/moisture barrier applied   transparent dressing maintained  Taken 6/30/2025 1600 by Divina Ray RN  Skin Protection:   incontinence pads utilized   silicone foam dressing in place   skin sealant/moisture barrier applied   transparent dressing maintained  Taken 6/30/2025 1400 by Divina Ray RN  Skin Protection:   incontinence pads utilized   silicone foam dressing in place   skin sealant/moisture barrier applied   transparent dressing maintained  Taken 6/30/2025 1200 by Divina Ray RN  Skin Protection:   incontinence pads utilized   silicone foam dressing in place   skin sealant/moisture barrier applied   transparent dressing maintained  Taken 6/30/2025 1000 by Divina Ray RN  Skin Protection:   incontinence pads utilized   silicone foam dressing in place   skin sealant/moisture barrier applied   transparent dressing maintained  Taken 6/30/2025 0800 by Divina Ray RN  Skin Protection:   incontinence pads utilized   silicone foam dressing in place   skin sealant/moisture barrier applied   transparent dressing maintained  Intervention: Prevent and Manage VTE (Venous Thromboembolism) Risk  Recent Flowsheet Documentation  Taken 6/30/2025 0800 by Divina Ray RN  VTE Prevention/Management: (Apixaban) other (see comments)  Intervention: Prevent Infection  Recent  Flowsheet Documentation  Taken 6/30/2025 1800 by Divina Ray RN  Infection Prevention:   environmental surveillance performed   equipment surfaces disinfected   hand hygiene promoted   personal protective equipment utilized   rest/sleep promoted   single patient room provided  Taken 6/30/2025 1600 by Divina Ray RN  Infection Prevention:   environmental surveillance performed   equipment surfaces disinfected   hand hygiene promoted   personal protective equipment utilized   rest/sleep promoted   single patient room provided  Taken 6/30/2025 1400 by Divina Ray RN  Infection Prevention:   environmental surveillance performed   equipment surfaces disinfected   hand hygiene promoted   personal protective equipment utilized   rest/sleep promoted   single patient room provided  Taken 6/30/2025 1200 by Divina Ray RN  Infection Prevention:   environmental surveillance performed   equipment surfaces disinfected   hand hygiene promoted   personal protective equipment utilized   rest/sleep promoted   single patient room provided  Taken 6/30/2025 1000 by Divina Ray RN  Infection Prevention:   environmental surveillance performed   equipment surfaces disinfected   hand hygiene promoted   personal protective equipment utilized   rest/sleep promoted   single patient room provided  Taken 6/30/2025 0800 by Divina Ray RN  Infection Prevention:   environmental surveillance performed   equipment surfaces disinfected   hand hygiene promoted   rest/sleep promoted   personal protective equipment utilized   single patient room provided  Goal: Optimal Comfort and Wellbeing  Intervention: Provide Person-Centered Care  Recent Flowsheet Documentation  Taken 6/30/2025 0800 by Divina Ray RN  Trust Relationship/Rapport:   care explained   choices provided   questions answered   questions encouraged     Problem: Comorbidity Management  Goal:  Maintenance of Osteoarthritis Symptom Control  Intervention: Maintain Osteoarthritis Symptom Control  Recent Flowsheet Documentation  Taken 6/30/2025 1800 by Divina Ray RN  Activity Management: activity encouraged  Medication Review/Management: medications reviewed  Taken 6/30/2025 1600 by Divina Ray RN  Activity Management: activity encouraged  Medication Review/Management: medications reviewed  Taken 6/30/2025 1400 by Divina Ray RN  Activity Management: activity encouraged  Medication Review/Management: medications reviewed  Taken 6/30/2025 1200 by Divina Ray RN  Activity Management: activity encouraged  Medication Review/Management: medications reviewed  Taken 6/30/2025 1000 by Divina Ray RN  Activity Management: activity encouraged  Medication Review/Management: medications reviewed  Taken 6/30/2025 0800 by Divina Ray RN  Activity Management: activity encouraged  Medication Review/Management: medications reviewed     Problem: Skin Injury Risk Increased  Goal: Skin Health and Integrity  Intervention: Optimize Skin Protection  Recent Flowsheet Documentation  Taken 6/30/2025 1800 by Divina Ray RN  Activity Management: activity encouraged  Pressure Reduction Techniques:   frequent weight shift encouraged   pressure points protected   weight shift assistance provided  Head of Bed (HOB) Positioning: [facility] elevated  Pressure Reduction Devices:   positioning supports utilized   pressure-redistributing mattress utilized  Skin Protection:   incontinence pads utilized   silicone foam dressing in place   skin sealant/moisture barrier applied   transparent dressing maintained  Taken 6/30/2025 1600 by Divina Ray RN  Activity Management: activity encouraged  Pressure Reduction Techniques:   frequent weight shift encouraged   pressure points protected   weight shift assistance provided  Head of Bed (HOB)  Positioning: [facility] elevated  Pressure Reduction Devices:   positioning supports utilized   pressure-redistributing mattress utilized  Skin Protection:   incontinence pads utilized   silicone foam dressing in place   skin sealant/moisture barrier applied   transparent dressing maintained  Taken 6/30/2025 1400 by Divina Ray RN  Activity Management: activity encouraged  Pressure Reduction Techniques:   frequent weight shift encouraged   pressure points protected  Head of Bed ([facility]) Positioning: [facility] elevated  Pressure Reduction Devices:   positioning supports utilized   pressure-redistributing mattress utilized  Skin Protection:   incontinence pads utilized   silicone foam dressing in place   skin sealant/moisture barrier applied   transparent dressing maintained  Taken 6/30/2025 1200 by Divina Ray RN  Activity Management: activity encouraged  Pressure Reduction Techniques:   frequent weight shift encouraged   pressure points protected   weight shift assistance provided  Head of Bed ([facility]) Positioning: [facility] elevated  Pressure Reduction Devices:   positioning supports utilized   pressure-redistributing mattress utilized  Skin Protection:   incontinence pads utilized   silicone foam dressing in place   skin sealant/moisture barrier applied   transparent dressing maintained  Taken 6/30/2025 1000 by Divina Ray RN  Activity Management: activity encouraged  Pressure Reduction Techniques:   frequent weight shift encouraged   pressure points protected   weight shift assistance provided  Head of Bed ([facility]) Positioning: [facility] elevated  Pressure Reduction Devices:   positioning supports utilized   pressure-redistributing mattress utilized  Skin Protection:   incontinence pads utilized   silicone foam dressing in place   skin sealant/moisture barrier applied   transparent dressing maintained  Taken 6/30/2025 0800 by Divina Ray RN  Activity Management: activity encouraged  Pressure  Reduction Techniques:   frequent weight shift encouraged   pressure points protected   weight shift assistance provided  Head of Bed (HOB) Positioning: HOB elevated  Pressure Reduction Devices:   positioning supports utilized   pressure-redistributing mattress utilized  Skin Protection:   incontinence pads utilized   silicone foam dressing in place   skin sealant/moisture barrier applied   transparent dressing maintained     Problem: Gas Exchange Impaired  Goal: Optimal Gas Exchange  Intervention: Optimize Oxygenation and Ventilation  Recent Flowsheet Documentation  Taken 6/30/2025 1800 by Divina Ray RN  Head of Bed (HOB) Positioning: HOB elevated  Taken 6/30/2025 1600 by Divina Ray RN  Head of Bed (HOB) Positioning: HOB elevated  Taken 6/30/2025 1400 by Divina Ray RN  Head of Bed (HOB) Positioning: HOB elevated  Taken 6/30/2025 1200 by Divina Ray RN  Head of Bed (HOB) Positioning: HOB elevated  Taken 6/30/2025 1000 by Divina Ray RN  Head of Bed (HOB) Positioning: HOB elevated  Taken 6/30/2025 0800 by Divina Ray RN  Head of Bed ([facility]) Positioning: HOB elevated     Problem: Fall Injury Risk  Goal: Absence of Fall and Fall-Related Injury  Intervention: Identify and Manage Contributors  Recent Flowsheet Documentation  Taken 6/30/2025 1800 by Divnia Ray RN  Medication Review/Management: medications reviewed  Taken 6/30/2025 1600 by Divina Ray RN  Medication Review/Management: medications reviewed  Taken 6/30/2025 1400 by Divina Ray RN  Medication Review/Management: medications reviewed  Taken 6/30/2025 1200 by Divina Ray RN  Medication Review/Management: medications reviewed  Taken 6/30/2025 1000 by Divina Ray RN  Medication Review/Management: medications reviewed  Taken 6/30/2025 0800 by Divina Ray RN  Medication Review/Management: medications  reviewed  Intervention: Promote Injury-Free Environment  Recent Flowsheet Documentation  Taken 6/30/2025 1800 by Divina Ray RN  Safety Promotion/Fall Prevention:   activity supervised   assistive device/personal items within reach   clutter free environment maintained   fall prevention program maintained   lighting adjusted   nonskid shoes/slippers when out of bed   room organization consistent   safety round/check completed  Taken 6/30/2025 1600 by Divina Rya RN  Safety Promotion/Fall Prevention:   activity supervised   assistive device/personal items within reach   clutter free environment maintained   fall prevention program maintained   lighting adjusted   nonskid shoes/slippers when out of bed   room organization consistent   safety round/check completed  Taken 6/30/2025 1400 by Divina Ray RN  Safety Promotion/Fall Prevention:   activity supervised   assistive device/personal items within reach   clutter free environment maintained   fall prevention program maintained   lighting adjusted   nonskid shoes/slippers when out of bed   room organization consistent   safety round/check completed  Taken 6/30/2025 1200 by Divina Ray RN  Safety Promotion/Fall Prevention:   activity supervised   assistive device/personal items within reach   clutter free environment maintained   fall prevention program maintained   lighting adjusted   nonskid shoes/slippers when out of bed   room organization consistent   safety round/check completed  Taken 6/30/2025 1000 by Divina Ray RN  Safety Promotion/Fall Prevention:   activity supervised   assistive device/personal items within reach   clutter free environment maintained   fall prevention program maintained   lighting adjusted   nonskid shoes/slippers when out of bed   room organization consistent   safety round/check completed  Taken 6/30/2025 0800 by Divina Ray RN  Safety Promotion/Fall  Prevention:   activity supervised   assistive device/personal items within reach   clutter free environment maintained   fall prevention program maintained   lighting adjusted   nonskid shoes/slippers when out of bed   room organization consistent   safety round/check completed   Goal Outcome Evaluation:

## 2025-06-30 NOTE — TELEPHONE ENCOUNTER
Provider:  Juanis  Surgery/Procedure:  Decompressive craniectomy right  Surgery/Procedure Date:  5/25/25  Last visit:   6/13/25  Next visit: 7/25/25     Reason for call:  Patient is currently inpatient at [facility]. Nurse  called to speak to us about concern for her helmet having a poor fit.

## 2025-07-01 PROCEDURE — 97110 THERAPEUTIC EXERCISES: CPT

## 2025-07-01 PROCEDURE — 99232 SBSQ HOSP IP/OBS MODERATE 35: CPT | Performed by: PHYSICIAN ASSISTANT

## 2025-07-01 PROCEDURE — 97530 THERAPEUTIC ACTIVITIES: CPT

## 2025-07-01 PROCEDURE — 25010000002 ONDANSETRON PER 1 MG: Performed by: PHYSICIAN ASSISTANT

## 2025-07-01 PROCEDURE — 63710000001 ONDANSETRON ODT 4 MG TABLET DISPERSIBLE: Performed by: PHYSICIAN ASSISTANT

## 2025-07-01 RX ORDER — OXYMETAZOLINE HYDROCHLORIDE 0.05 G/100ML
2 SPRAY NASAL
Status: DISCONTINUED | OUTPATIENT
Start: 2025-07-01 | End: 2025-07-03 | Stop reason: HOSPADM

## 2025-07-01 RX ORDER — ONDANSETRON 4 MG/1
4 TABLET, ORALLY DISINTEGRATING ORAL
Status: DISCONTINUED | OUTPATIENT
Start: 2025-07-01 | End: 2025-07-03 | Stop reason: HOSPADM

## 2025-07-01 RX ADMIN — ACETAMINOPHEN 650 MG: 325 TABLET, FILM COATED ORAL at 10:38

## 2025-07-01 RX ADMIN — PANTOPRAZOLE SODIUM 40 MG: 40 TABLET, DELAYED RELEASE ORAL at 21:48

## 2025-07-01 RX ADMIN — LEVETIRACETAM 500 MG: 500 TABLET, FILM COATED ORAL at 08:51

## 2025-07-01 RX ADMIN — Medication 5 MG: at 21:48

## 2025-07-01 RX ADMIN — LEVETIRACETAM 500 MG: 500 TABLET, FILM COATED ORAL at 21:48

## 2025-07-01 RX ADMIN — VITAM B12 100 MCG: 100 TAB at 08:51

## 2025-07-01 RX ADMIN — KETOTIFEN FUMARATE 1 DROP: 0.25 SOLUTION/ DROPS OPHTHALMIC at 08:51

## 2025-07-01 RX ADMIN — APIXABAN 10 MG: 5 TABLET, FILM COATED ORAL at 08:50

## 2025-07-01 RX ADMIN — PANTOPRAZOLE SODIUM 40 MG: 40 TABLET, DELAYED RELEASE ORAL at 08:51

## 2025-07-01 RX ADMIN — LUBIPROSTONE 24 MCG: 24 CAPSULE ORAL at 08:51

## 2025-07-01 RX ADMIN — Medication 10 ML: at 20:56

## 2025-07-01 RX ADMIN — OXYCODONE HYDROCHLORIDE AND ACETAMINOPHEN 500 MG: 500 TABLET ORAL at 08:51

## 2025-07-01 RX ADMIN — ASPIRIN 81 MG: 81 TABLET, COATED ORAL at 08:51

## 2025-07-01 RX ADMIN — APIXABAN 10 MG: 5 TABLET, FILM COATED ORAL at 21:48

## 2025-07-01 RX ADMIN — ONDANSETRON 4 MG: 2 INJECTION INTRAMUSCULAR; INTRAVENOUS at 08:48

## 2025-07-01 RX ADMIN — ATORVASTATIN CALCIUM 80 MG: 40 TABLET, FILM COATED ORAL at 21:48

## 2025-07-01 RX ADMIN — KETOTIFEN FUMARATE 1 DROP: 0.25 SOLUTION/ DROPS OPHTHALMIC at 21:48

## 2025-07-01 RX ADMIN — Medication 1 CAPSULE: at 08:51

## 2025-07-01 RX ADMIN — ONDANSETRON 4 MG: 4 TABLET, ORALLY DISINTEGRATING ORAL at 20:53

## 2025-07-01 RX ADMIN — CETIRIZINE HYDROCHLORIDE 10 MG: 10 TABLET, FILM COATED ORAL at 21:48

## 2025-07-01 NOTE — THERAPY TREATMENT NOTE
Patient Name: Linda Schafer  : 1960    MRN: [id number]                              Today's Date: 2025       Admit Date: 2025    Visit Dx:     ICD-10-CM ICD-9-CM   1. Acute saddle pulmonary embolism without acute cor pulmonale  I26.92 415.13   2. Pleuritic chest pain  R07.81 786.52     Patient Active Problem List   Diagnosis    Palpitations    Personal history of cardiac murmur    Migraine headache    Hiatal hernia    GERD (gastroesophageal reflux disease)    Diverticulosis    Diminished pulses in lower extremity    Dyslipidemia    Peripheral edema    Grade I diastolic dysfunction    Acute ischemic stroke    Status post craniotomy    Status post craniectomy    Pulmonary embolus    Acute deep vein thrombosis (DVT) of left lower extremity    Pulmonary embolism     Past Medical History:   Diagnosis Date    Atypical chest pain     Chest pain.Atypical chest pain    Chest pain     COVID-19     2022    COVID-19 vaccine administered     2021 ,  J and J ) 10/25/2021 - Moderna    Diminished pulses in lower extremity     Diverticulosis     Dizziness     Occasional    Dyslipidemia     Dyspnea     Edema     Fatigue     GERD (gastroesophageal reflux disease)     Hiatal hernia     Hyperlipidemia     Migraine headache     Palpitations     Personal history of cardiac murmur     SOB (shortness of breath)     Stroke 25     Past Surgical History:   Procedure Laterality Date    CHOLECYSTECTOMY      CRANIOTOMY  25    CRANIOTOMY FOR SUBDURAL HEMATOMA Right 2025    Procedure: DECOMPRESSIVE CRANIECTOMY RIGHT;  Surgeon: Matt Olivarez MD;  [facility]  Service: Neurosurgery;  Laterality: Right;    HYSTERECTOMY      OOPHORECTOMY      TUBAL ABDOMINAL LIGATION        General Information       Row Name 25 1113          OT Time and Intention    Document Type therapy note (daily note)  -LR     Mode of Treatment occupational therapy;co-treatment  -LR       Row Name 25 1113           General Information    Patient Profile Reviewed yes  -LR     Existing Precautions/Restrictions fall;other (see comments)  R sided decompressive craniectomy for stroke s/p 1 month ago, L sided residual deficits, helmet when OOB  -LR     Barriers to Rehab medically complex;previous functional deficit  -LR       Row Name 07/01/25 1113          Safety Issues/Impairments Affecting Functional Mobility    Safety Issues Affecting Function (Mobility) awareness of need for assistance;safety precaution awareness  -LR     Impairments Affecting Function (Mobility) balance;endurance/activity tolerance;strength;coordination;sensation/sensory awareness;grasp;motor control;motor planning;pain;postural/trunk control;range of motion (ROM)  -LR               User Key  (r) = Recorded By, (t) = Taken By, (c) = Cosigned By      Initials Name Provider Type    LR Dodie Ruiz OT Occupational Therapist                     Mobility/ADL's       Row Name 07/01/25 1118          Bed Mobility    Bed Mobility supine-sit  -LR     Supine-Sit [place] (Bed Mobility) maximum assist (25% patient effort);2 person assist;verbal cues;nonverbal cues (demo/gesture)  -LR     Bed Mobility, Safety Issues decreased use of arms for pushing/pulling;decreased use of legs for bridging/pushing  -LR     Assistive Device (Bed Mobility) bed rails;head of bed elevated;repositioning sheet  -LR       Row Name 07/01/25 1118          Transfers    Transfers sit-stand transfer;bed-chair transfer  -LR     Comment, (Transfers) STSx4  -LR       Row Name 07/01/25 1118          Bed-Chair Transfer    Bed-Chair [place] (Transfers) maximum assist (25% patient effort);2 person assist;nonverbal cues (demo/gesture);verbal cues  -LR     Assistive Device (Bed-Chair Transfers) other (see comments)  BUE support  -LR       Row Name 07/01/25 1118          Sit-Stand Transfer    Sit-Stand [place] (Transfers) moderate assist (50% patient effort);minimum assist (75%  patient effort);2 person assist;nonverbal cues (demo/gesture);verbal cues;other (see comments)  bouts of minx2  -LR     Assistive Device (Sit-Stand Transfers) other (see comments)  BUE support  -       Row Name 07/01/25 1118          Functional Mobility    Patient was able to Ambulate no, other medical factors prevent ambulation  -       Row Name 07/01/25 1118          Activities of Daily Living    BADL Assessment/Intervention lower body dressing;grooming  -[facility] Name 07/01/25 1118          Grooming Assessment/Training    Lea Level (Grooming) wash face, hands;contact guard assist  -LR     Position (Grooming) unsupported sitting  -LR     Comment, (Grooming) Targeting trunk control  -       Row Name 07/01/25 1118          Lower Body Dressing Assessment/Training    [place] Level (Lower Body Dressing) don;socks;dependent (less than 25% patient effort)  -LR     Position (Lower Body Dressing) sitting up in bed  -LR               User Key  (r) = Recorded By, (t) = Taken By, (c) = Cosigned By      Initials Name Provider Type    LR Dodie Ruiz, OT Occupational Therapist                   Obj/Interventions       [facility] Name 07/01/25 1128          Elbow/Forearm (Therapeutic Exercise)    Elbow/Forearm (Therapeutic Exercise) PROM (passive range of motion)  -     Elbow/Forearm PROM (Therapeutic Exercise) left;flexion;extension;pronation;supination;sitting;10 repetitions  -[facility] Name 07/01/25 1128          Wrist (Therapeutic Exercise)    Wrist (Therapeutic Exercise) PROM (passive range of motion)  -     Wrist PROM (Therapeutic Exercise) left;flexion;extension;10 repetitions  -[facility] Name 07/01/25 1128          Hand (Therapeutic Exercise)    Hand (Therapeutic Exercise) AROM (active range of motion)  -     Hand AROM/AAROM (Therapeutic Exercise) left;AAROM (active assistive range of motion);finger flexion;finger extension;10 repetitions  -       Row Name 07/01/25 1128          Motor  Skills    Therapeutic Exercise elbow/forearm;wrist;hand  -LR       Row Name 07/01/25 1128          Balance    Balance Assessment sitting static balance;sitting dynamic balance;standing static balance;standing dynamic balance  -LR     Static Sitting Balance minimal assist;contact guard;other (see comments)  progressed to CGA  -LR     Dynamic Sitting Balance minimal assist;1-person assist  -LR     Position, Sitting Balance unsupported;sitting edge of bed;sitting in chair  -LR     Static Standing Balance moderate assist;minimal assist;2-person assist;verbal cues;other (see comments)  progressing to min x2  -LR     Dynamic Standing Balance maximum assist;2-person assist;verbal cues  -LR     Position/Device Used, Standing Balance supported;other (see comments)  BUE support  -LR     Balance Interventions sitting;standing;sit to stand;supported;static;dynamic;occupation based/functional task  -LR               User Key  (r) = Recorded By, (t) = Taken By, (c) = Cosigned By      Initials Name Provider Type    LR Dodie Ruiz, LOU Occupational Therapist                   Goals/Plan    No documentation.                  Clinical Impression       Row Name 07/01/25 1130          Pain Assessment    Pretreatment Pain Rating 0/10 - no pain  -LR     Posttreatment Pain Rating 0/10 - no pain  -LR       Row Name 07/01/25 1130          Plan of Care Review    Plan of Care Reviewed With patient  -LR     Progress improving  -LR     Outcome Evaluation Continues to make improvements in ADL performance and functional mobility. Max Ax2 for supine>sit. Improved standing tolerance this session. Sat at edge of chair and performed grooming tasks targeting trunk control. Tolerated ther ex well. Continue IPOT POC.  -LR       Row Name 07/01/25 1130          Therapy Plan Review/Discharge Plan (OT)    Anticipated Discharge Disposition (OT) skilled nursing facility  -LR       Row Name 07/01/25 1130          Vital Signs    O2 Delivery Pre Treatment  room air  -LR     O2 Delivery Intra Treatment room air  -LR     O2 Delivery Post Treatment room air  -LR     Pre Patient Position Supine  -LR     Intra Patient Position Standing  -LR     Post Patient Position Sitting  -LR       Row Name 07/01/25 1130          Positioning and Restraints    Pre-Treatment Position in bed  -LR     Post Treatment Position chair  -LR     In Chair notified nsg;reclined;call light within reach;encouraged to call for assist;exit alarm on;waffle cushion;on mechanical lift sling;legs elevated  -LR               User Key  (r) = Recorded By, (t) = Taken By, (c) = Cosigned By      Initials Name Provider Type    Dodie Blair OT Occupational Therapist                   Outcome Measures       Row Name 07/01/25 1136          How much help from another is currently needed...    Putting on and taking off regular lower body clothing? 1  -LR     Bathing (including washing, rinsing, and drying) 2  -LR     Toileting (which includes using toilet bed pan or urinal) 2  -LR     Putting on and taking off regular upper body clothing 2  -LR     Taking care of personal grooming (such as brushing teeth) 3  -LR     Eating meals 3  -LR     AM-PAC 6 Clicks Score (OT) 13  -LR       Row Name 07/01/25 1136          Functional Assessment    Outcome Measure Options AM-PAC 6 Clicks Daily Activity (OT)  -LR               User Key  (r) = Recorded By, (t) = Taken By, (c) = Cosigned By      Initials Name Provider Type    Dodie Blair OT Occupational Therapist                    Occupational Therapy Education       Title: PT OT SLP Therapies (In Progress)       Topic: Occupational Therapy (In Progress)       Point: ADL training (In Progress)       Learning Progress Summary            Patient Acceptance, E, NR by LR at 7/1/2025 1136                      Point: Home exercise program (In Progress)       Learning Progress Summary            Patient Acceptance, E, NR by LR at 7/1/2025 1136                       Point: Precautions (In Progress)       Learning Progress Summary            Patient Acceptance, E, NR by LR at 7/1/2025 1136                      Point: Body mechanics (In Progress)       Learning Progress Summary            Patient Acceptance, E, NR by LR at 7/1/2025 1136                                      User Key       Initials Effective Dates Name Provider Type Discipline    LR 10/09/24 -  Dodie Ruiz, OT Occupational Therapist OT                  OT Recommendation and Plan  Planned Therapy Interventions (OT): activity tolerance training, adaptive equipment training, BADL retraining, strengthening exercise, occupation/activity based interventions, transfer/mobility retraining, functional balance retraining, IADL retraining, passive ROM/stretching, ROM/therapeutic exercise, patient/caregiver education/training  Therapy Frequency (OT): daily  Plan of Care Review  Plan of Care Reviewed With: patient  Progress: improving  Outcome Evaluation: Continues to make improvements in ADL performance and functional mobility. Max Ax2 for supine>sit. Improved standing tolerance this session. Sat at edge of chair and performed grooming tasks targeting trunk control. Tolerated ther ex well. Continue IPOT POC.     Time Calculation:   Evaluation Complexity (OT)  Review Occupational Profile/Medical/Therapy History Complexity: expanded/moderate complexity  Assessment, Occupational Performance/Identification of Deficit Complexity: 3-5 performance deficits  Clinical Decision Making Complexity (OT): detailed assessment/moderate complexity  Overall Complexity of Evaluation (OT): moderate complexity     Time Calculation- OT       Row Name 07/01/25 1137             Time Calculation- OT    OT Start Time 1036  -LR      OT Received On 07/01/25  -LR      OT Goal Re-Cert Due Date 07/07/25  -LR         Timed Charges    26977 - OT Therapeutic Exercise Minutes 10  -LR      83737 - OT Therapeutic Activity Minutes 13  -LR         Total  Minutes    Timed Charges Total Minutes 23  -LR       Total Minutes 23  -LR                User Key  (r) = Recorded By, (t) = Taken By, (c) = Cosigned By      Initials Name Provider Type    LR Dodie Ruiz OT Occupational Therapist                  Therapy Charges for Today       Code Description Service Date Service Provider Modifiers Qty    07437938909  OT THER PROC EA 15 MIN 7/1/2025 Dodie Ruiz, OT GO 1    [id number]  OT THERAPEUTIC ACT EA 15 MIN 7/1/2025 Dodie Ruiz, OT GO 1                 Dodie Ruiz OT  7/1/2025

## 2025-07-01 NOTE — THERAPY TREATMENT NOTE
Patient Name: Linda Schafer  : 1960    MRN: [id number]                              Today's Date: 2025       Admit Date: 2025    Visit Dx:     ICD-10-CM ICD-9-CM   1. Acute saddle pulmonary embolism without acute cor pulmonale  I26.92 415.13   2. Pleuritic chest pain  R07.81 786.52     Patient Active Problem List   Diagnosis    Palpitations    Personal history of cardiac murmur    Migraine headache    Hiatal hernia    GERD (gastroesophageal reflux disease)    Diverticulosis    Diminished pulses in lower extremity    Dyslipidemia    Peripheral edema    Grade I diastolic dysfunction    Acute ischemic stroke    Status post craniotomy    Status post craniectomy    Pulmonary embolus    Acute deep vein thrombosis (DVT) of left lower extremity    Pulmonary embolism     Past Medical History:   Diagnosis Date    Atypical chest pain     Chest pain.Atypical chest pain    Chest pain     COVID-19     2022    COVID-19 vaccine administered     2021 ,  J and J ) 10/25/2021 - Moderna    Diminished pulses in lower extremity     Diverticulosis     Dizziness     Occasional    Dyslipidemia     Dyspnea     Edema     Fatigue     GERD (gastroesophageal reflux disease)     Hiatal hernia     Hyperlipidemia     Migraine headache     Palpitations     Personal history of cardiac murmur     SOB (shortness of breath)     Stroke 25     Past Surgical History:   Procedure Laterality Date    CHOLECYSTECTOMY      CRANIOTOMY  25    CRANIOTOMY FOR SUBDURAL HEMATOMA Right 2025    Procedure: DECOMPRESSIVE CRANIECTOMY RIGHT;  Surgeon: Matt Olivarez MD;  Location: [facility];  Service: Neurosurgery;  Laterality: Right;    HYSTERECTOMY      OOPHORECTOMY      TUBAL ABDOMINAL LIGATION        General Information       Row Name 25 1027          Physical Therapy Time and Intention    Document Type therapy note (daily note)  -NS     Mode of Treatment co-treatment;physical therapy  -NS       Row Name 25  1027          General Information    Patient Profile Reviewed yes  -NS     Existing Precautions/Restrictions fall;other (see comments)  R sided decompressive craniectomy, helmet when OOB, L sided residual deficits  -NS       Row Name 07/01/25 1027          Cognition    Orientation Status (Cognition) oriented x 3  -NS       Row Name 07/01/25 1027          Safety Issues/Impairments Affecting Functional Mobility    Safety Issues Affecting Function (Mobility) safety precaution awareness;safety precautions follow-through/compliance;sequencing abilities  -NS     Impairments Affecting Function (Mobility) balance;coordination;endurance/activity tolerance;motor planning;strength;postural/trunk control;range of motion (ROM);pain;motor control;muscle tone abnormal  -NS               User Key  (r) = Recorded By, (t) = Taken By, (c) = Cosigned By      Initials Name Provider Type    Ciarra Valverde PT Physical Therapist                   Mobility       Row Name 07/01/25 1027          Bed Mobility    Bed Mobility supine-sit  -NS     Supine-Sit [place] (Bed Mobility) maximum assist (25% patient effort);2 person assist;verbal cues  -NS     Assistive Device (Bed Mobility) bed rails;head of bed elevated;repositioning sheet  -NS       Row Name 07/01/25 1027          Bed-Chair Transfer    Bed-Chair [place] (Transfers) maximum assist (25% patient effort);2 person assist  -NS     Assistive Device (Bed-Chair Transfers) other (see comments)  BUE support  -NS       Row Name 07/01/25 1027          Sit-Stand Transfer    Sit-Stand [place] (Transfers) moderate assist (50% patient effort);2 person assist  -NS     Assistive Device (Sit-Stand Transfers) other (see comments)  BUE support  -NS     Comment, (Sit-Stand Transfer) progressed to Min A x2  -NS               User Key  (r) = Recorded By, (t) = Taken By, (c) = Cosigned By      Initials Name Provider Type    Ciarra Valverde PT Physical Therapist                    Obj/Interventions       Row Name 07/01/25 1027          Motor Skills    Therapeutic Exercise hip;knee;ankle  -NS       Row Name 07/01/25 1027          Hip (Therapeutic Exercise)    Hip (Therapeutic Exercise) strengthening exercise;isometric exercises  -NS     Hip Isometrics (Therapeutic Exercise) bilateral;gluteal sets;10 repetitions  -NS     Hip Strengthening (Therapeutic Exercise) bilateral;external rotation;internal rotation;10 repetitions  -NS       Row Name 07/01/25 1027          Knee (Therapeutic Exercise)    Knee (Therapeutic Exercise) strengthening exercise;isometric exercises  -NS     Knee Isometrics (Therapeutic Exercise) bilateral;quad sets;10 repetitions  -NS     Knee Strengthening (Therapeutic Exercise) right;LAQ (long arc quad);10 repetitions  -NS       [facility] Name 07/01/25 1027          Ankle (Therapeutic Exercise)    Ankle (Therapeutic Exercise) AROM (active range of motion)  -NS     Ankle AROM (Therapeutic Exercise) right;dorsiflexion;plantarflexion;10 repetitions  R: PROM  -NS       [facility] Name 07/01/25 1027          Balance    Balance Assessment sitting static balance;sitting dynamic balance;standing static balance;standing dynamic balance  -NS     Static Sitting Balance contact guard  -NS     Dynamic Sitting Balance minimal assist  -NS     Position, Sitting Balance unsupported;sitting edge of bed  -NS     Static Standing Balance moderate assist;2-person assist  -NS     Dynamic Standing Balance maximum assist;2-person assist  -NS     Position/Device Used, Standing Balance supported  -NS     Comment, Balance lateral weight shifting in standing, L knee blocked intermittently.  -NS               User Key  (r) = Recorded By, (t) = Taken By, (c) = Cosigned By      Initials Name Provider Type    Ciarra Valverde PT Physical Therapist                   Goals/Plan    No documentation.                  Clinical Impression       [facility] Name 07/01/25 1027          Pain    Pretreatment Pain Rating 8/10  -NS      Posttreatment Pain Rating 8/10  -NS     Pain Location head  -NS     Pain Side/Orientation generalized  -NS     Pain Management Interventions nursing notified  -NS     Pre/Posttreatment Pain Comment RN notified and provided pain medication during session  -NS       Row Name 07/01/25 1027          Plan of Care Review    Plan of Care Reviewed With patient  -NS     Progress improving  -NS     Outcome Evaluation Patient presents with weakness, decreased balance, decreased trunk control, and pain affecting functional mobility. She was able to progress from Mod A x2 to Min A x2 to stand with BUE support, requiring L knee blocked due to buckling. Continue per PT POC.  -NS       Row Name 07/01/25 1027          Vital Signs    Pre Patient Position Supine  -NS     Intra Patient Position Standing  -NS     Post Patient Position Sitting  -NS       Row Name 07/01/25 1027          Positioning and Restraints    Pre-Treatment Position in bed  -NS     Post Treatment Position chair  -NS     In Chair notified nsg;reclined;call light within reach;encouraged to call for assist;exit alarm on;on mechanical lift sling;waffle cushion;legs elevated;with OT  -NS               User Key  (r) = Recorded By, (t) = Taken By, (c) = Cosigned By      Initials Name Provider Type    Ciarra Valverde PT Physical Therapist                   Outcome Measures       Row Name 07/01/25 1027          How much help from another person do you currently need...    Turning from your back to your side while in flat bed without using bedrails? 2  -NS     Moving from lying on back to sitting on the side of a flat bed without bedrails? 1  -NS     Moving to and from a bed to a chair (including a wheelchair)? 2  -NS     Standing up from a chair using your arms (e.g., wheelchair, bedside chair)? 2  -NS     Climbing 3-5 steps with a railing? 1  -NS     To walk in hospital room? 1  -NS     AM-PAC 6 Clicks Score (PT) 9  -NS     Highest Level of Mobility Goal Sit at Edge of  Bed-3  -NS       Row Name 07/01/25 1136 07/01/25 1027       Functional Assessment    Outcome Measure Options AM-PAC 6 Clicks Daily Activity (OT)  -LR AM-PAC 6 Clicks Basic Mobility (PT)  -NS              User Key  (r) = Recorded By, (t) = Taken By, (c) = Cosigned By      Initials Name Provider Type    Ciarra Valverde, PT Physical Therapist    LR Dodie Riuz, OT Occupational Therapist                                 Physical Therapy Education       Title: PT OT SLP Therapies (In Progress)       Topic: Physical Therapy (Done)       Point: Mobility training (Done)       Learning Progress Summary            Patient Acceptance, E, VU,NR by NS at 7/1/2025 1301    Acceptance, E, VU by NS at 6/27/2025 1018   Family Acceptance, E, VU by NS at 6/27/2025 1018                      Point: Home exercise program (Done)       Learning Progress Summary            Patient Acceptance, E, VU,NR by NS at 7/1/2025 1301                      Point: Body mechanics (Done)       Learning Progress Summary            Patient Acceptance, E, VU,NR by NS at 7/1/2025 1301    Acceptance, E, VU by NS at 6/27/2025 1018   Family Acceptance, E, VU by NS at 6/27/2025 1018                      Point: Precautions (Done)       Learning Progress Summary            Patient Acceptance, E, VU,NR by NS at 7/1/2025 1301    Acceptance, E, VU by NS at 6/27/2025 1018   Family Acceptance, E, VU by NS at 6/27/2025 1018                                      User Key       Initials Effective Dates Name Provider Type Discipline    NS 06/16/21 -  Ciarra Norman, PT Physical Therapist PT                  PT Recommendation and Plan  Planned Therapy Interventions (PT): bed mobility training, balance training, home exercise program, neuromuscular re-education, ROM (range of motion), strengthening, stretching, patient/family education, transfer training, postural re-education, wheelchair management/propulsion training  Progress: improving  Outcome Evaluation: Patient  presents with weakness, decreased balance, decreased trunk control, and pain affecting functional mobility. She was able to progress from Mod A x2 to Min A x2 to stand with BUE support, requiring L knee blocked due to buckling. Continue per PT POC.     Time Calculation:   PT Evaluation Complexity  History, PT Evaluation Complexity: 3 or more personal factors and/or comorbidities  Examination of Body Systems (PT Eval Complexity): total of 4 or more elements  Clinical Presentation (PT Evaluation Complexity): evolving  Clinical Decision Making (PT Evaluation Complexity): moderate complexity  Overall Complexity (PT Evaluation Complexity): moderate complexity     PT Charges       Row Name 07/01/25 1027             Time Calculation    Start Time 1027  -NS      PT Received On 07/01/25  -NS      PT Goal Re-Cert Due Date 07/07/25  -NS         Timed Charges    57626 - PT Therapeutic Exercise Minutes 12  -NS      05509 - PT Therapeutic Activity Minutes 11  -NS         Total Minutes    Timed Charges Total Minutes 23  -NS       Total Minutes 23  -NS                User Key  (r) = Recorded By, (t) = Taken By, (c) = Cosigned By      Initials Name Provider Type    NS Ciarra Norman, PT Physical Therapist                  Therapy Charges for Today       Code Description Service Date Service Provider Modifiers Qty    29206913156 HC PT THER PROC EA 15 MIN 7/1/2025 Ciarra Norman, PT GP 1    51916937049 HC PT THERAPEUTIC ACT EA 15 MIN 7/1/2025 Ciarra Norman, PT GP 1            PT G-Codes  Outcome Measure Options: AM-PAC 6 Clicks Daily Activity (OT)  AM-PAC 6 Clicks Score (PT): 9  AM-PAC 6 Clicks Score (OT): 13  PT Discharge Summary  Anticipated Discharge Disposition (PT): skilled nursing facility    Ciarra Norman PT  7/1/2025

## 2025-07-01 NOTE — PLAN OF CARE
Goal Outcome Evaluation:  Plan of Care Reviewed With: patient        Progress: improving  Outcome Evaluation: Patient presents with weakness, decreased balance, decreased trunk control, and pain affecting functional mobility. She was able to progress from Mod A x2 to Min A x2 to stand with BUE support, requiring L knee blocked due to buckling. Continue per PT POC.    Anticipated Discharge Disposition (PT): skilled nursing facility

## 2025-07-01 NOTE — PROGRESS NOTES
Patient has been initiated on Apixaban (Eliquis) during admission. Education provided on 7/1/2025 verbally and in writing. Information provided includes effects of medication, drug-drug and drug-food interactions, and signs/symptoms of bleeding and clotting. Patient/family verbalized understanding through teach back. All pertinent questions were answered by pharmacist.    Mike Clark [facility]  7/1/2025  13:43 EDT

## 2025-07-01 NOTE — CASE MANAGEMENT/SOCIAL WORK
Continued Stay Note  [facility]     Patient Name: Linda Schafer  MRN: [id number]  Today's Date: 7/1/2025    Admit Date: 6/26/2025    Plan: [facility] acute   Discharge Plan       Row Name 07/01/25 0842       Plan    Plan [facility] acute    Plan Comments Spoke with April at [facility], she is following for acute rehab and waiting for updated therapy notes.  [facility] to see if PT can see patient today; OT is signed in already.  Case Management following.    Final Discharge Disposition Code 62 - inpatient rehab facility                   Discharge Codes    No documentation.                       Fallon Jeong RN

## 2025-07-01 NOTE — PAYOR COMM NOTE
"Radha Mendoza (64 y.o. Female)     [id number]       Lisa Neff, RN  Utilization Review  [phone]  [phone]        Date of Birth   1960    Social Security Number       Address   [address] [place] [address]    Home Phone   [phone]    MRN   [id number]       Taoism   Adventist    Marital Status                               Admission Date   6/26/2025    Admission Type   Emergency    Admitting Provider   Gaston Cheung DO    Attending Provider   Gaston Cheung DO    Department, Room/Bed   [facility] 6B, N631/1       Discharge Date       Discharge Disposition       Discharge Destination                                 Attending Provider: Gaston Cheung DO    Allergies: Codeine, Penicillins    Isolation: None   Infection: None   Code Status: CPR    Ht: 157.5 cm (62.01\")   Wt: 70.3 kg (155 lb)    Admission Cmt: None   Principal Problem: Pulmonary embolus [I26.99]                   Active Insurance as of 6/26/2025       Primary Coverage       Payor Plan Insurance Group Employer/Plan Group    [facility] [employer] [facility] [employer] [id number]       Payor Plan Address Payor Plan Phone Number Payor Plan Fax Number Effective Dates    [address] [phone]  1/1/2015 - None Entered    [place] [address]         Subscriber Name Subscriber Birth Date Member ID       RADHA MENDOZA 1960 [id number]                     Emergency Contacts        (Rel.) Home Phone Work Phone Mobile Phone    RejiLoc (Spouse) [phone] -- [phone]    REJIRADHA (Son) -- -- [phone]    HugoNadia (Daughter) [phone] -- --    Lay Regina Vásquez (Relative) -- -- [phone]              Vital Signs (last day)       Date/Time Temp Temp src Pulse Resp BP Patient Position SpO2    07/01/25 1100 98 (36.7) Oral 78 18 100/71 Lying 95    07/01/25 0421 98.2 (36.8) Oral 65 18 115/69 Lying 93    06/30/25 " 2337 98.4 (36.9) Oral 71 18 104/65 Lying 93    06/30/25 2100 98.4 (36.9) Oral 71 18 103/50 Lying 92    06/30/25 1501 98.4 (36.9) Oral 74 18 113/62 Lying 94    06/30/25 1044 98.1 (36.7) Oral 73 16 108/67 Lying 94    06/30/25 0358 98.1 (36.7) Oral 70 16 115/62 Lying 93          Oxygen Therapy (last day)       Date/Time SpO2 Device (Oxygen Therapy) Flow (L/min) (Oxygen Therapy) Oxygen Concentration (%) ETCO2 (mmHg)    07/01/25 1100 95 -- -- -- --    07/01/25 0600 -- room air -- -- --    07/01/25 0421 93 -- -- -- --    07/01/25 0400 -- room air -- -- --    07/01/25 0200 -- room air -- -- --    07/01/25 0000 -- room air -- -- --    06/30/25 2337 93 -- -- -- --    06/30/25 2200 -- room air -- -- --    06/30/25 2100 92 room air -- -- --    06/30/25 2045 -- room air -- -- --    06/30/25 1800 -- room air -- -- --    06/30/25 1600 -- room air -- -- --    06/30/25 1501 94 room air -- -- --    06/30/25 1400 -- room air -- -- --    06/30/25 1200 -- room air -- -- --    06/30/25 1044 94 room air -- -- --    06/30/25 1000 -- room air -- -- --    06/30/25 0600 -- room air -- -- --    06/30/25 0400 -- room air -- -- --    06/30/25 0358 93 -- -- -- --    06/30/25 0200 -- room air -- -- --    06/30/25 0000 -- room air -- -- --          Lines, Drains & Airways       Active LDAs       Name Placement date Placement time Site Days    External Urinary Catheter 06/27/25  0720  --  4                  Current Facility-Administered Medications   Medication Dose Route Frequency Provider Last Rate Last Admin    acetaminophen (TYLENOL) tablet 650 mg  650 mg Oral Q4H PRN Sonia Sandra DO   650 mg at 07/01/25 1038    apixaban (ELIQUIS) tablet 10 mg  10 mg Oral Q12H Lucille Cook PA-C   10 mg at 07/01/25 0850    Followed by    [START ON 7/5/2025] apixaban (ELIQUIS) tablet 5 mg  5 mg Oral Q12H Lucille Cook PA-C        ascorbic acid (VITAMIN C) tablet 500 mg  500 mg Oral Daily Sonia Sandra DO   500 mg at 07/01/25 0851     aspirin EC tablet 81 mg  81 mg Oral Daily Sonia Sandra DO   81 mg at 07/01/25 0851    atorvastatin (LIPITOR) tablet 80 mg  80 mg Oral Nightly Sonia Sandra DO   80 mg at 06/30/25 2106    baclofen (LIORESAL) tablet 10 mg  10 mg Oral TID PRN Sonia Sandra DO   10 mg at 06/30/25 2106    sennosides-docusate (PERICOLACE) 8.6-50 MG per tablet 2 tablet  2 tablet Oral BID PRN Sonia Sandra DO        And    polyethylene glycol (MIRALAX) packet 17 g  17 g Oral Daily PRN Sonia Sandra DO        And    bisacodyl (DULCOLAX) EC tablet 5 mg  5 mg Oral Daily PRN Sonia Sandra DO        And    bisacodyl (DULCOLAX) suppository 10 mg  10 mg Rectal Daily PRN Sonia Sandra DO   10 mg at 06/26/25 2157    butalbital-acetaminophen-caffeine (FIORICET, ESGIC) -40 MG per tablet 1 tablet  1 tablet Oral Q6H PRN Sonia Sandra DO        Calcium Replacement - Follow Nurse / BPA Driven Protocol   Not Applicable PRN Sonia Sandra DO        cetirizine (zyrTEC) tablet 10 mg  10 mg Oral Nightly Sonia Sandra DO   10 mg at 06/30/25 2106    Ketotifen Fumarate (ZADITOR) 0.035 % ophthalmic solution 1 drop  1 drop Both Eyes BID Claudia, Ashley, APRN   1 drop at 07/01/25 0851    lactobacillus acidophilus (RISAQUAD) capsule 1 capsule  1 capsule Oral Daily Lucille Cook PA-C   1 capsule at 07/01/25 0851    levETIRAcetam (KEPPRA) tablet 500 mg  500 mg Oral Q12H Sonia Sandra DO   500 mg at 07/01/25 0851    lubiprostone (AMITIZA) capsule 24 mcg  24 mcg Oral Daily With Breakfast Lucille Cook PA-C   24 mcg at 07/01/25 0851    Magnesium Standard Dose Replacement - Follow Nurse / BPA Driven Protocol   Not Applicable PRN Sonia Sandra DO        melatonin tablet 5 mg  5 mg Oral Nightly Lucille Cook PA-C   5 mg at 06/30/25 2106    nitroglycerin (NITROSTAT) SL tablet 0.4 mg  0.4 mg Sublingual Q5 Min PRN Sonia Sandra DO        ondansetron ODT  (ZOFRAN-ODT) disintegrating tablet 4 mg  4 mg Oral Q6H PRN Lucille Cook PA-C   4 mg at 06/26/25 2044    Or    ondansetron (ZOFRAN) injection 4 mg  4 mg Intravenous Q6H PRN Lucille Cook PA-C   4 mg at 07/01/25 0848    ondansetron ODT (ZOFRAN-ODT) disintegrating tablet 4 mg  4 mg Translingual QAM AC Lucille Cook PA-C        oxymetazoline (AFRIN) nasal spray 2 spray  2 spray Each Nare Q30 Min PRN Lucille Cook PA-C        pantoprazole (PROTONIX) EC tablet 40 mg  40 mg Oral BID Sonia Sandra DO   40 mg at 07/01/25 0851    Phosphorus Replacement - Follow Nurse / BPA Driven Protocol   Not Applicable PRN Sonia Sandra DO        Potassium Replacement - Follow Nurse / BPA Driven Protocol   Not Applicable PRN Sonia Sandra DO        prochlorperazine (COMPAZINE) tablet 5 mg  5 mg Oral Q6H PRN Lucille Cook PA-C        sodium chloride 0.9 % flush 10 mL  10 mL Intravenous PRN Francesco Holland MD        sodium chloride 0.9 % flush 10 mL  10 mL Intravenous Q12H Sonia Sandra DO   10 mL at 06/30/25 2107    sodium chloride 0.9 % flush 10 mL  10 mL Intravenous PRN Sonia Sandra DO        sodium chloride 0.9 % infusion 40 mL  40 mL Intravenous PRN Sonia Sandra DO        sodium chloride nasal spray 2 spray  2 spray Each Nare Q30 Min PRN Lucille Cook PA-C   2 spray at 06/30/25 1908    vitamin B-12 (CYANOCOBALAMIN) tablet 100 mcg  100 mcg Oral Daily Sonia Sandra DO   100 mcg at 07/01/25 0851     Blood Administration Record (From admission, onward)      None          Lab Results (last 24 hours)       ** No results found for the last 24 hours. **          Imaging Results (Last 24 Hours)       ** No results found for the last 24 hours. **          ECG/EMG Results (last 24 hours)       ** No results found for the last 24 hours. **          Operative/Procedure Notes (last 24 hours)  Notes from 06/30/25 1225 through 07/01/25 1225   No notes of  "this type exist for this encounter.          Physician Progress Notes (last 24 hours)        Lucille Cook PA-C at 25 0912              [facility] Medicine Services  PROGRESS NOTE    Patient Name: Linda Schafer  : 1960  MRN: [id number]    Date of Admission: 2025  Primary Care Physician: Micheline Heath DO    Subjective     CC: f/u PE    HPI:  In bed. No family at bedside. Got some rest last night. No further epistaxis. Continues to have episodes of nausea/vomiting - says gagged while taking pills yesterday morning (could taste some of the pills and one got stuck on the roof of her mouth) and she vomited \"everything.\" Asks for Zofran to be scheduled before AM meds like she was doing at home. Awaits PT eval for [facility] referral    Objective     Vital Signs:   Temp:  [98.1 °F (36.7 °C)-98.4 °F (36.9 °C)] 98.2 °F (36.8 °C)  Heart Rate:  [65-74] 65  Resp:  [16-18] 18  BP: (103-115)/(50-69) 115/69     Physical Exam:  Constitutional: No acute distress, awake, alert and conversant. Sitting up in bed   HENT: NCAT, mucous membranes moist. Helmet on - did not remove for exam   Respiratory: Clear to auscultation bilaterally, normal respiratory effort on room air   Cardiovascular: RRR  Gastrointestinal: Positive bowel sounds, soft, nontender, nondistended  Musculoskeletal: (+) LLE edema  Psychiatric: Appropriate affect, cooperative with exam  Neurologic: Oriented x 3, moves all extremities spontaneously without focal deficits, speech clear  Skin: R craniectomy incision c/d/i     Results Reviewed:  LAB RESULTS:      Lab 25  0342 25  0354 25  0341 25  1752 25  1153 25  0429 25  1548 25  0800 25  0613   WBC 7.16 6.66 8.59  --   --  11.38*  --   --  14.04*   HEMOGLOBIN 11.5* 10.5* 11.6*  --   --  11.5*  --   --  14.5   HEMATOCRIT 37.1 33.8* 36.3  --   --  35.5  --   --  44.2   PLATELETS 310 250 223  --   --  227  --   --  267 "   NEUTROS ABS  --   --  5.06  --   --  7.66*  --   --  11.31*   IMMATURE GRANS (ABS)  --   --  0.04  --   --  0.07*  --   --  0.07*   LYMPHS ABS  --   --  2.34  --   --  2.26  --   --  1.60   MONOS ABS  --   --  0.78  --   --  1.11*  --   --  0.79   EOS ABS  --   --  0.30  --   --  0.21  --   --  0.18   MCV 88.8 88.5 87.7  --   --  87.0  --   --  86.2   PROTIME  --   --   --   --   --   --   --   --  13.1   APTT  --   --   --   --   --   --   --   --  22.9*   HEPARIN ANTI-XA  --  >1.10* 0.62 0.67 0.64 0.58   < >  --  0.10*   HSTROP T  --   --   --   --   --   --   --  9 11    < > = values in this interval not displayed.         Lab 06/29/25  0354 06/28/25  0341 06/27/25  0429 06/26/25  0613   SODIUM 138 137 134* 138   POTASSIUM 3.9 4.1 3.8 3.8   CHLORIDE 102 101 99 98   CO2 26.0 27.0 25.0 26.0   ANION GAP 10.0 9.0 10.0 14.0   BUN 10.2 13.7 13.3 11.1   CREATININE 0.52* 0.53* 0.56* 0.76   EGFR 105.2 104.7 103.3 88.7   GLUCOSE 106* 88 112* 112*   CALCIUM 9.0 9.0 8.8 9.7   MAGNESIUM 1.9  --   --   --          Lab 06/26/25  0613   TOTAL PROTEIN 7.9   ALBUMIN 4.3   GLOBULIN 3.6   ALT (SGPT) 37*   AST (SGOT) 61*   BILIRUBIN 0.6   ALK PHOS 147*         Lab 06/26/25  0800 06/26/25  0613   PROBNP  --  68.0   HSTROP T 9 11   PROTIME  --  13.1   INR  --  0.93     Brief Urine Lab Results  (Last result in the past 365 days)        Color   Clarity   Blood   Leuk Est   Nitrite   Protein   CREAT   Urine HCG        06/29/25 1441 Yellow   Cloudy   Negative   Negative   Negative   Negative                 Microbiology Results Abnormal       None          No radiology results from the last 24 hrs    Results for orders placed during the hospital encounter of 06/26/25    Adult Transthoracic Echo Limited W/ Cont if Necessary Per Protocol 06/26/2025 12:12 PM    Interpretation Summary    Left ventricular systolic function is normal. Estimated left ventricular EF = 65%    The cardiac valves are anatomically and functionally normal.    Current  medications:  Scheduled Meds:apixaban, 10 mg, Oral, Q12H   Followed by  [START ON 7/5/2025] apixaban, 5 mg, Oral, Q12H  vitamin C, 500 mg, Oral, Daily  aspirin, 81 mg, Oral, Daily  atorvastatin, 80 mg, Oral, Nightly  cetirizine, 10 mg, Oral, Nightly  Ketotifen Fumarate, 1 drop, Both Eyes, BID  lactobacillus acidophilus, 1 capsule, Oral, Daily  levETIRAcetam, 500 mg, Oral, Q12H  lubiprostone, 24 mcg, Oral, Daily With Breakfast  melatonin, 5 mg, Oral, Nightly  ondansetron ODT, 4 mg, Translingual, QAM AC  pantoprazole, 40 mg, Oral, BID  sodium chloride, 10 mL, Intravenous, Q12H  vitamin B-12, 100 mcg, Oral, Daily      Continuous Infusions:   PRN Meds:.  acetaminophen **OR** [DISCONTINUED] acetaminophen **OR** [DISCONTINUED] acetaminophen    baclofen    senna-docusate sodium **AND** polyethylene glycol **AND** bisacodyl **AND** bisacodyl    butalbital-acetaminophen-caffeine    Calcium Replacement - Follow Nurse / BPA Driven Protocol    Magnesium Standard Dose Replacement - Follow Nurse / BPA Driven Protocol    nitroglycerin    ondansetron ODT **OR** ondansetron    oxymetazoline    Phosphorus Replacement - Follow Nurse / BPA Driven Protocol    Potassium Replacement - Follow Nurse / BPA Driven Protocol    prochlorperazine    sodium chloride    sodium chloride    sodium chloride    sodium chloride    Assessment & Plan     Active Hospital Problems    Diagnosis  POA    **Pulmonary embolus [I26.99]  Yes    Pulmonary embolism [I26.99]  Yes    Acute deep vein thrombosis (DVT) of left lower extremity [I82.402]  Unknown    Status post craniotomy [Z98.890]  Not Applicable    Grade I diastolic dysfunction [I51.89]  Yes    Hiatal hernia [K44.9]  Yes    GERD (gastroesophageal reflux disease) [K21.9]  Yes    Diverticulosis [K57.90]  Yes    Dyslipidemia [E78.5]  Yes      Resolved Hospital Problems   No resolved problems to display.     Brief Hospital Course to date:  Linda Schafer is a 64 y.o. female with PMH significant for HLD, GERD  "and migraine headaches. Recently admitted to [facility] 5/24-5/30/2025 for acute R MCA CVA with R ICA occlusion. On 5/25, CTH showed worsening edema with mass effect. NS consulted and she underwent decompressive craniectomy on 5/26/25 (with plan for OP cranioplasty). She discharged to [facility] with holter monitor and protective helmet (to be worn when OOB). She was referred to sleep medicine for OP sleep study. She discharged from [facility] on 6/17/25. She followed up with Stroke Neurology APRN on 6/19/25    She presented to [facility] ED on 6/26/25 for evaluation of pain with deep breaths and shortness of breath. She was found to have a large saddle PE on CTA.     Large saddle pulmonary embolus  Acute LLE DVT  - CTA chest with large saddle embolus with bilateral segmental and subsegmental pulmonary emboli with no R heart strain noted  - posterior tibial, peroneal and gastrocnemius veins   - ECHO with EF 65%, no R heart strain  - On heparin gtt - transition to PO Eliquis today   - Hypercoagulable panel ordered during last admission per Stroke Neurology 6/19 and in review, only + result is for an Antithrombin III which is slightly elevated. Per neurology, this is generally insignificant in isolation and is not indicative of a clotting disorder on its own     Recent R MCA CVA s/p decompressive craniectomy by Dr. Olivarez 5/26/25  - Continue ASA, high-intensity statin  - On prophylactic Keppra 500mg BID   - Helmet on when OOB  - Craniectomy scheduled 7/7 but now on hold due to PE/anticoagulation. Plan to follow up with Dr. Olivarez on 7/25/25.  - PT/OT following. Patient/family interested in return to [facility]   - Followed by stroke neurology team - OP Holter monitor showed 5 episodes of SVT (longest episode 17.4 seconds with max ), per reading physician, \"relatively benign study\"    Episodes of sweating / nausea  - Could be neurological related to recent surgery or SVT but I suspect more likely vasomotor related to " estrogen withdrawal  - Historically, has been prescribed Estradiol patch 0.05mg 2x/week. Last filled 84 day supply on 3/18.   - Did not wear patch during - admission and not at [facility] - so now 4-6 weeks into estrogen withdrawal   - Recommend AGAINST restarting estrogen given DVT/PE     Microscopic hematuria  - 3-5 RBCs on  UA but has been on heparin gtt and high-dose Eliquis  - Would repeat OP once on stable Eliquis dose     Migraine headaches, Keppra has been effective. PRN Fioricet available  Chronic constipation, home Linzess not on formulary. On Amitiza 24mg daily     Expected Discharge Location and Transportation: rehab  Expected Discharge Expected Discharge Date: 7/3/2025; Expected Discharge Time:      VTE Prophylaxis:Pharmacologic VTE prophylaxis orders are present.    AM-PAC 6 Clicks Score (PT): 9 (25 0800)    CODE STATUS:   Code Status and Medical Interventions: CPR (Attempt to Resuscitate); Full Support   Ordered at: 25 1159     Code Status (Patient has no pulse and is not breathing):    CPR (Attempt to Resuscitate)     Medical Interventions (Patient has pulse or is breathing):    Full Support     Level Of Support Discussed With:    Patient     Lucille Cook PA-C  25        Electronically signed by Lucille Cook PA-C at 25 0931       Lucille Cook PA-C at 25 1240       Attestation signed by Trey Shah MD at 25 1659      I have reviewed this documentation and agree.                          [facility] Medicine Services  PROGRESS NOTE    Patient Name: Linda Schafer  : 1960  MRN: [id number]    Date of Admission: 2025  Primary Care Physician: Micheline Heath DO    Subjective     CC: f/u PE    HPI:  In bed. Feeling about the same. Daughter at bedside.   Nosebleed yesterday evening - has since resolved. Encouraged to keep O2 off as much as possible if O2 sats allow. Per daughter, patient seemed  disoriented and not her self yesterday but seems back to baseline today. Hasn't been sleeping very well    Objective     Vital Signs:   Temp:  [97.9 °F (36.6 °C)-98.2 °F (36.8 °C)] 98.1 °F (36.7 °C)  Heart Rate:  [68-73] 73  Resp:  [16-18] 16  BP: (108-117)/(59-67) 108/67     Physical Exam:  Constitutional: No acute distress, awake, alert and conversant. Sitting up in bed   HENT: NCAT, mucous membranes moist. Helmet on - did not remove for exam   Respiratory: Clear to auscultation bilaterally, normal respiratory effort on room air   Cardiovascular: RRR  Gastrointestinal: Positive bowel sounds, soft, nontender, nondistended  Musculoskeletal: (+) LLE edema  Psychiatric: Appropriate affect, cooperative with exam  Neurologic: Oriented x 3, moves all extremities spontaneously without focal deficits, speech clear  Skin: R craniectomy incision c/d/i     Results Reviewed:  LAB RESULTS:      Lab 06/30/25  0342 06/29/25  0354 06/28/25  0341 06/27/25  1752 06/27/25  1153 06/27/25  0429 06/26/25  1548 06/26/25  0800 06/26/25  0613   WBC 7.16 6.66 8.59  --   --  11.38*  --   --  14.04*   HEMOGLOBIN 11.5* 10.5* 11.6*  --   --  11.5*  --   --  14.5   HEMATOCRIT 37.1 33.8* 36.3  --   --  35.5  --   --  44.2   PLATELETS 310 250 223  --   --  227  --   --  267   NEUTROS ABS  --   --  5.06  --   --  7.66*  --   --  11.31*   IMMATURE GRANS (ABS)  --   --  0.04  --   --  0.07*  --   --  0.07*   LYMPHS ABS  --   --  2.34  --   --  2.26  --   --  1.60   MONOS ABS  --   --  0.78  --   --  1.11*  --   --  0.79   EOS ABS  --   --  0.30  --   --  0.21  --   --  0.18   MCV 88.8 88.5 87.7  --   --  87.0  --   --  86.2   PROTIME  --   --   --   --   --   --   --   --  13.1   APTT  --   --   --   --   --   --   --   --  22.9*   HEPARIN ANTI-XA  --  >1.10* 0.62 0.67 0.64 0.58   < >  --  0.10*   HSTROP T  --   --   --   --   --   --   --  9 11    < > = values in this interval not displayed.         Lab 06/29/25  0354 06/28/25  0341 06/27/25  0429  06/26/25  0613   SODIUM 138 137 134* 138   POTASSIUM 3.9 4.1 3.8 3.8   CHLORIDE 102 101 99 98   CO2 26.0 27.0 25.0 26.0   ANION GAP 10.0 9.0 10.0 14.0   BUN 10.2 13.7 13.3 11.1   CREATININE 0.52* 0.53* 0.56* 0.76   EGFR 105.2 104.7 103.3 88.7   GLUCOSE 106* 88 112* 112*   CALCIUM 9.0 9.0 8.8 9.7   MAGNESIUM 1.9  --   --   --          Lab 06/26/25  0613   TOTAL PROTEIN 7.9   ALBUMIN 4.3   GLOBULIN 3.6   ALT (SGPT) 37*   AST (SGOT) 61*   BILIRUBIN 0.6   ALK PHOS 147*         Lab 06/26/25  0800 06/26/25  0613   PROBNP  --  68.0   HSTROP T 9 11   PROTIME  --  13.1   INR  --  0.93     Brief Urine Lab Results  (Last result in the past 365 days)        Color   Clarity   Blood   Leuk Est   Nitrite   Protein   CREAT   Urine HCG        06/29/25 1441 Yellow   Cloudy   Negative   Negative   Negative   Negative                 Microbiology Results Abnormal       None          No radiology results from the last 24 hrs    Results for orders placed during the hospital encounter of 06/26/25    Adult Transthoracic Echo Limited W/ Cont if Necessary Per Protocol    Interpretation Summary    Left ventricular systolic function is normal. Estimated left ventricular EF = 65%    The cardiac valves are anatomically and functionally normal.    Current medications:  Scheduled Meds:apixaban, 10 mg, Oral, Q12H   Followed by  [START ON 7/5/2025] apixaban, 5 mg, Oral, Q12H  vitamin C, 500 mg, Oral, Daily  aspirin, 81 mg, Oral, Daily  atorvastatin, 80 mg, Oral, Nightly  cetirizine, 10 mg, Oral, Nightly  Ketotifen Fumarate, 1 drop, Both Eyes, BID  lactobacillus acidophilus, 1 capsule, Oral, Daily  levETIRAcetam, 500 mg, Oral, Q12H  lubiprostone, 24 mcg, Oral, Daily With Breakfast  melatonin, 5 mg, Oral, Nightly  oxymetazoline, 2 spray, Each Nare, BID  pantoprazole, 40 mg, Oral, BID  sodium chloride, 10 mL, Intravenous, Q12H  vitamin B-12, 100 mcg, Oral, Daily      Continuous Infusions:   PRN Meds:.  acetaminophen **OR** [DISCONTINUED] acetaminophen  **OR** [DISCONTINUED] acetaminophen    baclofen    senna-docusate sodium **AND** polyethylene glycol **AND** bisacodyl **AND** bisacodyl    butalbital-acetaminophen-caffeine    Calcium Replacement - Follow Nurse / BPA Driven Protocol    Magnesium Standard Dose Replacement - Follow Nurse / BPA Driven Protocol    nitroglycerin    ondansetron ODT **OR** ondansetron    Phosphorus Replacement - Follow Nurse / BPA Driven Protocol    Potassium Replacement - Follow Nurse / BPA Driven Protocol    prochlorperazine    sodium chloride    sodium chloride    sodium chloride    sodium chloride    Assessment & Plan     Active Hospital Problems    Diagnosis  POA    **Pulmonary embolus [I26.99]  Yes    Pulmonary embolism [I26.99]  Yes    Acute deep vein thrombosis (DVT) of left lower extremity [I82.402]  Unknown    Status post craniotomy [Z98.890]  Not Applicable    Grade I diastolic dysfunction [I51.89]  Yes    Hiatal hernia [K44.9]  Yes    GERD (gastroesophageal reflux disease) [K21.9]  Yes    Diverticulosis [K57.90]  Yes    Dyslipidemia [E78.5]  Yes      Resolved Hospital Problems   No resolved problems to display.     Brief Hospital Course to date:  Linda Schafer is a 64 y.o. female with PMH significant for HLD, GERD and migraine headaches. Recently admitted to [facility] 5/24-5/30/2025 for acute R MCA CVA with R ICA occlusion. On 5/25, CTH showed worsening edema with mass effect. NS consulted and she underwent decompressive craniectomy on 5/26/25 (with plan for OP cranioplasty). She discharged to [facility] with holter monitor and protective helmet (to be worn when OOB). She was referred to sleep medicine for OP sleep study. She discharged from [facility] on 6/17/25. She followed up with Stroke Neurology APRN on 6/19/25    She presented to [facility] ED on 6/26/25 for evaluation of pain with deep breaths and shortness of breath. She was found to have a large saddle PE on CTA.     Large saddle pulmonary embolus  Acute LLE DVT  - CTA  "chest with large saddle embolus with bilateral segmental and subsegmental pulmonary emboli with no R heart strain noted  - posterior tibial, peroneal and gastrocnemius veins   - ECHO with EF 65%, no R heart strain  - On heparin gtt - transition to PO Eliquis today   - Hypercoagulable panel ordered during last admission per Stroke Neurology 6/19 and in review, only + result is for an Antithrombin III which is slightly elevated. Per neurology, this is generally insignificant in isolation and is not indicative of a clotting disorder on its own     Recent R MCA CVA s/p decompressive craniectomy by Dr. Olivarez 5/26/25  - Continue ASA, high-intensity statin  - On prophylactic Keppra 500mg BID   - Helmet on when OOB  - Craniectomy scheduled 7/7 but now on hold due to PE/anticoagulation. Plan to follow up with Dr. Olivarez on 7/25/25.  - PT/OT following. Patient/family interested in [facility] [facility]   - Followed by stroke neurology team - OP Holter monitor showed 5 episodes of SVT (longest episode 17.4 seconds with max ), per reading physician, \"relatively benign study\"    Episodes of sweating / nausea  - Could be neurological related to recent surgery or SVT but I suspect more likely vasomotor related to estrogen withdrawal  - Historically, has been prescribed Estradiol patch 0.05mg 2x/week. Last filled 84 day supply on 3/18.   - Did not wear patch during 5/24-5/30 admission and not at [facility] - so now 4-6 weeks into estrogen withdrawal   - Recommend AGAINST restarting estrogen given DVT/PE     Migraine headaches, Keppra has been effective. PRN Fioricet available  Chronic constipation, home Linzess not on formulary. On Amitiza 24mg daily     Expected Discharge Location and Transportation: rehab  Expected Discharge Expected Discharge Date: 7/1/2025; Expected Discharge Time:      VTE Prophylaxis:Pharmacologic VTE prophylaxis orders are present.    AM-PAC 6 Clicks Score (PT): 9 (06/30/25 0800)    CODE STATUS:   Code Status " and Medical Interventions: CPR (Attempt to Resuscitate); Full Support   Ordered at: 06/26/25 1159     Code Status (Patient has no pulse and is not breathing):    CPR (Attempt to Resuscitate)     Medical Interventions (Patient has pulse or is breathing):    Full Support     Level Of Support Discussed With:    Patient     Lucille Cook PA-C  06/30/25        Electronically signed by Trey Shah MD at 06/30/25 1658       Consult Notes (last 24 hours)  Notes from 06/30/25 1226 through 07/01/25 1226   No notes of this type exist for this encounter.

## 2025-07-01 NOTE — PROGRESS NOTES
"    [facility] Medicine Services  PROGRESS NOTE    Patient Name: Linda Schafer  : 1960  MRN: [id number]    Date of Admission: 2025  Primary Care Physician: Micheline Heath DO Subjective     CC: f/u PE    HPI:  In bed. No family at bedside. Got some rest last night. No further epistaxis. Continues to have episodes of nausea/vomiting - says gagged while taking pills yesterday morning (could taste some of the pills and one got stuck on the roof of her mouth) and she vomited \"everything.\" Asks for Zofran to be scheduled before AM meds like she was doing at home. Awaits PT eval for [facility] referral    Objective     Vital Signs:   Temp:  [98.1 °F (36.7 °C)-98.4 °F (36.9 °C)] 98.2 °F (36.8 °C)  Heart Rate:  [65-74] 65  Resp:  [16-18] 18  BP: (103-115)/(50-69) 115/69     Physical Exam:  Constitutional: No acute distress, awake, alert and conversant. Sitting up in bed   HENT: NCAT, mucous membranes moist. Helmet on - did not remove for exam   Respiratory: Clear to auscultation bilaterally, normal respiratory effort on room air   Cardiovascular: RRR  Gastrointestinal: Positive bowel sounds, soft, nontender, nondistended  Musculoskeletal: (+) LLE edema  Psychiatric: Appropriate affect, cooperative with exam  Neurologic: Oriented x 3, moves all extremities spontaneously without focal deficits, speech clear  Skin: R craniectomy incision c/d/i     Results Reviewed:  LAB RESULTS:      Lab 25  0342 25  0354 25  0341 25  1752 25  1153 25  0429 25  1548 25  0800 25  0613   WBC 7.16 6.66 8.59  --   --  11.38*  --   --  14.04*   HEMOGLOBIN 11.5* 10.5* 11.6*  --   --  11.5*  --   --  14.5   HEMATOCRIT 37.1 33.8* 36.3  --   --  35.5  --   --  44.2   PLATELETS 310 250 223  --   --  227  --   --  267   NEUTROS ABS  --   --  5.06  --   --  7.66*  --   --  11.31*   IMMATURE GRANS (ABS)  --   --  0.04  --   --  0.07*  --   --  0.07*   LYMPHS ABS  -- "   --  2.34  --   --  2.26  --   --  1.60   MONOS ABS  --   --  0.78  --   --  1.11*  --   --  0.79   EOS ABS  --   --  0.30  --   --  0.21  --   --  0.18   MCV 88.8 88.5 87.7  --   --  87.0  --   --  86.2   PROTIME  --   --   --   --   --   --   --   --  13.1   APTT  --   --   --   --   --   --   --   --  22.9*   HEPARIN ANTI-XA  --  >1.10* 0.62 0.67 0.64 0.58   < >  --  0.10*   HSTROP T  --   --   --   --   --   --   --  9 11    < > = values in this interval not displayed.         Lab 06/29/25  0354 06/28/25  0341 06/27/25  0429 06/26/25  0613   SODIUM 138 137 134* 138   POTASSIUM 3.9 4.1 3.8 3.8   CHLORIDE 102 101 99 98   CO2 26.0 27.0 25.0 26.0   ANION GAP 10.0 9.0 10.0 14.0   BUN 10.2 13.7 13.3 11.1   CREATININE 0.52* 0.53* 0.56* 0.76   EGFR 105.2 104.7 103.3 88.7   GLUCOSE 106* 88 112* 112*   CALCIUM 9.0 9.0 8.8 9.7   MAGNESIUM 1.9  --   --   --          Lab 06/26/25  0613   TOTAL PROTEIN 7.9   ALBUMIN 4.3   GLOBULIN 3.6   ALT (SGPT) 37*   AST (SGOT) 61*   BILIRUBIN 0.6   ALK PHOS 147*         Lab 06/26/25  0800 06/26/25  0613   PROBNP  --  68.0   HSTROP T 9 11   PROTIME  --  13.1   INR  --  0.93     Brief Urine Lab Results  (Last result in the past 365 days)        Color   Clarity   Blood   Leuk Est   Nitrite   Protein   CREAT   Urine HCG        06/29/25 1441 Yellow   Cloudy   Negative   Negative   Negative   Negative                 Microbiology Results Abnormal       None          No radiology results from the last 24 hrs    Results for orders placed during the hospital encounter of 06/26/25    Adult Transthoracic Echo Limited W/ Cont if Necessary Per Protocol 06/26/2025 12:12 PM    Interpretation Summary    Left ventricular systolic function is normal. Estimated left ventricular EF = 65%    The cardiac valves are anatomically and functionally normal.    Current medications:  Scheduled Meds:apixaban, 10 mg, Oral, Q12H   Followed by  [START ON 7/5/2025] apixaban, 5 mg, Oral, Q12H  vitamin C, 500 mg, Oral,  Daily  aspirin, 81 mg, Oral, Daily  atorvastatin, 80 mg, Oral, Nightly  cetirizine, 10 mg, Oral, Nightly  Ketotifen Fumarate, 1 drop, Both Eyes, BID  lactobacillus acidophilus, 1 capsule, Oral, Daily  levETIRAcetam, 500 mg, Oral, Q12H  lubiprostone, 24 mcg, Oral, Daily With Breakfast  melatonin, 5 mg, Oral, Nightly  ondansetron ODT, 4 mg, Translingual, QAM AC  pantoprazole, 40 mg, Oral, BID  sodium chloride, 10 mL, Intravenous, Q12H  vitamin B-12, 100 mcg, Oral, Daily      Continuous Infusions:   PRN Meds:.  acetaminophen **OR** [DISCONTINUED] acetaminophen **OR** [DISCONTINUED] acetaminophen    baclofen    senna-docusate sodium **AND** polyethylene glycol **AND** bisacodyl **AND** bisacodyl    butalbital-acetaminophen-caffeine    Calcium Replacement - Follow Nurse / BPA Driven Protocol    Magnesium Standard Dose Replacement - Follow Nurse / BPA Driven Protocol    nitroglycerin    ondansetron ODT **OR** ondansetron    oxymetazoline    Phosphorus Replacement - Follow Nurse / BPA Driven Protocol    Potassium Replacement - Follow Nurse / BPA Driven Protocol    prochlorperazine    sodium chloride    sodium chloride    sodium chloride    sodium chloride    Assessment & Plan     Active Hospital Problems    Diagnosis  POA    **Pulmonary embolus [I26.99]  Yes    Pulmonary embolism [I26.99]  Yes    Acute deep vein thrombosis (DVT) of left lower extremity [I82.402]  Unknown    Status post craniotomy [Z98.890]  Not Applicable    Grade I diastolic dysfunction [I51.89]  Yes    Hiatal hernia [K44.9]  Yes    GERD (gastroesophageal reflux disease) [K21.9]  Yes    Diverticulosis [K57.90]  Yes    Dyslipidemia [E78.5]  Yes      Resolved Hospital Problems   No resolved problems to display.     Brief Hospital Course to date:  Linda Schafer is a 64 y.o. female with PMH significant for HLD, GERD and migraine headaches. 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 On 5/25, [facility]  "showed worsening edema with mass effect. NS consulted and she underwent decompressive craniectomy on 5/26/25 (with plan for OP cranioplasty). She discharged to [facility] with holter monitor and protective helmet (to be worn when OOB). She was referred to sleep medicine for OP sleep study. She discharged from [facility] on 6/17/25. She followed up with Stroke Neurology APRN on 6/19/25    She presented to [facility] ED on 6/26/25 for evaluation of pain with deep breaths and shortness of breath. She was found to have a large saddle PE on CTA.     Large saddle pulmonary embolus  Acute LLE DVT  - CTA chest with large saddle embolus with bilateral segmental and subsegmental pulmonary emboli with no R heart strain noted  - posterior tibial, peroneal and gastrocnemius veins   - ECHO with EF 65%, no R heart strain  - On heparin gtt - transition to PO Eliquis today   - Hypercoagulable panel ordered during last admission per Stroke Neurology 6/19 and in review, only + result is for an Antithrombin III which is slightly elevated. Per neurology, this is generally insignificant in isolation and is not indicative of a clotting disorder on its own     Recent R MCA CVA s/p decompressive craniectomy by Dr. Olivarez 5/26/25  - Continue ASA, high-intensity statin  - On prophylactic Keppra 500mg BID   - Helmet on when OOB  - Craniectomy scheduled 7/7 but now on hold due to PE/anticoagulation. Plan to follow up with Dr. Olivarez on 7/25/25.  - PT/OT following. Patient/family interested in return to [facility]   - Followed by stroke neurology team - OP Holter monitor showed 5 episodes of SVT (longest episode 17.4 seconds with max ), per reading physician, \"relatively benign study\"    Episodes of sweating / nausea  - Could be neurological related to recent surgery or SVT but I suspect more likely vasomotor related to estrogen withdrawal  - Historically, has been prescribed Estradiol patch 0.05mg 2x/week. Last filled 84 day supply on 3/18.   - Did not wear " patch during 5/24-5/30 admission and not at [facility] - so now 4-6 weeks into estrogen withdrawal   - Recommend AGAINST restarting estrogen given DVT/PE     Microscopic hematuria  - 3-5 RBCs on 6/29 UA but has been on heparin gtt and high-dose Eliquis  - Would repeat OP once on stable Eliquis dose     Migraine headaches, Keppra has been effective. PRN Fioricet available  Chronic constipation, home Linzess not on formulary. On Amitiza 24mg daily     Expected Discharge Location and Transportation: rehab  Expected Discharge Expected Discharge Date: 7/3/2025; Expected Discharge Time:      VTE Prophylaxis:Pharmacologic VTE prophylaxis orders are present.    AM-PAC 6 Clicks Score (PT): 9 (06/30/25 0800)    CODE STATUS:   Code Status and Medical Interventions: CPR (Attempt to Resuscitate); Full Support   Ordered at: 06/26/25 1159     Code Status (Patient has no pulse and is not breathing):    CPR (Attempt to Resuscitate)     Medical Interventions (Patient has pulse or is breathing):    Full Support     Level Of Support Discussed With:    Patient     Lucille Cook PA-C  07/01/25

## 2025-07-01 NOTE — PLAN OF CARE
Goal Outcome Evaluation:  Plan of Care Reviewed With: patient        Progress: improving  Outcome Evaluation: Continues to make improvements in ADL performance and functional mobility. Max Ax2 for supine>sit. Improved standing tolerance this session. Sat at edge of chair and performed grooming tasks targeting trunk control. Tolerated ther ex well. Continue IPOT POC.    Anticipated Discharge Disposition (OT): skilled nursing facility

## 2025-07-02 LAB
ANION GAP SERPL CALCULATED.3IONS-SCNC: 11 MMOL/L (ref 5–15)
BUN SERPL-MCNC: 11 MG/DL (ref 8–23)
BUN/CREAT SERPL: 19.3 (ref 7–25)
CALCIUM SPEC-SCNC: 9.2 MG/DL (ref 8.6–10.5)
CHLORIDE SERPL-SCNC: 102 MMOL/L (ref 98–107)
CO2 SERPL-SCNC: 26 MMOL/L (ref 22–29)
CREAT SERPL-MCNC: 0.57 MG/DL (ref 0.57–1)
DEPRECATED RDW RBC AUTO: 39.6 FL (ref 37–54)
EGFRCR SERPLBLD CKD-EPI 2021: 102.9 ML/MIN/1.73
ERYTHROCYTE [DISTWIDTH] IN BLOOD BY AUTOMATED COUNT: 12.7 % (ref 12.3–15.4)
GLUCOSE SERPL-MCNC: 93 MG/DL (ref 65–99)
HCT VFR BLD AUTO: 36.1 % (ref 34–46.6)
HGB BLD-MCNC: 11.7 G/DL (ref 12–15.9)
MCH RBC QN AUTO: 27.9 PG (ref 26.6–33)
MCHC RBC AUTO-ENTMCNC: 32.4 G/DL (ref 31.5–35.7)
MCV RBC AUTO: 86.2 FL (ref 79–97)
PLATELET # BLD AUTO: 348 10*3/MM3 (ref 140–450)
PMV BLD AUTO: 11.6 FL (ref 6–12)
POTASSIUM SERPL-SCNC: 3.6 MMOL/L (ref 3.5–5.2)
POTASSIUM SERPL-SCNC: 4.3 MMOL/L (ref 3.5–5.2)
QT INTERVAL: 396 MS
QTC INTERVAL: 445 MS
RBC # BLD AUTO: 4.19 10*6/MM3 (ref 3.77–5.28)
SODIUM SERPL-SCNC: 139 MMOL/L (ref 136–145)
WBC NRBC COR # BLD AUTO: 7.32 10*3/MM3 (ref 3.4–10.8)

## 2025-07-02 PROCEDURE — 63710000001 ONDANSETRON ODT 4 MG TABLET DISPERSIBLE: Performed by: PHYSICIAN ASSISTANT

## 2025-07-02 PROCEDURE — 84132 ASSAY OF SERUM POTASSIUM: CPT | Performed by: INTERNAL MEDICINE

## 2025-07-02 PROCEDURE — 80048 BASIC METABOLIC PNL TOTAL CA: CPT | Performed by: PHYSICIAN ASSISTANT

## 2025-07-02 PROCEDURE — 85027 COMPLETE CBC AUTOMATED: CPT | Performed by: PHYSICIAN ASSISTANT

## 2025-07-02 PROCEDURE — 99232 SBSQ HOSP IP/OBS MODERATE 35: CPT | Performed by: INTERNAL MEDICINE

## 2025-07-02 PROCEDURE — 25010000002 ONDANSETRON PER 1 MG: Performed by: PHYSICIAN ASSISTANT

## 2025-07-02 RX ORDER — POTASSIUM CHLORIDE 1500 MG/1
40 TABLET, EXTENDED RELEASE ORAL EVERY 4 HOURS
Status: DISCONTINUED | OUTPATIENT
Start: 2025-07-02 | End: 2025-07-02

## 2025-07-02 RX ORDER — POTASSIUM CHLORIDE 1.5 G/1.58G
40 POWDER, FOR SOLUTION ORAL ONCE
Status: COMPLETED | OUTPATIENT
Start: 2025-07-02 | End: 2025-07-02

## 2025-07-02 RX ORDER — LIDOCAINE 4 G/G
1 PATCH TOPICAL
Status: DISCONTINUED | OUTPATIENT
Start: 2025-07-02 | End: 2025-07-03 | Stop reason: HOSPADM

## 2025-07-02 RX ADMIN — APIXABAN 10 MG: 5 TABLET, FILM COATED ORAL at 19:54

## 2025-07-02 RX ADMIN — ATORVASTATIN CALCIUM 80 MG: 40 TABLET, FILM COATED ORAL at 19:54

## 2025-07-02 RX ADMIN — Medication 5 MG: at 19:53

## 2025-07-02 RX ADMIN — ACETAMINOPHEN 650 MG: 325 TABLET, FILM COATED ORAL at 08:47

## 2025-07-02 RX ADMIN — Medication 10 ML: at 08:36

## 2025-07-02 RX ADMIN — PANTOPRAZOLE SODIUM 40 MG: 40 TABLET, DELAYED RELEASE ORAL at 08:35

## 2025-07-02 RX ADMIN — VITAM B12 100 MCG: 100 TAB at 08:36

## 2025-07-02 RX ADMIN — ASPIRIN 81 MG: 81 TABLET, COATED ORAL at 08:35

## 2025-07-02 RX ADMIN — ACETAMINOPHEN 650 MG: 325 TABLET, FILM COATED ORAL at 12:56

## 2025-07-02 RX ADMIN — LIDOCAINE 1 PATCH: 4 PATCH TOPICAL at 21:13

## 2025-07-02 RX ADMIN — ONDANSETRON 4 MG: 4 TABLET, ORALLY DISINTEGRATING ORAL at 20:00

## 2025-07-02 RX ADMIN — BACLOFEN 10 MG: 10 TABLET ORAL at 20:01

## 2025-07-02 RX ADMIN — CETIRIZINE HYDROCHLORIDE 10 MG: 10 TABLET, FILM COATED ORAL at 19:54

## 2025-07-02 RX ADMIN — Medication 10 ML: at 20:06

## 2025-07-02 RX ADMIN — ACETAMINOPHEN 650 MG: 325 TABLET, FILM COATED ORAL at 19:54

## 2025-07-02 RX ADMIN — SENNOSIDES AND DOCUSATE SODIUM 2 TABLET: 50; 8.6 TABLET ORAL at 08:36

## 2025-07-02 RX ADMIN — POTASSIUM CHLORIDE 40 MEQ: 1.5 FOR SOLUTION ORAL at 12:01

## 2025-07-02 RX ADMIN — APIXABAN 10 MG: 5 TABLET, FILM COATED ORAL at 08:35

## 2025-07-02 RX ADMIN — LUBIPROSTONE 24 MCG: 24 CAPSULE ORAL at 08:36

## 2025-07-02 RX ADMIN — LEVETIRACETAM 500 MG: 500 TABLET, FILM COATED ORAL at 19:53

## 2025-07-02 RX ADMIN — KETOTIFEN FUMARATE 1 DROP: 0.25 SOLUTION/ DROPS OPHTHALMIC at 08:48

## 2025-07-02 RX ADMIN — ONDANSETRON 4 MG: 4 TABLET, ORALLY DISINTEGRATING ORAL at 07:01

## 2025-07-02 RX ADMIN — LEVETIRACETAM 500 MG: 500 TABLET, FILM COATED ORAL at 08:35

## 2025-07-02 RX ADMIN — Medication 1 CAPSULE: at 08:36

## 2025-07-02 RX ADMIN — PANTOPRAZOLE SODIUM 40 MG: 40 TABLET, DELAYED RELEASE ORAL at 19:53

## 2025-07-02 RX ADMIN — KETOTIFEN FUMARATE 1 DROP: 0.25 SOLUTION/ DROPS OPHTHALMIC at 19:55

## 2025-07-02 RX ADMIN — ONDANSETRON 4 MG: 2 INJECTION INTRAMUSCULAR; INTRAVENOUS at 12:01

## 2025-07-02 RX ADMIN — POTASSIUM CHLORIDE 40 MEQ: 1500 TABLET, EXTENDED RELEASE ORAL at 08:35

## 2025-07-02 NOTE — PLAN OF CARE
Goal Outcome Evaluation:              Outcome Evaluation: Patient resting on room air. No complaints of pain overnight or SOA. PRN zofran given before medications and no episodes of N/V overnight.

## 2025-07-02 NOTE — PAYOR COMM NOTE
"Radha Mendoza (64 y.o. Female)     [id number]     Lisa Neff, RN  Utilization Review  Pksbl-[phone]  Ajt-[phone]        Date of Birth   1960    Social Security Number       Address   [address] [place] [address]    Home Phone   [phone]    MRN   [id number]       Bahai   Faith    Marital Status                               Admission Date   6/26/2025    Admission Type   Emergency    Admitting Provider   Gaston Cheung DO    Attending Provider   Gaston Cheung DO    Department, Room/Bed   [facility] 6B, N631/1       Discharge Date       Discharge Disposition       Discharge Destination                                 Attending Provider: Gaston Cheung DO    Allergies: Codeine, Penicillins    Isolation: None   Infection: None   Code Status: CPR    Ht: 157.5 cm (62.01\")   Wt: 70.3 kg (155 lb)    Admission Cmt: None   Principal Problem: Pulmonary embolus [I26.99]                   Active Insurance as of 6/26/2025       Primary Coverage       Payor Plan Insurance Group Employer/Plan Group    [facility] BLUE CR[employer] EMPL[employer]EE [id number]       Payor Plan Address Payor Plan Phone Number Payor Plan Fax Number Effective Dates    [address] [phone]  1/1/2015 - None Entered    [place] [address]         Subscriber Name Subscriber Birth Date Member ID       RADHA MENDOZA 1960 [id number]                     Emergency Contacts        (Rel.) Home Phone Work Phone Mobile Phone    RejiLoc (Spouse) [phone] -- [phone]    REJIRADHA (Son) -- -- [phone]    HugoNadia (Daughter) [phone] -- --    Lay Regina Vásquez (Relative) -- -- [phone]              Vital Signs (last 2 days)       Date/Time Temp Temp src Pulse Resp BP Patient Position SpO2    07/02/25 1520 98.6 (37) Axillary 70 18 113/73 Lying 92    07/02/25 1105 98.2 (36.8) Oral 67 18 120/75 Lying 93    " 07/02/25 0838 98.4 (36.9) Oral 71 18 124/70 Lying 94    07/02/25 0404 98.3 (36.8) Oral 67 18 106/63 Lying 90    07/02/25 0006 98.2 (36.8) Oral 66 16 112/58 Lying 91    07/01/25 2054 98.3 (36.8) Oral 67 18 118/69 Lying 92    07/01/25 1515 98.3 (36.8) Oral 70 18 116/69 Lying 94    07/01/25 1100 98 (36.7) Oral 78 18 100/71 Lying 95    07/01/25 0832 98.2 (36.8) Oral 74 16 117/68 Lying 92    07/01/25 0421 98.2 (36.8) Oral 65 18 115/69 Lying 93    06/30/25 2337 98.4 (36.9) Oral 71 18 104/65 Lying 93    06/30/25 2100 98.4 (36.9) Oral 71 18 103/50 Lying 92    06/30/25 1501 98.4 (36.9) Oral 74 18 113/62 Lying 94    06/30/25 1044 98.1 (36.7) Oral 73 16 108/67 Lying 94    06/30/25 0358 98.1 (36.7) Oral 70 16 115/62 Lying 93          Oxygen Therapy (last 2 days)       Date/Time SpO2 Device (Oxygen Therapy) Flow (L/min) (Oxygen Therapy) Oxygen Concentration (%) ETCO2 (mmHg)    07/02/25 1520 92 -- -- -- --    07/02/25 1501 -- room air -- -- --    07/02/25 1430 -- room air -- -- --    07/02/25 1200 -- room air -- -- --    07/02/25 1105 93 -- -- -- --    07/02/25 1000 -- room air -- -- --    07/02/25 0847 -- room air -- -- --    07/02/25 0838 94 room air -- -- --    07/02/25 0615 -- room air -- -- --    07/02/25 0433 -- room air -- -- --    07/02/25 0404 90 -- -- -- --    07/02/25 0221 -- room air -- -- --    07/02/25 0025 -- room air -- -- --    07/02/25 0006 91 -- -- -- --    07/01/25 2238 -- room air -- -- --    07/01/25 2054 92 room air -- -- --    07/01/25 1800 -- room air -- -- --    07/01/25 1600 -- room air -- -- --    07/01/25 1515 94 -- -- -- --    07/01/25 1400 -- room air -- -- --    07/01/25 1200 -- room air -- -- --    07/01/25 1100 95 -- -- -- --    07/01/25 1000 -- room air -- -- --    07/01/25 0832 92 room air -- -- --    07/01/25 0800 -- room air -- -- --    07/01/25 0600 -- room air -- -- --    07/01/25 0421 93 -- -- -- --    07/01/25 0400 -- room air -- -- --    07/01/25 0200 -- room air -- -- --    07/01/25  0000 -- room air -- -- --    06/30/25 2337 93 -- -- -- --    06/30/25 2200 -- room air -- -- --    06/30/25 2100 92 room air -- -- --    06/30/25 2045 -- room air -- -- --    06/30/25 1800 -- room air -- -- --    06/30/25 1600 -- room air -- -- --    06/30/25 1501 94 room air -- -- --    06/30/25 1400 -- room air -- -- --    06/30/25 1200 -- room air -- -- --    06/30/25 1044 94 room air -- -- --    06/30/25 1000 -- room air -- -- --    06/30/25 0600 -- room air -- -- --    06/30/25 0400 -- room air -- -- --    06/30/25 0358 93 -- -- -- --    06/30/25 0200 -- room air -- -- --    06/30/25 0000 -- room air -- -- --          Lines, Drains & Airways       Active LDAs       Name Placement date Placement time Site Days    Peripheral IV 07/01/25 1638 22 G Anterior;Proximal;Right Forearm 07/01/25  1638  Forearm  less than 1    External Urinary Catheter 06/27/25  0720  --  5                  Current Facility-Administered Medications   Medication Dose Route Frequency Provider Last Rate Last Admin    acetaminophen (TYLENOL) tablet 650 mg  650 mg Oral Q4H PRN Sonia Sandra DO   650 mg at 07/02/25 1256    apixaban (ELIQUIS) tablet 10 mg  10 mg Oral Q12H Lucille Cook PA-C   10 mg at 07/02/25 0835    Followed by    [START ON 7/5/2025] apixaban (ELIQUIS) tablet 5 mg  5 mg Oral Q12H Lucille Cook PA-C        ascorbic acid (VITAMIN C) tablet 500 mg  500 mg Oral Daily Sonia Sandra DO   500 mg at 07/01/25 0851    aspirin EC tablet 81 mg  81 mg Oral Daily Sonia Sandra DO   81 mg at 07/02/25 0835    atorvastatin (LIPITOR) tablet 80 mg  80 mg Oral Nightly Sonia Sandra DO   80 mg at 07/01/25 2148    baclofen (LIORESAL) tablet 10 mg  10 mg Oral TID PRN Sonia Sandra DO   10 mg at 06/30/25 2106    sennosides-docusate (PERICOLACE) 8.6-50 MG per tablet 2 tablet  2 tablet Oral BID PRN Sonia Sandra DO   2 tablet at 07/02/25 0836    And    polyethylene glycol (MIRALAX) packet 17 g  17 g  Oral Daily PRN Sonia Sandra DO        And    bisacodyl (DULCOLAX) EC tablet 5 mg  5 mg Oral Daily PRN Sonia Sandra DO        And    bisacodyl (DULCOLAX) suppository 10 mg  10 mg Rectal Daily PRN Sonia Sandra DO   10 mg at 06/26/25 2157    butalbital-acetaminophen-caffeine (FIORICET, ESGIC) -40 MG per tablet 1 tablet  1 tablet Oral Q6H PRN Sonia Sandra DO        Calcium Replacement - Follow Nurse / BPA Driven Protocol   Not Applicable PRN Sonia Sandra DO        cetirizine (zyrTEC) tablet 10 mg  10 mg Oral Nightly Sonia Sandra DO   10 mg at 07/01/25 2148    Ketotifen Fumarate (ZADITOR) 0.035 % ophthalmic solution 1 drop  1 drop Both Eyes BID Ashley Taylor, APRN   1 drop at 07/02/25 0848    lactobacillus acidophilus (RISAQUAD) capsule 1 capsule  1 capsule Oral Daily Lucille Cook PA-C   1 capsule at 07/02/25 0836    levETIRAcetam (KEPPRA) tablet 500 mg  500 mg Oral Q12H Sonia Sandra DO   500 mg at 07/02/25 0835    lubiprostone (AMITIZA) capsule 24 mcg  24 mcg Oral Daily With Breakfast Lucille Cook PA-C   24 mcg at 07/02/25 0836    Magnesium Standard Dose Replacement - Follow Nurse / BPA Driven Protocol   Not Applicable PRN Sonia Sandra DO        melatonin tablet 5 mg  5 mg Oral Nightly Lucille Cook PA-C   5 mg at 07/01/25 2148    nitroglycerin (NITROSTAT) SL tablet 0.4 mg  0.4 mg Sublingual Q5 Min PRN Sonia Sandra DO        ondansetron ODT (ZOFRAN-ODT) disintegrating tablet 4 mg  4 mg Oral Q6H PRN Lucille Cook PA-C   4 mg at 07/01/25 2053    Or    ondansetron (ZOFRAN) injection 4 mg  4 mg Intravenous Q6H PRN Lucille Cook PA-C   4 mg at 07/02/25 1201    ondansetron ODT (ZOFRAN-ODT) disintegrating tablet 4 mg  4 mg Translingual QAM AC Lucille Cook PA-C   4 mg at 07/02/25 0701    oxymetazoline (AFRIN) nasal spray 2 spray  2 spray Each Nare Q30 Min PRN Lucille Cook, EMELYN         pantoprazole (PROTONIX) EC tablet 40 mg  40 mg Oral BID Sonia Sandra, DO   40 mg at 07/02/25 0835    Phosphorus Replacement - Follow Nurse / BPA Driven Protocol   Not Applicable PRN Soina Sandra DO        Potassium Replacement - Follow Nurse / BPA Driven Protocol   Not Applicable PRN Sonia Sandra, DO        prochlorperazine (COMPAZINE) tablet 5 mg  5 mg Oral Q6H PRN Lucille Cook PA-C        sodium chloride 0.9 % flush 10 mL  10 mL Intravenous PRN Francesco Holland MD        sodium chloride 0.9 % flush 10 mL  10 mL Intravenous Q12H Sonia Sandra DO   10 mL at 07/02/25 0836    sodium chloride 0.9 % flush 10 mL  10 mL Intravenous PRN Sonia Sandra DO        sodium chloride 0.9 % infusion 40 mL  40 mL Intravenous PRN Sonia Sandra DO        sodium chloride nasal spray 2 spray  2 spray Each Nare Q30 Min PRN Lucille Cook PA-C   2 spray at 06/30/25 1908    vitamin B-12 (CYANOCOBALAMIN) tablet 100 mcg  100 mcg Oral Daily Sonia Sandra, DO   100 mcg at 07/02/25 0836     Lab Results (last 48 hours)       Procedure Component Value Units Date/Time    Potassium [[id number]] Collected: 07/02/25 1541    Specimen: Blood Updated: 07/02/25 1612    Basic Metabolic Panel [[id number]]  (Normal) Collected: 07/02/25 0422    Specimen: Blood Updated: 07/02/25 0606     Glucose 93 mg/dL      BUN 11.0 mg/dL      Creatinine 0.57 mg/dL      Sodium 139 mmol/L      Potassium 3.6 mmol/L      Chloride 102 mmol/L      CO2 26.0 mmol/L      Calcium 9.2 mg/dL      BUN/Creatinine Ratio 19.3     Anion Gap 11.0 mmol/L      eGFR 102.9 mL/min/1.73     Narrative:      GFR Categories in Chronic Kidney Disease (CKD)              GFR Category          GFR (mL/min/1.73)    Interpretation  G1                    90 or greater        Normal or high (1)  G2                    60-89                Mild decrease (1)  G3a                   45-59                Mild to moderate decrease  G3b                   30-44                 Moderate to severe decrease  G4                    15-29                Severe decrease  G5                    14 or less           Kidney failure    (1)In the absence of evidence of kidney disease, neither GFR category G1 or G2 fulfill the criteria for CKD.    eGFR calculation 2021 CKD-EPI creatinine equation, which does not include race as a factor    CBC (No Diff) [[id number]]  (Abnormal) Collected: 07/02/25 0422    Specimen: Blood Updated: 07/02/25 0542     WBC 7.32 10*3/mm3      RBC 4.19 10*6/mm3      Hemoglobin 11.7 g/dL      Hematocrit 36.1 %      MCV 86.2 fL      MCH 27.9 pg      MCHC 32.4 g/dL      RDW 12.7 %      RDW-SD 39.6 fl      MPV 11.6 fL      Platelets 348 10*3/mm3           Imaging Results (Last 48 Hours)       ** No results found for the last 48 hours. **          ECG/EMG Results (last 48 hours)       Procedure Component Value Units Date/Time    ECG 12 Lead Dyspnea [[id number]] Collected: 06/26/25 0750     Updated: 07/02/25 0655     QT Interval 396 ms      QTC Interval 445 ms     Narrative:      Test Reason : Dyspnea  Blood Pressure :   */*   mmHG  Vent. Rate :  76 BPM     Atrial Rate :  76 BPM     P-R Int : 142 ms          QRS Dur :  70 ms      QT Int : 396 ms       P-R-T Axes :  17  22  38 degrees    QTcB Int : 445 ms    Normal sinus rhythm  Low voltage QRS  Cannot rule out Anterior infarct (cited on or before 26-Jun-2025)  Abnormal ECG  When compared with ECG of 26-Jun-2025 05:29,  No significant change was found  Confirmed by MD GÓMEZ KYLE ([id number]) on 7/2/2025 6:55:12 AM    Referred By: ED MD           Confirmed By: HARLEEN GÓMEZ MD          Operative/Procedure Notes (last 48 hours)  Notes from 06/30/25 1615 through 07/02/25 1615   No notes of this type exist for this encounter.          Physician Progress Notes (last 48 hours)        Gaston Cheung DO at 07/02/25 0851              [facility] Medicine Services  PROGRESS NOTE    Patient Name: Linda GREEN  [place]  : 1960  MRN: [id number]    Date of Admission: 2025  Primary Care Physician: Micheline Heath DO    Subjective   Subjective     CC:  F/u PE, N/V    HPI:  Doing alright this morning. Currently taking AM meds. She reports that typically it's her Vitamin C that causes he issues, it starts to dissolve then sticks to her throat causing gagging.      Objective   Objective     Vital Signs:   Temp:  [98.2 °F (36.8 °C)-98.6 °F (37 °C)] 98.6 °F (37 °C)  Heart Rate:  [66-71] 70  Resp:  [16-18] 18  BP: (106-124)/(58-75) 113/73     Physical Exam:  Constitutional: Awake, alert, sitting up in bed taking meds  Respiratory: Clear to auscultation bilaterally, respiratory effort normal   Cardiovascular: RRR, palpable radial pulse  Gastrointestinal: Positive bowel sounds, soft, nontender, nondistended  Psychiatric: Appropriate affect, cooperative  Neurologic: Speech slow but fluent, answering questions appropriately    Results Reviewed:  LAB RESULTS:      Lab 25  0422 25  0342 25  0354 25  0341 25  1752 25  1153 25  0429 25  1548 25  0800 25  0613   WBC 7.32 7.16 6.66 8.59  --   --  11.38*  --   --  14.04*   HEMOGLOBIN 11.7* 11.5* 10.5* 11.6*  --   --  11.5*  --   --  14.5   HEMATOCRIT 36.1 37.1 33.8* 36.3  --   --  35.5  --   --  44.2   PLATELETS 348 310 250 223  --   --  227  --   --  267   NEUTROS ABS  --   --   --  5.06  --   --  7.66*  --   --  11.31*   IMMATURE GRANS (ABS)  --   --   --  0.04  --   --  0.07*  --   --  0.07*   LYMPHS ABS  --   --   --  2.34  --   --  2.26  --   --  1.60   MONOS ABS  --   --   --  0.78  --   --  1.11*  --   --  0.79   EOS ABS  --   --   --  0.30  --   --  0.21  --   --  0.18   MCV 86.2 88.8 88.5 87.7  --   --  87.0  --   --  86.2   PROTIME  --   --   --   --   --   --   --   --   --  13.1   APTT  --   --   --   --   --   --   --   --   --  22.9*   HEPARIN ANTI-XA  --   --  >1.10* 0.62 0.67 0.64 0.58   < >  --  0.10*    HSTROP T  --   --   --   --   --   --   --   --  9 11    < > = values in this interval not displayed.         Lab 07/02/25  0422 06/29/25  0354 06/28/25  0341 06/27/25  0429 06/26/25  0613   SODIUM 139 138 137 134* 138   POTASSIUM 3.6 3.9 4.1 3.8 3.8   CHLORIDE 102 102 101 99 98   CO2 26.0 26.0 27.0 25.0 26.0   ANION GAP 11.0 10.0 9.0 10.0 14.0   BUN 11.0 10.2 13.7 13.3 11.1   CREATININE 0.57 0.52* 0.53* 0.56* 0.76   EGFR 102.9 105.2 104.7 103.3 88.7   GLUCOSE 93 106* 88 112* 112*   CALCIUM 9.2 9.0 9.0 8.8 9.7   MAGNESIUM  --  1.9  --   --   --          Lab 06/26/25  0613   TOTAL PROTEIN 7.9   ALBUMIN 4.3   GLOBULIN 3.6   ALT (SGPT) 37*   AST (SGOT) 61*   BILIRUBIN 0.6   ALK PHOS 147*         Lab 06/26/25  0800 06/26/25  0613   PROBNP  --  68.0   HSTROP T 9 11   PROTIME  --  13.1   INR  --  0.93                 Brief Urine Lab Results  (Last result in the past 365 days)        Color   Clarity   Blood   Leuk Est   Nitrite   Protein   CREAT   Urine HCG        06/29/25 1441 Yellow   Cloudy   Negative   Negative   Negative   Negative                   Microbiology Results Abnormal       None            No radiology results from the last 24 hrs    Results for orders placed during the hospital encounter of 06/26/25    Adult Transthoracic Echo Limited W/ Cont if Necessary Per Protocol 06/26/2025 12:12 PM    Interpretation Summary    Left ventricular systolic function is normal. Estimated left ventricular EF = 65%    The cardiac valves are anatomically and functionally normal.      Current medications:  Scheduled Meds:apixaban, 10 mg, Oral, Q12H   Followed by  [START ON 7/5/2025] apixaban, 5 mg, Oral, Q12H  vitamin C, 500 mg, Oral, Daily  aspirin, 81 mg, Oral, Daily  atorvastatin, 80 mg, Oral, Nightly  cetirizine, 10 mg, Oral, Nightly  Ketotifen Fumarate, 1 drop, Both Eyes, BID  lactobacillus acidophilus, 1 capsule, Oral, Daily  levETIRAcetam, 500 mg, Oral, Q12H  lubiprostone, 24 mcg, Oral, Daily With  Breakfast  melatonin, 5 mg, Oral, Nightly  ondansetron ODT, 4 mg, Translingual, QAM AC  pantoprazole, 40 mg, Oral, BID  sodium chloride, 10 mL, Intravenous, Q12H  vitamin B-12, 100 mcg, Oral, Daily      Continuous Infusions:   PRN Meds:.  acetaminophen **OR** [DISCONTINUED] acetaminophen **OR** [DISCONTINUED] acetaminophen    baclofen    senna-docusate sodium **AND** polyethylene glycol **AND** bisacodyl **AND** bisacodyl    butalbital-acetaminophen-caffeine    Calcium Replacement - Follow Nurse / BPA Driven Protocol    Magnesium Standard Dose Replacement - Follow Nurse / BPA Driven Protocol    nitroglycerin    ondansetron ODT **OR** ondansetron    oxymetazoline    Phosphorus Replacement - Follow Nurse / BPA Driven Protocol    Potassium Replacement - Follow Nurse / BPA Driven Protocol    prochlorperazine    sodium chloride    sodium chloride    sodium chloride    sodium chloride    Assessment & Plan   Assessment & Plan     Active Hospital Problems    Diagnosis  POA    **Pulmonary embolus [I26.99]  Yes    Pulmonary embolism [I26.99]  Yes    Acute deep vein thrombosis (DVT) of left lower extremity [I82.402]  Unknown    Status post craniotomy [Z98.890]  Not Applicable    Grade I diastolic dysfunction [I51.89]  Yes    Hiatal hernia [K44.9]  Yes    GERD (gastroesophageal reflux disease) [K21.9]  Yes    Diverticulosis [K57.90]  Yes    Dyslipidemia [E78.5]  Yes      Resolved Hospital Problems   No resolved problems to display.        Brief Hospital Course to date:  Linda Schafer is a 64 y.o. female w/ GERD/HH, HLD, migraines, recently admitted 5/24-5/30 to the ICU, she presented w/ RT MCA syndrome, on repeat CT 5/25 she had worse edema w/ mass effect, she required decompressive craniectomy 5/26 w/ Dr. Olivarez and she was ultimately discharged to  rehab where she remained until 6/19/25 when she was dismissed 2/2 insurance no longer covering her stay; she developed pleuritic chest pain and presented to the ED where CTA  showed large saddle PE w/ BL segmental and subsegmental Pe's w/o evidence for RT heart strain; TTE was stable, venous duplex showed LT LE DVT in the posterior tibial, peroneal, and gastrocnemius; she was on heparin gtt and transitioned to PO eliquis 6/28, she is awaiting rehab placement    The following problems are new to me today    Assessment/Plan    Large saddle PE w/ assoc segmental and subsegmental Pe's  Acute LT LE DVT  -no RT heart strain on TTE or CTA  -recent hypercoag panel from prior admit w/o clear process  -s/p heparin gtt, now on po eliquis since 6/28    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 Olivarez)  HLD  -ASA, statin  -on ppx keppra  -helmet when oob  -NSGY f/u 7/25/25, new thrombus and oral AC will complicate future surgical planning  -stroke neuro f/u    Intermittent nausea  -has predominantly morning nausea, especially w/ vitamin C  -most likely related to recent stroke w/ neurosurgical intervention, may be related to prior estrogen use w/ abrupt cessation  -supportive care, antiemetics    Microscopic hematuria - noted on earlier UA, repeat outpatient once more stable  Migraines  Chronic constipation - linzess at home, on amitiza here  GERD/HH - ppi      Expected Discharge Location and Transportation: rehab when available  Expected Discharge   Expected Discharge Date: 7/3/2025; Expected Discharge Time:      VTE Prophylaxis:  Pharmacologic VTE prophylaxis orders are present.         AM-PAC 6 Clicks Score (PT): 11 (07/02/25 1501)    CODE STATUS:   Code Status and Medical Interventions: CPR (Attempt to Resuscitate); Full Support   Ordered at: 06/26/25 1159     Code Status (Patient has no pulse and is not breathing):    CPR (Attempt to Resuscitate)     Medical Interventions (Patient has pulse or is breathing):    Full Support     Level Of Support Discussed With:    Patient       Gaston Cheung DO  07/02/25        Electronically signed by Gaston Cheung DO at  "25 1603       Lucille Cook PA-C at 25 0912       Attestation signed by Gaston Cheung DO at 25 1717      I have reviewed this documentation and agree.                          [facility] Medicine Services  PROGRESS NOTE    Patient Name: Linda Schafer  : 1960  MRN: [id number]    Date of Admission: 2025  Primary Care Physician: Micheline Heath DO    Subjective     CC: f/u PE    HPI:  In bed. No family at bedside. Got some rest last night. No further epistaxis. Continues to have episodes of nausea/vomiting - says gagged while taking pills yesterday morning (could taste some of the pills and one got stuck on the roof of her mouth) and she vomited \"everything.\" Asks for Zofran to be scheduled before AM meds like she was doing at home. Awaits PT eval for [facility] referral    Objective     Vital Signs:   Temp:  [98.1 °F (36.7 °C)-98.4 °F (36.9 °C)] 98.2 °F (36.8 °C)  Heart Rate:  [65-74] 65  Resp:  [16-18] 18  BP: (103-115)/(50-69) 115/69     Physical Exam:  Constitutional: No acute distress, awake, alert and conversant. Sitting up in bed   HENT: NCAT, mucous membranes moist. Helmet on - did not remove for exam   Respiratory: Clear to auscultation bilaterally, normal respiratory effort on room air   Cardiovascular: RRR  Gastrointestinal: Positive bowel sounds, soft, nontender, nondistended  Musculoskeletal: (+) LLE edema  Psychiatric: Appropriate affect, cooperative with exam  Neurologic: Oriented x 3, moves all extremities spontaneously without focal deficits, speech clear  Skin: R craniectomy incision c/d/i     Results Reviewed:  LAB RESULTS:      Lab 25  0342 25  0354 25  0341 25  1752 25  1153 25  0429 25  1548 25  0800 25  0613   WBC 7.16 6.66 8.59  --   --  11.38*  --   --  14.04*   HEMOGLOBIN 11.5* 10.5* 11.6*  --   --  11.5*  --   --  14.5   HEMATOCRIT 37.1 33.8* 36.3  --   --  35.5  --   -- "  44.2   PLATELETS 310 250 223  --   --  227  --   --  267   NEUTROS ABS  --   --  5.06  --   --  7.66*  --   --  11.31*   IMMATURE GRANS (ABS)  --   --  0.04  --   --  0.07*  --   --  0.07*   LYMPHS ABS  --   --  2.34  --   --  2.26  --   --  1.60   MONOS ABS  --   --  0.78  --   --  1.11*  --   --  0.79   EOS ABS  --   --  0.30  --   --  0.21  --   --  0.18   MCV 88.8 88.5 87.7  --   --  87.0  --   --  86.2   PROTIME  --   --   --   --   --   --   --   --  13.1   APTT  --   --   --   --   --   --   --   --  22.9*   HEPARIN ANTI-XA  --  >1.10* 0.62 0.67 0.64 0.58   < >  --  0.10*   HSTROP T  --   --   --   --   --   --   --  9 11    < > = values in this interval not displayed.         Lab 06/29/25  0354 06/28/25  0341 06/27/25  0429 06/26/25  0613   SODIUM 138 137 134* 138   POTASSIUM 3.9 4.1 3.8 3.8   CHLORIDE 102 101 99 98   CO2 26.0 27.0 25.0 26.0   ANION GAP 10.0 9.0 10.0 14.0   BUN 10.2 13.7 13.3 11.1   CREATININE 0.52* 0.53* 0.56* 0.76   EGFR 105.2 104.7 103.3 88.7   GLUCOSE 106* 88 112* 112*   CALCIUM 9.0 9.0 8.8 9.7   MAGNESIUM 1.9  --   --   --          Lab 06/26/25 0613   TOTAL PROTEIN 7.9   ALBUMIN 4.3   GLOBULIN 3.6   ALT (SGPT) 37*   AST (SGOT) 61*   BILIRUBIN 0.6   ALK PHOS 147*         Lab 06/26/25  0800 06/26/25  0613   PROBNP  --  68.0   HSTROP T 9 11   PROTIME  --  13.1   INR  --  0.93     Brief Urine Lab Results  (Last result in the past 365 days)        Color   Clarity   Blood   Leuk Est   Nitrite   Protein   CREAT   Urine HCG        06/29/25 1441 Yellow   Cloudy   Negative   Negative   Negative   Negative                 Microbiology Results Abnormal       None          No radiology results from the last 24 hrs    Results for orders placed during the hospital encounter of 06/26/25    Adult Transthoracic Echo Limited W/ Cont if Necessary Per Protocol 06/26/2025 12:12 PM    Interpretation Summary    Left ventricular systolic function is normal. Estimated left ventricular EF = 65%    The cardiac  valves are anatomically and functionally normal.    Current medications:  Scheduled Meds:apixaban, 10 mg, Oral, Q12H   Followed by  [START ON 7/5/2025] apixaban, 5 mg, Oral, Q12H  vitamin C, 500 mg, Oral, Daily  aspirin, 81 mg, Oral, Daily  atorvastatin, 80 mg, Oral, Nightly  cetirizine, 10 mg, Oral, Nightly  Ketotifen Fumarate, 1 drop, Both Eyes, BID  lactobacillus acidophilus, 1 capsule, Oral, Daily  levETIRAcetam, 500 mg, Oral, Q12H  lubiprostone, 24 mcg, Oral, Daily With Breakfast  melatonin, 5 mg, Oral, Nightly  ondansetron ODT, 4 mg, Translingual, QAM AC  pantoprazole, 40 mg, Oral, BID  sodium chloride, 10 mL, Intravenous, Q12H  vitamin B-12, 100 mcg, Oral, Daily      Continuous Infusions:   PRN Meds:.  acetaminophen **OR** [DISCONTINUED] acetaminophen **OR** [DISCONTINUED] acetaminophen    baclofen    senna-docusate sodium **AND** polyethylene glycol **AND** bisacodyl **AND** bisacodyl    butalbital-acetaminophen-caffeine    Calcium Replacement - Follow Nurse / BPA Driven Protocol    Magnesium Standard Dose Replacement - Follow Nurse / BPA Driven Protocol    nitroglycerin    ondansetron ODT **OR** ondansetron    oxymetazoline    Phosphorus Replacement - Follow Nurse / BPA Driven Protocol    Potassium Replacement - Follow Nurse / BPA Driven Protocol    prochlorperazine    sodium chloride    sodium chloride    sodium chloride    sodium chloride    Assessment & Plan     Active Hospital Problems    Diagnosis  POA    **Pulmonary embolus [I26.99]  Yes    Pulmonary embolism [I26.99]  Yes    Acute deep vein thrombosis (DVT) of left lower extremity [I82.402]  Unknown    Status post craniotomy [Z98.890]  Not Applicable    Grade I diastolic dysfunction [I51.89]  Yes    Hiatal hernia [K44.9]  Yes    GERD (gastroesophageal reflux disease) [K21.9]  Yes    Diverticulosis [K57.90]  Yes    Dyslipidemia [E78.5]  Yes      Resolved Hospital Problems   No resolved problems to display.     Brief Hospital Course to date:  Linda GREEN  "Gilmar is a 64 y.o. female with PMH significant for HLD, GERD and migraine headaches. 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 On 5/25, CTH showed worsening edema with mass effect. NS consulted and she underwent decompressive craniectomy on 5/26/25 (with plan for OP cranioplasty). She discharged to [facility] with holter monitor and protective helmet (to be worn when OOB). She was referred to sleep medicine for OP sleep study. She discharged from [facility] on 6/17/25. She followed up with Stroke Neurology APRN on 6/19/25    She presented to [facility] ED on 6/26/25 for evaluation of pain with deep breaths and shortness of breath. She was found to have a large saddle PE on CTA.     Large saddle pulmonary embolus  Acute LLE DVT  - CTA chest with large saddle embolus with bilateral segmental and subsegmental pulmonary emboli with no R heart strain noted  - posterior tibial, peroneal and gastrocnemius veins   - ECHO with EF 65%, no R heart strain  - On heparin gtt - transition to PO Eliquis today   - Hypercoagulable panel ordered during last admission per Stroke Neurology 6/19 and in review, only + result is for an Antithrombin III which is slightly elevated. Per neurology, this is generally insignificant in isolation and is not indicative of a clotting disorder on its own     Recent R MCA CVA s/p decompressive craniectomy by Dr. Olivarez 5/26/25  - Continue ASA, high-intensity statin  - On prophylactic Keppra 500mg BID   - Helmet on when OOB  - Craniectomy scheduled 7/7 but now on hold due to PE/anticoagulation. Plan to follow up with Dr. Olivarez on 7/25/25.  - PT/OT following. Patient/family interested in return to [facility]   - Followed by stroke neurology team - OP Holter monitor showed 5 episodes of SVT (longest episode 17.4 seconds with max ), per reading physician, \"relatively benign study\"    Episodes of sweating / nausea  - Could be neurological related to recent " surgery or SVT but I suspect more likely vasomotor related to estrogen withdrawal  - Historically, has been prescribed Estradiol patch 0.05mg 2x/week. Last filled 84 day supply on 3/18.   - Did not wear patch during 5/24-5/30 admission and not at [facility] - so now 4-6 weeks into estrogen withdrawal   - Recommend AGAINST restarting estrogen given DVT/PE     Microscopic hematuria  - 3-5 RBCs on 6/29 UA but has been on heparin gtt and high-dose Eliquis  - Would repeat OP once on stable Eliquis dose     Migraine headaches, Keppra has been effective. PRN Fioricet available  Chronic constipation, home Linzess not on formulary. On Amitiza 24mg daily     Expected Discharge Location and Transportation: rehab  Expected Discharge Expected Discharge Date: 7/3/2025; Expected Discharge Time:      VTE Prophylaxis:Pharmacologic VTE prophylaxis orders are present.    AM-PAC 6 Clicks Score (PT): 9 (06/30/25 0800)    CODE STATUS:   Code Status and Medical Interventions: CPR (Attempt to Resuscitate); Full Support   Ordered at: 06/26/25 1159     Code Status (Patient has no pulse and is not breathing):    CPR (Attempt to Resuscitate)     Medical Interventions (Patient has pulse or is breathing):    Full Support     Level Of Support Discussed With:    Patient     Lucille Cook PA-C  07/01/25        Electronically signed by Gaston Cheung DO at 07/01/25 1717       Medical Student Notes (last 48 hours)  Notes from 06/30/25 1615 through 07/02/25 1615   No notes of this type exist for this encounter.

## 2025-07-02 NOTE — CASE MANAGEMENT/SOCIAL WORK
Continued Stay Note  [facility]     Patient Name: Linda Schafer  MRN: [id number]  Today's Date: 7/2/2025    Admit Date: 6/26/2025    Plan: [facility]   Discharge Plan       Row Name 07/02/25 1052       Plan    Plan [facility]    Plan Comments Per discussion in MDR, Pt had n/v this AM. Last documented BM 6/29. RN to initiate bowel regimen. Per April with [facility], precert for acute rehab is still pending. An update was provided to Pt and spouse, in room. CM will continue to follow.    *Addendum: 7/2 @ 4407 Per April, facility liaison at [facility], Pt has insurance approval for acute rehab. Goal is for Pt to discharge to facility tomorrow. Update provided to Pt and spouse. CM left voicemail to inform daughterNadia.     Final Discharge Disposition Code 62 - inpatient rehab facility                   Discharge Codes    No documentation.                 Expected Discharge Date and Time       Expected Discharge Date Expected Discharge Time    Jul 3, 2025               Chelo Laugna RN

## 2025-07-02 NOTE — PLAN OF CARE
Goal Outcome Evaluation:              Outcome Evaluation: Pt resting in bed with eyes open, a/ox4, bed in lowest position and locked, call light within reach. No comoplaints during this time. Will continue to monitor.         Problem: Adult Inpatient Plan of Care  Goal: Plan of Care Review  Outcome: Progressing  Flowsheets  Taken 7/2/2025 1446 by No Velazquez RN  Outcome Evaluation: Pt resting in bed with eyes open, a/ox4, bed in lowest position and locked, call light within reach. No comoplaints during this time. Will continue to monitor.  Taken 7/1/2025 1130 by Dodie Ruiz OT  Progress: improving  Taken 7/1/2025 1027 by Ciarra Norman PT  Plan of Care Reviewed With: patient  Goal: Patient-Specific Goal (Individualized)  Outcome: Progressing  Goal: Absence of Hospital-Acquired Illness or Injury  Outcome: Progressing  Intervention: Identify and Manage Fall Risk  Flowsheets (Taken 7/2/2025 1430 by No Liu, RN)  Safety Promotion/Fall Prevention:   activity supervised   assistive device/personal items within reach   clutter free environment maintained   fall prevention program maintained   lighting adjusted   nonskid shoes/slippers when out of bed   room organization consistent   safety round/check completed  Intervention: Prevent Skin Injury  Flowsheets  Taken 7/2/2025 1430 by No Liu RN  Skin Protection:   incontinence pads utilized   transparent dressing maintained   silicone foam dressing in place  Taken 7/2/2025 1400 by Jesus Mahmood RN  Body Position: left  Intervention: Prevent and Manage VTE (Venous Thromboembolism) Risk  Flowsheets (Taken 7/2/2025 0847 by No Liu, RN)  VTE Prevention/Management: (eliquis) other (see comments)  Intervention: Prevent Infection  Flowsheets (Taken 7/2/2025 1430 by No Liu, RN)  Infection Prevention:   environmental surveillance performed   hand hygiene promoted   personal protective equipment utilized   rest/sleep promoted   single patient room  provided  Goal: Optimal Comfort and Wellbeing  Outcome: Progressing  Intervention: Monitor Pain and Promote Comfort  Flowsheets (Taken 7/2/2025 1446)  Pain Management Interventions: care clustered  Intervention: Provide Person-Centered Care  Flowsheets (Taken 7/2/2025 0847 by No Liu, RN)  Trust Relationship/Rapport:   care explained   choices provided   questions encouraged   questions answered   thoughts/feelings acknowledged  Goal: Readiness for Transition of Care  Outcome: Progressing  Intervention: Mutually Develop Transition Plan  Flowsheets (Taken 6/26/2025 1333 by Caitlin Lainez, RN)  Equipment Currently Used at Home:   wheelchair   bath bench   commode  Transportation Anticipated: health plan transportation  Patient/Family Anticipated Services at Transition:   Patient/Family Anticipates Transition to: inpatient rehabilitation facility     Problem: Comorbidity Management  Goal: Maintenance of Osteoarthritis Symptom Control  Outcome: Progressing  Intervention: Maintain Osteoarthritis Symptom Control  Flowsheets (Taken 7/2/2025 1430 by No Liu, RN)  Activity Management: up to bedside commode  Assistive Device Utilized: gait belt     Problem: Skin Injury Risk Increased  Goal: Skin Health and Integrity  Outcome: Progressing     Problem: Gas Exchange Impaired  Goal: Optimal Gas Exchange  Outcome: Progressing     Problem: Fall Injury Risk  Goal: Absence of Fall and Fall-Related Injury  Outcome: Progressing  Intervention: Identify and Manage Contributors  Flowsheets (Taken 7/2/2025 0615 by Jenifer Leung, RN)  Self-Care Promotion: independence encouraged  Intervention: Promote Injury-Free Environment  Flowsheets (Taken 7/2/2025 1430 by No Liu, RN)  Safety Promotion/Fall Prevention:   activity supervised   assistive device/personal items within reach   clutter free environment maintained   fall prevention program maintained   lighting adjusted   nonskid shoes/slippers when out of bed   room  organization consistent   safety round/check completed

## 2025-07-02 NOTE — PROGRESS NOTES
[facility] Medicine Services  PROGRESS NOTE    Patient Name: Linda Schafer  : 1960  MRN: [id number]    Date of Admission: 2025  Primary Care Physician: Micheline Heath DO    Subjective   Subjective     CC:  F/u PE, N/V    HPI:  Doing alright this morning. Currently taking AM meds. She reports that typically it's her Vitamin C that causes he issues, it starts to dissolve then sticks to her throat causing gagging.      Objective   Objective     Vital Signs:   Temp:  [98.2 °F (36.8 °C)-98.6 °F (37 °C)] 98.6 °F (37 °C)  Heart Rate:  [66-71] 70  Resp:  [16-18] 18  BP: (106-124)/(58-75) 113/73     Physical Exam:  Constitutional: Awake, alert, sitting up in bed taking meds  Respiratory: Clear to auscultation bilaterally, respiratory effort normal   Cardiovascular: RRR, palpable radial pulse  Gastrointestinal: Positive bowel sounds, soft, nontender, nondistended  Psychiatric: Appropriate affect, cooperative  Neurologic: Speech slow but fluent, answering questions appropriately    Results Reviewed:  LAB RESULTS:      Lab 25  0422 25  0342 25  0354 25  0341 25  1752 25  1153 25  0429 25  1548 25  0800 25  0613   WBC 7.32 7.16 6.66 8.59  --   --  11.38*  --   --  14.04*   HEMOGLOBIN 11.7* 11.5* 10.5* 11.6*  --   --  11.5*  --   --  14.5   HEMATOCRIT 36.1 37.1 33.8* 36.3  --   --  35.5  --   --  44.2   PLATELETS 348 310 250 223  --   --  227  --   --  267   NEUTROS ABS  --   --   --  5.06  --   --  7.66*  --   --  11.31*   IMMATURE GRANS (ABS)  --   --   --  0.04  --   --  0.07*  --   --  0.07*   LYMPHS ABS  --   --   --  2.34  --   --  2.26  --   --  1.60   MONOS ABS  --   --   --  0.78  --   --  1.11*  --   --  0.79   EOS ABS  --   --   --  0.30  --   --  0.21  --   --  0.18   MCV 86.2 88.8 88.5 87.7  --   --  87.0  --   --  86.2   PROTIME  --   --   --   --   --   --   --   --   --  13.1   APTT  --   --   --   --   --    --   --   --   --  22.9*   HEPARIN ANTI-XA  --   --  >1.10* 0.62 0.67 0.64 0.58   < >  --  0.10*   HSTROP T  --   --   --   --   --   --   --   --  9 11    < > = values in this interval not displayed.         Lab 07/02/25  0422 06/29/25  0354 06/28/25  0341 06/27/25  0429 06/26/25  0613   SODIUM 139 138 137 134* 138   POTASSIUM 3.6 3.9 4.1 3.8 3.8   CHLORIDE 102 102 101 99 98   CO2 26.0 26.0 27.0 25.0 26.0   ANION GAP 11.0 10.0 9.0 10.0 14.0   BUN 11.0 10.2 13.7 13.3 11.1   CREATININE 0.57 0.52* 0.53* 0.56* 0.76   EGFR 102.9 105.2 104.7 103.3 88.7   GLUCOSE 93 106* 88 112* 112*   CALCIUM 9.2 9.0 9.0 8.8 9.7   MAGNESIUM  --  1.9  --   --   --          Lab 06/26/25  0613   TOTAL PROTEIN 7.9   ALBUMIN 4.3   GLOBULIN 3.6   ALT (SGPT) 37*   AST (SGOT) 61*   BILIRUBIN 0.6   ALK PHOS 147*         Lab 06/26/25  0800 06/26/25  0613   PROBNP  --  68.0   HSTROP T 9 11   PROTIME  --  13.1   INR  --  0.93                 Brief Urine Lab Results  (Last result in the past 365 days)        Color   Clarity   Blood   Leuk Est   Nitrite   Protein   CREAT   Urine HCG        06/29/25 1441 Yellow   Cloudy   Negative   Negative   Negative   Negative                   Microbiology Results Abnormal       None            No radiology results from the last 24 hrs    Results for orders placed during the hospital encounter of 06/26/25    Adult Transthoracic Echo Limited W/ Cont if Necessary Per Protocol 06/26/2025 12:12 PM    Interpretation Summary    Left ventricular systolic function is normal. Estimated left ventricular EF = 65%    The cardiac valves are anatomically and functionally normal.      Current medications:  Scheduled Meds:apixaban, 10 mg, Oral, Q12H   Followed by  [START ON 7/5/2025] apixaban, 5 mg, Oral, Q12H  vitamin C, 500 mg, Oral, Daily  aspirin, 81 mg, Oral, Daily  atorvastatin, 80 mg, Oral, Nightly  cetirizine, 10 mg, Oral, Nightly  Ketotifen Fumarate, 1 drop, Both Eyes, BID  lactobacillus acidophilus, 1 capsule, Oral,  Daily  levETIRAcetam, 500 mg, Oral, Q12H  lubiprostone, 24 mcg, Oral, Daily With Breakfast  melatonin, 5 mg, Oral, Nightly  ondansetron ODT, 4 mg, Translingual, QAM AC  pantoprazole, 40 mg, Oral, BID  sodium chloride, 10 mL, Intravenous, Q12H  vitamin B-12, 100 mcg, Oral, Daily      Continuous Infusions:   PRN Meds:.  acetaminophen **OR** [DISCONTINUED] acetaminophen **OR** [DISCONTINUED] acetaminophen    baclofen    senna-docusate sodium **AND** polyethylene glycol **AND** bisacodyl **AND** bisacodyl    butalbital-acetaminophen-caffeine    Calcium Replacement - Follow Nurse / BPA Driven Protocol    Magnesium Standard Dose Replacement - Follow Nurse / BPA Driven Protocol    nitroglycerin    ondansetron ODT **OR** ondansetron    oxymetazoline    Phosphorus Replacement - Follow Nurse / BPA Driven Protocol    Potassium Replacement - Follow Nurse / BPA Driven Protocol    prochlorperazine    sodium chloride    sodium chloride    sodium chloride    sodium chloride    Assessment & Plan   Assessment & Plan     Active Hospital Problems    Diagnosis  POA    **Pulmonary embolus [I26.99]  Yes    Pulmonary embolism [I26.99]  Yes    Acute deep vein thrombosis (DVT) of left lower extremity [I82.402]  Unknown    Status post craniotomy [Z98.890]  Not Applicable    Grade I diastolic dysfunction [I51.89]  Yes    Hiatal hernia [K44.9]  Yes    GERD (gastroesophageal reflux disease) [K21.9]  Yes    Diverticulosis [K57.90]  Yes    Dyslipidemia [E78.5]  Yes      Resolved Hospital Problems   No resolved problems to display.        Brief Hospital Course to date:  Linda Schafer is a 64 y.o. female w/ GERD/HH, HLD, migraines, recently admitted 5/24-5/30 to the ICU, she presented w/ RT MCA syndrome, on repeat CT 5/25 she had worse edema w/ mass effect, she required decompressive craniectomy 5/26 w/ Dr. Olivarez and she was ultimately discharged to  rehab where she remained until 6/19/25 when she was dismissed 2/2 insurance no longer covering  her stay; she developed pleuritic chest pain and presented to the ED where CTA showed large saddle PE w/ BL segmental and subsegmental Pe's w/o evidence for RT heart strain; TTE was stable, venous duplex showed LT LE DVT in the posterior tibial, peroneal, and gastrocnemius; she was on heparin gtt and transitioned to PO eliquis 6/28, she is awaiting rehab placement    The following problems are new to me today    Assessment/Plan    Large saddle PE w/ assoc segmental and subsegmental Pe's  Acute LT LE DVT  -no RT heart strain on TTE or CTA  -recent hypercoag panel from prior admit w/o clear process  -s/p heparin gtt, now on po eliquis since 6/28    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 Olivarez)  HLD  -ASA, statin  -on ppx keppra  -helmet when oob  -NSGY f/u 7/25/25, new thrombus and oral AC will complicate future surgical planning  -stroke neuro f/u    Intermittent nausea  -has predominantly morning nausea, especially w/ vitamin C  -most likely related to recent stroke w/ neurosurgical intervention, may be related to prior estrogen use w/ abrupt cessation  -supportive care, antiemetics    Microscopic hematuria - noted on earlier UA, repeat outpatient once more stable  Migraines  Chronic constipation - linzess at home, on amitiza here  GERD/HH - ppi      Expected Discharge Location and Transportation: rehab when available  Expected Discharge   Expected Discharge Date: 7/3/2025; Expected Discharge Time:      VTE Prophylaxis:  Pharmacologic VTE prophylaxis orders are present.         AM-PAC 6 Clicks Score (PT): 11 (07/02/25 1501)    CODE STATUS:   Code Status and Medical Interventions: CPR (Attempt to Resuscitate); Full Support   Ordered at: 06/26/25 1159     Code Status (Patient has no pulse and is not breathing):    CPR (Attempt to Resuscitate)     Medical Interventions (Patient has pulse or is breathing):    Full Support     Level Of Support Discussed With:    Patient       Gaston Edwards  DO Jonatan  07/02/25

## 2025-07-03 ENCOUNTER — APPOINTMENT (OUTPATIENT)
Dept: GENERAL RADIOLOGY | Facility: HOSPITAL | Age: 65
End: 2025-07-03
Payer: COMMERCIAL

## 2025-07-03 VITALS
BODY MASS INDEX: 28.52 KG/M2 | HEIGHT: 62 IN | HEART RATE: 76 BPM | SYSTOLIC BLOOD PRESSURE: 110 MMHG | WEIGHT: 155 LBS | DIASTOLIC BLOOD PRESSURE: 64 MMHG | RESPIRATION RATE: 16 BRPM | OXYGEN SATURATION: 92 % | TEMPERATURE: 98.4 F

## 2025-07-03 PROCEDURE — 99239 HOSP IP/OBS DSCHRG MGMT >30: CPT | Performed by: NURSE PRACTITIONER

## 2025-07-03 PROCEDURE — 63710000001 ONDANSETRON ODT 4 MG TABLET DISPERSIBLE: Performed by: PHYSICIAN ASSISTANT

## 2025-07-03 PROCEDURE — 73560 X-RAY EXAM OF KNEE 1 OR 2: CPT

## 2025-07-03 RX ORDER — LIDOCAINE 4 G/G
1 PATCH TOPICAL
Start: 2025-07-04

## 2025-07-03 RX ORDER — L.ACID,PARA/B.BIFIDUM/S.THERM 8B CELL
1 CAPSULE ORAL DAILY
Start: 2025-07-04

## 2025-07-03 RX ORDER — ACETAMINOPHEN 325 MG/1
650 TABLET ORAL EVERY 4 HOURS PRN
Start: 2025-07-03

## 2025-07-03 RX ADMIN — ONDANSETRON 4 MG: 4 TABLET, ORALLY DISINTEGRATING ORAL at 12:45

## 2025-07-03 RX ADMIN — LEVETIRACETAM 500 MG: 500 TABLET, FILM COATED ORAL at 08:30

## 2025-07-03 RX ADMIN — ASPIRIN 81 MG: 81 TABLET, COATED ORAL at 08:30

## 2025-07-03 RX ADMIN — APIXABAN 10 MG: 5 TABLET, FILM COATED ORAL at 08:30

## 2025-07-03 RX ADMIN — ACETAMINOPHEN 650 MG: 325 TABLET, FILM COATED ORAL at 08:31

## 2025-07-03 RX ADMIN — PANTOPRAZOLE SODIUM 40 MG: 40 TABLET, DELAYED RELEASE ORAL at 08:31

## 2025-07-03 RX ADMIN — KETOTIFEN FUMARATE 1 DROP: 0.25 SOLUTION/ DROPS OPHTHALMIC at 08:30

## 2025-07-03 RX ADMIN — Medication 1 CAPSULE: at 08:31

## 2025-07-03 RX ADMIN — ONDANSETRON 4 MG: 4 TABLET, ORALLY DISINTEGRATING ORAL at 08:31

## 2025-07-03 NOTE — PLAN OF CARE
Goal Outcome Evaluation:  Plan of Care Reviewed With: patient        Progress: improving  Outcome Evaluation: pt. on room air, nsr on tele, complaints of nausea relieved with prn zofran.  pt. with complaints of left knee pain, order received for lidocaine patch with relief per patient.  fall precautions in place, plan of care ongoing

## 2025-07-03 NOTE — DISCHARGE SUMMARY
[facility] Medicine Services  DISCHARGE SUMMARY    Patient Name: Linda Schafer  : 1960  MRN: [id number]    Date of Admission: 2025  5:19 AM  Date of Discharge:  25    Primary Care Physician: Micheline Heath,     Consults       Date and Time Order Name Status Description    2025  7:50 AM Inpatient Cardiology Consult Completed     2025 10:26 AM Inpatient Neurology Consult Stroke Completed             Hospital Course     Presenting Problem: Dyspnea     Active Hospital Problems    Diagnosis  POA    **Pulmonary embolus [I26.99]  Yes    Pulmonary embolism [I26.99]  Yes    Acute deep vein thrombosis (DVT) of left lower extremity [I82.402]  Unknown    Status post craniotomy [Z98.890]  Not Applicable    Grade I diastolic dysfunction [I51.89]  Yes    Hiatal hernia [K44.9]  Yes    GERD (gastroesophageal reflux disease) [K21.9]  Yes    Diverticulosis [K57.90]  Yes    Dyslipidemia [E78.5]  Yes      Resolved Hospital Problems   No resolved problems to display.      Hospital Course:  Linda Schafer is a 64 y.o. female w/ GERD/HH, HLD, migraines, recently admitted - to the ICU, she presented w/ RT MCA syndrome, on repeat CT  she had worse edema w/ mass effect, she required decompressive craniectomy  w/ Dr. Olivarez and she was ultimately discharged to  rehab where she remained until 25 when she was dismissed 2/2 insurance no longer covering her stay; she developed pleuritic chest pain and presented to the ED on 2025 where CTA showed large saddle PE w/ BL segmental and subsegmental Pe's w/o evidence for RT heart strain; TTE was stable, venous duplex showed LT LE DVT in the posterior tibial, peroneal, and gastrocnemius. She was initially treated with heparin gtt and was transitioned to PO eliquis on .  PT/OT evaluated and recommended inpatient rehab.  Case management has arranged for inpatient rehab at [facility].  She is currently stable  "and will be discharged today in stable condition.      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 Olivarez)  HLD  -ASA, statin  -on ppx keppra  -helmet when oob  -NSGY f/u 7/25/25, new thrombus and oral AC will complicate future surgical planning  -stroke neuro f/u     Intermittent nausea  -has predominantly morning nausea, especially w/ vitamin C  -most likely related to recent stroke w/ neurosurgical intervention, may be related to prior estrogen use w/ abrupt cessation  -supportive care, antiemetics     Microscopic hematuria - noted on earlier UA, repeat outpatient once more stable  Migraines  Chronic constipation - linzess at home, on amitiza here  GERD/HH - ppi     Discharge Follow Up Recommendations for outpatient labs/diagnostics:   Follow up with PCP in 1 week.   Follow up with Dr. Olivarez in 4-6 weeks.   Follow up with Stroke neurology as scheduled.   Follow up with Cardiology as scheduled.     Day of Discharge     HPI:   Patient was seen resting in bed no apparent distress.  No acute events overnight per nursing.  Patient was complaining of left knee pain this morning.  X-ray revealed no evidence of bony trauma with concern for osteoarthritis.  Patient feels ready to discharge to inpatient rehab.  We discussed the importance taking all medications as prescribed and keeping all recommended follow-up appointments.  She expressed no additional concerns at this time.    Vital Signs:   Temp:  [98.2 °F (36.8 " °C)-98.6 °F (37 °C)] 98.4 °F (36.9 °C)  Heart Rate:  [60-90] 76  Resp:  [16-18] 16  BP: ()/(63-73) 110/64    Physical Exam:  Constitutional: No acute distress, awake, alert, chronically ill-appearing  HENT: NCAT, mucous membranes moist  Respiratory: Clear to auscultation bilaterally, respiratory effort normal on room air  Cardiovascular: RRR, no murmurs, cap refill brisk   Gastrointestinal: Positive bowel sounds, soft, nontender, nondistended  Musculoskeletal: No BLE edema, left knee edema noted  Psychiatric: Flat affect, cooperative  Neurologic: Oriented x 3, speech slow  Skin: warm, dry, no visible rash     Pertinent  and/or Most Recent Results     LAB RESULTS:      Lab 07/02/25 0422 06/30/25 0342 06/29/25 0354 06/28/25  0341 06/27/25  1752 06/27/25  1153 06/27/25  0429   WBC 7.32 7.16 6.66 8.59  --   --  11.38*   HEMOGLOBIN 11.7* 11.5* 10.5* 11.6*  --   --  11.5*   HEMATOCRIT 36.1 37.1 33.8* 36.3  --   --  35.5   PLATELETS 348 310 250 223  --   --  227   NEUTROS ABS  --   --   --  5.06  --   --  7.66*   IMMATURE GRANS (ABS)  --   --   --  0.04  --   --  0.07*   LYMPHS ABS  --   --   --  2.34  --   --  2.26   MONOS ABS  --   --   --  0.78  --   --  1.11*   EOS ABS  --   --   --  0.30  --   --  0.21   MCV 86.2 88.8 88.5 87.7  --   --  87.0   HEPARIN ANTI-XA  --   --  >1.10* 0.62 0.67 0.64 0.58         Lab 07/02/25  1541 07/02/25 0422 06/29/25  0354 06/28/25  0341 06/27/25  0429   SODIUM  --  139 138 137 134*   POTASSIUM 4.3 3.6 3.9 4.1 3.8   CHLORIDE  --  102 102 101 99   CO2  --  26.0 26.0 27.0 25.0   ANION GAP  --  11.0 10.0 9.0 10.0   BUN  --  11.0 10.2 13.7 13.3   CREATININE  --  0.57 0.52* 0.53* 0.56*   EGFR  --  102.9 105.2 104.7 103.3   GLUCOSE  --  93 106* 88 112*   CALCIUM  --  9.2 9.0 9.0 8.8   MAGNESIUM  --   --  1.9  --   --                          Brief Urine Lab Results  (Last result in the past 365 days)        Color   Clarity   Blood   Leuk Est   Nitrite   Protein   CREAT   Urine HCG         06/29/25 1441 Yellow   Cloudy   Negative   Negative   Negative   Negative                 Microbiology Results (last 10 days)       ** No results found for the last 240 hours. **            XR Knee 1 or 2 View Left  Result Date: 7/3/2025  XR KNEE 1 OR 2 VW LEFT Date of Exam: 7/3/2025 10:17 AM EDT Indication: left knee pain Comparison: None available. Findings: Bones of the left knee joint appear anatomically aligned and intact. No fracture, avulsion or foreign body is seen. There appears to be relatively mild generalized tricompartmental left knee joint osteoarthritis. There is minimal if any suprapatellar effusion.     Impression: Mild generalized left knee joint osteoarthritis, and questionable small joint effusion. No evidence of acute or healing bony trauma. Electronically Signed: Hardik Jarvis MD  7/3/2025 10:47 AM EDT  Workstation ID: [id number]    Duplex Venous Lower Extremity - Bilateral CAR  Result Date: 6/26/2025    Acute left lower extremity deep vein thrombosis noted in the posterial tibial, peroneal and gastrocnemius.   All other veins appeared normal bilaterally.     CT Angiogram Chest Pulmonary Embolism  Result Date: 6/26/2025  CT ANGIOGRAM CHEST PULMONARY EMBOLISM Date of Exam: 6/26/2025 6:32 AM EDT Indication: left pleuritic chest pain. Comparison: None available. Technique: Axial CT images were obtained of the chest after the uneventful intravenous administration of 75 mL Isovue-370 utilizing pulmonary embolism protocol.  In addition, a 3-D volume rendered image was created for interpretation.  Reconstructed coronal and sagittal images were also obtained. Automated exposure control and iterative construction methods were used. Findings: Pulmonary arteries: Adequate opacification of the pulmonary arteries. There is a large saddle embolus. There are also filling defects extending into the bilateral upper and lower lobes as well as the right middle lobe with both segmental and subsegmental  involvement.  The ventricular ratio is normal. There is no evidence of right heart failure. Lungs and Pleura: There is bilateral basilar and lingular atelectasis. Mediastinum/Cassi: No mediastinal or hilar lymphadenopathy. Lymph nodes: No axillary or supraclavicular adenopathy. Cardiovascular: The cardiac chambers are within normal limits. The pericardium is normal. The aorta and its arch branch vessels are unremarkable.   Upper Abdomen: Status post cholecystectomy. The upper abdominal contents are unremarkable.      Bones and Soft Tissue: No suspicious osseous lesion.     Impression: Large saddle embolus with bilateral segmental and subsegmental pulmonary emboli. There is no evidence of right heart failure. Electronically Signed: Daryn Shell MD  6/26/2025 7:03 AM EDT  Workstation ID: [id number]    XR Chest 1 View  Result Date: 6/26/2025  XR CHEST 1 VW Date of Exam: 6/26/2025 5:22 AM EDT Indication: SOA triage protocol Comparison: 5/24/2025 Findings: Cardiac and mediastinal contours are normal. Pulmonary vascularity is normal. There is mild infiltrate or atelectasis at the left base. The lungs are otherwise clear. No pneumothorax.     Mild infiltrate or atelectasis at the left base. Electronically Signed: Joe Grover MD  6/26/2025 5:36 AM EDT  Workstation ID: [id number]      Results for orders placed during the hospital encounter of 06/26/25    Duplex Venous Lower Extremity - Bilateral CAR 06/26/2025 12:11 PM    Interpretation Summary    Acute left lower extremity deep vein thrombosis noted in the posterial tibial, peroneal and gastrocnemius.    All other veins appeared normal bilaterally.      Results for orders placed during the hospital encounter of 06/26/25    Duplex Venous Lower Extremity - Bilateral CAR 06/26/2025 12:11 PM    Interpretation Summary    Acute left lower extremity deep vein thrombosis noted in the posterial tibial, peroneal and gastrocnemius.    All other veins appeared normal bilaterally.      Results for  orders placed during the hospital encounter of 06/26/25    Adult Transthoracic Echo Limited W/ Cont if Necessary Per Protocol 06/26/2025 12:12 PM    Interpretation Summary    Left ventricular systolic function is normal. Estimated left ventricular EF = 65%    The cardiac valves are anatomically and functionally normal.      Plan for Follow-up of Pending Labs/Results: Follow up as directed.     Discharge Details        Discharge Medications        New Medications        Instructions Start Date   acetaminophen 325 MG tablet  Commonly known as: TYLENOL   650 mg, Oral, Every 4 Hours PRN      apixaban 5 MG tablet tablet  Commonly known as: ELIQUIS   Take 2 tablets by mouth Every 12 (Twelve) Hours for 2 days, THEN 1 tablet Every 12 (Twelve) Hours for 30 days. Indications: DVT/PE (active thrombosis)   Start Date: July 3, 2025     lactobacillus acidophilus capsule capsule   1 capsule, Oral, Daily   Start Date: July 4, 2025     Lidocaine 4 %   1 patch, Transdermal, Every 24 Hours Scheduled, Remove & Discard patch within 12 hours or as directed by MD   Start Date: July 4, 2025            Continue These Medications        Instructions Start Date   aspirin 81 MG tablet   81 mg, Daily      atorvastatin 80 MG tablet  Commonly known as: LIPITOR   80 mg, Oral, Nightly      baclofen 10 MG tablet  Commonly known as: LIORESAL   10 mg, Oral, 3 Times Daily PRN      butalbital-acetaminophen-caffeine -40 MG per tablet  Commonly known as: FIORICET, ESGIC   Take 1 tablet by mouth Every 6 (Six) Hours As Needed for Headache or Migraine.      cetirizine 10 MG tablet  Commonly known as: zyrTEC   10 mg, Nightly      Ketotifen Fumarate 0.035 % solution  Commonly known as: ZADITOR   1 drop, Both Eyes, 2 Times Daily      levETIRAcetam 500 MG tablet  Commonly known as: KEPPRA   500 mg, Oral, Every 12 Hours Scheduled      linaclotide 145 MCG capsule capsule  Commonly known as: LINZESS   145 mcg, Every Morning Before Breakfast      ondansetron 4  MG tablet  Commonly known as: ZOFRAN   4 mg, Every 8 Hours PRN      pantoprazole 40 MG EC tablet  Commonly known as: PROTONIX   40 mg, Oral, 2 Times Daily      vitamin B-12 100 MCG tablet  Commonly known as: CYANOCOBALAMIN   100 mcg, Daily      vitamin C 250 MG tablet  Commonly known as: ASCORBIC ACID   500 mg, Daily               Allergies   Allergen Reactions    Codeine      Codeine Derivatives    Penicillins Rash     Has tolerated Amoxicillin         Discharge Disposition: [facility]   Rehab Facility or Unit (DC - External)    Diet:  Hospital:  Diet Order   Procedures    Diet: Regular/House; Fluid Consistency: Thin (IDDSI 0)       Diet Instructions       Diet: Regular/House Diet; Regular (IDDSI 7); Thin (IDDSI 0)      Discharge Diet: Regular/House Diet    Texture: Regular (IDDSI 7)    Fluid Consistency: Thin (IDDSI 0)             Activity: As tolerated  Activity Instructions       Activity as Tolerated                 CODE STATUS:    Code Status and Medical Interventions: CPR (Attempt to Resuscitate); Full Support   Ordered at: 06/26/25 1159     Code Status (Patient has no pulse and is not breathing):    CPR (Attempt to Resuscitate)     Medical Interventions (Patient has pulse or is breathing):    Full Support     Level Of Support Discussed With:    Patient       Future Appointments   Date Time Provider Department Center   7/25/2025  1:30 PM Matt Olivarez MD [facility]   8/15/2025 11:00 AM Jessica Pete APRN [facility]   9/10/2025  9:00 AM Joe García MD [facility]       Additional Instructions for the Follow-ups that You Need to Schedule       Discharge Follow-up with PCP   As directed       Currently Documented PCP:    Micheline Heath DO    PCP Phone Number:    [phone]     Follow Up Details: Follow up with PCP in 1 week.        Discharge Follow-up with Specified Provider: Follow up with Dr. Olivarez in 4-6 weeks.   As directed      To: Follow up with Dr. Olivarez in 4-6 weeks.                       Sergey Olivarez, APRN  07/03/25      Time Spent on Discharge:  I spent  42  minutes on this discharge activity which included: face-to-face encounter with the patient, reviewing the data in the system, coordination of the care with the nursing staff as well as consultants, documentation, and entering orders.

## 2025-07-03 NOTE — PROGRESS NOTES
"    [facility] Medicine Services  PROGRESS NOTE    Patient Name: Linda Schafer  : 1960  MRN: [id number]    Date of Admission: 2025  Primary Care Physician: Micheline Heath DO    Subjective   Subjective     CC:  Follow up PE, N/V     HPI:  Patient seen resting in bed in no apparent distress. Nursing notes reviewed. Patient is complaining of 10 out of 10 left knee pain. Patient reports she felt a \"pop\" in her knee a few nights ago while being repositioned. She denies history of chronic knee pain.     Objective   Objective     Vital Signs:   Temp:  [98.2 °F (36.8 °C)-98.6 °F (37 °C)] 98.3 °F (36.8 °C)  Heart Rate:  [60-90] 75  Resp:  [17-18] 18  BP: ()/(63-75) 110/64     Physical Exam:  Constitutional: No acute distress, awake, alert, chronically ill-appearing  HENT: NCAT, mucous membranes moist  Respiratory: Clear to auscultation bilaterally, respiratory effort normal on room air  Cardiovascular: RRR, no murmurs, cap refill brisk   Gastrointestinal: Positive bowel sounds, soft, nontender, nondistended  Musculoskeletal: No BLE edema, left knee edema noted  Psychiatric: Flat affect, cooperative  Neurologic: Oriented x 3, moves all extremities, speech slow  Skin: warm, dry, no visible rash     Results Reviewed:  LAB RESULTS:      Lab 25  0422 25  0342 25  0354 25  0341 25  1752 25  1153 25  0429   WBC 7.32 7.16 6.66 8.59  --   --  11.38*   HEMOGLOBIN 11.7* 11.5* 10.5* 11.6*  --   --  11.5*   HEMATOCRIT 36.1 37.1 33.8* 36.3  --   --  35.5   PLATELETS 348 310 250 223  --   --  227   NEUTROS ABS  --   --   --  5.06  --   --  7.66*   IMMATURE GRANS (ABS)  --   --   --  0.04  --   --  0.07*   LYMPHS ABS  --   --   --  2.34  --   --  2.26   MONOS ABS  --   --   --  0.78  --   --  1.11*   EOS ABS  --   --   --  0.30  --   --  0.21   MCV 86.2 88.8 88.5 87.7  --   --  87.0   HEPARIN ANTI-XA  --   --  >1.10* 0.62 0.67 0.64 0.58         Lab " 07/02/25  1541 07/02/25  0422 06/29/25  0354 06/28/25  0341 06/27/25  0429   SODIUM  --  139 138 137 134*   POTASSIUM 4.3 3.6 3.9 4.1 3.8   CHLORIDE  --  102 102 101 99   CO2  --  26.0 26.0 27.0 25.0   ANION GAP  --  11.0 10.0 9.0 10.0   BUN  --  11.0 10.2 13.7 13.3   CREATININE  --  0.57 0.52* 0.53* 0.56*   EGFR  --  102.9 105.2 104.7 103.3   GLUCOSE  --  93 106* 88 112*   CALCIUM  --  9.2 9.0 9.0 8.8   MAGNESIUM  --   --  1.9  --   --                          Brief Urine Lab Results  (Last result in the past 365 days)        Color   Clarity   Blood   Leuk Est   Nitrite   Protein   CREAT   Urine HCG        06/29/25 1441 Yellow   Cloudy   Negative   Negative   Negative   Negative                   Microbiology Results Abnormal       None            No radiology results from the last 24 hrs    Results for orders placed during the hospital encounter of 06/26/25    Adult Transthoracic Echo Limited W/ Cont if Necessary Per Protocol 06/26/2025 12:12 PM    Interpretation Summary    Left ventricular systolic function is normal. Estimated left ventricular EF = 65%    The cardiac valves are anatomically and functionally normal.      Current medications:  Scheduled Meds:apixaban, 10 mg, Oral, Q12H   Followed by  [START ON 7/5/2025] apixaban, 5 mg, Oral, Q12H  vitamin C, 500 mg, Oral, Daily  aspirin, 81 mg, Oral, Daily  atorvastatin, 80 mg, Oral, Nightly  cetirizine, 10 mg, Oral, Nightly  Ketotifen Fumarate, 1 drop, Both Eyes, BID  lactobacillus acidophilus, 1 capsule, Oral, Daily  levETIRAcetam, 500 mg, Oral, Q12H  Lidocaine, 1 patch, Transdermal, Q24H  lubiprostone, 24 mcg, Oral, Daily With Breakfast  melatonin, 5 mg, Oral, Nightly  ondansetron ODT, 4 mg, Translingual, QAM AC  pantoprazole, 40 mg, Oral, BID  sodium chloride, 10 mL, Intravenous, Q12H  vitamin B-12, 100 mcg, Oral, Daily      Continuous Infusions:   PRN Meds:.  acetaminophen **OR** [DISCONTINUED] acetaminophen **OR** [DISCONTINUED] acetaminophen    baclofen     senna-docusate sodium **AND** polyethylene glycol **AND** bisacodyl **AND** bisacodyl    butalbital-acetaminophen-caffeine    Calcium Replacement - Follow Nurse / BPA Driven Protocol    Magnesium Standard Dose Replacement - Follow Nurse / BPA Driven Protocol    nitroglycerin    ondansetron ODT **OR** ondansetron    oxymetazoline    Phosphorus Replacement - Follow Nurse / BPA Driven Protocol    Potassium Replacement - Follow Nurse / BPA Driven Protocol    prochlorperazine    sodium chloride    sodium chloride    sodium chloride    sodium chloride    Assessment & Plan   Assessment & Plan     Active Hospital Problems    Diagnosis  POA    **Pulmonary embolus [I26.99]  Yes    Pulmonary embolism [I26.99]  Yes    Acute deep vein thrombosis (DVT) of left lower extremity [I82.402]  Unknown    Status post craniotomy [Z98.890]  Not Applicable    Grade I diastolic dysfunction [I51.89]  Yes    Hiatal hernia [K44.9]  Yes    GERD (gastroesophageal reflux disease) [K21.9]  Yes    Diverticulosis [K57.90]  Yes    Dyslipidemia [E78.5]  Yes      Resolved Hospital Problems   No resolved problems to display.        Brief Hospital Course to date:  Linda Schafer is a 64 y.o. female  w/ GERD/HH, HLD, migraines, recently admitted 5/24-5/30 to the ICU, she presented w/ RT MCA syndrome, on repeat CT 5/25 she had worse edema w/ mass effect, she required decompressive craniectomy 5/26 w/ Dr. Olivarez and she was ultimately discharged to  rehab where she remained until 6/19/25 when she was dismissed 2/2 insurance no longer covering her stay; she developed pleuritic chest pain and presented to the ED where CTA showed large saddle PE w/ BL segmental and subsegmental Pe's w/o evidence for RT heart strain; TTE was stable, venous duplex showed LT LE DVT in the posterior tibial, peroneal, and gastrocnemius; she was on heparin gtt and transitioned to PO eliquis 6/28, she is awaiting rehab placement    This patient's problems and plans were partially  "entered by my partner and updated as appropriate by me 07/03/25.     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 Olivarez)  HLD  -ASA, statin  -on ppx keppra  -helmet when oob  -NSGY f/u 7/25/25, new thrombus and oral AC will complicate future surgical planning  -stroke neuro f/u     Intermittent nausea  -has predominantly morning nausea, especially w/ vitamin C  -most likely related to recent stroke w/ neurosurgical intervention, may be related to prior estrogen use w/ abrupt cessation  -supportive care, antiemetics     Microscopic hematuria - noted on earlier UA, repeat outpatient once more stable  Migraines  Chronic constipation - linzess at home, on amitiza here  GERD/HH - ppi        Expected Discharge Location and Transportation: rehab when available  Expected Discharge   Expected Discharge Date: 7/4/2025; Expected Discharge Time:       VTE Prophylaxis:  Pharmacologic VTE prophylaxis orders are present.         AM-PAC 6 Clicks Score (PT): 11 (07/02/25 2100)    CODE STATUS:   Code Status and Medical Interventions: CPR (Attempt to Resuscitate); Full Support   Ordered at: 06/26/25 1159     Code Status (Patient has no pulse and is not breathing):    CPR (Attempt to Resuscitate)     Medical Interventions (Patient has pulse or is breathing):    Full Support     Level Of Support Discussed With:    Patient       Sergey Olivarez, APRN  07/03/25    "

## 2025-07-03 NOTE — PAYOR COMM NOTE
"Radha Mendoza (64 y.o. Female)     [id number]     Lisa Neff, RN  Utilization Review  Cxcfx-[phone]  [phone]        Date of Birth   1960    Social Security Number       Address   [address] [place] [address]    Home Phone   [phone]    MRN   [id number]       Mandaen   Confucianist    Marital Status                               Admission Date   6/26/2025    Admission Type   Emergency    Admitting Provider   Gaston Cheung DO    Attending Provider   Gaston Cheung DO    Department, Room/Bed   [facility] 6B, N631/1       Discharge Date       Discharge Disposition   Rehab Facility or Unit (DC - External)    Discharge Destination                                 Attending Provider: Gaston Cheung DO    Allergies: Codeine, Penicillins    Isolation: None   Infection: None   Code Status: CPR    Ht: 157.5 cm (62.01\")   Wt: 70.3 kg (155 lb)    Admission Cmt: None   Principal Problem: Pulmonary embolus [I26.99]                   Active Insurance as of 6/26/2025       Primary Coverage       Payor Plan Insurance Group Employer/Plan Group    [employer] EMPL[employer]EE [id number]       Payor Plan Address Payor Plan Phone Number Payor Plan Fax Number Effective Dates    [address] [phone]  1/1/2015 - None Entered    [place] [address]         Subscriber Name Subscriber Birth Date Member ID       RADHA MENDOZA 1960 [id number]                     Emergency Contacts        (Rel.) Home Phone Work Phone Mobile Phone    RejiLoc (Spouse) [phone] -- [phone]    REJIRADHA (Son) -- -- [phone]    Nadia Arias (Daughter) [phone] -- --    Regina Downs (Relative) -- -- [phone]                 Discharge Summary        Sergey Olivarez APRN at 07/03/25 1137              [facility] Medicine Services  DISCHARGE SUMMARY    Patient Name: Radha GREEN " [place]  : 1960  MRN: [id number]    Date of Admission: 2025  5:19 AM  Date of Discharge:  25    Primary Care Physician: Micheline Heath,     Consults       Date and Time Order Name Status Description    2025  7:50 AM Inpatient Cardiology Consult Completed     2025 10:26 AM Inpatient Neurology Consult Stroke Completed             Hospital Course     Presenting Problem: Dyspnea     Active Hospital Problems    Diagnosis  POA    **Pulmonary embolus [I26.99]  Yes    Pulmonary embolism [I26.99]  Yes    Acute deep vein thrombosis (DVT) of left lower extremity [I82.402]  Unknown    Status post craniotomy [Z98.890]  Not Applicable    Grade I diastolic dysfunction [I51.89]  Yes    Hiatal hernia [K44.9]  Yes    GERD (gastroesophageal reflux disease) [K21.9]  Yes    Diverticulosis [K57.90]  Yes    Dyslipidemia [E78.5]  Yes      Resolved Hospital Problems   No resolved problems to display.      Hospital Course:  Linda Schafer is a 64 y.o. female w/ GERD/HH, HLD, migraines, recently admitted - to the ICU, she presented w/ RT MCA syndrome, on repeat CT  she had worse edema w/ mass effect, she required decompressive craniectomy  w/ Dr. Olivarez and she was ultimately discharged to  rehab where she remained until 25 when she was dismissed 2/2 insurance no longer covering her stay; she developed pleuritic chest pain and presented to the ED on 2025 where CTA showed large saddle PE w/ BL segmental and subsegmental Pe's w/o evidence for RT heart strain; TTE was stable, venous duplex showed LT LE DVT in the posterior tibial, peroneal, and gastrocnemius. She was initially treated with heparin gtt and was transitioned to PO eliquis on .  PT/OT evaluated and recommended inpatient rehab.  Case management has arranged for inpatient rehab at [facility].  She is currently stable and will be discharged today in stable condition.      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 Olivarez)  HLD  -ASA, statin  -on ppx keppra  -helmet when oob  -NSGY f/u 7/25/25, new thrombus and oral AC will complicate future surgical planning  -stroke neuro f/u     Intermittent nausea  -has predominantly morning nausea, especially w/ vitamin C  -most likely related to recent stroke w/ neurosurgical intervention, may be related to prior estrogen use w/ abrupt cessation  -supportive care, antiemetics     Microscopic hematuria - noted on earlier UA, repeat outpatient once more stable  Migraines  Chronic constipation - linzess at home, on amitiza here  GERD/HH - ppi     Discharge Follow Up Recommendations for outpatient labs/diagnostics:   Follow up with PCP in 1 week.   Follow up with Dr. Olivarez in 4-6 weeks.   Follow up with Stroke neurology as scheduled.   Follow up with Cardiology as scheduled.     Day of Discharge     HPI:   Patient was seen resting in bed no apparent distress.  No acute events overnight per nursing.  Patient was complaining of left knee pain this morning.  X-ray revealed no evidence of bony trauma with concern for osteoarthritis.  Patient feels ready to discharge to inpatient rehab.  We discussed the importance taking all medications as prescribed and keeping all recommended follow-up appointments.  She expressed no additional concerns at this time.    Vital Signs:   Temp:  [98.2 °F (36.8 °C)-98.6 °F (37 °C)] 98.4 °F (36.9 °C)  Heart Rate:  [60-90] 76  Resp:  [16-18] 16  BP: " ()/(63-73) 110/64    Physical Exam:  Constitutional: No acute distress, awake, alert, chronically ill-appearing  HENT: NCAT, mucous membranes moist  Respiratory: Clear to auscultation bilaterally, respiratory effort normal on room air  Cardiovascular: RRR, no murmurs, cap refill brisk   Gastrointestinal: Positive bowel sounds, soft, nontender, nondistended  Musculoskeletal: No BLE edema, left knee edema noted  Psychiatric: Flat affect, cooperative  Neurologic: Oriented x 3, speech slow  Skin: warm, dry, no visible rash     Pertinent  and/or Most Recent Results     LAB RESULTS:      Lab 07/02/25  0422 06/30/25  0342 06/29/25  0354 06/28/25  0341 06/27/25  1752 06/27/25  1153 06/27/25  0429   WBC 7.32 7.16 6.66 8.59  --   --  11.38*   HEMOGLOBIN 11.7* 11.5* 10.5* 11.6*  --   --  11.5*   HEMATOCRIT 36.1 37.1 33.8* 36.3  --   --  35.5   PLATELETS 348 310 250 223  --   --  227   NEUTROS ABS  --   --   --  5.06  --   --  7.66*   IMMATURE GRANS (ABS)  --   --   --  0.04  --   --  0.07*   LYMPHS ABS  --   --   --  2.34  --   --  2.26   MONOS ABS  --   --   --  0.78  --   --  1.11*   EOS ABS  --   --   --  0.30  --   --  0.21   MCV 86.2 88.8 88.5 87.7  --   --  87.0   HEPARIN ANTI-XA  --   --  >1.10* 0.62 0.67 0.64 0.58         Lab 07/02/25  1541 07/02/25  0422 06/29/25  0354 06/28/25  0341 06/27/25  0429   SODIUM  --  139 138 137 134*   POTASSIUM 4.3 3.6 3.9 4.1 3.8   CHLORIDE  --  102 102 101 99   CO2  --  26.0 26.0 27.0 25.0   ANION GAP  --  11.0 10.0 9.0 10.0   BUN  --  11.0 10.2 13.7 13.3   CREATININE  --  0.57 0.52* 0.53* 0.56*   EGFR  --  102.9 105.2 104.7 103.3   GLUCOSE  --  93 106* 88 112*   CALCIUM  --  9.2 9.0 9.0 8.8   MAGNESIUM  --   --  1.9  --   --                          Brief Urine Lab Results  (Last result in the past 365 days)        Color   Clarity   Blood   Leuk Est   Nitrite   Protein   CREAT   Urine HCG        06/29/25 1441 Yellow   Cloudy   Negative   Negative   Negative   Negative                  Microbiology Results (last 10 days)       ** No results found for the last 240 hours. **            XR Knee 1 or 2 View Left  Result Date: 7/3/2025  XR KNEE 1 OR 2 VW LEFT Date of Exam: 7/3/2025 10:17 AM EDT Indication: left knee pain Comparison: None available. Findings: Bones of the left knee joint appear anatomically aligned and intact. No fracture, avulsion or foreign body is seen. There appears to be relatively mild generalized tricompartmental left knee joint osteoarthritis. There is minimal if any suprapatellar effusion.     Impression: Mild generalized left knee joint osteoarthritis, and questionable small joint effusion. No evidence of acute or healing bony trauma. Electronically Signed: Hardik Jarvis MD  7/3/2025 10:47 AM EDT  Workstation ID: [id number]    Duplex Venous Lower Extremity - Bilateral CAR  Result Date: 6/26/2025    Acute left lower extremity deep vein thrombosis noted in the posterial tibial, peroneal and gastrocnemius.   All other veins appeared normal bilaterally.     CT Angiogram Chest Pulmonary Embolism  Result Date: 6/26/2025  CT ANGIOGRAM CHEST PULMONARY EMBOLISM Date of Exam: 6/26/2025 6:32 AM EDT Indication: left pleuritic chest pain. Comparison: None available. Technique: Axial CT images were obtained of the chest after the uneventful intravenous administration of 75 mL Isovue-370 utilizing pulmonary embolism protocol.  In addition, a 3-D volume rendered image was created for interpretation.  Reconstructed coronal and sagittal images were also obtained. Automated exposure control and iterative construction methods were used. Findings: Pulmonary arteries: Adequate opacification of the pulmonary arteries. There is a large saddle embolus. There are also filling defects extending into the bilateral upper and lower lobes as well as the right middle lobe with both segmental and subsegmental  involvement. The ventricular ratio is normal. There is no evidence of right heart failure. Lungs and  Pleura: There is bilateral basilar and lingular atelectasis. Mediastinum/Cassi: No mediastinal or hilar lymphadenopathy. Lymph nodes: No axillary or supraclavicular adenopathy. Cardiovascular: The cardiac chambers are within normal limits. The pericardium is normal. The aorta and its arch branch vessels are unremarkable.   Upper Abdomen: Status post cholecystectomy. The upper abdominal contents are unremarkable.      Bones and Soft Tissue: No suspicious osseous lesion.     Impression: Large saddle embolus with bilateral segmental and subsegmental pulmonary emboli. There is no evidence of right heart failure. Electronically Signed: Daryn Shell MD  6/26/2025 7:03 AM EDT  Workstation ID: [id number]    XR Chest 1 View  Result Date: 6/26/2025  XR CHEST 1 VW Date of Exam: 6/26/2025 5:22 AM EDT Indication: SOA triage protocol Comparison: 5/24/2025 Findings: Cardiac and mediastinal contours are normal. Pulmonary vascularity is normal. There is mild infiltrate or atelectasis at the left base. The lungs are otherwise clear. No pneumothorax.     Mild infiltrate or atelectasis at the left base. Electronically Signed: Joe Grover MD  6/26/2025 5:36 AM EDT  Workstation ID: [id number]      Results for orders placed during the hospital encounter of 06/26/25    Duplex Venous Lower Extremity - Bilateral CAR 06/26/2025 12:11 PM    Interpretation Summary    Acute left lower extremity deep vein thrombosis noted in the posterial tibial, peroneal and gastrocnemius.    All other veins appeared normal bilaterally.      Results for orders placed during the hospital encounter of 06/26/25    Duplex Venous Lower Extremity - Bilateral CAR 06/26/2025 12:11 PM    Interpretation Summary    Acute left lower extremity deep vein thrombosis noted in the posterial tibial, peroneal and gastrocnemius.    All other veins appeared normal bilaterally.      Results for orders placed during the hospital encounter of 06/26/25    Adult Transthoracic Echo Limited  W/ Cont if Necessary Per Protocol 06/26/2025 12:12 PM    Interpretation Summary    Left ventricular systolic function is normal. Estimated left ventricular EF = 65%    The cardiac valves are anatomically and functionally normal.      Plan for Follow-up of Pending Labs/Results: Follow up as directed.     Discharge Details        Discharge Medications        New Medications        Instructions Start Date   acetaminophen 325 MG tablet  Commonly known as: TYLENOL   650 mg, Oral, Every 4 Hours PRN      apixaban 5 MG tablet tablet  Commonly known as: ELIQUIS   Take 2 tablets by mouth Every 12 (Twelve) Hours for 2 days, THEN 1 tablet Every 12 (Twelve) Hours for 30 days. Indications: DVT/PE (active thrombosis)   Start Date: July 3, 2025     lactobacillus acidophilus capsule capsule   1 capsule, Oral, Daily   Start Date: July 4, 2025     Lidocaine 4 %   1 patch, Transdermal, Every 24 Hours Scheduled, Remove & Discard patch within 12 hours or as directed by MD   Start Date: July 4, 2025            Continue These Medications        Instructions Start Date   aspirin 81 MG tablet   81 mg, Daily      atorvastatin 80 MG tablet  Commonly known as: LIPITOR   80 mg, Oral, Nightly      baclofen 10 MG tablet  Commonly known as: LIORESAL   10 mg, Oral, 3 Times Daily PRN      butalbital-acetaminophen-caffeine -40 MG per tablet  Commonly known as: FIORICET, ESGIC   Take 1 tablet by mouth Every 6 (Six) Hours As Needed for Headache or Migraine.      cetirizine 10 MG tablet  Commonly known as: zyrTEC   10 mg, Nightly      Ketotifen Fumarate 0.035 % solution  Commonly known as: ZADITOR   1 drop, Both Eyes, 2 Times Daily      levETIRAcetam 500 MG tablet  Commonly known as: KEPPRA   500 mg, Oral, Every 12 Hours Scheduled      linaclotide 145 MCG capsule capsule  Commonly known as: LINZESS   145 mcg, Every Morning Before Breakfast      ondansetron 4 MG tablet  Commonly known as: ZOFRAN   4 mg, Every 8 Hours PRN      pantoprazole 40 MG EC  tablet  Commonly known as: PROTONIX   40 mg, Oral, 2 Times Daily      vitamin B-12 100 MCG tablet  Commonly known as: CYANOCOBALAMIN   100 mcg, Daily      vitamin C 250 MG tablet  Commonly known as: ASCORBIC ACID   500 mg, Daily               Allergies   Allergen Reactions    Codeine      Codeine Derivatives    Penicillins Rash     Has tolerated Amoxicillin         Discharge Disposition: [facility]   Rehab Facility or Unit (DC - External)    Diet:  Hospital:  Diet Order   Procedures    Diet: Regular/House; Fluid Consistency: Thin (IDDSI 0)       Diet Instructions       Diet: Regular/House Diet; Regular (IDDSI 7); Thin (IDDSI 0)      Discharge Diet: Regular/House Diet    Texture: Regular (IDDSI 7)    Fluid Consistency: Thin (IDDSI 0)             Activity: As tolerated  Activity Instructions       Activity as Tolerated                 CODE STATUS:    Code Status and Medical Interventions: CPR (Attempt to Resuscitate); Full Support   Ordered at: 06/26/25 1159     Code Status (Patient has no pulse and is not breathing):    CPR (Attempt to Resuscitate)     Medical Interventions (Patient has pulse or is breathing):    Full Support     Level Of Support Discussed With:    Patient       Future Appointments   Date Time Provider Department Center   7/25/2025  1:30 PM Matt Olivarez MD [facility]   8/15/2025 11:00 AM Jessica Pete APRN [facility] CHE[facility]   9/10/2025  9:00 AM Joe García MD [facility]       Additional Instructions for the Follow-ups that You Need to Schedule       Discharge Follow-up with PCP   As directed       Currently Documented PCP:    Micheline Heath DO    PCP Phone Number:    [phone]     Follow Up Details: Follow up with PCP in 1 week.        Discharge Follow-up with Specified Provider: Follow up with Dr. Olivarez in 4-6 weeks.   As directed      To: Follow up with Dr. Olivarez in 4-6 weeks.                      CINDY Lind  07/03/25      Time Spent on Discharge:  I spent   42  minutes on this discharge activity which included: face-to-face encounter with the patient, reviewing the data in the system, coordination of the care with the nursing staff as well as consultants, documentation, and entering orders.          Electronically signed by Sergey Olivarez APRN at 07/03/25 9039

## 2025-07-03 NOTE — CASE MANAGEMENT/SOCIAL WORK
Case Management Discharge Note      Final Note: Pt is discharging to acute rehab at [facility] today. CM confirmed with April, facility liaison, Pt has insurance approval and can be accepted today. Facility will retrieve discharge summary from [facility]. RN to call report to [phone]. If accepting RN is unavailable for report, RN can call report to [phone]. [facility]. CM scanned PCS into dropbox and placed original in chart. RN to contact CM when report has been called and Pt is ready. Then, CM will notify EMS. Pt, spouse, and daughter are aware and agreeable to plan. No other discharge needs are identified.         Selected Continued Care - Admitted Since 6/26/2025       Destination Coordination complete.      Service Provider Services Address Phone Fax Patient Preferred    [facility] [address] [address] [phone] --              Durable Medical Equipment    No services have been selected for the patient.                Dialysis/Infusion    No services have been selected for the patient.                Home Medical Care    No services have been selected for the patient.                Therapy    No services have been selected for the patient.                Community Resources    No services have been selected for the patient.                Community & DME    No services have been selected for the patient.                    Selected Continued Care - Prior Encounters Includes continued care and service providers with selected services from prior encounters from 3/28/2025 to 7/3/2025      Discharged on 5/30/2025 Admission date: 5/24/2025 - Discharge disposition: Rehab Facility or Unit (DC - External)      Destination       Service Provider Services Address Phone Fax Patient Preferred    [facility] [address]  [address] [phone] --                          Transportation Services  Transportation: Ambulance  Ambulance: [facility]    Final Discharge Disposition Code: 62 - inpatient rehab facility

## 2025-07-07 ENCOUNTER — TELEPHONE (OUTPATIENT)
Dept: NEUROLOGY | Facility: CLINIC | Age: 65
End: 2025-07-07

## 2025-07-15 ENCOUNTER — TELEPHONE (OUTPATIENT)
Dept: NEUROLOGY | Facility: CLINIC | Age: 65
End: 2025-07-15
Payer: COMMERCIAL

## 2025-07-15 NOTE — TELEPHONE ENCOUNTER
Regarding Botox Prior Authorization. Via [facility], Botox has been approved 7/8/25 - 7/8/26, 400 u q 12 w/ 5 visits. Per Blanca JEFFERS Auth #: [id number]. She will be faxing documentation for upload to chart.

## 2025-07-15 NOTE — PROGRESS NOTES
Called patient's that while her hypercoagulable panel was being drawn daughter to clarify there was 1 lab that was drawn incorrectly or clotted off.  It has been reordered.  She will get the lupus anticoagulant panel redrawn when she comes to [facility] on 7/25/2025 to see Dr. Olivarez with neurosurgery.  I will call the patient/family with results when I have them available.  She is currently being treated with Eliquis for PE and DVT.   06-Mar-2022 11:56

## 2025-07-16 ENCOUNTER — TELEPHONE (OUTPATIENT)
Dept: NEUROLOGY | Facility: CLINIC | Age: 65
End: 2025-07-16
Payer: COMMERCIAL

## 2025-07-16 NOTE — TELEPHONE ENCOUNTER
Spoke to Ms. Schafer's daughter Nadia. Patient is scheduled for Botox injections on 8/20/25 at 1:00 PM.

## 2025-07-23 ENCOUNTER — TELEPHONE (OUTPATIENT)
Dept: NEUROSURGERY | Facility: CLINIC | Age: 65
End: 2025-07-23
Payer: COMMERCIAL

## 2025-07-23 NOTE — TELEPHONE ENCOUNTER
Caller: JUDAH NAVARRETE    Relationship: DANELLE    Best call back number: [phone]    What is the best time to reach you: ANYTIME    Who are you requesting to speak with (clinical staff, provider,  specific staff member): WHOEVER CAN ASSIST W/ MED RECS    What was the call regarding: PT DANELLE CALLED REQUESTING A CONDENSED VERSION OF PT MEDICAL RECORDS FOR DISABILITY- FROM DATE OF STROKE 04-24-25 TO PRESENT. JUDAH STATES SHE FOUND RECORDS ON [employer] BUT WAS OVER 2,000 PAGES- KEY DOCUMENTS OVER 900 PAGES.    Is it okay if the provider responds through [employer]: NO- PREFER CALL BACK

## 2025-07-24 NOTE — TELEPHONE ENCOUNTER
Caller: JUDAH NAVARRETE    Relationship: DANELLE    Best call back number: [phone]    What is the best time to reach you: ANYTIME    Who are you requesting to speak with (clinical staff, provider,  specific staff member): CLINICAL     Do you know the name of the person who called: NA    What was the call regarding: PATIENTS DANELLE CALLED BACK STATING SHE CALLED MEDICAL RECORDS THEY ADVISED THEY ARE NEEDING SOMETHING STATING OK TO RELEASE MEDICAL RECORDS TO HER ON BEHALF OF THE PATIENT-THEY ARE ASKING FOR IT TO BE UPLOADED IN CHART OR FAXED TO [phone]-SENDING TO OFFICE TO [facility] OF CALL DANELLE IS ASKING FOR A CALL BACK TO [facility] THANK YOU    Is it okay if the provider responds through [employer]:

## 2025-07-25 ENCOUNTER — OFFICE VISIT (OUTPATIENT)
Dept: NEUROSURGERY | Facility: CLINIC | Age: 65
End: 2025-07-25
Payer: COMMERCIAL

## 2025-07-25 ENCOUNTER — LAB (OUTPATIENT)
Dept: LAB | Facility: HOSPITAL | Age: 65
End: 2025-07-25
Payer: COMMERCIAL

## 2025-07-25 VITALS — HEIGHT: 62 IN | BODY MASS INDEX: 27.6 KG/M2 | WEIGHT: 150 LBS | TEMPERATURE: 97.3 F

## 2025-07-25 DIAGNOSIS — Z86.73 HISTORY OF STROKE: Primary | ICD-10-CM

## 2025-07-25 DIAGNOSIS — Z98.890 STATUS POST CRANIECTOMY: Primary | ICD-10-CM

## 2025-07-25 DIAGNOSIS — Z86.73 HISTORY OF STROKE: ICD-10-CM

## 2025-07-25 PROCEDURE — 85670 THROMBIN TIME PLASMA: CPT

## 2025-07-25 PROCEDURE — 36415 COLL VENOUS BLD VENIPUNCTURE: CPT

## 2025-07-25 PROCEDURE — 86146 BETA-2 GLYCOPROTEIN ANTIBODY: CPT

## 2025-07-25 PROCEDURE — 86147 CARDIOLIPIN ANTIBODY EA IG: CPT

## 2025-07-25 PROCEDURE — 85597 PHOSPHOLIPID PLTLT NEUTRALIZ: CPT

## 2025-07-25 PROCEDURE — 85610 PROTHROMBIN TIME: CPT

## 2025-07-25 PROCEDURE — 85732 THROMBOPLASTIN TIME PARTIAL: CPT

## 2025-07-25 PROCEDURE — 85613 RUSSELL VIPER VENOM DILUTED: CPT

## 2025-07-25 PROCEDURE — 85730 THROMBOPLASTIN TIME PARTIAL: CPT

## 2025-07-25 PROCEDURE — 85598 HEXAGNAL PHOSPH PLTLT NEUTRL: CPT

## 2025-07-25 NOTE — TELEPHONE ENCOUNTER
S/TONYA Tapia and she has now downloaded the medical release of information form and faxed it back to medical records.

## 2025-07-25 NOTE — Clinical Note
Pt is needing disability.  Per Juanis, please give pt a call to advise how to move forward.  Thank you!

## 2025-07-25 NOTE — PROGRESS NOTES
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 She was scheduled to undergo cranioplasty in , bit unfortunately was found to have a pulmonary embolus prior to surgery and was placed on Eliquis.  She is here today for follow-up.  Neurologically, she has continued to improve from her stroke.  She is gaining strength in the left side primarily left lower extremity and has not had any issues with her incision.    Objective    Vital Signs: Temperature 97.3 °F (36.3 °C), temperature source Infrared, height 157.5 cm (62\"), weight 68 kg (150 lb).    Physical Exam  Awake, alert and oriented x 3  Opens eyes spont  Pupils 3 mm rx bilat  Extraocular muscles intact bilaterally  Left-sided facial droop which is improving  Tongue midline  5/5 in the right upper and right lower extremity.  In the left lower extremity, she is diffusely 2-3 out of 5.  She has no significant movement in the left upper extremity.  Incision clean dry and intact    Current Medications:   Current Outpatient Medications:     acetaminophen (TYLENOL) 325 MG tablet, Take 2 tablets by mouth Every 4 (Four) Hours As Needed for Mild Pain., Disp: , Rfl:     apixaban (ELIQUIS) 5 MG tablet tablet, Take 2 tablets by mouth Every 12 (Twelve) Hours for 2 days, THEN 1 tablet Every 12 (Twelve) Hours for 30 days. Indications: DVT/PE (active thrombosis), Disp: , Rfl:     aspirin 81 MG tablet, Take 1 tablet by mouth Daily. OTC, Disp: , Rfl:     atorvastatin (LIPITOR) 80 MG tablet, Take 1 tablet by mouth Every Night., Disp: , Rfl:     baclofen (LIORESAL) 10 MG tablet, Take 1 tablet by mouth 3 (Three) Times a Day As Needed for Muscle Spasms., Disp: 90 tablet, Rfl: 1    Ciprofloxacin (CIPRO PO), Take 250 mg by mouth 2 (Two) Times " a Day. For UTI, Disp: , Rfl:     levETIRAcetam (KEPPRA) 500 MG tablet, Take 1 tablet by mouth Every 12 (Twelve) Hours., Disp: , Rfl:     Lidocaine 4 %, Place 1 patch on the skin as directed by provider Daily. Remove & Discard patch within 12 hours or as directed by MD, Disp: , Rfl:     linaclotide (LINZESS) 145 MCG capsule capsule, Take 1 capsule by mouth Every Morning Before Breakfast., Disp: , Rfl:     ondansetron (ZOFRAN) 4 MG tablet, Take 1 tablet by mouth Every 8 (Eight) Hours As Needed for Nausea or Vomiting., Disp: , Rfl:     pantoprazole (PROTONIX) 40 MG EC tablet, Take 1 tablet by mouth 2 (Two) Times a Day., Disp: , Rfl:     vitamin B-12 (CYANOCOBALAMIN) 100 MCG tablet, Take 1 tablet by mouth Daily. OTC, Disp: , Rfl:     vitamin C (ASCORBIC ACID) 250 MG tablet, Take 2 tablets by mouth Daily. OTC, Disp: , Rfl:     butalbital-acetaminophen-caffeine (FIORICET, ESGIC) -40 MG per tablet, Take 1 tablet by mouth Every 6 (Six) Hours As Needed for Headache or Migraine. (Patient not taking: Reported on 7/25/2025), Disp: , Rfl:     cetirizine (zyrTEC) 10 MG tablet, Take 1 tablet by mouth Every Night. (Patient not taking: Reported on 7/25/2025), Disp: , Rfl:     Ketotifen Fumarate (ZADITOR) 0.035 % solution, Administer 1 drop to both eyes 2 (Two) Times a Day. (Patient not taking: Reported on 7/25/2025), Disp: , Rfl:     lactobacillus acidophilus (RISAQUAD) capsule capsule, Take 1 capsule by mouth Daily. (Patient not taking: Reported on 7/25/2025), Disp: , Rfl:      Laboratory Results:                              Brief Urine Lab Results  (Last result in the past 365 days)        Color   Clarity   Blood   Leuk Est   Nitrite   Protein   CREAT   Urine HCG        06/29/25 1441 Yellow   Cloudy   Negative   Negative   Negative   Negative                 Microbiology Results (last 10 days)       ** No results found for the last 240 hours. **            Diagnostic Imaging: I reviewed and independently interpreted the new  imaging.     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  Unfortunately, we are unable to proceed with her scheduled cranioplasty because she developed a saddle pulmonary embolus and was placed on Eliquis.  Neurologically, she is continuing to improve from her stroke, but at this point, we cannot proceed with a cranioplasty due to her need to be on anticoagulation.  I told the patient's family once she is cleared to go off of the anticoagulation temporarily, they can call our office and we will have her back in to discuss the cranioplasty surgery in more detail and get it scheduled.  They understood and were in agreement with the plan.    Diagnoses and all orders for this visit:    1. Status post craniectomy (Primary)      Linda Schafer  reports that she has never smoked. She has never been exposed to tobacco smoke. She has never used smokeless tobacco.         Matt Olivarez MD  07/25/25  14:10 EDT

## 2025-08-05 ENCOUNTER — TELEPHONE (OUTPATIENT)
Dept: NEUROSURGERY | Facility: CLINIC | Age: 65
End: 2025-08-05
Payer: COMMERCIAL

## 2025-08-05 LAB
APTT HEX PL PPP: 3 SEC
APTT IMM NP PPP: ABNORMAL SEC
APTT PPP 1:1 SALINE: ABNORMAL SEC
APTT PPP: 28.2 SEC
B2 GLYCOPROT1 IGA SER-ACNC: <10 SAU
B2 GLYCOPROT1 IGG SER-ACNC: <10 SGU
B2 GLYCOPROT1 IGM SER-ACNC: <10 SMU
CARDIOLIPIN IGG SER IA-ACNC: <10 GPL
CARDIOLIPIN IGM SER IA-ACNC: <10 MPL
CONFIRM APTT: 2.2 SEC
CONFIRM DRVVT: 81.3 SEC
DRVVT SCREEN TO CONFIRM RATIO: 1.1 RATIO
INR PPP: 1.1 RATIO
LABORATORY COMMENT REPORT: ABNORMAL
PROTHROMBIN TIME: 11.8 SEC
SCREEN DRVVT: 97 SEC
THROMBIN TIME: 17.1 SEC

## 2025-08-15 ENCOUNTER — TELEMEDICINE (OUTPATIENT)
Dept: NEUROLOGY | Facility: CLINIC | Age: 65
End: 2025-08-15
Payer: COMMERCIAL

## 2025-08-15 VITALS — DIASTOLIC BLOOD PRESSURE: 76 MMHG | SYSTOLIC BLOOD PRESSURE: 123 MMHG

## 2025-08-15 DIAGNOSIS — I69.398 SPASTICITY AS LATE EFFECT OF CEREBROVASCULAR ACCIDENT (CVA): ICD-10-CM

## 2025-08-15 DIAGNOSIS — R25.2 SPASTICITY AS LATE EFFECT OF CEREBROVASCULAR ACCIDENT (CVA): ICD-10-CM

## 2025-08-15 DIAGNOSIS — I26.99 ACUTE PULMONARY EMBOLISM, UNSPECIFIED PULMONARY EMBOLISM TYPE, UNSPECIFIED WHETHER ACUTE COR PULMONALE PRESENT: Primary | ICD-10-CM

## 2025-08-15 DIAGNOSIS — D68.59 HYPERCOAGULOPATHY: ICD-10-CM

## (undated) DEVICE — DISPOSABLE BIPOLAR FORCEPS 7 3/4" (19.7CM) SCOVILLE BAYONET, INSULATED, 1.5MM TIP AND 12 FT. (3.6M) CABLE: Brand: KIRWAN

## (undated) DEVICE — SURGUARD3 SAFETY HYPODERMIC NEEDLE: Brand: SURGUARD3

## (undated) DEVICE — SYR CONTRL LUERLOK 10CC

## (undated) DEVICE — TUBING, SUCTION, 1/4" X 10', STRAIGHT: Brand: MEDLINE

## (undated) DEVICE — KT DRN EVAC WND PVC PCH WTROC RND 10F400

## (undated) DEVICE — PERFORATOR CRANI 14MM 11MM

## (undated) DEVICE — CATH IV ANGIOCATH FEP 14GA 1.88IN ORNG

## (undated) DEVICE — CONN TBG Y 5 IN 1 LF STRL

## (undated) DEVICE — ELECTRD BLD EZ CLN MOD XLNG 2.75IN

## (undated) DEVICE — GLV SURG PREMIERPRO MIC LTX PF SZ7.5 BRN

## (undated) DEVICE — GLV SURG PREMIERPRO GAMMEX NEOPRN PF SZ7.5 GRN

## (undated) DEVICE — PK CRANI 10

## (undated) DEVICE — BANDAGE,GAUZE,BULKEE II,4.5"X4.1YD,STRL: Brand: MEDLINE

## (undated) DEVICE — GLV SURG PREMIERPRO MIC LTX PF SZ6 BRN

## (undated) DEVICE — SUT MNCRYL PLS ANTIB UD 3/0 PS2 27IN

## (undated) DEVICE — SUT MONOCRYL PLS ANTIB UND 3/0  PS1 27IN

## (undated) DEVICE — SPNG GZ WOVN 4X4IN 12PLY 10/BX STRL

## (undated) DEVICE — TOOL MR8-F2/7TA23 MR8 F2/7CM TAPER 2.3MM: Brand: MIDAS REX MR8

## (undated) DEVICE — DRV BATRY MATRIXPRO STRL

## (undated) DEVICE — ANTIBACTERIAL UNDYED BRAIDED (POLYGLACTIN 910), SYNTHETIC ABSORBABLE SUTURE: Brand: COATED VICRYL

## (undated) DEVICE — TOOL MR8-10MH30 MR8 10CM MATCH 3MM: Brand: MIDAS REX MR8

## (undated) DEVICE — PATIENT RETURN ELECTRODE, SINGLE-USE, CONTACT QUALITY MONITORING, ADULT, WITH 9FT CORD, FOR PATIENTS WEIGING OVER 33LBS. (15KG): Brand: MEGADYNE

## (undated) DEVICE — 3M™ MEDIPORE™ H SOFT CLOTH SURGICAL TAPE, 2863, 3 IN X 10 YD, 12/CASE: Brand: 3M™ MEDIPORE™

## (undated) DEVICE — ELECTRD BLD EZ CLN STD 2.5IN

## (undated) DEVICE — SUT NUROLON 4/0 TF18 CR8 I8IN C584D

## (undated) DEVICE — DRESSING,GAUZE,XEROFORM,CURAD,1"X8",ST: Brand: CURAD

## (undated) DEVICE — GLV SURG DERMASSURE GRN LF PF SZ 6.5

## (undated) DEVICE — PENCL ROCKRSWCH MEGADYNE W/HOLSTR 10FT SS

## (undated) DEVICE — TOOL MR8-12MH30 MR8 12CM MATCH 3MM: Brand: MIDAS REX MR8

## (undated) DEVICE — CRANIOTOMY DRAPE, STERILE: Brand: MEDLINE

## (undated) DEVICE — SUT SILK 2/0 PS 18IN 1588H

## (undated) DEVICE — Device

## (undated) DEVICE — DRAPE,INSTRUMENT,MAGNETIC,10X16: Brand: MEDLINE

## (undated) DEVICE — PERF CRANI RND/CUT/EDGE 14/11MM A/ DISP

## (undated) DEVICE — TOOL MR8-9AC60M MR8 9CM ACORN 6MM 2FLT: Brand: MIDAS REX MR8

## (undated) DEVICE — SHEET, DRAPE, SPLIT, STERILE: Brand: MEDLINE